# Patient Record
Sex: MALE | Race: BLACK OR AFRICAN AMERICAN | NOT HISPANIC OR LATINO | Employment: FULL TIME | URBAN - METROPOLITAN AREA
[De-identification: names, ages, dates, MRNs, and addresses within clinical notes are randomized per-mention and may not be internally consistent; named-entity substitution may affect disease eponyms.]

---

## 2017-01-17 RX ORDER — FLUTICASONE PROPIONATE 50 MCG
SPRAY, SUSPENSION (ML) NASAL
Qty: 16 G | Refills: 0 | Status: SHIPPED | OUTPATIENT
Start: 2017-01-17 | End: 2017-02-16 | Stop reason: SDUPTHER

## 2017-02-08 ENCOUNTER — TELEPHONE (OUTPATIENT)
Dept: INTERNAL MEDICINE | Facility: CLINIC | Age: 67
End: 2017-02-08

## 2017-02-08 NOTE — TELEPHONE ENCOUNTER
----- Message from Francis Dexter sent at 2/8/2017 11:20 AM CST -----  Contact: self  Pt called in about wanting to get lab work done. Please put lab in system.       Thank You

## 2017-02-09 ENCOUNTER — LAB VISIT (OUTPATIENT)
Dept: LAB | Facility: HOSPITAL | Age: 67
End: 2017-02-09
Attending: INTERNAL MEDICINE
Payer: MEDICARE

## 2017-02-09 DIAGNOSIS — I10 ESSENTIAL HYPERTENSION: ICD-10-CM

## 2017-02-09 DIAGNOSIS — E78.5 HYPERLIPIDEMIA, UNSPECIFIED HYPERLIPIDEMIA TYPE: ICD-10-CM

## 2017-02-09 LAB
ALBUMIN SERPL BCP-MCNC: 3.7 G/DL
ALP SERPL-CCNC: 92 U/L
ALT SERPL W/O P-5'-P-CCNC: 14 U/L
ANION GAP SERPL CALC-SCNC: 6 MMOL/L
AST SERPL-CCNC: 20 U/L
BASOPHILS # BLD AUTO: 0.04 K/UL
BASOPHILS NFR BLD: 0.5 %
BILIRUB SERPL-MCNC: 0.7 MG/DL
BUN SERPL-MCNC: 17 MG/DL
CALCIUM SERPL-MCNC: 9.3 MG/DL
CHLORIDE SERPL-SCNC: 106 MMOL/L
CHOLEST/HDLC SERPL: 4 {RATIO}
CO2 SERPL-SCNC: 29 MMOL/L
CREAT SERPL-MCNC: 1.4 MG/DL
DIFFERENTIAL METHOD: NORMAL
EOSINOPHIL # BLD AUTO: 0.4 K/UL
EOSINOPHIL NFR BLD: 4.9 %
ERYTHROCYTE [DISTWIDTH] IN BLOOD BY AUTOMATED COUNT: 13.3 %
EST. GFR  (AFRICAN AMERICAN): 59.7 ML/MIN/1.73 M^2
EST. GFR  (NON AFRICAN AMERICAN): 51.6 ML/MIN/1.73 M^2
GLUCOSE SERPL-MCNC: 96 MG/DL
HCT VFR BLD AUTO: 44.1 %
HDL/CHOLESTEROL RATIO: 25 %
HDLC SERPL-MCNC: 204 MG/DL
HDLC SERPL-MCNC: 51 MG/DL
HGB BLD-MCNC: 14.3 G/DL
LDLC SERPL CALC-MCNC: 131 MG/DL
LYMPHOCYTES # BLD AUTO: 2.9 K/UL
LYMPHOCYTES NFR BLD: 38.6 %
MCH RBC QN AUTO: 27.2 PG
MCHC RBC AUTO-ENTMCNC: 32.4 %
MCV RBC AUTO: 84 FL
MONOCYTES # BLD AUTO: 0.8 K/UL
MONOCYTES NFR BLD: 11.2 %
NEUTROPHILS # BLD AUTO: 3.3 K/UL
NEUTROPHILS NFR BLD: 44.3 %
NONHDLC SERPL-MCNC: 153 MG/DL
PLATELET # BLD AUTO: 232 K/UL
PMV BLD AUTO: 11.5 FL
POTASSIUM SERPL-SCNC: 4.2 MMOL/L
PROT SERPL-MCNC: 7.1 G/DL
RBC # BLD AUTO: 5.25 M/UL
SODIUM SERPL-SCNC: 141 MMOL/L
TRIGL SERPL-MCNC: 110 MG/DL
WBC # BLD AUTO: 7.4 K/UL

## 2017-02-09 PROCEDURE — 80053 COMPREHEN METABOLIC PANEL: CPT

## 2017-02-09 PROCEDURE — 80061 LIPID PANEL: CPT

## 2017-02-09 PROCEDURE — 85025 COMPLETE CBC W/AUTO DIFF WBC: CPT

## 2017-02-09 PROCEDURE — 36415 COLL VENOUS BLD VENIPUNCTURE: CPT | Mod: PO

## 2017-02-16 ENCOUNTER — TELEPHONE (OUTPATIENT)
Dept: INTERNAL MEDICINE | Facility: CLINIC | Age: 67
End: 2017-02-16

## 2017-02-16 NOTE — TELEPHONE ENCOUNTER
----- Message from Krista Zavala sent at 2/16/2017  1:48 PM CST -----  Contact: Online Dealer request  Message     Appointment Request From: Julio Bernardo        With Provider: Ryan Loera MD [-Primary Care Physician-]        Would Accept With:Only the person I've selected        Preferred Date Range: From 2/16/2017 To 2/20/2017        Preferred Times: Any        Reason for visit: Request an Appt        Comments:    Recently completed blood work and out of prescription drugs. need renewals.

## 2017-02-16 NOTE — TELEPHONE ENCOUNTER
Make him an appointment -  I usually see 10 - 11 in the morning-  Put him in on a day I don't have that many.

## 2017-02-17 ENCOUNTER — PATIENT MESSAGE (OUTPATIENT)
Dept: INTERNAL MEDICINE | Facility: CLINIC | Age: 67
End: 2017-02-17

## 2017-02-17 RX ORDER — SIMVASTATIN 20 MG/1
20 TABLET, FILM COATED ORAL NIGHTLY
Qty: 30 TABLET | Refills: 6 | Status: SHIPPED | OUTPATIENT
Start: 2017-02-17 | End: 2017-02-20 | Stop reason: SDUPTHER

## 2017-02-17 RX ORDER — LOSARTAN POTASSIUM 100 MG/1
100 TABLET ORAL DAILY
Qty: 30 TABLET | Refills: 6 | Status: SHIPPED | OUTPATIENT
Start: 2017-02-17 | End: 2017-02-17 | Stop reason: SDUPTHER

## 2017-02-17 RX ORDER — LOSARTAN POTASSIUM 100 MG/1
100 TABLET ORAL DAILY
Qty: 30 TABLET | Refills: 6 | Status: SHIPPED | OUTPATIENT
Start: 2017-02-17 | End: 2017-02-20 | Stop reason: SDUPTHER

## 2017-02-17 RX ORDER — SIMVASTATIN 20 MG/1
20 TABLET, FILM COATED ORAL NIGHTLY
Qty: 30 TABLET | Refills: 6 | Status: SHIPPED | OUTPATIENT
Start: 2017-02-17 | End: 2017-02-17 | Stop reason: SDUPTHER

## 2017-02-19 RX ORDER — FLUTICASONE PROPIONATE 50 MCG
1 SPRAY, SUSPENSION (ML) NASAL DAILY
Qty: 16 G | Refills: 4 | Status: SHIPPED | OUTPATIENT
Start: 2017-02-19 | End: 2017-02-20 | Stop reason: SDUPTHER

## 2017-02-20 DIAGNOSIS — L29.9 ITCHING OF EAR: ICD-10-CM

## 2017-02-20 RX ORDER — HYDROCORTISONE AND ACETIC ACID 20.75; 10.375 MG/ML; MG/ML
SOLUTION AURICULAR (OTIC)
Qty: 10 ML | Refills: 2 | Status: SHIPPED | OUTPATIENT
Start: 2017-02-20 | End: 2022-10-03

## 2017-02-20 RX ORDER — SILDENAFIL 100 MG/1
100 TABLET, FILM COATED ORAL DAILY PRN
Qty: 16 TABLET | Refills: 0 | Status: SHIPPED | OUTPATIENT
Start: 2017-02-20 | End: 2017-03-06 | Stop reason: SDUPTHER

## 2017-02-20 RX ORDER — SIMVASTATIN 20 MG/1
20 TABLET, FILM COATED ORAL NIGHTLY
Qty: 90 TABLET | Refills: 0 | Status: SHIPPED | OUTPATIENT
Start: 2017-02-20 | End: 2017-02-23 | Stop reason: SDUPTHER

## 2017-02-20 RX ORDER — LORATADINE 10 MG/1
10 TABLET ORAL DAILY
Qty: 90 TABLET | Refills: 1 | Status: SHIPPED | OUTPATIENT
Start: 2017-02-20 | End: 2021-10-01

## 2017-02-20 RX ORDER — INDOMETHACIN 50 MG/1
50 CAPSULE ORAL 2 TIMES DAILY WITH MEALS
Qty: 60 CAPSULE | Refills: 2 | Status: SHIPPED | OUTPATIENT
Start: 2017-02-20 | End: 2017-09-07

## 2017-02-20 RX ORDER — LOSARTAN POTASSIUM 100 MG/1
100 TABLET ORAL DAILY
Qty: 90 TABLET | Refills: 0 | Status: SHIPPED | OUTPATIENT
Start: 2017-02-20 | End: 2017-02-23 | Stop reason: SDUPTHER

## 2017-02-20 RX ORDER — FLUTICASONE PROPIONATE 50 MCG
1 SPRAY, SUSPENSION (ML) NASAL DAILY
Qty: 16 G | Refills: 4 | Status: SHIPPED | OUTPATIENT
Start: 2017-02-20 | End: 2017-02-23 | Stop reason: SDUPTHER

## 2017-02-24 RX ORDER — LOSARTAN POTASSIUM 100 MG/1
100 TABLET ORAL DAILY
Qty: 90 TABLET | Refills: 4 | Status: SHIPPED | OUTPATIENT
Start: 2017-02-24 | End: 2017-03-06 | Stop reason: SDUPTHER

## 2017-02-24 RX ORDER — FLUTICASONE PROPIONATE 50 MCG
1 SPRAY, SUSPENSION (ML) NASAL DAILY
Qty: 16 G | Refills: 4 | Status: SHIPPED | OUTPATIENT
Start: 2017-02-24 | End: 2017-09-04 | Stop reason: SDUPTHER

## 2017-02-24 RX ORDER — SIMVASTATIN 20 MG/1
20 TABLET, FILM COATED ORAL NIGHTLY
Qty: 90 TABLET | Refills: 4 | Status: SHIPPED | OUTPATIENT
Start: 2017-02-24 | End: 2017-09-07

## 2017-03-06 ENCOUNTER — HOSPITAL ENCOUNTER (OUTPATIENT)
Dept: RADIOLOGY | Facility: HOSPITAL | Age: 67
Discharge: HOME | End: 2017-03-06
Attending: INTERNAL MEDICINE
Payer: MEDICARE

## 2017-03-06 ENCOUNTER — OFFICE VISIT (OUTPATIENT)
Dept: INTERNAL MEDICINE | Facility: CLINIC | Age: 67
End: 2017-03-06
Payer: MEDICARE

## 2017-03-06 VITALS
WEIGHT: 241.63 LBS | HEART RATE: 75 BPM | DIASTOLIC BLOOD PRESSURE: 78 MMHG | BODY MASS INDEX: 32.02 KG/M2 | SYSTOLIC BLOOD PRESSURE: 135 MMHG | HEIGHT: 73 IN

## 2017-03-06 DIAGNOSIS — M17.0 OSTEOARTHRITIS OF BOTH KNEES, UNSPECIFIED OSTEOARTHRITIS TYPE: ICD-10-CM

## 2017-03-06 DIAGNOSIS — E78.5 HYPERLIPIDEMIA, UNSPECIFIED HYPERLIPIDEMIA TYPE: Primary | ICD-10-CM

## 2017-03-06 DIAGNOSIS — I10 ESSENTIAL HYPERTENSION: ICD-10-CM

## 2017-03-06 DIAGNOSIS — M79.673 PAIN OF FOOT, UNSPECIFIED LATERALITY: ICD-10-CM

## 2017-03-06 DIAGNOSIS — K63.5 POLYP OF COLON, UNSPECIFIED PART OF COLON, UNSPECIFIED TYPE: ICD-10-CM

## 2017-03-06 PROCEDURE — 3078F DIAST BP <80 MM HG: CPT | Mod: S$GLB,,, | Performed by: INTERNAL MEDICINE

## 2017-03-06 PROCEDURE — 1157F ADVNC CARE PLAN IN RCRD: CPT | Mod: S$GLB,,, | Performed by: INTERNAL MEDICINE

## 2017-03-06 PROCEDURE — 1160F RVW MEDS BY RX/DR IN RCRD: CPT | Mod: S$GLB,,, | Performed by: INTERNAL MEDICINE

## 2017-03-06 PROCEDURE — 73560 X-RAY EXAM OF KNEE 1 OR 2: CPT | Mod: 26,RT,, | Performed by: RADIOLOGY

## 2017-03-06 PROCEDURE — 99214 OFFICE O/P EST MOD 30 MIN: CPT | Mod: S$GLB,,, | Performed by: INTERNAL MEDICINE

## 2017-03-06 PROCEDURE — 99999 PR PBB SHADOW E&M-EST. PATIENT-LVL IV: CPT | Mod: PBBFAC,,, | Performed by: INTERNAL MEDICINE

## 2017-03-06 PROCEDURE — 3075F SYST BP GE 130 - 139MM HG: CPT | Mod: S$GLB,,, | Performed by: INTERNAL MEDICINE

## 2017-03-06 PROCEDURE — 99499 UNLISTED E&M SERVICE: CPT | Mod: S$PBB,,, | Performed by: INTERNAL MEDICINE

## 2017-03-06 PROCEDURE — 1159F MED LIST DOCD IN RCRD: CPT | Mod: S$GLB,,, | Performed by: INTERNAL MEDICINE

## 2017-03-06 PROCEDURE — 73560 X-RAY EXAM OF KNEE 1 OR 2: CPT | Mod: TC,50

## 2017-03-06 PROCEDURE — 73560 X-RAY EXAM OF KNEE 1 OR 2: CPT | Mod: 26,LT,, | Performed by: RADIOLOGY

## 2017-03-06 RX ORDER — SILDENAFIL 100 MG/1
100 TABLET, FILM COATED ORAL DAILY PRN
Qty: 6 TABLET | Refills: 12 | Status: SHIPPED | OUTPATIENT
Start: 2017-03-06 | End: 2021-10-01

## 2017-03-06 RX ORDER — LOSARTAN POTASSIUM 100 MG/1
100 TABLET ORAL DAILY
Qty: 90 TABLET | Refills: 4 | Status: SHIPPED | OUTPATIENT
Start: 2017-03-06 | End: 2017-10-23

## 2017-03-06 NOTE — PROGRESS NOTES
Mr. Bernardo is a 66 -year-old gentleman coming in today for followup of his   ongoing medical problems .    1. He has hypertension. He has been on losartan 100 mg a day. He has been tolerating it well. He denies any chest pain, short windedness, palpitation, nausea, or vomiting. Blood pressure today is 135/78 . At home his bp is 120-130's over 80's and he check regularly.         2. He has hyperlipidemia. He is on simvastatin. Cholesterol from this visit was reviewed---tc was 204, hdl 51 , .       3. . Colon polyps: He had 2 colon polyps in 2007, and 1 polyps in 9/2013-- Next c-scope was 9/2016. .      4. he has been having Right foot pain for 10 months. He feels a knot under the base of his second toe. He saw a podiatrist in the 9/2016   And looks like she prescribed orthotics- but he said he has not gotten those yet.  He continue to have pain./    5. Gout-- last attack in 9/2016.           ROS : Gen - no fatigue-- his weight has gone up about 4 #.  He walks or jogs daily.    Eyes - no eye pain or visual changes  ENT - no hoarseness or sore throat  CV - No chest pain or SOB. NO palpitations.  Pulm - no cough or wheezing  GI - no N/V/D   no dysuria or incontinence  MS - no joint pain or muscle pain  Skin - no rash, or c/o of skin lesions  Neuro - no HA, dizziness--- memory is doing well.   Heme - no abnormal bleeding or bruising  Endo - no polydipsia, or temperature changes  Psych - no anxiety or depression            PHYSICAL EXAM: A well-appearing gentleman. Weight is 241 pounds. bmi 31.88.   Blood   pressure as above. Pulse of 75. HT 6' 1'' blood pressure    Ear canals are open. TMs are clear. Oropharynx is clear.   NECK: Supple with no JVD. Thyroid is not enlarged.   Chest : CTA bilaterally   CARDIOVASCULAR: S1, S2, regular rate and rhythm without murmur, gallop, or  rub.   ABDOMEN: Soft, nontender.   LOWER EXTREMITIES: No edema  He has no cervical, axillary, or inguinal adenopathy.   Bicep reflexs:  nomal and equal bilaterally  Lower extremities: Normal muscle strength. No edema or cyanosis. TP/PT pulses+2 bilaterally. He does have some tenderness under his right foot.     ASSESSMENT:   1. Hyperlipidemia. His chol came back- continue the smivastatin.   2. Hypertension. conitnue the current bp meds.   3. Obesity. Spoke to him about diet and exercise. He actually seems to be exercising pretty well.     4. Colon polyp- c-scope due in another 3 years-- 9/2016.   5. Gout- - no recent episode. - We reviewed the gout diet.  6. Foot pain-- will refer back to  poditry-   7. optho- will refer back to optho.  Had been seen by optho for laser procedure after cataract surgery in 10/2016- but missed his follow up.  No eye pain-  Vision is good.    8. OA of knees- s/p chalogen injections in past- now they are starting to bother him again.  He is not taking anything for them at this time.  -- he can take some tylenol and use biofreeze- will get xrays and have him see ortho.      I will follow him up again in 6 months. He is going to let me know if   anything else come up.

## 2017-03-06 NOTE — MR AVS SNAPSHOT
[facility]  [address]  [facility] [address]  Phone: [phone]  Fax: [phone]                  Julio Bernardo   3/6/2017 9:00 AM   Office Visit    Description:  Male : 1950   Provider:  Ryan Loera Jr., MD   Department:  [facility] Medicine           Reason for Visit     Follow-up           Diagnoses this Visit        Comments    Hyperlipidemia, unspecified hyperlipidemia type    -  Primary     Essential hypertension         Polyp of colon, unspecified part of colon, unspecified type         Osteoarthritis of both knees, unspecified osteoarthritis type         Pain of foot, unspecified laterality                To Do List           Goals (5 Years of Data)     None       These Medications        Disp Refills Start End    losartan (COZAAR) 100 MG tablet 90 tablet 4 3/6/2017     Take 1 tablet (100 mg total) by mouth once daily. - Oral    Pharmacy: [facility]/pharmacy #[id number] [place], [place] - [address] Ph #: [phone]       sildenafil (VIAGRA) 100 MG tablet 6 tablet 12 3/6/2017     Take 1 tablet (100 mg total) by mouth daily as needed for Erectile Dysfunction. - Oral    Pharmacy: [facility]/pharmacy #[id number] [facility] [place] - [address] Ph #: [phone]         [facility]     [facility] On [facility] Nurse Care Line -  Assistance  Registered nurses in the [facility] Center provide clinical advisement, health education, appointment booking, and other advisory services.  Call for this free service at [phone].             Medications           Message regarding Medications     Verify the changes and/or additions to your medication regime listed below are the same as discussed with your clinician today.  If any of these changes or additions are incorrect, please notify your healthcare provider.             Verify that the below list of medications is an accurate representation of the medications you are currently taking.  If  "none reported, the list may be blank. If incorrect, please contact your healthcare provider. Carry this list with you in case of emergency.           Current Medications     ascorbic acid (VITAMIN C) 1000 MG tablet Take 1,000 mg by mouth once daily.    aspirin 81 mg Tab Take by mouth. 1 Tablet Oral Every day.  Last taken 3/25/11    fish oil-omega-3 fatty acids 300-1,000 mg capsule Take 2 g by mouth once daily.    fluticasone (FLONASE) 50 mcg/actuation nasal spray 1 spray by Each Nare route once daily.    losartan (COZAAR) 100 MG tablet Take 1 tablet (100 mg total) by mouth once daily.    multivitamin capsule Take 1 capsule by mouth once daily.    sildenafil (VIAGRA) 100 MG tablet Take 1 tablet (100 mg total) by mouth daily as needed for Erectile Dysfunction.    simvastatin (ZOCOR) 20 MG tablet Take 1 tablet (20 mg total) by mouth every evening.    acetic acid-hydrocortisone (VOSOL-HC) otic solution 2-4 drops to affected ear twice a day    indomethacin (INDOCIN) 50 MG capsule Take 1 capsule (50 mg total) by mouth 2 (two) times daily with meals.    loratadine (CLARITIN) 10 mg tablet Take 1 tablet (10 mg total) by mouth once daily.    PREVIDENT 5000 SENSITIVE 1.1-5 % Pste     sodium,potassium,mag sulfates (SUPREP BOWEL PREP KIT) 17.5-3.13-1.6 gram SolR Take 1 kit by mouth as directed.           Clinical Reference Information           Your Vitals Were     BP Pulse Height Weight BMI    135/78 (BP Location: Right arm, Patient Position: Sitting, BP Method: Automatic) 75 6' 1" (1.854 m) 109.6 kg (241 lb 10 oz) 31.88 kg/m2      Blood Pressure          Most Recent Value    BP  135/78      Allergies as of 3/6/2017     Penicillins    V-cillin K      Immunizations Administered on Date of Encounter - 3/6/2017     None      Orders Placed During Today's Visit      Normal Orders This Visit    Ambulatory consult to Orthopedics     Ambulatory consult to Podiatry     Future Labs/Procedures Expected by Expires    Comprehensive " metabolic panel  3/6/2017 5/5/2018    Lipid panel  3/6/2017 5/5/2018    X-ray AP Standing Knees with Both Lateral  3/6/2017 3/6/2018      Language Assistance Services     ATTENTION: Language assistance services are available, free of charge. Please call [phone].      ATENCIÓN: Si habla español, tiene a beard disposición servicios gratuitos de asistencia lingüística. Llame al [phone].     CHÚ Ý: N?u b?n nói Ti?ng Vi?t, có các d?ch v? h? tr? ngôn ng? mi?n phí dành cho b?n. G?i s? [phone].         [facility] complies with applicable Federal civil rights laws and does not discriminate on the basis of race, color, national origin, age, disability, or sex.

## 2017-03-09 ENCOUNTER — OFFICE VISIT (OUTPATIENT)
Dept: ORTHOPEDICS | Facility: CLINIC | Age: 67
End: 2017-03-09
Payer: MEDICARE

## 2017-03-09 VITALS
DIASTOLIC BLOOD PRESSURE: 93 MMHG | HEART RATE: 68 BPM | HEIGHT: 72 IN | SYSTOLIC BLOOD PRESSURE: 153 MMHG | WEIGHT: 235.88 LBS | RESPIRATION RATE: 16 BRPM | BODY MASS INDEX: 31.95 KG/M2

## 2017-03-09 DIAGNOSIS — M17.0 PRIMARY OSTEOARTHRITIS OF BOTH KNEES: Primary | ICD-10-CM

## 2017-03-09 PROCEDURE — 99203 OFFICE O/P NEW LOW 30 MIN: CPT | Mod: S$GLB,,, | Performed by: PHYSICIAN ASSISTANT

## 2017-03-09 PROCEDURE — 1157F ADVNC CARE PLAN IN RCRD: CPT | Mod: S$GLB,,, | Performed by: PHYSICIAN ASSISTANT

## 2017-03-09 PROCEDURE — 3077F SYST BP >= 140 MM HG: CPT | Mod: S$GLB,,, | Performed by: PHYSICIAN ASSISTANT

## 2017-03-09 PROCEDURE — 1159F MED LIST DOCD IN RCRD: CPT | Mod: S$GLB,,, | Performed by: PHYSICIAN ASSISTANT

## 2017-03-09 PROCEDURE — 1160F RVW MEDS BY RX/DR IN RCRD: CPT | Mod: S$GLB,,, | Performed by: PHYSICIAN ASSISTANT

## 2017-03-09 PROCEDURE — 99999 PR PBB SHADOW E&M-EST. PATIENT-LVL IV: CPT | Mod: PBBFAC,,, | Performed by: PHYSICIAN ASSISTANT

## 2017-03-09 PROCEDURE — 3080F DIAST BP >= 90 MM HG: CPT | Mod: S$GLB,,, | Performed by: PHYSICIAN ASSISTANT

## 2017-03-09 PROCEDURE — 1125F AMNT PAIN NOTED PAIN PRSNT: CPT | Mod: S$GLB,,, | Performed by: PHYSICIAN ASSISTANT

## 2017-03-09 RX ORDER — HYALURONATE SODIUM 20 MG/2 ML
40 SYRINGE (ML) INTRAARTICULAR WEEKLY
Status: COMPLETED | OUTPATIENT
Start: 2017-03-09 | End: 2017-03-30

## 2017-03-09 NOTE — PROGRESS NOTES
SUBJECTIVE:     Chief Complaint & History of Present Illness:  Julio Bernardo is a New patient 66 y.o. male who is seen here today with a complaint of    Chief Complaint   Patient presents with    Right Knee - Pain    Left Knee - Pain    . Patient reports she's had a slow insidious onset of pain in bilateral ears of prolonged sitting and/or negotiating stairs.  He's had no specific trauma or injury to his knees does not have significant swelling locking catching or giving way.  He has taken NSAIDs with short-term relief but has an immediate return of pain upon completion of the NSAIDs.  Reports she underwent a arthroscopy to the right knee several years ago for meniscal tear which relieved his symptoms for over 2 and half years.  He remains active and exercises daily but does try to avoid high-impact activities  On a scale of 1-10, with 10 being worst pain imaginable, he rates this pain as 4 on good days and 7 on bad days.  he describes the sore and achyAllergen Reactions    Penicillins     V-cillin k          Current Outpatient Prescriptions   Medication Sig Dispense Refill    acetic acid-hydrocortisone (VOSOL-HC) otic solution 2-4 drops to affected ear twice a day 10 mL 2    ascorbic acid (VITAMIN C) 1000 MG tablet Take 1,000 mg by mouth once daily.      aspirin 81 mg Tab Take by mouth. 1 Tablet Oral Every day.  Last taken 3/25/11      fish oil-omega-3 fatty acids 300-1,000 mg capsule Take 2 g by mouth once daily.      fluticasone (FLONASE) 50 mcg/actuation nasal spray 1 spray by Each Nare route once daily. 16 g 4    indomethacin (INDOCIN) 50 MG capsule Take 1 capsule (50 mg total) by mouth 2 (two) times daily with meals. 60 capsule 2    loratadine (CLARITIN) 10 mg tablet Take 1 tablet (10 mg total) by mouth once daily. 90 tablet 1    losartan (COZAAR) 100 MG tablet Take 1 tablet (100 mg total) by mouth once daily. 90 tablet 4    multivitamin capsule Take 1 capsule by mouth once daily.       PREVIDENT 5000 SENSITIVE 1.1-5 % Pste   2    sildenafil (VIAGRA) 100 MG tablet Take 1 tablet (100 mg total) by mouth daily as needed for Erectile Dysfunction. 6 tablet 12    simvastatin (ZOCOR) 20 MG tablet Take 1 tablet (20 mg total) by mouth every evening. 90 tablet 4    sodium,potassium,mag sulfates (SUPREP BOWEL PREP KIT) 17.5-3.13-1.6 gram SolR Take 1 kit by mouth as directed. 354 mL 0     Current Facility-Administered Medications   Medication Dose Route Frequency Provider Last Rate Last Dose    EUFLEXXA injection Syrg 40 mg  40 mg Intra-articular Weekly Damien Lee PA-C           Past Medical History:   Diagnosis Date    Allergy     Colon polyps     Hyperlipemia     Hypertension     Posterior capsular opacification        Past Surgical History:   Procedure Laterality Date    CATARACT EXTRACTION      both eyes    COLONOSCOPY N/A 9/27/2016    Procedure: COLONOSCOPY;  Surgeon: Jan Argueta MD;  Location: [address]);  Service: Endoscopy;  Laterality: N/A;    EYE SURGERY      HERNIA REPAIR      WISDOM TOOTH EXTRACTION      YAG      Left Eye       Vital Signs (Most Recent)  Vitals:    03/09/17 1114   BP: (!) 153/93   Pulse: 68   Resp: 16           Review of Systems:  ROS:  Constitutional: no fever or chills  Eyes: no visual changes, Bilateral vitreous attachments  ENT: no nasal congestion or sore throat  Respiratory: no cough or shortness of breath  Cardiovascular: no chest pain or palpitations  Gastrointestinal: no nausea or vomiting, tolerating diet  Genitourinary: no hematuria or dysuria  Integument/Breast: no rash or pruritis  Hematologic/Lymphatic: no easy bruising or lymphadenopathy  Musculoskeletal: no arthralgias or myalgias  Neurological: no seizures or tremors  Behavioral/Psych: no auditory or visual hallucinations  Endocrine: no heat or cold intolerance                OBJECTIVE:     PHYSICAL EXAM:  Height: 6' (182.9 cm) Weight: 107 kg (235 lb 14.3 oz), General Appearance:  Well nourished, well developed, in no acute distress.  Neurological: Mood & affect are normal.  bilateral Knee Exam:  Knee Range of Motion:0-115 degrees flexion   Effusion:not significant  Condition of skin:intact  Location of tenderness:Medial joint line   Strength:limited by pain and 5 of 5  Stability:  Lachman: stable, LCL: stable, MCL: stable, PCL: stable and posteromedial (dial): stable  Varus /Valgus stress:   Mild varus  Lyric:   negative/negative  Hip Examination:  full painless range of motion, without tenderness    RADIOGRAPHS:  X-rays taken today films reviewed by me demonstrate moderate arthritic changes throughout both knees with significant joint space narrowing tricompartmentally bilaterally more so in the medial compartments there is no marked osteophytic spurring and early sclerotic changes can be detected no other evidence of fracture dislocation or other bony abnormalities     ASSESSMENT/PLAN:     Plan: We discussed with the patient at length all the different treatment options available for arthrosis of the knee including anti-inflammatories, acetaminophen, rest, ice, knee strengthening exercise, occasional cortisone injections for temporary relief, Viscosupplimentation injections, arthroscopic debridement osteotomy, and finally knee arthroplasty.patient like to begin with a course of physical supplementation treatment.  We will order the medication see him back next week to begin injection therapy

## 2017-03-09 NOTE — LETTER
March 9, 2017      Ryan Loera Jr., MD  [address]  [place] [address]           [facility] Orthopedics  [address]  [facility] [address]  Phone: [phone]          Patient: Julio Bernardo   MR Number: [id number]   YOB: 1950   Date of Visit: 3/9/2017       Dear Dr. Ryan Loera Jr.:    Thank you for referring Julio Bernardo to me for evaluation. Attached you will find relevant portions of my assessment and plan of care.    If you have questions, please do not hesitate to call me. I look forward to following Julio Bernardo along with you.    Sincerely,    Damien Lee PA-C    Enclosure  CC:  No Recipients    If you would like to receive this communication electronically, please contact [email or web address] or [phone] to request more information on [employer] Link access.    For providers and/or their staff who would like to refer a patient to [facility], please contact us through our one-stop-shop provider referral line, [facility], at [phone].    If you feel you have received this communication in error or would no longer like to receive these types of communications, please e-mail [email or web address]

## 2017-03-16 ENCOUNTER — OFFICE VISIT (OUTPATIENT)
Dept: ORTHOPEDICS | Facility: CLINIC | Age: 67
End: 2017-03-16
Payer: MEDICARE

## 2017-03-16 VITALS — WEIGHT: 235.88 LBS | BODY MASS INDEX: 31.95 KG/M2 | HEIGHT: 72 IN

## 2017-03-16 DIAGNOSIS — M17.0 PRIMARY OSTEOARTHRITIS OF BOTH KNEES: Primary | ICD-10-CM

## 2017-03-16 PROCEDURE — 99999 PR PBB SHADOW E&M-EST. PATIENT-LVL III: CPT | Mod: PBBFAC,,, | Performed by: PHYSICIAN ASSISTANT

## 2017-03-16 PROCEDURE — 99499 UNLISTED E&M SERVICE: CPT | Mod: S$GLB,,, | Performed by: PHYSICIAN ASSISTANT

## 2017-03-16 PROCEDURE — 20610 DRAIN/INJ JOINT/BURSA W/O US: CPT | Mod: 50,S$GLB,, | Performed by: PHYSICIAN ASSISTANT

## 2017-03-16 RX ADMIN — Medication 40 MG: at 09:03

## 2017-03-16 NOTE — LETTER
March 16, 2017      Brenton Lay MD  [address]  [place] [address]           [facility]oped[facility]  [address]  [facility] [address]  Phone: [phone]          Patient: Julio Bernardo   MR Number: [id number]   YOB: 1950   Date of Visit: 3/16/2017       Dear Dr. Brenton Lay:    Thank you for referring Julio Bernardo to me for evaluation. Attached you will find relevant portions of my assessment and plan of care.    If you have questions, please do not hesitate to call me. I look forward to following Julio Bernardo along with you.    Sincerely,    Damien Lee PA-C    Enclosure  CC:  No Recipients    If you would like to receive this communication electronically, please contact [email or web address] or [phone] to request more information on [employer] Link access.    For providers and/or their staff who would like to refer a patient to [facility], please contact us through our one-stop-shop provider referral line, [facility], at [phone].    If you feel you have received this communication in error or would no longer like to receive these types of communications, please e-mail [email or web address]

## 2017-03-16 NOTE — PROGRESS NOTES
Julio Bernardo is a 66 y.o. year old his here today for his 1st Euflexxa injection for degenerative changes of his bilateral knee . he was last seen and treated in the clinic on 3/9/2017. There has been no significant change in his medical status since his last visit. No Fever, chills, malaise, or unexplained weight change.      Allergies, Medications, past medical and surgical history were reviewed .    Examination of the knee demonstrates  No evidence of edema, erythema , echymosis strength and range of motion are unchanged from previous visit.    The injection site was identified and the skin was prepared with a betadine solution. The joint was injected with 2 ml of Euflexxa solution under sterile technique. Julio Bernardo tolerated the procedure well, he was advised to rest the knee today, ice and elevation. I may take 3 -6 weeks following the last injection to get the full benefit of the medication.  I will see him back in 1 week. Sooner if he has any problems or concerns.           .

## 2017-03-20 ENCOUNTER — OFFICE VISIT (OUTPATIENT)
Dept: PODIATRY | Facility: CLINIC | Age: 67
End: 2017-03-20
Payer: MEDICARE

## 2017-03-20 VITALS
DIASTOLIC BLOOD PRESSURE: 79 MMHG | WEIGHT: 237 LBS | HEIGHT: 72 IN | HEART RATE: 85 BPM | BODY MASS INDEX: 32.1 KG/M2 | SYSTOLIC BLOOD PRESSURE: 136 MMHG

## 2017-03-20 DIAGNOSIS — M20.41 HAMMER TOE OF RIGHT FOOT: ICD-10-CM

## 2017-03-20 DIAGNOSIS — M79.2 NEURITIS: ICD-10-CM

## 2017-03-20 DIAGNOSIS — M79.671 RIGHT FOOT PAIN: Primary | ICD-10-CM

## 2017-03-20 DIAGNOSIS — M77.51: ICD-10-CM

## 2017-03-20 PROCEDURE — 1160F RVW MEDS BY RX/DR IN RCRD: CPT | Mod: S$GLB,,, | Performed by: PODIATRIST

## 2017-03-20 PROCEDURE — 1159F MED LIST DOCD IN RCRD: CPT | Mod: S$GLB,,, | Performed by: PODIATRIST

## 2017-03-20 PROCEDURE — 1125F AMNT PAIN NOTED PAIN PRSNT: CPT | Mod: S$GLB,,, | Performed by: PODIATRIST

## 2017-03-20 PROCEDURE — 1157F ADVNC CARE PLAN IN RCRD: CPT | Mod: S$GLB,,, | Performed by: PODIATRIST

## 2017-03-20 PROCEDURE — 3078F DIAST BP <80 MM HG: CPT | Mod: S$GLB,,, | Performed by: PODIATRIST

## 2017-03-20 PROCEDURE — 99999 PR PBB SHADOW E&M-EST. PATIENT-LVL III: CPT | Mod: PBBFAC,,, | Performed by: PODIATRIST

## 2017-03-20 PROCEDURE — 3075F SYST BP GE 130 - 139MM HG: CPT | Mod: S$GLB,,, | Performed by: PODIATRIST

## 2017-03-20 PROCEDURE — 99214 OFFICE O/P EST MOD 30 MIN: CPT | Mod: S$GLB,,, | Performed by: PODIATRIST

## 2017-03-20 NOTE — LETTER
March 22, 2017      Ryan Loera Jr., MD  [address]  [place] [address]           [facility] - Podiat[facility]  [address]  [facility] [address]  Phone: [phone]          Patient: Julio Bernardo   MR Number: [id number]   YOB: 1950   Date of Visit: 3/20/2017       Dear Dr. Ryan Loera Jr.:    Thank you for referring Julio Bernardo to me for evaluation. Attached you will find relevant portions of my assessment and plan of care.    If you have questions, please do not hesitate to call me. I look forward to following Julio Bernardo along with you.    Sincerely,    Chen Ellington DPM    Enclosure  CC:  No Recipients    If you would like to receive this communication electronically, please contact [email or web address] or [phone] to request more information on [employer] Link access.    For providers and/or their staff who would like to refer a patient to [facility], please contact us through our one-stop-shop provider referral line, [facility], at [phone].    If you feel you have received this communication in error or would no longer like to receive these types of communications, please e-mail [email or web address]

## 2017-03-20 NOTE — PROGRESS NOTES
CC:   painful right foot       HPI:   Julio Bernardo is a 66 y.o. male who presents to clinic with complaints of  Pain sub 2nd metatarsal head of the right foot.  Still present since last visit.  He has not gotten custom foot orthotics    No redness, swelling or wounds noted. He is in dress shoes, pointy toe.       Patient Active Problem List   Diagnosis    After-cataract, obscuring vision - Left Eye    Vitreous detachment - Both Eyes    Idiopathic hypertension    Astigmatism - Both Eyes    Post-operative state - Left Eye    Hyperlipemia    Hypertension    ED (erectile dysfunction)    Colon polyp    Right posterior capsular opacification    Pseudophakia       Current Outpatient Prescriptions on File Prior to Visit   Medication Sig Dispense Refill    acetic acid-hydrocortisone (VOSOL-HC) otic solution 2-4 drops to affected ear twice a day 10 mL 2    ascorbic acid (VITAMIN C) 1000 MG tablet Take 1,000 mg by mouth once daily.      aspirin 81 mg Tab Take by mouth. 1 Tablet Oral Every day.  Last taken 3/25/11      fish oil-omega-3 fatty acids 300-1,000 mg capsule Take 2 g by mouth once daily.      fluticasone (FLONASE) 50 mcg/actuation nasal spray 1 spray by Each Nare route once daily. 16 g 4    indomethacin (INDOCIN) 50 MG capsule Take 1 capsule (50 mg total) by mouth 2 (two) times daily with meals. 60 capsule 2    loratadine (CLARITIN) 10 mg tablet Take 1 tablet (10 mg total) by mouth once daily. 90 tablet 1    losartan (COZAAR) 100 MG tablet Take 1 tablet (100 mg total) by mouth once daily. 90 tablet 4    multivitamin capsule Take 1 capsule by mouth once daily.      PREVIDENT 5000 SENSITIVE 1.1-5 % Pste   2    sildenafil (VIAGRA) 100 MG tablet Take 1 tablet (100 mg total) by mouth daily as needed for Erectile Dysfunction. 6 tablet 12    simvastatin (ZOCOR) 20 MG tablet Take 1 tablet (20 mg total) by mouth every evening. 90 tablet 4    sodium,potassium,mag sulfates (SUPREP BOWEL PREP KIT)  17.5-3.13-1.6 gram SolR Take 1 kit by mouth as directed. 354 mL 0     Current Facility-Administered Medications on File Prior to Visit   Medication Dose Route Frequency Provider Last Rate Last Dose    EUFLEXXA injection Syrg 40 mg  40 mg Intra-articular Weekly Damien Lee PA-C   40 mg at 17 0937       Review of patient's allergies indicates:   Allergen Reactions    Penicillins     V-cillin k              Review of Systems -   General ROS: negative  Respiratory ROS: no cough, shortness of breath, or wheezing  Cardiovascular ROS: no chest pain or dyspnea on exertion  Musculoskeletal ROS: positive for - joint stiffness  negative for - muscle pain or muscular weakness  Neurological ROS: no TIA or stroke symptoms  Dermatological ROS: negative        EXAM:  Vitals:    17 1357   BP: 136/79   Pulse: 85   Weight: 107.5 kg (237 lb)   Height: 6' (1.829 m)         Gen: Alert and orient x 3, no apparent distress. Cooperative.       Right   Foot:      Vascular:     Dorsalis pedis pulse 2/4   posterior tibial pulse 2/4 .   3 seconds capillary refill time and toes are warm to touch.  There is  presence of digital hair.  There is no edema.  no varicosities.    Neurological: no sensory deficits noted and normal light touch sensation    Derm: normal, good color, good turgor and no lesions    MSK:  Hammer toes right 2nd, palpable pain 2nd MPJ right, reduceable 2nd MPJ right  No pain, redness or swelling on the joints  normal 5/5 strength in all tested muscle groups, no muscle wasting or atrophy and no abnormalities of position      Imagin16   The bony structures of the right foot are intact.  mild degenerative change is seen in the first MP joint but the remainder of the MP joints are normal.  The phalanges and metatarsals are normal.  Tarsal bones are unremarkable      ASSESSMENT / PLAN:    I counseled the patient on his conditions, their implications and medical management.     Right foot pain  -      MRI Lower Extremity Without Contrast Right; Future; Expected date: 3/20/17    Neuritis - Right Foot  -     MRI Lower Extremity Without Contrast Right; Future; Expected date: 3/20/17    Hammer toe of right foot    Bursitis of foot, right    -  NSAIDS prn pain.   -  Avoid narrow toe shoes, wider extra depth shoes advised.  -  Patient wants an MRI.  Will order and call with MRI result when available.

## 2017-03-23 ENCOUNTER — OFFICE VISIT (OUTPATIENT)
Dept: ORTHOPEDICS | Facility: CLINIC | Age: 67
End: 2017-03-23
Payer: MEDICARE

## 2017-03-23 VITALS
WEIGHT: 237 LBS | DIASTOLIC BLOOD PRESSURE: 86 MMHG | SYSTOLIC BLOOD PRESSURE: 145 MMHG | RESPIRATION RATE: 16 BRPM | HEIGHT: 72 IN | BODY MASS INDEX: 32.1 KG/M2 | HEART RATE: 75 BPM

## 2017-03-23 DIAGNOSIS — M17.0 PRIMARY OSTEOARTHRITIS OF BOTH KNEES: Primary | ICD-10-CM

## 2017-03-23 PROCEDURE — 20610 DRAIN/INJ JOINT/BURSA W/O US: CPT | Mod: 50,S$GLB,, | Performed by: PHYSICIAN ASSISTANT

## 2017-03-23 PROCEDURE — 99499 UNLISTED E&M SERVICE: CPT | Mod: S$GLB,,, | Performed by: PHYSICIAN ASSISTANT

## 2017-03-23 PROCEDURE — 99999 PR PBB SHADOW E&M-EST. PATIENT-LVL IV: CPT | Mod: PBBFAC,,, | Performed by: PHYSICIAN ASSISTANT

## 2017-03-23 RX ADMIN — Medication 40 MG: at 11:03

## 2017-03-23 NOTE — LETTER
March 23, 2017      Brenton Lay MD  [address]  [place] [address]           [facility] - Orthopedics  [address]  [facility] [address]  Phone: [phone]          Patient: Julio Bernardo   MR Number: [id number]   YOB: 1950   Date of Visit: 3/23/2017       Dear Dr. Brenton Lay:    Thank you for referring Julio Bernardo to me for evaluation. Attached you will find relevant portions of my assessment and plan of care.    If you have questions, please do not hesitate to call me. I look forward to following Julio Bernardo along with you.    Sincerely,    Damien Lee PA-C    Enclosure  CC:  No Recipients    If you would like to receive this communication electronically, please contact [email or web address] or [phone] to request more information on [employer] Link access.    For providers and/or their staff who would like to refer a patient to [facility], please contact us through our one-stop-shop provider referral line, [facility], at [phone].    If you feel you have received this communication in error or would no longer like to receive these types of communications, please e-mail [email or web address]

## 2017-03-23 NOTE — PROGRESS NOTES
Julio Bernardo is a 66 y.o. year old his here today for his 2nd Euflexxa injection for degenerative changes of his bilateral knee . he was last seen and treated in the clinic on 3/16/2017. There has been no significant change in his medical status since his last visit. No Fever, chills, malaise, or unexplained weight change.      Allergies, Medications, past medical and surgical history were reviewed .    Examination of the knee demonstrates  No evidence of edema, erythema , echymosis strength and range of motion are unchanged from previous visit.    The injection site was identified and the skin was prepared with a betadine solution. The joint was injected with 2 ml of Euflexxa solution under sterile technique. Julio Bernardo tolerated the procedure well, he was advised to rest the knee today, ice and elevation. I may take 3 -6 weeks following the last injection to get the full benefit of the medication.  I will see him back in 1 week. Sooner if he has any problems or concerns.           .

## 2017-03-27 ENCOUNTER — HOSPITAL ENCOUNTER (OUTPATIENT)
Dept: RADIOLOGY | Facility: HOSPITAL | Age: 67
Discharge: HOME | End: 2017-03-27
Attending: PODIATRIST
Payer: MEDICARE

## 2017-03-27 DIAGNOSIS — M79.2 NEURITIS: ICD-10-CM

## 2017-03-27 DIAGNOSIS — M79.671 RIGHT FOOT PAIN: ICD-10-CM

## 2017-03-27 PROCEDURE — 73718 MRI LOWER EXTREMITY W/O DYE: CPT | Mod: TC,RT

## 2017-03-27 PROCEDURE — 73718 MRI LOWER EXTREMITY W/O DYE: CPT | Mod: 26,RT,, | Performed by: RADIOLOGY

## 2017-03-29 ENCOUNTER — PATIENT MESSAGE (OUTPATIENT)
Dept: PODIATRY | Facility: CLINIC | Age: 67
End: 2017-03-29

## 2017-03-30 ENCOUNTER — OFFICE VISIT (OUTPATIENT)
Dept: ORTHOPEDICS | Facility: CLINIC | Age: 67
End: 2017-03-30
Payer: MEDICARE

## 2017-03-30 VITALS
BODY MASS INDEX: 32.1 KG/M2 | DIASTOLIC BLOOD PRESSURE: 95 MMHG | RESPIRATION RATE: 16 BRPM | HEART RATE: 68 BPM | WEIGHT: 237 LBS | HEIGHT: 72 IN | SYSTOLIC BLOOD PRESSURE: 141 MMHG

## 2017-03-30 DIAGNOSIS — M17.0 PRIMARY OSTEOARTHRITIS OF BOTH KNEES: Primary | ICD-10-CM

## 2017-03-30 PROCEDURE — 99999 PR PBB SHADOW E&M-EST. PATIENT-LVL IV: CPT | Mod: PBBFAC,,, | Performed by: PHYSICIAN ASSISTANT

## 2017-03-30 PROCEDURE — 20610 DRAIN/INJ JOINT/BURSA W/O US: CPT | Mod: 50,S$GLB,, | Performed by: PHYSICIAN ASSISTANT

## 2017-03-30 PROCEDURE — 99499 UNLISTED E&M SERVICE: CPT | Mod: S$GLB,,, | Performed by: PHYSICIAN ASSISTANT

## 2017-03-30 RX ADMIN — Medication 40 MG: at 10:03

## 2017-03-30 NOTE — LETTER
March 30, 2017      Brenton Lay MD  [address]  [place] [address]           [facility] - Orthopedics  [address]  [place] [address]  Phone: [phone]          Patient: Julio Bernardo   MR Number: [id number]   YOB: 1950   Date of Visit: 3/30/2017       Dear Dr. Brenton Lay:    Thank you for referring Julio Bernardo to me for evaluation. Attached you will find relevant portions of my assessment and plan of care.    If you have questions, please do not hesitate to call me. I look forward to following Julio Bernardo along with you.    Sincerely,    Damien Lee PA-C    Enclosure  CC:  No Recipients    If you would like to receive this communication electronically, please contact [email or web address] or [phone] to request more information on [employer] Link access.    For providers and/or their staff who would like to refer a patient to [facility], please contact us through our one-stop-shop provider referral line, [facility], at [phone].    If you feel you have received this communication in error or would no longer like to receive these types of communications, please e-mail [email or web address]

## 2017-04-12 ENCOUNTER — OFFICE VISIT (OUTPATIENT)
Dept: PODIATRY | Facility: CLINIC | Age: 67
End: 2017-04-12
Payer: MEDICARE

## 2017-04-12 VITALS
HEART RATE: 75 BPM | DIASTOLIC BLOOD PRESSURE: 81 MMHG | SYSTOLIC BLOOD PRESSURE: 141 MMHG | HEIGHT: 72 IN | BODY MASS INDEX: 32.51 KG/M2 | WEIGHT: 240 LBS

## 2017-04-12 DIAGNOSIS — M79.671 RIGHT FOOT PAIN: Primary | ICD-10-CM

## 2017-04-12 DIAGNOSIS — G57.61 PLANTAR NEUROMA OF RIGHT FOOT: ICD-10-CM

## 2017-04-12 PROCEDURE — 64455 NJX AA&/STRD PLTR COM DG NRV: CPT | Mod: RT,S$GLB,, | Performed by: PODIATRIST

## 2017-04-12 PROCEDURE — 99499 UNLISTED E&M SERVICE: CPT | Mod: S$GLB,,, | Performed by: PODIATRIST

## 2017-04-12 PROCEDURE — 99999 PR PBB SHADOW E&M-EST. PATIENT-LVL III: CPT | Mod: PBBFAC,,, | Performed by: PODIATRIST

## 2017-04-12 RX ORDER — DEXAMETHASONE SODIUM PHOSPHATE 4 MG/ML
4 INJECTION, SOLUTION INTRA-ARTICULAR; INTRALESIONAL; INTRAMUSCULAR; INTRAVENOUS; SOFT TISSUE ONCE
Status: COMPLETED | OUTPATIENT
Start: 2017-04-12 | End: 2017-04-12

## 2017-04-12 RX ORDER — TRIAMCINOLONE ACETONIDE 40 MG/ML
40 INJECTION, SUSPENSION INTRA-ARTICULAR; INTRAMUSCULAR ONCE
Status: COMPLETED | OUTPATIENT
Start: 2017-04-12 | End: 2017-04-12

## 2017-04-12 RX ADMIN — DEXAMETHASONE SODIUM PHOSPHATE 4 MG: 4 INJECTION, SOLUTION INTRA-ARTICULAR; INTRALESIONAL; INTRAMUSCULAR; INTRAVENOUS; SOFT TISSUE at 03:04

## 2017-04-12 RX ADMIN — TRIAMCINOLONE ACETONIDE 40 MG: 40 INJECTION, SUSPENSION INTRA-ARTICULAR; INTRAMUSCULAR at 03:04

## 2017-04-12 NOTE — PROGRESS NOTES
CC:   painful right foot       HPI:   Julio Bernardo is a 66 y.o. male who presents to clinic with complaints of  Pain sub 2nd metatarsal head of the right foot.  Still present since last visit.  He has not gotten custom foot orthotics    No redness, swelling or wounds noted. He is in dress shoes, pointy toe.       4/12/17:  MRI done.  Shows neuroma at site of pain.  Here for cortisone injection.  He is wearing narrow toe dress shoes.         Patient Active Problem List   Diagnosis    After-cataract, obscuring vision - Left Eye    Vitreous detachment - Both Eyes    Idiopathic hypertension    Astigmatism - Both Eyes    Post-operative state - Left Eye    Hyperlipemia    Hypertension    ED (erectile dysfunction)    Colon polyp    Right posterior capsular opacification    Pseudophakia       Current Outpatient Prescriptions on File Prior to Visit   Medication Sig Dispense Refill    acetic acid-hydrocortisone (VOSOL-HC) otic solution 2-4 drops to affected ear twice a day 10 mL 2    ascorbic acid (VITAMIN C) 1000 MG tablet Take 1,000 mg by mouth once daily.      aspirin 81 mg Tab Take by mouth. 1 Tablet Oral Every day.  Last taken 3/25/11      fish oil-omega-3 fatty acids 300-1,000 mg capsule Take 2 g by mouth once daily.      fluticasone (FLONASE) 50 mcg/actuation nasal spray 1 spray by Each Nare route once daily. 16 g 4    indomethacin (INDOCIN) 50 MG capsule Take 1 capsule (50 mg total) by mouth 2 (two) times daily with meals. 60 capsule 2    loratadine (CLARITIN) 10 mg tablet Take 1 tablet (10 mg total) by mouth once daily. 90 tablet 1    losartan (COZAAR) 100 MG tablet Take 1 tablet (100 mg total) by mouth once daily. 90 tablet 4    multivitamin capsule Take 1 capsule by mouth once daily.      PREVIDENT 5000 SENSITIVE 1.1-5 % Pste   2    sildenafil (VIAGRA) 100 MG tablet Take 1 tablet (100 mg total) by mouth daily as needed for Erectile Dysfunction. 6 tablet 12    simvastatin (ZOCOR) 20 MG  tablet Take 1 tablet (20 mg total) by mouth every evening. 90 tablet 4    sodium,potassium,mag sulfates (SUPREP BOWEL PREP KIT) 17.5-3.13-1.6 gram SolR Take 1 kit by mouth as directed. 354 mL 0     No current facility-administered medications on file prior to visit.        Review of patient's allergies indicates:   Allergen Reactions    Penicillins     V-cillin k              Review of Systems -   General ROS: negative  Respiratory ROS: no cough, shortness of breath, or wheezing  Cardiovascular ROS: no chest pain or dyspnea on exertion  Musculoskeletal ROS: positive for - joint stiffness  negative for - muscle pain or muscular weakness  Neurological ROS: no TIA or stroke symptoms  Dermatological ROS: negative        EXAM:  Vitals:    17 1505   BP: (!) 141/81   Pulse: 75   Weight: 108.9 kg (240 lb)   Height: 6' (1.829 m)         Gen: Alert and orient x 3, no apparent distress. Cooperative.       Right   Foot:      Vascular:     Dorsalis pedis pulse 2/4   posterior tibial pulse 2/4 .   3 seconds capillary refill time and toes are warm to touch.  There is  presence of digital hair.  There is no edema.  no varicosities.    Neurological: no sensory deficits noted and normal light touch sensation    Derm: normal, good color, good turgor and no lesions    MSK:  Hammer toes right 2nd, palpable pain 2nd MPJ right, reduceable 2nd MPJ right  No pain, redness or swelling on the joints  normal 5/5 strength in all tested muscle groups, no muscle wasting or atrophy and no abnormalities of position    Imagin16   The bony structures of the right foot are intact.  mild degenerative change is seen in the first MP joint but the remainder of the MP joints are normal.  The phalanges and metatarsals are normal.  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    Mild degenerative changes of the forefoot.          ASSESSMENT / PLAN:    I counseled the patient on  his conditions, their implications and medical management.     Right foot pain  -     dexamethasone injection 4 mg; Inject 1 mL (4 mg total) into the muscle once.  -     triamcinolone acetonide injection 40 mg; Inject 1 mL (40 mg total) into the muscle once.    Plantar neuroma of right foot  -     dexamethasone injection 4 mg; Inject 1 mL (4 mg total) into the muscle once.  -     triamcinolone acetonide injection 40 mg; Inject 1 mL (40 mg total) into the muscle once.        -  NSAIDS prn pain.   -  Avoid narrow toe shoes, wider extra depth shoes advised.  -  With patient's permission,  a cortisone injection was performed at the Right 2nd intermetatarsal space, using 3ccs of mixture of (1cc 1% plain Lidocaine : 1cc 0.5% bupivicaine : 0.5cc kenalog-40 : 0.5cc dexamethasone).  Patient tolerated well.   Call or return to clinic prn if these symptoms worsen or fail to improve as anticipated.   Patient is amenable to plan.

## 2017-06-13 ENCOUNTER — OFFICE VISIT (OUTPATIENT)
Dept: OPTOMETRY | Facility: CLINIC | Age: 67
End: 2017-06-13
Payer: MEDICARE

## 2017-06-13 DIAGNOSIS — Z96.1 PSEUDOPHAKIA OF BOTH EYES: Primary | ICD-10-CM

## 2017-06-13 DIAGNOSIS — Z13.5 SCREENING FOR GLAUCOMA: ICD-10-CM

## 2017-06-13 PROCEDURE — 92015 DETERMINE REFRACTIVE STATE: CPT | Mod: S$GLB,,, | Performed by: OPTOMETRIST

## 2017-06-13 PROCEDURE — 99999 PR PBB SHADOW E&M-EST. PATIENT-LVL II: CPT | Mod: PBBFAC,,, | Performed by: OPTOMETRIST

## 2017-06-13 PROCEDURE — 92014 COMPRE OPH EXAM EST PT 1/>: CPT | Mod: S$GLB,,, | Performed by: OPTOMETRIST

## 2017-06-13 NOTE — PROGRESS NOTES
HPI     DLS: 10/26/2016 with Dr. Blackmon  Pt states va is blurry with most recent glasses Rx. Pt states when reading   everything gets blurry when moving to the right of the page. Pt states he   sees better without glasses.  Denies f/f      Systane ou PRN     Last edited by Angie Giles on 6/13/2017  8:08 AM. (History)        ROS     Positive for: Cardiovascular, Eyes    Negative for: Constitutional, Gastrointestinal, Neurological, Skin,   Genitourinary, Musculoskeletal, HENT, Endocrine, Respiratory, Psychiatric,   Allergic/Imm, Heme/Lymph    Last edited by Manish Godoy, OD on 6/13/2017  8:12 AM. (History)        Assessment /Plan     For exam results, see Encounter Report.    Pseudophakia of both eyes    Screening for glaucoma      1. Sp pciol/YAG OU.  2 weeks ago pt got new spex Rx based on last years refraction, and cant see out of them as well as older Rx.  Re-refracted today, and found change.  Rewrote spex Rx for remake (spex made outside of [facility])    PLAN:    rtc 1 yr

## 2017-09-04 RX ORDER — FLUTICASONE PROPIONATE 50 MCG
1 SPRAY, SUSPENSION (ML) NASAL DAILY
Qty: 1 BOTTLE | Refills: 4 | Status: SHIPPED | OUTPATIENT
Start: 2017-09-04 | End: 2018-01-08 | Stop reason: SDUPTHER

## 2017-09-05 ENCOUNTER — LAB VISIT (OUTPATIENT)
Dept: LAB | Facility: HOSPITAL | Age: 67
End: 2017-09-05
Attending: INTERNAL MEDICINE
Payer: MEDICARE

## 2017-09-05 DIAGNOSIS — I10 ESSENTIAL HYPERTENSION: ICD-10-CM

## 2017-09-05 DIAGNOSIS — E78.5 HYPERLIPIDEMIA, UNSPECIFIED HYPERLIPIDEMIA TYPE: ICD-10-CM

## 2017-09-05 LAB
ALBUMIN SERPL BCP-MCNC: 3.3 G/DL
ALP SERPL-CCNC: 92 U/L
ALT SERPL W/O P-5'-P-CCNC: 17 U/L
ANION GAP SERPL CALC-SCNC: 9 MMOL/L
AST SERPL-CCNC: 21 U/L
BILIRUB SERPL-MCNC: 0.5 MG/DL
BUN SERPL-MCNC: 15 MG/DL
CALCIUM SERPL-MCNC: 9 MG/DL
CHLORIDE SERPL-SCNC: 106 MMOL/L
CHOLEST SERPL-MCNC: 185 MG/DL
CHOLEST/HDLC SERPL: 3.4 {RATIO}
CO2 SERPL-SCNC: 27 MMOL/L
CREAT SERPL-MCNC: 1.5 MG/DL
EST. GFR  (AFRICAN AMERICAN): 54.5 ML/MIN/1.73 M^2
EST. GFR  (NON AFRICAN AMERICAN): 47.2 ML/MIN/1.73 M^2
GLUCOSE SERPL-MCNC: 105 MG/DL
HDLC SERPL-MCNC: 55 MG/DL
HDLC SERPL: 29.7 %
LDLC SERPL CALC-MCNC: 110.6 MG/DL
NONHDLC SERPL-MCNC: 130 MG/DL
POTASSIUM SERPL-SCNC: 4.1 MMOL/L
PROT SERPL-MCNC: 6.8 G/DL
SODIUM SERPL-SCNC: 142 MMOL/L
TRIGL SERPL-MCNC: 97 MG/DL

## 2017-09-05 PROCEDURE — 36415 COLL VENOUS BLD VENIPUNCTURE: CPT | Mod: PO

## 2017-09-05 PROCEDURE — 80061 LIPID PANEL: CPT

## 2017-09-05 PROCEDURE — 80053 COMPREHEN METABOLIC PANEL: CPT

## 2017-09-07 ENCOUNTER — OFFICE VISIT (OUTPATIENT)
Dept: INTERNAL MEDICINE | Facility: CLINIC | Age: 67
End: 2017-09-07
Payer: MEDICARE

## 2017-09-07 VITALS
HEIGHT: 73 IN | DIASTOLIC BLOOD PRESSURE: 89 MMHG | WEIGHT: 238.31 LBS | OXYGEN SATURATION: 96 % | BODY MASS INDEX: 31.59 KG/M2 | SYSTOLIC BLOOD PRESSURE: 140 MMHG | HEART RATE: 63 BPM

## 2017-09-07 DIAGNOSIS — I10 ESSENTIAL HYPERTENSION: ICD-10-CM

## 2017-09-07 DIAGNOSIS — R60.9 EDEMA, UNSPECIFIED TYPE: ICD-10-CM

## 2017-09-07 DIAGNOSIS — E78.5 HYPERLIPIDEMIA, UNSPECIFIED HYPERLIPIDEMIA TYPE: Primary | ICD-10-CM

## 2017-09-07 DIAGNOSIS — N18.30 CKD (CHRONIC KIDNEY DISEASE) STAGE 3, GFR 30-59 ML/MIN: ICD-10-CM

## 2017-09-07 DIAGNOSIS — R06.09 DOE (DYSPNEA ON EXERTION): ICD-10-CM

## 2017-09-07 LAB
BILIRUB UR QL STRIP: NEGATIVE
CLARITY UR REFRACT.AUTO: CLEAR
COLOR UR AUTO: YELLOW
GLUCOSE UR QL STRIP: NEGATIVE
HGB UR QL STRIP: NEGATIVE
KETONES UR QL STRIP: NEGATIVE
LEUKOCYTE ESTERASE UR QL STRIP: NEGATIVE
MICROSCOPIC COMMENT: NORMAL
NITRITE UR QL STRIP: NEGATIVE
PH UR STRIP: 7 [PH] (ref 5–8)
PROT UR QL STRIP: NEGATIVE
RBC #/AREA URNS AUTO: 1 /HPF (ref 0–4)
SP GR UR STRIP: 1.01 (ref 1–1.03)
URN SPEC COLLECT METH UR: NORMAL
UROBILINOGEN UR STRIP-ACNC: NEGATIVE EU/DL
WBC #/AREA URNS AUTO: 1 /HPF (ref 0–5)

## 2017-09-07 PROCEDURE — 81001 URINALYSIS AUTO W/SCOPE: CPT

## 2017-09-07 PROCEDURE — 1159F MED LIST DOCD IN RCRD: CPT | Mod: S$GLB,,, | Performed by: INTERNAL MEDICINE

## 2017-09-07 PROCEDURE — 99999 PR PBB SHADOW E&M-EST. PATIENT-LVL IV: CPT | Mod: PBBFAC,,, | Performed by: INTERNAL MEDICINE

## 2017-09-07 PROCEDURE — 3008F BODY MASS INDEX DOCD: CPT | Mod: S$GLB,,, | Performed by: INTERNAL MEDICINE

## 2017-09-07 PROCEDURE — 1125F AMNT PAIN NOTED PAIN PRSNT: CPT | Mod: S$GLB,,, | Performed by: INTERNAL MEDICINE

## 2017-09-07 PROCEDURE — 3077F SYST BP >= 140 MM HG: CPT | Mod: S$GLB,,, | Performed by: INTERNAL MEDICINE

## 2017-09-07 PROCEDURE — 99214 OFFICE O/P EST MOD 30 MIN: CPT | Mod: S$GLB,,, | Performed by: INTERNAL MEDICINE

## 2017-09-07 PROCEDURE — 3079F DIAST BP 80-89 MM HG: CPT | Mod: S$GLB,,, | Performed by: INTERNAL MEDICINE

## 2017-09-07 RX ORDER — FUROSEMIDE 20 MG/1
20 TABLET ORAL 2 TIMES DAILY
Qty: 60 TABLET | Refills: 11 | Status: CANCELLED | OUTPATIENT
Start: 2017-09-07 | End: 2018-09-07

## 2017-09-07 RX ORDER — ATORVASTATIN CALCIUM 40 MG/1
40 TABLET, FILM COATED ORAL DAILY
Qty: 90 TABLET | Refills: 3 | Status: SHIPPED | OUTPATIENT
Start: 2017-09-07 | End: 2018-09-14 | Stop reason: SDUPTHER

## 2017-09-07 RX ORDER — HYDROCHLOROTHIAZIDE 25 MG/1
25 TABLET ORAL DAILY
Qty: 30 TABLET | Refills: 11 | Status: CANCELLED | OUTPATIENT
Start: 2017-09-07 | End: 2018-09-07

## 2017-09-07 NOTE — PROGRESS NOTES
I have personally taken the history and examined this patient and agree with the resident's note as stated above with the following thoughts:   Getting chest pain at times- but continues to walk and jog sometimes without difficulty.

## 2017-09-07 NOTE — PROGRESS NOTES
INTERNAL MEDICINE RESIDENT CLINIC  CLINIC NOTE    Patient Name: Julio eBrnardo  YOB: 1950    PRESENTING HISTORY       History of Present Illness:  Mr. Julio Bernardo is a 67 y.o. male w/ significant PMHx of:    1- HTN : On losartan 100 mg. Today's /90  2- Knee SUSANNAH: F/U with orthopedic, on local steroids injection. Pain is improving and able to do usual activity.  3- Right foot neuroma: F/U with podiatry.    Pt last seen by Dr. Whitmore 03/17, He is here to day for a f/u. He gives a 6 months history of chest heaviness that on-off comes sudden, stays for less than half an hr relived by itself, doesn't make him stop his activity, it comes both at rest and with activity. Associated with palpitation. He also noticed lower limbs edema one month ago. No PND or orthopnea. No cough. Not a smoker.    He is still jogging, no leg pain after walking. According to him he drinks a lot of water.    He doesn't relate the episodes to anxiety or stress.     Review of Systems:  Constitutional: no fever or chills  Eyes: no visual changes  ENT: no nasal congestion or sore throat  Respiratory: no cough or shortness of breath  Cardiovascular: Chest heaviness, palpitation  Gastrointestinal: no nausea or vomiting, no abdominal pain or change in bowel habits  Genitourinary: no hematuria or dysuria  Musculoskeletal: no arthralgias or myalgias neha lower limbs edema.  Neurological: no seizures or tremors    PAST HISTORY:     Past Medical History:   Diagnosis Date    Allergy     Colon polyps     Hyperlipemia     Hypertension     Posterior capsular opacification        Past Surgical History:   Procedure Laterality Date    CATARACT EXTRACTION      both eyes    COLONOSCOPY N/A 9/27/2016    Procedure: COLONOSCOPY;  Surgeon: Jan Argueta MD;  Location: [address];  Service: Endoscopy;  Laterality: N/A;    EYE SURGERY      HERNIA REPAIR      WISDOM TOOTH EXTRACTION      YAG      Left Eye       Family  History   Problem Relation Age of Onset    Hypertension Father     Cataracts Father     Prostate cancer Paternal Uncle      prostate    Amblyopia Neg Hx     Blindness Neg Hx     Glaucoma Neg Hx     Macular degeneration Neg Hx     Retinal detachment Neg Hx     Strabismus Neg Hx        Social History     Social History    Marital status:      Spouse name: Ginger    Number of children: 4    Years of education: N/A     Occupational History     [employer]     Social History Main Topics    Smoking status: Never Smoker    Smokeless tobacco: None    Alcohol use Yes      Comment: socially    Drug use: No    Sexual activity: Yes     Partners: Female     Other Topics Concern    None     Social History Narrative    None       MEDICATIONS & ALLERGIES:     Current Outpatient Prescriptions on File Prior to Visit   Medication Sig    acetic acid-hydrocortisone (VOSOL-HC) otic solution 2-4 drops to affected ear twice a day    ascorbic acid (VITAMIN C) 1000 MG tablet Take 1,000 mg by mouth once daily.    aspirin 81 mg Tab Take by mouth. 1 Tablet Oral Every day.  Last taken 3/25/11    fish oil-omega-3 fatty acids 300-1,000 mg capsule Take 2 g by mouth once daily.    fluticasone (FLONASE) 50 mcg/actuation nasal spray 1 SPRAY BY EACH NARE ROUTE ONCE DAILY.    loratadine (CLARITIN) 10 mg tablet Take 1 tablet (10 mg total) by mouth once daily.    losartan (COZAAR) 100 MG tablet Take 1 tablet (100 mg total) by mouth once daily.    multivitamin capsule Take 1 capsule by mouth once daily.    PREVIDENT 5000 SENSITIVE 1.1-5 % Pste     sildenafil (VIAGRA) 100 MG tablet Take 1 tablet (100 mg total) by mouth daily as needed for Erectile Dysfunction.    sodium,potassium,mag sulfates (SUPREP BOWEL PREP KIT) 17.5-3.13-1.6 gram SolR Take 1 kit by mouth as directed.    [DISCONTINUED] indomethacin (INDOCIN) 50 MG capsule Take 1 capsule (50 mg total) by mouth 2 (two) times daily with meals.     "[DISCONTINUED] simvastatin (ZOCOR) 20 MG tablet Take 1 tablet (20 mg total) by mouth every evening.     No current facility-administered medications on file prior to visit.        Review of patient's allergies indicates:   Allergen Reactions    Penicillins     V-cillin k        OBJECTIVE:   Vital Signs:  Vitals:    09/07/17 0832   BP: (!) 140/89   Pulse: 63   SpO2: 96%   Weight: 108.1 kg (238 lb 5.1 oz)   Height: 6' 1" (1.854 m)       No results found for this or any previous visit (from the past 24 hour(s)).      Physical Exam:   General:  Well developed, well nourished, no acute distress  HEENT:  Normocephalic, atraumatic, PERRL, EOMI, clear sclera, ears normal, throat clear without erythema or exudates  CVS:  RRR, S1 and S2 normal, no murmurs, rubs, gallops  Resp:  Lungs clear to auscultation, no wheezes, rales, rhonchi, cough  GI:  Abdomen soft, non-tender, non-distended, normoactive bowel sounds, no masses  MSK:  No muscle atrophy, cyanosis, peripheral edema, full range of motion  Skin:  No rashes, ulcers, erythema  Neuro:  CNII-XII grossly intact  Psych:  Alert and oriented to person, place, and time    ASSESSMENT & PLAN:     Julio was seen today for follow-up.    Diagnoses and all orders for this visit:    Hyperlipidemia, unspecified hyperlipidemia type  - ASCVD score 19.8%, increase his statin therapy to moderate intensity.  - atorvastatin (LIPITOR) 40 MG tablet; Take 1 tablet (40 mg total) by mouth once daily.    Essential hypertension  - Pt already on ARBs, as his history of recurrent gouts will hold prescribing HCZT at the moment also because of L.L edema CCB wont be ideal also, pulse today 63 BB would be a good option.  - Will consider adding another agent next visit.    QUINTEROS (dyspnea on exertion)  -     Exercise stress echo; Future    CKD (chronic kidney disease) stage 3, GFR 30-59 ml/min  -     Urinalysis to check for proteinuria         -     New findings, possible do to his HTN.    Edema, " unspecified type  -     Basic metabolic panel; Future  -     Brain natriuretic peptide; Future        RTC 1 month with labs    Da Ashley  PGY-1      Answers for HPI/ROS submitted by the patient on 9/5/2017   activity change: No  unexpected weight change: No  neck pain: Yes  hearing loss: No  rhinorrhea: No  trouble swallowing: No  eye discharge: No  visual disturbance: No  chest tightness: Yes  wheezing: No  chest pain: No  palpitations: No  blood in stool: No  constipation: No  vomiting: No  diarrhea: No  polydipsia: No  polyuria: No  difficulty urinating: No  urgency: No  hematuria: No  joint swelling: Yes  arthralgias: Yes  headaches: No  weakness: No  confusion: No  dysphoric mood: No

## 2017-09-11 ENCOUNTER — PATIENT MESSAGE (OUTPATIENT)
Dept: INTERNAL MEDICINE | Facility: CLINIC | Age: 67
End: 2017-09-11

## 2017-09-11 ENCOUNTER — HOSPITAL ENCOUNTER (OUTPATIENT)
Dept: CARDIOLOGY | Facility: CLINIC | Age: 67
Discharge: HOME | End: 2017-09-11
Payer: MEDICARE

## 2017-09-11 DIAGNOSIS — R06.09 DOE (DYSPNEA ON EXERTION): ICD-10-CM

## 2017-09-11 LAB — RETIRED EF AND QEF - SEE NOTES: 63 (ref 55–65)

## 2017-09-11 PROCEDURE — 93351 STRESS TTE COMPLETE: CPT | Mod: S$GLB,,, | Performed by: INTERNAL MEDICINE

## 2017-09-11 PROCEDURE — 93321 DOPPLER ECHO F-UP/LMTD STD: CPT | Mod: S$GLB,,, | Performed by: INTERNAL MEDICINE

## 2017-09-12 ENCOUNTER — TELEPHONE (OUTPATIENT)
Dept: HEPATOLOGY | Facility: HOSPITAL | Age: 67
End: 2017-09-12

## 2017-10-04 ENCOUNTER — PATIENT MESSAGE (OUTPATIENT)
Dept: INTERNAL MEDICINE | Facility: CLINIC | Age: 67
End: 2017-10-04

## 2017-10-04 DIAGNOSIS — R06.09 DOE (DYSPNEA ON EXERTION): Primary | ICD-10-CM

## 2017-10-09 ENCOUNTER — LAB VISIT (OUTPATIENT)
Dept: LAB | Facility: HOSPITAL | Age: 67
End: 2017-10-09
Attending: INTERNAL MEDICINE
Payer: MEDICARE

## 2017-10-09 DIAGNOSIS — R60.9 EDEMA, UNSPECIFIED TYPE: ICD-10-CM

## 2017-10-09 LAB
ANION GAP SERPL CALC-SCNC: 8 MMOL/L
BNP SERPL-MCNC: 24 PG/ML
BUN SERPL-MCNC: 14 MG/DL
CALCIUM SERPL-MCNC: 8.7 MG/DL
CHLORIDE SERPL-SCNC: 108 MMOL/L
CO2 SERPL-SCNC: 26 MMOL/L
CREAT SERPL-MCNC: 1.2 MG/DL
EST. GFR  (AFRICAN AMERICAN): >60 ML/MIN/1.73 M^2
EST. GFR  (NON AFRICAN AMERICAN): >60 ML/MIN/1.73 M^2
GLUCOSE SERPL-MCNC: 102 MG/DL
POTASSIUM SERPL-SCNC: 4 MMOL/L
SODIUM SERPL-SCNC: 142 MMOL/L

## 2017-10-09 PROCEDURE — 83880 ASSAY OF NATRIURETIC PEPTIDE: CPT

## 2017-10-09 PROCEDURE — 36415 COLL VENOUS BLD VENIPUNCTURE: CPT

## 2017-10-09 PROCEDURE — 80048 BASIC METABOLIC PNL TOTAL CA: CPT

## 2017-10-17 ENCOUNTER — OFFICE VISIT (OUTPATIENT)
Dept: INTERNAL MEDICINE | Facility: CLINIC | Age: 67
End: 2017-10-17
Payer: MEDICARE

## 2017-10-17 VITALS
DIASTOLIC BLOOD PRESSURE: 85 MMHG | HEIGHT: 73 IN | WEIGHT: 244.06 LBS | HEART RATE: 72 BPM | SYSTOLIC BLOOD PRESSURE: 150 MMHG | BODY MASS INDEX: 32.34 KG/M2

## 2017-10-17 DIAGNOSIS — E78.5 HYPERLIPIDEMIA, UNSPECIFIED HYPERLIPIDEMIA TYPE: ICD-10-CM

## 2017-10-17 DIAGNOSIS — N18.30 CKD (CHRONIC KIDNEY DISEASE) STAGE 3, GFR 30-59 ML/MIN: ICD-10-CM

## 2017-10-17 DIAGNOSIS — K63.5 POLYP OF COLON, UNSPECIFIED PART OF COLON, UNSPECIFIED TYPE: ICD-10-CM

## 2017-10-17 DIAGNOSIS — I10 ESSENTIAL HYPERTENSION: Primary | ICD-10-CM

## 2017-10-17 DIAGNOSIS — M79.673 PAIN OF FOOT, UNSPECIFIED LATERALITY: ICD-10-CM

## 2017-10-17 PROBLEM — R06.09 DOE (DYSPNEA ON EXERTION): Status: RESOLVED | Noted: 2017-09-07 | Resolved: 2017-10-17

## 2017-10-17 PROCEDURE — 99999 PR PBB SHADOW E&M-EST. PATIENT-LVL III: CPT | Mod: PBBFAC,,, | Performed by: INTERNAL MEDICINE

## 2017-10-17 PROCEDURE — 99214 OFFICE O/P EST MOD 30 MIN: CPT | Mod: S$GLB,,, | Performed by: INTERNAL MEDICINE

## 2017-10-17 PROCEDURE — 99499 UNLISTED E&M SERVICE: CPT | Mod: S$PBB,,, | Performed by: INTERNAL MEDICINE

## 2017-10-17 RX ORDER — AMLODIPINE BESYLATE 5 MG/1
5 TABLET ORAL DAILY
Qty: 90 TABLET | Refills: 2 | Status: SHIPPED | OUTPATIENT
Start: 2017-10-17 | End: 2018-05-31

## 2017-10-17 NOTE — PROGRESS NOTES
"Mr. Bernardo is a 67 -year-old gentleman coming in today for followup of his   ongoing medical problems .    1. He has hypertension. He has been on losartan 100 mg a day. He has been tolerating it well. He denies any chest pain, short windedness, palpitation, nausea, or vomiting. Blood pressure today is 160/90  . At home his bp is 140's over 80's and he check regularly. He feels like he gets weak and lethargic when his bp "gets to low"          2. He has hyperlipidemia. He is on atorvastatin. Cholesterol from this visit was reviewed---tc was 185, hdl 58 , .       3. . Colon polyps: He had 2 colon polyps in 2007, and 1 polyps in 9/2013-- Next c-scope was 9/2016- 1 polyp was found-- next c-scope is due 2019     4. he has been having Right foot pain for 10 months. He feels a knot under the base of his second toe. He got a steroid shot, and the pain has come back.  He saw a podiatrist in the 9/2016     He continue to have pain./     5. Gout-- last attack in 9/2016.      Answers for HPI/ROS submitted by the patient on 10/16/2017   Shortness of breath  Chronicity: new  Onset: more than 1 month ago  Frequency: intermittently  Progression since onset: gradually improving  Episode duration: 15 seconds  abdominal pain: No  chest pain: No  claudication: No  coryza: No  ear pain: No  fever: No  headaches: No  hemoptysis: No  leg pain: No  leg swelling: Yes  neck pain: No  orthopnea: No  PND: No  rash: No  rhinorrhea: No  sore throat: No  sputum production: Yes  swollen glands: No  syncope: No  vomiting: No  wheezing: No  Aggravating factors: exercise  Improvement on treatment: moderate  Risk factors for DVT/PE: no known risk factors  Treatments tried: nothing  asthma: No  allergies: Yes  COPD: No  pneumonia: Yes  aspirin allergies: No  CAD: No  DVT: No  heart failure: No  PE: No  recent surgery: Yes  bronchiolitis: No  chronic lung disease: No    BP (!) 160/90 (BP Location: Right arm, Patient Position: Sitting, BP Method: " "Medium (Manual))   Pulse 72   Ht 6' 1" (1.854 m)   Wt 110.7 kg (244 lb 0.8 oz)   BMI 32.20 kg/m²      ROS : Gen - no fatigue--   He walks or jogs daily.    Eyes - no eye pain or visual changes  ENT - no hoarseness or sore throat  CV - No chest pain or SOB. NO palpitations.  Pulm - no cough or wheezing  GI - no N/V/D   no dysuria or incontinence  MS - no joint pain or muscle pain  Skin - no rash, or c/o of skin lesions  Neuro - no HA, dizziness--- memory is doing well.   Heme - no abnormal bleeding or bruising  Endo - no polydipsia, or temperature changes  Psych - no anxiety or depression            PHYSICAL EXAM: A well-appearing gentleman. Weight is 241 pounds. bmi 31.88.   Blood   pressure as above. Pulse of 75. HT 6' 1'' blood pressure    Ear canals are open. TMs are clear. Oropharynx is clear.   NECK: Supple with no JVD. Thyroid is not enlarged.   Chest : CTA bilaterally   CARDIOVASCULAR: S1, S2, regular rate and rhythm without murmur, gallop, or  rub.   ABDOMEN: Soft, nontender.   LOWER EXTREMITIES: No edema  He has no cervical, axillary, or inguinal adenopathy.   Bicep reflexs: nomal and equal bilaterally  Lower extremities: Normal muscle strength. No edema or cyanosis. TP/PT pulses+2 bilaterally. He does have some tenderness under his right foot.     ASSESSMENT:   1. Hyperlipidemia. His chol came back- continue the atorvastatin.   2. Hypertension. conitnue the current bp meds. Will add amlodipine 5mg a day   3. Obesity. Spoke to him about diet and exercise. He actually seems to be exercising pretty well.     4. Colon polyp- c-scope due in another 3 years-- 9/2019.   5. Gout- - no recent episode. - We reviewed the gout diet.  6. Foot pain-- will refer back to  [facility]-  For garcia's neuroma --  7.OA of knees- s/p chalogen injections in past- now they are starting to bother him again.  He is not taking anything for them at this time.  -- he can take some tylenol and use biofreeze- will get xrays and have " him see ortho.       I will follow him up again in 6 months. He is going to let me know if   anything else come up.

## 2017-10-23 ENCOUNTER — OFFICE VISIT (OUTPATIENT)
Dept: PODIATRY | Facility: CLINIC | Age: 67
End: 2017-10-23
Payer: MEDICARE

## 2017-10-23 VITALS
SYSTOLIC BLOOD PRESSURE: 155 MMHG | WEIGHT: 244 LBS | HEIGHT: 73 IN | DIASTOLIC BLOOD PRESSURE: 93 MMHG | BODY MASS INDEX: 32.34 KG/M2 | HEART RATE: 66 BPM

## 2017-10-23 DIAGNOSIS — M20.41 HAMMER TOE OF RIGHT FOOT: Primary | ICD-10-CM

## 2017-10-23 DIAGNOSIS — G57.61 PLANTAR NEUROMA OF RIGHT FOOT: ICD-10-CM

## 2017-10-23 DIAGNOSIS — M79.671 RIGHT FOOT PAIN: ICD-10-CM

## 2017-10-23 PROCEDURE — 64455 NJX AA&/STRD PLTR COM DG NRV: CPT | Mod: RT,S$GLB,, | Performed by: PODIATRIST

## 2017-10-23 PROCEDURE — 99999 PR PBB SHADOW E&M-EST. PATIENT-LVL III: CPT | Mod: PBBFAC,,, | Performed by: PODIATRIST

## 2017-10-23 PROCEDURE — 99213 OFFICE O/P EST LOW 20 MIN: CPT | Mod: 25,S$GLB,, | Performed by: PODIATRIST

## 2017-10-23 RX ORDER — DEXAMETHASONE SODIUM PHOSPHATE 4 MG/ML
4 INJECTION, SOLUTION INTRA-ARTICULAR; INTRALESIONAL; INTRAMUSCULAR; INTRAVENOUS; SOFT TISSUE ONCE
Status: COMPLETED | OUTPATIENT
Start: 2017-10-23 | End: 2017-10-23

## 2017-10-23 RX ORDER — TRIAMCINOLONE ACETONIDE 40 MG/ML
40 INJECTION, SUSPENSION INTRA-ARTICULAR; INTRAMUSCULAR ONCE
Status: COMPLETED | OUTPATIENT
Start: 2017-10-23 | End: 2017-10-23

## 2017-10-23 RX ADMIN — DEXAMETHASONE SODIUM PHOSPHATE 4 MG: 4 INJECTION, SOLUTION INTRA-ARTICULAR; INTRALESIONAL; INTRAMUSCULAR; INTRAVENOUS; SOFT TISSUE at 09:10

## 2017-10-23 RX ADMIN — TRIAMCINOLONE ACETONIDE 40 MG: 40 INJECTION, SUSPENSION INTRA-ARTICULAR; INTRAMUSCULAR at 10:10

## 2017-10-23 NOTE — PROGRESS NOTES
CC:   painful right foot       HPI:   Julio Bernardo is a 67 y.o. male who presents to clinic with complaints of  Pain sub 2nd metatarsal head of the right foot.  Still present since last visit.  He has not gotten custom foot orthotics    No redness, swelling or wounds noted. He is in dress shoes, pointy toe.     4/12/17:  MRI done.  Shows neuroma at site of pain.  Here for cortisone injection.  He is wearing narrow toe dress shoes.       10/23/17:   complains of pain sub 2nd metatarsal head right foot.  Relief with injection in the past however.  Requesting injection today.   Also has right 2nd hammertoe, using crest pad occasionally.  In canvas slip on shoes today.  No history of trauma to the feet.  No custom foot orthotics.             Patient Active Problem List   Diagnosis    After-cataract, obscuring vision - Left Eye    Vitreous detachment - Both Eyes    Idiopathic hypertension    Astigmatism - Both Eyes    Post-operative state - Left Eye    Hyperlipemia    Hypertension    ED (erectile dysfunction)    Colon polyp    Right posterior capsular opacification    Pseudophakia    CKD (chronic kidney disease) stage 3, GFR 30-59 ml/min       Current Outpatient Prescriptions on File Prior to Visit   Medication Sig Dispense Refill    acetic acid-hydrocortisone (VOSOL-HC) otic solution 2-4 drops to affected ear twice a day 10 mL 2    amlodipine (NORVASC) 5 MG tablet Take 1 tablet (5 mg total) by mouth once daily. 90 tablet 2    ascorbic acid (VITAMIN C) 1000 MG tablet Take 1,000 mg by mouth once daily.      aspirin 81 mg Tab Take by mouth. 1 Tablet Oral Every day.  Last taken 3/25/11      atorvastatin (LIPITOR) 40 MG tablet Take 1 tablet (40 mg total) by mouth once daily. 90 tablet 3    fish oil-omega-3 fatty acids 300-1,000 mg capsule Take 2 g by mouth once daily.      fluticasone (FLONASE) 50 mcg/actuation nasal spray 1 SPRAY BY EACH NARE ROUTE ONCE DAILY. 1 Bottle 4    loratadine (CLARITIN)  "10 mg tablet Take 1 tablet (10 mg total) by mouth once daily. 90 tablet 1    multivitamin capsule Take 1 capsule by mouth once daily.      PREVIDENT 5000 SENSITIVE 1.1-5 % Pste   2    sildenafil (VIAGRA) 100 MG tablet Take 1 tablet (100 mg total) by mouth daily as needed for Erectile Dysfunction. 6 tablet 12    [DISCONTINUED] losartan (COZAAR) 100 MG tablet Take 1 tablet (100 mg total) by mouth once daily. 90 tablet 4     No current facility-administered medications on file prior to visit.        Review of patient's allergies indicates:   Allergen Reactions    Penicillins     V-cillin k            Social History     Social History    Marital status:      Spouse name: Ginger    Number of children: 4    Years of education: N/A     Occupational History     [employer]     Social History Main Topics    Smoking status: Never Smoker    Smokeless tobacco: Not on file    Alcohol use Yes      Comment: socially    Drug use: No    Sexual activity: Yes     Partners: Female     Other Topics Concern    Not on file     Social History Narrative    No narrative on file           Review of Systems -   General ROS: negative  Respiratory ROS: no cough, shortness of breath, or wheezing  Cardiovascular ROS: no chest pain or dyspnea on exertion  Musculoskeletal ROS: positive for - joint stiffness  negative for - muscle pain or muscular weakness  Neurological ROS: no TIA or stroke symptoms  Dermatological ROS: negative        EXAM:  Vitals:    10/23/17 0915   BP: (!) 155/93   Pulse: 66   Weight: 110.7 kg (244 lb)   Height: 6' 1" (1.854 m)         Gen: Alert and orient x 3, no apparent distress. Cooperative.       Right   Foot:      Vascular:     Dorsalis pedis pulse 2/4   posterior tibial pulse 2/4 .   3 seconds capillary refill time and toes are warm to touch.  There is  presence of digital hair.  There is no edema.  no varicosities.    Neurological: no sensory deficits noted and normal light touch " sensation    Derm: normal, good color, good turgor and no lesions    MSK:  Semi-rigid Hammer toes right 2nd, palpable pain 2nd MPJ right, reduceable 2nd MPJ right.  No pain with 2nd metatarso-phalangeal joint range of motion.  No Mulders.  No pain with lateral squeeze.   No pain, redness or swelling on the joints  normal 5/5 strength in all tested muscle groups, no muscle wasting or atrophy and no abnormalities of position.  There is hyperpigmentation at the dorsal proximal interphalangeal joint 2/2 chronic shoe irritation.      Imagin16   The bony structures of the right foot are intact.  mild degenerative change is seen in the first MP joint but the remainder of the MP joints are normal.  The phalanges and metatarsals are normal.  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  Mild degenerative changes of the forefoot.          ASSESSMENT / PLAN:    I counseled the patient on his conditions, their implications and medical management.     Hammer toe of right foot  -     dexamethasone injection 4 mg; Inject 1 mL (4 mg total) into the muscle once.  -     triamcinolone acetonide injection 40 mg; Inject 1 mL (40 mg total) into the muscle once.  - Crest pads  - Extra depth shoes    Plantar neuroma of right foot  -     dexamethasone injection 4 mg; Inject 1 mL (4 mg total) into the muscle once.  -     triamcinolone acetonide injection 40 mg; Inject 1 mL (40 mg total) into the muscle once.  - With patient's permission,  a cortisone injection was performed at the Right 2nd intermetatarsal space, using 3ccs of mixture of (1cc 1% plain Lidocaine : 1cc 0.5% bupivicaine : 0.5cc kenalog-40 : 0.5cc dexamethasone).  Patient tolerated well.       Right foot pain  · See above  · Continue comfortable shoes.  · Avoid barefoot walking      Call or return to clinic prn if these symptoms worsen or fail to improve as anticipated.

## 2017-10-23 NOTE — LETTER
October 23, 2017      Ryan Loera Jr., MD  [address]  [place] [address]           [facility] - Podiatry  [address]  [facility] [address]  Phone: [phone]          Patient: Julio Bernardo   MR Number: [id number]   YOB: 1950   Date of Visit: 10/23/2017       Dear Dr. Ryan Loera Jr.:    Thank you for referring Julio Bernardo to me for evaluation. Attached you will find relevant portions of my assessment and plan of care.    If you have questions, please do not hesitate to call me. I look forward to following Julio Bernardo along with you.    Sincerely,    Chen Ellington DPM    Enclosure  CC:  No Recipients    If you would like to receive this communication electronically, please contact [email or web address] or [phone] to request more information on [employer] Link access.    For providers and/or their staff who would like to refer a patient to [facility], please contact us through our one-stop-shop provider referral line, [facility], at [phone].    If you feel you have received this communication in error or would no longer like to receive these types of communications, please e-mail [email or web address]

## 2017-10-23 NOTE — PATIENT INSTRUCTIONS
What Are Mallet, Hammer, and Claw Toes?  Mallet, hammer, and claw toes are most often caused by wearing shoes that are too short or heels that are too high. This jams the toes against the front of the shoe and causes one or more joints to bend. Rarely, disease can cause the joints in the toes to bend. Mallet, hammer, and claw toes are among the most common toe problems. They occur most often in the longest of the four smaller toes.    Inside your toes  There are 3 bones in each of your 4 smaller toes. Where 2 bones connect is called a joint. Normally the toes lie flat. But pressure on the toes or the front of the foot can cause one or more joints to bend. This curls the toe. Toes that stay curled are called mallet toes, hammer toes, or claw toes, depending on which joints are bent.    Symptoms  You may feel pain in the toe or in the ball of your foot. A corn (a hard growth of skin on the top of the toe) may form where the toe rubs against the top of the shoe. Or a callus (a hard growth of skin on the bottom of the foot) may form under the tip of the toe or on the ball of the foot. Corns and calluses can also be painful.   Date Last Reviewed: 10/18/2015  © 2544-4577 [employer]. [address] [place] [address]. All rights reserved. This information is not intended as a substitute for professional medical care. Always follow your healthcare professional's instructions.        Treating Mallet, Hammer, and Claw Toes  Definitions  A hammer toe has an abnormal bend in the middle joint of your toe (toe is bent upward at joint).  Mallet toe affects the joint nearest your toenail (toe is bent downward at joint).  Claw toe affects the joint at the ball of your foot (toe is bent upward at joint), as well as both toe joints (toe is bent downward at both joints).  Hammer toe and mallet toe are most likely to occur in the toe next to your big toe.  Causes  The most common cause for all 3 deformities is  poorly fitting shoes and tight shoes, especially high heels for women. Trauma and nerve damage from various diseases like diabetes may also cause these deformities.  Treatment  Buying shoes with more room in the toes, filing down corns and calluses, and padding, taping, or strapping the toe most often relieve the pain. Toe stretching and exercises may also be helpful. If these steps dont work, you may need surgery to straighten your toes.  Shoes  Buy low-heeled shoes with plenty of room in the front. This keeps your toes from being jammed against the end of your shoe. It also keeps your shoe from rubbing the tops of your toes.  Corns and calluses    To file down a corn or callus, soak your foot in warm water. This softens the hard skin. Dry your foot. Then gently rub the corn or callus with a pumice stone or nail file.  Pads and splints  If you still have pain, you may need to put a pad or splint on your toe. This helps take pressure off the painful corn or callus.  · For a mallet toe, you can put a gel pad on the toe. This keeps the tip of the toe from rubbing against the bottom of the shoe.  · For a hammer or claw toe, you can put a felt or foam pad over the bent joint. This keeps the toe from rubbing on the top of the shoe.  · For a hammer or claw toe that is still flexible, you can put a splint on the toe. This keeps it straight so it doesn't rub on the top of the shoe.    Date Last Reviewed: 9/29/2015 © 2000-2017 [employer]. [address] [place] [address], [place] [address]. All rights reserved. This information is not intended as a substitute for professional medical care. Always follow your healthcare professional's instructions.

## 2018-01-08 RX ORDER — FLUTICASONE PROPIONATE 50 MCG
SPRAY, SUSPENSION (ML) NASAL
Qty: 1 BOTTLE | Refills: 1 | Status: SHIPPED | OUTPATIENT
Start: 2018-01-08 | End: 2018-06-07

## 2018-01-15 ENCOUNTER — PATIENT MESSAGE (OUTPATIENT)
Dept: INTERNAL MEDICINE | Facility: CLINIC | Age: 68
End: 2018-01-15

## 2018-02-26 RX ORDER — FLUTICASONE PROPIONATE 50 MCG
SPRAY, SUSPENSION (ML) NASAL
Qty: 16 G | Refills: 0 | Status: SHIPPED | OUTPATIENT
Start: 2018-02-26 | End: 2018-06-07 | Stop reason: SDUPTHER

## 2018-03-21 ENCOUNTER — TELEPHONE (OUTPATIENT)
Dept: INTERNAL MEDICINE | Facility: CLINIC | Age: 68
End: 2018-03-21

## 2018-03-21 DIAGNOSIS — I10 ESSENTIAL HYPERTENSION: Primary | ICD-10-CM

## 2018-03-21 DIAGNOSIS — E78.5 HYPERLIPIDEMIA, UNSPECIFIED HYPERLIPIDEMIA TYPE: ICD-10-CM

## 2018-03-22 NOTE — TELEPHONE ENCOUNTER
----- Message from Veronique Barlow sent at 3/21/2018  9:51 AM CDT -----  Contact: patient  Doctor appointment and lab have been scheduled.  Please link lab orders to the lab appointment.  Date of doctor appointment:  5-31  Physical or EP:  6 mo f/u  Date of lab appointment:  5-15  Comments: patient is having continuing swelling of right lower legs and would like all previous labs repeated.

## 2018-05-15 ENCOUNTER — LAB VISIT (OUTPATIENT)
Dept: LAB | Facility: HOSPITAL | Age: 68
End: 2018-05-15
Attending: INTERNAL MEDICINE
Payer: MEDICARE

## 2018-05-15 DIAGNOSIS — E78.5 HYPERLIPIDEMIA, UNSPECIFIED HYPERLIPIDEMIA TYPE: ICD-10-CM

## 2018-05-15 DIAGNOSIS — R06.09 DOE (DYSPNEA ON EXERTION): ICD-10-CM

## 2018-05-15 LAB
ALBUMIN SERPL BCP-MCNC: 3.6 G/DL
ALP SERPL-CCNC: 103 U/L
ALT SERPL W/O P-5'-P-CCNC: 17 U/L
ANION GAP SERPL CALC-SCNC: 9 MMOL/L
AST SERPL-CCNC: 22 U/L
BILIRUB SERPL-MCNC: 0.9 MG/DL
BUN SERPL-MCNC: 15 MG/DL
CALCIUM SERPL-MCNC: 8.8 MG/DL
CHLORIDE SERPL-SCNC: 107 MMOL/L
CHOLEST SERPL-MCNC: 153 MG/DL
CHOLEST/HDLC SERPL: 2.8 {RATIO}
CO2 SERPL-SCNC: 25 MMOL/L
CREAT SERPL-MCNC: 1.2 MG/DL
EST. GFR  (AFRICAN AMERICAN): >60 ML/MIN/1.73 M^2
EST. GFR  (NON AFRICAN AMERICAN): >60 ML/MIN/1.73 M^2
GLUCOSE SERPL-MCNC: 101 MG/DL
HDLC SERPL-MCNC: 54 MG/DL
HDLC SERPL: 35.3 %
LDLC SERPL CALC-MCNC: 77.2 MG/DL
NONHDLC SERPL-MCNC: 99 MG/DL
POTASSIUM SERPL-SCNC: 4 MMOL/L
PROT SERPL-MCNC: 6.8 G/DL
SODIUM SERPL-SCNC: 141 MMOL/L
TRIGL SERPL-MCNC: 109 MG/DL
TSH SERPL DL<=0.005 MIU/L-ACNC: 1.77 UIU/ML

## 2018-05-15 PROCEDURE — 36415 COLL VENOUS BLD VENIPUNCTURE: CPT | Mod: PO

## 2018-05-15 PROCEDURE — 80061 LIPID PANEL: CPT

## 2018-05-15 PROCEDURE — 80053 COMPREHEN METABOLIC PANEL: CPT

## 2018-05-15 PROCEDURE — 84443 ASSAY THYROID STIM HORMONE: CPT

## 2018-05-31 ENCOUNTER — OFFICE VISIT (OUTPATIENT)
Dept: INTERNAL MEDICINE | Facility: CLINIC | Age: 68
End: 2018-05-31
Payer: MEDICARE

## 2018-05-31 VITALS
DIASTOLIC BLOOD PRESSURE: 70 MMHG | HEART RATE: 70 BPM | SYSTOLIC BLOOD PRESSURE: 138 MMHG | WEIGHT: 244.69 LBS | BODY MASS INDEX: 32.43 KG/M2 | HEIGHT: 73 IN

## 2018-05-31 DIAGNOSIS — E78.5 HYPERLIPIDEMIA, UNSPECIFIED HYPERLIPIDEMIA TYPE: ICD-10-CM

## 2018-05-31 DIAGNOSIS — N18.30 CKD (CHRONIC KIDNEY DISEASE) STAGE 3, GFR 30-59 ML/MIN: ICD-10-CM

## 2018-05-31 DIAGNOSIS — K63.5 POLYP OF COLON, UNSPECIFIED PART OF COLON, UNSPECIFIED TYPE: ICD-10-CM

## 2018-05-31 DIAGNOSIS — I10 ESSENTIAL HYPERTENSION: ICD-10-CM

## 2018-05-31 DIAGNOSIS — I10 IDIOPATHIC HYPERTENSION: Primary | ICD-10-CM

## 2018-05-31 PROCEDURE — 99214 OFFICE O/P EST MOD 30 MIN: CPT | Mod: S$GLB,,, | Performed by: INTERNAL MEDICINE

## 2018-05-31 PROCEDURE — 3075F SYST BP GE 130 - 139MM HG: CPT | Mod: CPTII,S$GLB,, | Performed by: INTERNAL MEDICINE

## 2018-05-31 PROCEDURE — 99999 PR PBB SHADOW E&M-EST. PATIENT-LVL III: CPT | Mod: PBBFAC,,, | Performed by: INTERNAL MEDICINE

## 2018-05-31 PROCEDURE — 3078F DIAST BP <80 MM HG: CPT | Mod: CPTII,S$GLB,, | Performed by: INTERNAL MEDICINE

## 2018-05-31 RX ORDER — HYDROCHLOROTHIAZIDE 12.5 MG/1
12.5 CAPSULE ORAL DAILY
Qty: 90 CAPSULE | Refills: 3 | Status: SHIPPED | OUTPATIENT
Start: 2018-05-31 | End: 2018-12-05 | Stop reason: SDUPTHER

## 2018-05-31 RX ORDER — LOSARTAN POTASSIUM 100 MG/1
100 TABLET ORAL DAILY
Qty: 90 TABLET | Refills: 3 | Status: SHIPPED | OUTPATIENT
Start: 2018-05-31 | End: 2018-11-29 | Stop reason: SDUPTHER

## 2018-05-31 NOTE — PROGRESS NOTES
"Mr. Bernardo is a 67 -year-old gentleman coming in today for followup of his   ongoing medical problems .    1. He has hypertension. He has been on losartan 100 mg a day-- last visit I put him on amlodipine 5 mg a day.   He has been tolerating it well. Nut has had some ankle swelling.  He says his bp is good today but usually higher/        2. He has hyperlipidemia. He is on atorvastatin. Cholesterol from this visit was reviewed---tc was 153, hdl 54 , LDL 77.2.         3. . Colon polyps: He had 2 colon polyps in 2007, and 1 polyps in 9/2013-- Next c-scope was 9/2016- 1 polyp was found-- next c-scope is due 2019     4. . Gout-- last attack in 9/2016.  this is why we did not start hct last visit.         /70 (BP Location: Left arm, Patient Position: Sitting, BP Method: Large (Manual))   Pulse 70   Ht 6' 1" (1.854 m)   Wt 111 kg (244 lb 11.4 oz)   BMI 32.29 kg/m²        ROS : Gen - no fatigue--   He walks or jogs daily.    Eyes - no eye pain or visual changes  ENT - no hoarseness or sore throat  CV - No chest pain or SOB. NO palpitations.  Pulm - no cough or wheezing  GI - no N/V/D   no dysuria or incontinence  MS - no joint pain or muscle pain  Skin - no rash, or c/o of skin lesions  Neuro - no HA, dizziness--- memory is doing well.   Heme - no abnormal bleeding or bruising  Endo - no polydipsia, or temperature changes  Psych - no anxiety or depression            PHYSICAL EXAM: A well-appearing gentleman. Weight is 244 pounds. bmi 32.   Ear canals are open. TMs are clear. Oropharynx is clear.   NECK: Supple with no JVD. Thyroid is not enlarged.   Chest : CTA bilaterally   CARDIOVASCULAR: S1, S2, regular rate and rhythm without murmur, gallop, or  rub.   ABDOMEN: Soft, nontender.   LOWER EXTREMITIES: No edema  He has no cervical, axillary, or inguinal adenopathy.   Bicep reflexs: nomal and equal bilaterally  Lower extremities: Normal muscle strength. No edema or cyanosis. TP/PT pulses+2 bilaterally.bilateral " LE edema       ASSESSMENT:   1. Hyperlipidemia. His chol came back- continue the atorvastatin.   2. Hypertension. Stop the amlodipine  - restart losartan-- will add hctz.  -- has history of gout and very mild ckd- so will monitor - check bp at home.    3. Obesity. Spoke to him about diet and exercise. He actually seems to be exercising pretty well.     4. Colon polyp- c-scope due in another 3 years-- 9/2019.   5. Gout- - no recent episode. - We reviewed the gout diet.  6. Foot pain-- due to edema. --  7.OA of knees- s/p chalogen injections in past- now they are starting to bother him again.  He is not taking anything for them at this time.  -- he can take some tylenol and use biofreeze- will get xrays and have him see ortho.       I will follow him up again in 2 months. He is going to let me know if   anything else come up.     Answers for HPI/ROS submitted by the patient on 5/30/2018   activity change: No  unexpected weight change: No  neck pain: Yes  hearing loss: No  rhinorrhea: No  trouble swallowing: No  eye discharge: No  visual disturbance: No  chest tightness: No  wheezing: No  chest pain: No  palpitations: No  blood in stool: No  constipation: Yes  vomiting: No  diarrhea: No  polydipsia: No  polyuria: No  difficulty urinating: No  urgency: No  hematuria: No  joint swelling: Yes  arthralgias: No  headaches: No  weakness: No  confusion: No  dysphoric mood: No

## 2018-06-07 RX ORDER — FLUTICASONE PROPIONATE 50 MCG
SPRAY, SUSPENSION (ML) NASAL
Qty: 16 ML | Refills: 0 | Status: SHIPPED | OUTPATIENT
Start: 2018-06-07 | End: 2018-07-24 | Stop reason: SDUPTHER

## 2018-07-09 RX ORDER — AMLODIPINE BESYLATE 5 MG/1
5 TABLET ORAL DAILY
Qty: 90 TABLET | Refills: 2 | Status: SHIPPED | OUTPATIENT
Start: 2018-07-09 | End: 2019-09-25

## 2018-07-24 ENCOUNTER — PATIENT MESSAGE (OUTPATIENT)
Dept: INTERNAL MEDICINE | Facility: CLINIC | Age: 68
End: 2018-07-24

## 2018-07-25 RX ORDER — FLUTICASONE PROPIONATE 50 MCG
SPRAY, SUSPENSION (ML) NASAL
Qty: 16 ML | Refills: 3 | Status: SHIPPED | OUTPATIENT
Start: 2018-07-25 | End: 2018-10-10 | Stop reason: SDUPTHER

## 2018-07-26 ENCOUNTER — PATIENT MESSAGE (OUTPATIENT)
Dept: INTERNAL MEDICINE | Facility: CLINIC | Age: 68
End: 2018-07-26

## 2018-07-27 NOTE — TELEPHONE ENCOUNTER
----- Message from Mady Kingston sent at 7/27/2018 12:37 PM CDT -----  Contact: Wife, Connie call [phone]  Patient and wife sent [email or web address] message to have some labs ordered. Please check my chart and call her or enter the labs.

## 2018-07-31 ENCOUNTER — OFFICE VISIT (OUTPATIENT)
Dept: INTERNAL MEDICINE | Facility: CLINIC | Age: 68
End: 2018-07-31
Payer: MEDICARE

## 2018-07-31 VITALS
SYSTOLIC BLOOD PRESSURE: 130 MMHG | BODY MASS INDEX: 31.56 KG/M2 | HEIGHT: 73 IN | OXYGEN SATURATION: 96 % | WEIGHT: 238.13 LBS | DIASTOLIC BLOOD PRESSURE: 82 MMHG | TEMPERATURE: 98 F | HEART RATE: 52 BPM

## 2018-07-31 DIAGNOSIS — E78.5 HYPERLIPIDEMIA, UNSPECIFIED HYPERLIPIDEMIA TYPE: ICD-10-CM

## 2018-07-31 DIAGNOSIS — Z12.5 SCREENING PSA (PROSTATE SPECIFIC ANTIGEN): ICD-10-CM

## 2018-07-31 DIAGNOSIS — M1A.3710 CHRONIC GOUT OF RIGHT FOOT DUE TO RENAL IMPAIRMENT WITHOUT TOPHUS: ICD-10-CM

## 2018-07-31 DIAGNOSIS — I10 ESSENTIAL HYPERTENSION: ICD-10-CM

## 2018-07-31 DIAGNOSIS — N18.30 CKD (CHRONIC KIDNEY DISEASE) STAGE 3, GFR 30-59 ML/MIN: Primary | ICD-10-CM

## 2018-07-31 PROBLEM — M1A.9XX0 CHRONIC GOUT: Status: ACTIVE | Noted: 2018-07-31

## 2018-07-31 PROCEDURE — 3079F DIAST BP 80-89 MM HG: CPT | Mod: CPTII,S$GLB,, | Performed by: INTERNAL MEDICINE

## 2018-07-31 PROCEDURE — 99213 OFFICE O/P EST LOW 20 MIN: CPT | Mod: S$GLB,,, | Performed by: INTERNAL MEDICINE

## 2018-07-31 PROCEDURE — 99999 PR PBB SHADOW E&M-EST. PATIENT-LVL IV: CPT | Mod: PBBFAC,,, | Performed by: INTERNAL MEDICINE

## 2018-07-31 PROCEDURE — 3075F SYST BP GE 130 - 139MM HG: CPT | Mod: CPTII,S$GLB,, | Performed by: INTERNAL MEDICINE

## 2018-07-31 NOTE — PROGRESS NOTES
"Mr. Bernardo is a 68 -year-old gentleman coming in today for followup of his   ongoing medical problems .    1. He has hypertension. He has been on losartan 100 mg a day-- and hctz-- he was on amlodipine- but had some right foot swelling with this.      2. He has hyperlipidemia. He is on atorvastatin. Cholesterol from this visit was reviewed---tc was 153, hdl 54 , LDL 77.2.         3. . Colon polyps: He had 2 colon polyps in 2007, and 1 polyps in 9/2013-- Next c-scope was 9/2016- 1 polyp was found-- next c-scope is due 2019     4. . Gout-- last attack in 7/2018. This is after starting his hctz.        /82 (BP Location: Right arm, Patient Position: Sitting, BP Method: Large (Manual))   Pulse (!) 52   Temp 98.1 °F (36.7 °C)   Ht 6' 1" (1.854 m)   Wt 108 kg (238 lb 1.6 oz)   SpO2 96%   BMI 31.41 kg/m²     Answers for HPI/ROS submitted by the patient on 7/24/2018   activity change: No  unexpected weight change: No  neck pain: No  hearing loss: No  rhinorrhea: No  trouble swallowing: No  eye discharge: No  visual disturbance: No  chest tightness: No  wheezing: No  chest pain: No  palpitations: No  blood in stool: No  constipation: No  vomiting: No  diarrhea: No  polydipsia: No  polyuria: No  difficulty urinating: No  urgency: No  hematuria: No  joint swelling: Yes  arthralgias: Yes  headaches: No  weakness: No  confusion: No  dysphoric mood: No                   PHYSICAL EXAM: A well-appearing gentleman. /82 (BP Location: Right arm, Patient Position: Sitting, BP Method: Large (Manual))   Pulse (!) 52   Temp 98.1 °F (36.7 °C)   Ht 6' 1" (1.854 m)   Wt 108 kg (238 lb 1.6 oz)   SpO2 96%   BMI 31.41 kg/m²   NECK: Supple with no JVD. Thyroid is not enlarged.   Chest : CTA bilaterally   CARDIOVASCULAR: S1, S2, regular rate and rhythm without murmur, gallop, or  rub.   ABDOMEN: Soft, nontender.   LOWER EXTREMITIES: No edema  He has no cervical, axillary, or inguinal adenopathy.   Bicep reflexs: nomal and " equal bilaterally  Lower extremities: Normal muscle strength. No edema or cyanosis. TP/PT pulses+2 bilaterally.bilateral LE edema        ASSESSMENT:   1. Hyperlipidemia. continue the atorvastatin.   2. Hypertension.  losartan-- and  hctz. make sure he is drinking enough fluids--if he has more giout- may need to change to another medicine     3. Obesity. Spoke to him about diet and exercise. He actually seems to be exercising pretty well.     4. Colon polyp- c-scope due in another 3 years-- 9/2019.   5. Gout- -recent episode. - We reviewed the gout diet.  6. Foot pain-- due to edema. --resolved.     7.OA of knees- s/p chalogen injections in past- now they are starting to bother him again.  He is not taking anything for them at this time.  -- he can take some tylenol and use biofreeze- will get xrays and have him see ortho.

## 2018-08-01 ENCOUNTER — LAB VISIT (OUTPATIENT)
Dept: LAB | Facility: HOSPITAL | Age: 68
End: 2018-08-01
Attending: INTERNAL MEDICINE
Payer: MEDICARE

## 2018-08-01 DIAGNOSIS — Z12.5 SCREENING PSA (PROSTATE SPECIFIC ANTIGEN): ICD-10-CM

## 2018-08-01 DIAGNOSIS — N18.30 CKD (CHRONIC KIDNEY DISEASE) STAGE 3, GFR 30-59 ML/MIN: ICD-10-CM

## 2018-08-01 DIAGNOSIS — E78.5 HYPERLIPIDEMIA, UNSPECIFIED HYPERLIPIDEMIA TYPE: ICD-10-CM

## 2018-08-01 DIAGNOSIS — I10 ESSENTIAL HYPERTENSION: ICD-10-CM

## 2018-08-01 DIAGNOSIS — M1A.3710 CHRONIC GOUT OF RIGHT FOOT DUE TO RENAL IMPAIRMENT WITHOUT TOPHUS: ICD-10-CM

## 2018-08-01 LAB
ALBUMIN SERPL BCP-MCNC: 3.8 G/DL
ALP SERPL-CCNC: 114 U/L
ALT SERPL W/O P-5'-P-CCNC: 17 U/L
ANION GAP SERPL CALC-SCNC: 10 MMOL/L
AST SERPL-CCNC: 22 U/L
BILIRUB SERPL-MCNC: 1.1 MG/DL
BUN SERPL-MCNC: 13 MG/DL
CALCIUM SERPL-MCNC: 9.4 MG/DL
CHLORIDE SERPL-SCNC: 104 MMOL/L
CHOLEST SERPL-MCNC: 130 MG/DL
CHOLEST/HDLC SERPL: 3.3 {RATIO}
CO2 SERPL-SCNC: 25 MMOL/L
COMPLEXED PSA SERPL-MCNC: 0.63 NG/ML
CREAT SERPL-MCNC: 1.3 MG/DL
EST. GFR  (AFRICAN AMERICAN): >60 ML/MIN/1.73 M^2
EST. GFR  (NON AFRICAN AMERICAN): 55.7 ML/MIN/1.73 M^2
GLUCOSE SERPL-MCNC: 95 MG/DL
HDLC SERPL-MCNC: 40 MG/DL
HDLC SERPL: 30.8 %
LDLC SERPL CALC-MCNC: 67 MG/DL
NONHDLC SERPL-MCNC: 90 MG/DL
POTASSIUM SERPL-SCNC: 3.8 MMOL/L
PROT SERPL-MCNC: 6.9 G/DL
SODIUM SERPL-SCNC: 139 MMOL/L
TRIGL SERPL-MCNC: 115 MG/DL
URATE SERPL-MCNC: 7.1 MG/DL

## 2018-08-01 PROCEDURE — 36415 COLL VENOUS BLD VENIPUNCTURE: CPT | Mod: PO

## 2018-08-01 PROCEDURE — 84550 ASSAY OF BLOOD/URIC ACID: CPT

## 2018-08-01 PROCEDURE — 84153 ASSAY OF PSA TOTAL: CPT

## 2018-08-01 PROCEDURE — 80061 LIPID PANEL: CPT

## 2018-08-01 PROCEDURE — 80053 COMPREHEN METABOLIC PANEL: CPT

## 2018-09-14 RX ORDER — ATORVASTATIN CALCIUM 40 MG/1
40 TABLET, FILM COATED ORAL DAILY
Qty: 90 TABLET | Refills: 3 | Status: SHIPPED | OUTPATIENT
Start: 2018-09-14 | End: 2019-09-12 | Stop reason: SDUPTHER

## 2018-10-02 ENCOUNTER — TELEPHONE (OUTPATIENT)
Dept: INTERNAL MEDICINE | Facility: CLINIC | Age: 68
End: 2018-10-02

## 2018-10-02 NOTE — TELEPHONE ENCOUNTER
----- Message from Yadi Mosher sent at 10/2/2018 11:42 AM CDT -----  Contact: Ginger/Spouse/[phone]  What orders is pt asking for?: colonoscopy    Is there a future appointment with the provider?:  no    When?:    Comments?: Ginger would like for Dr Loera to sent orders for colonoscopy. thank you

## 2018-10-02 NOTE — TELEPHONE ENCOUNTER
He had on 2 years ago and they said follow up in 3 years-- Is he having any problems that would move up his colonoscopy?

## 2018-10-10 RX ORDER — FLUTICASONE PROPIONATE 50 MCG
SPRAY, SUSPENSION (ML) NASAL
Qty: 16 ML | Refills: 3 | Status: SHIPPED | OUTPATIENT
Start: 2018-10-10 | End: 2019-01-17 | Stop reason: SDUPTHER

## 2018-11-30 RX ORDER — LOSARTAN POTASSIUM 100 MG/1
100 TABLET ORAL DAILY
Qty: 90 TABLET | Refills: 3 | Status: SHIPPED | OUTPATIENT
Start: 2018-11-30 | End: 2019-12-12 | Stop reason: SDUPTHER

## 2018-12-05 RX ORDER — HYDROCHLOROTHIAZIDE 12.5 MG/1
12.5 CAPSULE ORAL DAILY
Qty: 90 CAPSULE | Refills: 3 | Status: SHIPPED | OUTPATIENT
Start: 2018-12-05 | End: 2020-01-16

## 2018-12-31 ENCOUNTER — OFFICE VISIT (OUTPATIENT)
Dept: URGENT CARE | Facility: CLINIC | Age: 68
End: 2018-12-31
Payer: MEDICARE

## 2018-12-31 VITALS
SYSTOLIC BLOOD PRESSURE: 147 MMHG | HEIGHT: 73 IN | HEART RATE: 79 BPM | DIASTOLIC BLOOD PRESSURE: 90 MMHG | BODY MASS INDEX: 31.54 KG/M2 | TEMPERATURE: 99 F | WEIGHT: 238 LBS | OXYGEN SATURATION: 99 % | RESPIRATION RATE: 18 BRPM

## 2018-12-31 DIAGNOSIS — J20.9 ACUTE BRONCHITIS DUE TO INFECTION: Primary | ICD-10-CM

## 2018-12-31 PROCEDURE — 3077F SYST BP >= 140 MM HG: CPT | Mod: CPTII,S$GLB,, | Performed by: EMERGENCY MEDICINE

## 2018-12-31 PROCEDURE — 1101F PT FALLS ASSESS-DOCD LE1/YR: CPT | Mod: CPTII,S$GLB,, | Performed by: EMERGENCY MEDICINE

## 2018-12-31 PROCEDURE — 99214 OFFICE O/P EST MOD 30 MIN: CPT | Mod: S$GLB,,, | Performed by: EMERGENCY MEDICINE

## 2018-12-31 PROCEDURE — 3080F DIAST BP >= 90 MM HG: CPT | Mod: CPTII,S$GLB,, | Performed by: EMERGENCY MEDICINE

## 2018-12-31 RX ORDER — AZITHROMYCIN 250 MG/1
TABLET, FILM COATED ORAL
Qty: 6 TABLET | Refills: 0 | Status: SHIPPED | OUTPATIENT
Start: 2018-12-31 | End: 2018-12-31 | Stop reason: SDUPTHER

## 2018-12-31 RX ORDER — AZITHROMYCIN 250 MG/1
TABLET, FILM COATED ORAL
Qty: 6 TABLET | Refills: 0 | Status: SHIPPED | OUTPATIENT
Start: 2018-12-31 | End: 2019-09-25

## 2018-12-31 RX ORDER — CODEINE PHOSPHATE AND GUAIFENESIN 10; 100 MG/5ML; MG/5ML
5 SOLUTION ORAL 3 TIMES DAILY PRN
Qty: 150 ML | Refills: 0 | Status: SHIPPED | OUTPATIENT
Start: 2018-12-31 | End: 2019-01-10

## 2018-12-31 NOTE — PROGRESS NOTES
"Subjective:       Patient ID: Julio Bernardo is a 68 y.o. male.    Vitals:    12/31/18 1118   BP: (!) 147/90   Pulse: 79   Resp: 18   Temp: 98.6 °F (37 °C)   TempSrc: Oral   SpO2: 99%   Weight: 108 kg (238 lb)   Height: 6' 1" (1.854 m)       Chief Complaint: Cough    Pt states x6 days has had a productive cough, congestion. Pt denies any fever. Pt states he had left over doxycyline and started taking the abx 2 days ago.       Cough   This is a new problem. The current episode started in the past 7 days. The problem has been gradually worsening. The problem occurs every few hours. The cough is productive of sputum. Associated symptoms include nasal congestion and postnasal drip. Pertinent negatives include no chest pain, chills, ear pain, eye redness, fever, headaches, myalgias, sore throat, shortness of breath or wheezing. Nothing aggravates the symptoms. Treatments tried: doxycyline  The treatment provided mild relief.     Review of Systems   Constitution: Negative for chills, fever and malaise/fatigue.   HENT: Positive for postnasal drip. Negative for congestion, ear pain, hoarse voice and sore throat.    Eyes: Negative for discharge and redness.   Cardiovascular: Negative for chest pain, dyspnea on exertion and leg swelling.   Respiratory: Positive for cough. Negative for shortness of breath, sputum production and wheezing.    Musculoskeletal: Negative for myalgias.   Gastrointestinal: Negative for abdominal pain and nausea.   Neurological: Negative for headaches.       Objective:      Physical Exam   Constitutional: He is oriented to person, place, and time. He appears well-developed and well-nourished. He is cooperative.  Non-toxic appearance. He does not appear ill. No distress.   HENT:   Head: Normocephalic and atraumatic.   Right Ear: Hearing, tympanic membrane, external ear and ear canal normal.   Left Ear: Hearing, tympanic membrane, external ear and ear canal normal.   Nose: Rhinorrhea present. No " mucosal edema or nasal deformity. No epistaxis. Right sinus exhibits no maxillary sinus tenderness and no frontal sinus tenderness. Left sinus exhibits no maxillary sinus tenderness and no frontal sinus tenderness.   Mouth/Throat: Uvula is midline, oropharynx is clear and moist and mucous membranes are normal. No trismus in the jaw. Normal dentition. No uvula swelling. No posterior oropharyngeal erythema.   Eyes: Conjunctivae and lids are normal. No scleral icterus.   Sclera clear bilat   Neck: Trachea normal, full passive range of motion without pain and phonation normal. Neck supple.   Cardiovascular: Normal rate, regular rhythm, normal heart sounds, intact distal pulses and normal pulses.   Pulmonary/Chest: Effort normal and breath sounds normal. No respiratory distress.   Frequent cough on exam   Abdominal: Soft. Normal appearance and bowel sounds are normal. He exhibits no distension. There is no tenderness.   Musculoskeletal: Normal range of motion. He exhibits no edema or deformity.   Neurological: He is alert and oriented to person, place, and time. He exhibits normal muscle tone. Coordination normal.   Skin: Skin is warm, dry and intact. He is not diaphoretic. No pallor.   Psychiatric: He has a normal mood and affect. His speech is normal and behavior is normal. Judgment and thought content normal. Cognition and memory are normal.   Nursing note and vitals reviewed.      Assessment:       1. Acute bronchitis due to infection        Plan:       Julio was seen today for cough.    Diagnoses and all orders for this visit:    Acute bronchitis due to infection    Other orders  -     azithromycin (ZITHROMAX Z-KOKO) 250 MG tablet; Take 2 tablets (500 mg) on  Day 1,  followed by 1 tablet (250 mg) once daily on Days 2 through 5.  -     guaifenesin-codeine 100-10 mg/5 ml (CHERATUSSIN AC)  mg/5 mL syrup; Take 5 mLs by mouth 3 (three) times daily as needed for Cough.    MDM:  Patient had started antibiotic therapy  at home prior to arrival without improvement.  This has clouded the clinical picture and given his age and comorbidities patient will be placed on azithromycin for coverage of acute bronchitis and community acquired pneumonia.        Patient Instructions   Please return here or go to the Emergency Department for any concerns or worsening of condition.      Follow up with Primary Care if not improved in 7-10 Days:  [phone]      Bronchitis, Antibiotic Treatment (Adult)    Bronchitis is an infection of the air passages (bronchial tubes) in your lungs. It often occurs when you have a cold. This illness is contagious during the first few days and is spread through the air by coughing and sneezing, or by direct contact (touching the sick person and then touching your own eyes, nose, or mouth).  Symptoms of bronchitis include cough with mucus (phlegm) and low-grade fever. Bronchitis usually lasts 7 to 14 days. Mild cases can be treated with simple home remedies. More severe infection is treated with an antibiotic.  Home care  Follow these guidelines when caring for yourself at home:  · If your symptoms are severe, rest at home for the first 2 to 3 days. When you go back to your usual activities, don't let yourself get too tired.  · Do not smoke. Also avoid being exposed to secondhand smoke.  · You may use over-the-counter medicines to control fever or pain, unless another medicine was prescribed. (Note: If you have chronic liver or kidney disease or have ever had a stomach ulcer or gastrointestinal bleeding, talk with your healthcare provider before using these medicines. Also talk to your provider if you are taking medicine to prevent blood clots.) Aspirin should never be given to anyone younger than 18 years of age who is ill with a viral infection or fever. It may cause severe liver or brain damage.  · Your appetite may be poor, so a light diet is fine. Avoid dehydration by drinking 6 to 8 glasses of fluids per day  (such as water, soft drinks, sports drinks, juices, tea, or soup). Extra fluids will help loosen secretions in the nose and lungs.  · Over-the-counter cough, cold, and sore-throat medicines will not shorten the length of the illness, but they may be helpful to reduce symptoms. (Note: Do not use decongestants if you have high blood pressure.)  · Finish all antibiotic medicine. Do this even if you are feeling better after only a few days.  Follow-up care  Follow up with your healthcare provider, or as advised. If you had an X-ray or ECG (electrocardiogram), a specialist will review it. You will be notified of any new findings that may affect your care.  Note: If you are age 65 or older, or if you have a chronic lung disease or condition that affects your immune system, or you smoke, talk to your healthcare provider about having pneumococcal vaccinations and a yearly influenza vaccination (flu shot).  When to seek medical advice  Call your healthcare provider right away if any of these occur:  · Fever of 100.4°F (38°C) or higher  · Coughing up increased amounts of colored sputum  · Weakness, drowsiness, headache, facial pain, ear pain, or a stiff neck  Call 911, or get immediate medical care  Contact emergency services right away if any of these occur.  · Coughing up blood  · Worsening weakness, drowsiness, headache, or stiff neck  · Trouble breathing, wheezing, or pain with breathing  Date Last Reviewed: 9/13/2015 © 2000-2016 [employer]. [address] [place] [address], [place] [address]. All rights reserved. This information is not intended as a substitute for professional medical care. Always follow your healthcare professional's instructions.

## 2018-12-31 NOTE — PATIENT INSTRUCTIONS
Please return here or go to the Emergency Department for any concerns or worsening of condition.      Follow up with Primary Care if not improved in 7-10 Days:  [phone]      Bronchitis, Antibiotic Treatment (Adult)    Bronchitis is an infection of the air passages (bronchial tubes) in your lungs. It often occurs when you have a cold. This illness is contagious during the first few days and is spread through the air by coughing and sneezing, or by direct contact (touching the sick person and then touching your own eyes, nose, or mouth).  Symptoms of bronchitis include cough with mucus (phlegm) and low-grade fever. Bronchitis usually lasts 7 to 14 days. Mild cases can be treated with simple home remedies. More severe infection is treated with an antibiotic.  Home care  Follow these guidelines when caring for yourself at home:  · If your symptoms are severe, rest at home for the first 2 to 3 days. When you go back to your usual activities, don't let yourself get too tired.  · Do not smoke. Also avoid being exposed to secondhand smoke.  · You may use over-the-counter medicines to control fever or pain, unless another medicine was prescribed. (Note: If you have chronic liver or kidney disease or have ever had a stomach ulcer or gastrointestinal bleeding, talk with your healthcare provider before using these medicines. Also talk to your provider if you are taking medicine to prevent blood clots.) Aspirin should never be given to anyone younger than 18 years of age who is ill with a viral infection or fever. It may cause severe liver or brain damage.  · Your appetite may be poor, so a light diet is fine. Avoid dehydration by drinking 6 to 8 glasses of fluids per day (such as water, soft drinks, sports drinks, juices, tea, or soup). Extra fluids will help loosen secretions in the nose and lungs.  · Over-the-counter cough, cold, and sore-throat medicines will not shorten the length of the illness, but they may be helpful to  reduce symptoms. (Note: Do not use decongestants if you have high blood pressure.)  · Finish all antibiotic medicine. Do this even if you are feeling better after only a few days.  Follow-up care  Follow up with your healthcare provider, or as advised. If you had an X-ray or ECG (electrocardiogram), a specialist will review it. You will be notified of any new findings that may affect your care.  Note: If you are age 65 or older, or if you have a chronic lung disease or condition that affects your immune system, or you smoke, talk to your healthcare provider about having pneumococcal vaccinations and a yearly influenza vaccination (flu shot).  When to seek medical advice  Call your healthcare provider right away if any of these occur:  · Fever of 100.4°F (38°C) or higher  · Coughing up increased amounts of colored sputum  · Weakness, drowsiness, headache, facial pain, ear pain, or a stiff neck  Call 911, or get immediate medical care  Contact emergency services right away if any of these occur.  · Coughing up blood  · Worsening weakness, drowsiness, headache, or stiff neck  · Trouble breathing, wheezing, or pain with breathing  Date Last Reviewed: 9/13/2015  © 9038-4738 [employer]. [address] [place] [address], [place] [address]. All rights reserved. This information is not intended as a substitute for professional medical care. Always follow your healthcare professional's instructions.

## 2019-01-17 RX ORDER — FLUTICASONE PROPIONATE 50 MCG
SPRAY, SUSPENSION (ML) NASAL
Qty: 48 ML | Refills: 3 | Status: SHIPPED | OUTPATIENT
Start: 2019-01-17 | End: 2020-03-17 | Stop reason: SDUPTHER

## 2019-03-21 ENCOUNTER — PES CALL (OUTPATIENT)
Dept: ADMINISTRATIVE | Facility: CLINIC | Age: 69
End: 2019-03-21

## 2019-04-09 ENCOUNTER — OFFICE VISIT (OUTPATIENT)
Dept: URGENT CARE | Facility: CLINIC | Age: 69
End: 2019-04-09
Payer: MEDICARE

## 2019-04-09 VITALS
WEIGHT: 232 LBS | DIASTOLIC BLOOD PRESSURE: 81 MMHG | BODY MASS INDEX: 30.75 KG/M2 | TEMPERATURE: 101 F | HEIGHT: 73 IN | OXYGEN SATURATION: 98 % | HEART RATE: 77 BPM | RESPIRATION RATE: 18 BRPM | SYSTOLIC BLOOD PRESSURE: 157 MMHG

## 2019-04-09 DIAGNOSIS — R50.9 FEVER, UNSPECIFIED FEVER CAUSE: ICD-10-CM

## 2019-04-09 DIAGNOSIS — J10.1 INFLUENZA A: Primary | ICD-10-CM

## 2019-04-09 DIAGNOSIS — R05.9 COUGH: ICD-10-CM

## 2019-04-09 LAB
CTP QC/QA: YES
FLUAV AG NPH QL: POSITIVE
FLUBV AG NPH QL: NEGATIVE

## 2019-04-09 PROCEDURE — 3079F DIAST BP 80-89 MM HG: CPT | Mod: CPTII,S$GLB,, | Performed by: EMERGENCY MEDICINE

## 2019-04-09 PROCEDURE — 3079F PR MOST RECENT DIASTOLIC BLOOD PRESSURE 80-89 MM HG: ICD-10-PCS | Mod: CPTII,S$GLB,, | Performed by: EMERGENCY MEDICINE

## 2019-04-09 PROCEDURE — 1101F PR PT FALLS ASSESS DOC 0-1 FALLS W/OUT INJ PAST YR: ICD-10-PCS | Mod: CPTII,S$GLB,, | Performed by: EMERGENCY MEDICINE

## 2019-04-09 PROCEDURE — 71046 X-RAY EXAM CHEST 2 VIEWS: CPT | Mod: S$GLB,,, | Performed by: RADIOLOGY

## 2019-04-09 PROCEDURE — 87804 INFLUENZA ASSAY W/OPTIC: CPT | Mod: QW,S$GLB,, | Performed by: EMERGENCY MEDICINE

## 2019-04-09 PROCEDURE — 99214 PR OFFICE/OUTPT VISIT, EST, LEVL IV, 30-39 MIN: ICD-10-PCS | Mod: S$GLB,,, | Performed by: EMERGENCY MEDICINE

## 2019-04-09 PROCEDURE — 99499 RISK ADDL DX/OHS AUDIT: ICD-10-PCS | Mod: S$GLB,,, | Performed by: EMERGENCY MEDICINE

## 2019-04-09 PROCEDURE — 3077F PR MOST RECENT SYSTOLIC BLOOD PRESSURE >= 140 MM HG: ICD-10-PCS | Mod: CPTII,S$GLB,, | Performed by: EMERGENCY MEDICINE

## 2019-04-09 PROCEDURE — 87804 POCT INFLUENZA A/B: ICD-10-PCS | Mod: QW,S$GLB,, | Performed by: EMERGENCY MEDICINE

## 2019-04-09 PROCEDURE — 99214 OFFICE O/P EST MOD 30 MIN: CPT | Mod: S$GLB,,, | Performed by: EMERGENCY MEDICINE

## 2019-04-09 PROCEDURE — 3077F SYST BP >= 140 MM HG: CPT | Mod: CPTII,S$GLB,, | Performed by: EMERGENCY MEDICINE

## 2019-04-09 PROCEDURE — 99499 UNLISTED E&M SERVICE: CPT | Mod: S$GLB,,, | Performed by: EMERGENCY MEDICINE

## 2019-04-09 PROCEDURE — 71046 XR CHEST PA AND LATERAL: ICD-10-PCS | Mod: S$GLB,,, | Performed by: RADIOLOGY

## 2019-04-09 PROCEDURE — 1101F PT FALLS ASSESS-DOCD LE1/YR: CPT | Mod: CPTII,S$GLB,, | Performed by: EMERGENCY MEDICINE

## 2019-04-09 RX ORDER — OSELTAMIVIR PHOSPHATE 75 MG/1
75 CAPSULE ORAL 2 TIMES DAILY
Qty: 10 CAPSULE | Refills: 0 | Status: SHIPPED | OUTPATIENT
Start: 2019-04-09 | End: 2019-04-14

## 2019-04-09 RX ORDER — CODEINE PHOSPHATE AND GUAIFENESIN 10; 100 MG/5ML; MG/5ML
5 SOLUTION ORAL 3 TIMES DAILY PRN
Qty: 150 ML | Refills: 0 | Status: SHIPPED | OUTPATIENT
Start: 2019-04-09 | End: 2019-04-19

## 2019-04-09 NOTE — PATIENT INSTRUCTIONS
Go to the Emergency Room if symptoms or condition worsens in any way      Tylenol 500mg 2 tabs by mouth every 8 hours  Motrin 200mg 2 tabs by mouth every 8 hours  Alternate Tylenol and Motrin every 4hours      Influenza (Adult)    Influenza is also called the flu. It is a viral illness that affects the air passages of your lungs. It is different from the common cold. The flu can easily be passed from one to person to another. It may be spread through the air by coughing and sneezing. Or it can be spread by touching the sick person and then touching your own eyes, nose, or mouth.  The flu starts 1 to 3 days after you are exposed to the flu virus. It may last for 1 to 2 weeks but many people feel tired or fatigued for many weeks afterward. You usually dont need to take antibiotics unless you have a complication. This might be an ear or sinus infection or pneumonia.  Symptoms of the flu may be mild or severe. They can include extreme tiredness (wanting to stay in bed all day), chills, fevers, muscle aches, soreness with eye movement, headache, and a dry, hacking cough.  Home care  Follow these guidelines when caring for yourself at home:  · Avoid being around cigarette smoke, whether yours or other peoples.  · Acetaminophen or ibuprofen will help ease your fever, muscle aches, and headache. Dont give aspirin to anyone younger than 18 who has the flu. Aspirin can harm the liver.  · Nausea and loss of appetite are common with the flu. Eat light meals. Drink 6 to 8 glasses of liquids every day. Good choices are water, sport drinks, soft drinks without caffeine, juices, tea, and soup. Extra fluids will also help loosen secretions in your nose and lungs.  · Over-the-counter cold medicines will not make the flu go away faster. But the medicines may help with coughing, sore throat, and congestion in your nose and sinuses. Dont use a decongestant if you have high blood pressure.  · Stay home until your fever has been  gone for at least 24 hours without using medicine to reduce fever.  Follow-up care  Follow up with your healthcare provider, or as advised, if you are not getting better over the next week.  If you are age 65 or older, talk with your provider about getting a pneumococcal vaccine every 5 years. You should also get this vaccine if you have chronic asthma or COPD. All adults should get a flu vaccine every fall. Ask your provider about this.  When to seek medical advice  Call your healthcare provider right away if any of these occur:  · Cough with lots of colored mucus (sputum) or blood in your mucus  · Chest pain, shortness of breath, wheezing, or trouble breathing  · Severe headache, or face, neck, or ear pain  · New rash with fever  · Fever of 100.4°F (38°C) or higher, or as directed by your healthcare provider  · Confusion, behavior change, or seizure  · Severe weakness or dizziness  · You get a new fever or cough after getting better for a few days  Date Last Reviewed: 1/1/2017  © 0214-9005 [employer]. [address] [place] [address], [place] [address]. All rights reserved. This information is not intended as a substitute for professional medical care. Always follow your healthcare professional's instructions.

## 2019-04-09 NOTE — PROGRESS NOTES
"Subjective:       Patient ID: Julio Bernardo is a 68 y.o. male.    Vitals:  height is 6' 1" (1.854 m) and weight is 105.2 kg (232 lb). His temperature is 100.5 °F (38.1 °C) (abnormal). His blood pressure is 157/81 (abnormal) and his pulse is 77. His respiration is 18 and oxygen saturation is 98%.     Chief Complaint: Cough    Pt came in today with cough . Pt stated he bronchitis back in December . Pt stated he he been having rattle in the back of his throat ever since . Pt stated his cough got worse over the weekend and fever    Cough   The current episode started more than 1 month ago. The problem has been unchanged. The cough is productive of sputum. Associated symptoms include a fever. Pertinent negatives include no chest pain, chills, headaches, myalgias, rash, sore throat or shortness of breath. Nothing aggravates the symptoms. He has tried prescription cough suppressant for the symptoms. The treatment provided mild relief.       Constitution: Positive for fever. Negative for chills and fatigue.   HENT: Negative for congestion and sore throat.    Neck: Negative for painful lymph nodes.   Cardiovascular: Negative for chest pain and leg swelling.   Eyes: Negative for double vision and blurred vision.   Respiratory: Positive for cough. Negative for shortness of breath.    Gastrointestinal: Negative for nausea, vomiting and diarrhea.   Genitourinary: Negative for dysuria, frequency and urgency.   Musculoskeletal: Negative for joint pain, joint swelling, muscle cramps and muscle ache.   Skin: Negative for color change, pale and rash.   Allergic/Immunologic: Negative for seasonal allergies.   Neurological: Negative for dizziness, history of vertigo, light-headedness, passing out and headaches.   Hematologic/Lymphatic: Negative for swollen lymph nodes, easy bruising/bleeding and history of blood clots. Does not bruise/bleed easily.   Psychiatric/Behavioral: Negative for nervous/anxious, sleep disturbance and " depression. The patient is not nervous/anxious.        Objective:      Physical Exam   Constitutional: He is oriented to person, place, and time. He appears well-developed and well-nourished. He is cooperative.  Non-toxic appearance. He does not appear ill. No distress.   HENT:   Head: Normocephalic and atraumatic.   Right Ear: Hearing, tympanic membrane, external ear and ear canal normal.   Left Ear: Hearing, tympanic membrane, external ear and ear canal normal.   Nose: Rhinorrhea present. No mucosal edema or nasal deformity. No epistaxis. Right sinus exhibits no maxillary sinus tenderness and no frontal sinus tenderness. Left sinus exhibits no maxillary sinus tenderness and no frontal sinus tenderness.   Mouth/Throat: Uvula is midline and mucous membranes are normal. No trismus in the jaw. Normal dentition. No uvula swelling. Posterior oropharyngeal erythema present.   Eyes: Conjunctivae and lids are normal. No scleral icterus.   Sclera clear bilat   Neck: Trachea normal, full passive range of motion without pain and phonation normal. Neck supple.   Cardiovascular: Normal rate, regular rhythm, normal heart sounds, intact distal pulses and normal pulses.   Pulmonary/Chest: Effort normal and breath sounds normal. No respiratory distress.   Frequent cough on exam   Abdominal: Soft. Normal appearance and bowel sounds are normal. He exhibits no distension. There is no tenderness.   Musculoskeletal: Normal range of motion. He exhibits no edema or deformity.   Neurological: He is alert and oriented to person, place, and time. He exhibits normal muscle tone. Coordination normal.   Skin: Skin is warm, dry and intact. He is not diaphoretic. No pallor.   Psychiatric: He has a normal mood and affect. His speech is normal and behavior is normal. Judgment and thought content normal. Cognition and memory are normal.   Nursing note and vitals reviewed.      Assessment:       1. Influenza A    2. Cough    3. Fever, unspecified  fever cause        Plan:         Influenza A    Cough  -     X-Ray Chest PA And Lateral; Future; Expected date: 04/09/2019  -     POCT Influenza A/B    Fever, unspecified fever cause    Other orders  -     oseltamivir (TAMIFLU) 75 MG capsule; Take 1 capsule (75 mg total) by mouth 2 (two) times daily. for 5 days  Dispense: 10 capsule; Refill: 0  -     guaifenesin-codeine 100-10 mg/5 ml (CHERATUSSIN AC)  mg/5 mL syrup; Take 5 mLs by mouth 3 (three) times daily as needed for Cough.  Dispense: 150 mL; Refill: 0      X-ray Chest Pa And Lateral    Result Date: 4/9/2019  EXAMINATION: XR CHEST PA AND LATERAL CLINICAL HISTORY: Cough TECHNIQUE: PA and lateral views of the chest were performed. COMPARISON: None FINDINGS: X-ray beam attenuation and scatter occur in generous overlying soft tissues. Mediastinal structures are midline. Cardiac silhouette and pulmonary vascular distribution are normal. There is mild elevation of the left hemidiaphragm suggesting eventration.  Allowing for this, lung volumes are normal. I detect no pulmonary disease, pleural fluid, lymph node enlargement, cardiac decompensation, pneumothorax, pneumomediastinum, pneumoperitoneum or significant osseous abnormality.     No convincing evidence of intrathoracic disease detected. Electronically signed by: Marilynn Powell MD Date:    04/09/2019 Time:    09:28      Medical decision making:  Patient influenza A positive with persistent cough for 2 months.  Patient with low-grade fever on examination. Clear lung fields no respiratory distress. X-ray findings show increased markings to the right perihilar region.  This may represent a early infiltrative process and could be consistent with a infiltrate from influenza.  Patient will be treated with Tamiflu at this time.  Given cough syrup as well.  Patient told to rest and drink plenty fluids.  Patient is encouraged to follow up in 1-2 weeks with primary care to ensure resolution.        Patient  Instructions     Go to the Emergency Room if symptoms or condition worsens in any way      Tylenol 500mg 2 tabs by mouth every 8 hours  Motrin 200mg 2 tabs by mouth every 8 hours  Alternate Tylenol and Motrin every 4hours      Influenza (Adult)    Influenza is also called the flu. It is a viral illness that affects the air passages of your lungs. It is different from the common cold. The flu can easily be passed from one to person to another. It may be spread through the air by coughing and sneezing. Or it can be spread by touching the sick person and then touching your own eyes, nose, or mouth.  The flu starts 1 to 3 days after you are exposed to the flu virus. It may last for 1 to 2 weeks but many people feel tired or fatigued for many weeks afterward. You usually dont need to take antibiotics unless you have a complication. This might be an ear or sinus infection or pneumonia.  Symptoms of the flu may be mild or severe. They can include extreme tiredness (wanting to stay in bed all day), chills, fevers, muscle aches, soreness with eye movement, headache, and a dry, hacking cough.  Home care  Follow these guidelines when caring for yourself at home:  · Avoid being around cigarette smoke, whether yours or other peoples.  · Acetaminophen or ibuprofen will help ease your fever, muscle aches, and headache. Dont give aspirin to anyone younger than 18 who has the flu. Aspirin can harm the liver.  · Nausea and loss of appetite are common with the flu. Eat light meals. Drink 6 to 8 glasses of liquids every day. Good choices are water, sport drinks, soft drinks without caffeine, juices, tea, and soup. Extra fluids will also help loosen secretions in your nose and lungs.  · Over-the-counter cold medicines will not make the flu go away faster. But the medicines may help with coughing, sore throat, and congestion in your nose and sinuses. Dont use a decongestant if you have high blood pressure.  · Stay home until your  fever has been gone for at least 24 hours without using medicine to reduce fever.  Follow-up care  Follow up with your healthcare provider, or as advised, if you are not getting better over the next week.  If you are age 65 or older, talk with your provider about getting a pneumococcal vaccine every 5 years. You should also get this vaccine if you have chronic asthma or COPD. All adults should get a flu vaccine every fall. Ask your provider about this.  When to seek medical advice  Call your healthcare provider right away if any of these occur:  · Cough with lots of colored mucus (sputum) or blood in your mucus  · Chest pain, shortness of breath, wheezing, or trouble breathing  · Severe headache, or face, neck, or ear pain  · New rash with fever  · Fever of 100.4°F (38°C) or higher, or as directed by your healthcare provider  · Confusion, behavior change, or seizure  · Severe weakness or dizziness  · You get a new fever or cough after getting better for a few days  Date Last Reviewed: 1/1/2017  © 5626-4919 [employer]. [address] [place] [address], [place] [address]. All rights reserved. This information is not intended as a substitute for professional medical care. Always follow your healthcare professional's instructions.

## 2019-04-29 ENCOUNTER — PATIENT MESSAGE (OUTPATIENT)
Dept: INTERNAL MEDICINE | Facility: CLINIC | Age: 69
End: 2019-04-29

## 2019-04-29 DIAGNOSIS — N18.30 CKD (CHRONIC KIDNEY DISEASE) STAGE 3, GFR 30-59 ML/MIN: Primary | ICD-10-CM

## 2019-04-29 DIAGNOSIS — E78.2 MIXED HYPERLIPIDEMIA: ICD-10-CM

## 2019-05-01 ENCOUNTER — PATIENT MESSAGE (OUTPATIENT)
Dept: INTERNAL MEDICINE | Facility: CLINIC | Age: 69
End: 2019-05-01

## 2019-05-07 ENCOUNTER — LAB VISIT (OUTPATIENT)
Dept: LAB | Facility: HOSPITAL | Age: 69
End: 2019-05-07
Attending: INTERNAL MEDICINE
Payer: MEDICARE

## 2019-05-07 DIAGNOSIS — N18.30 CKD (CHRONIC KIDNEY DISEASE) STAGE 3, GFR 30-59 ML/MIN: ICD-10-CM

## 2019-05-07 DIAGNOSIS — E78.2 MIXED HYPERLIPIDEMIA: ICD-10-CM

## 2019-05-07 LAB
ALBUMIN SERPL BCP-MCNC: 3.7 G/DL (ref 3.5–5.2)
ALP SERPL-CCNC: 97 U/L (ref 55–135)
ALT SERPL W/O P-5'-P-CCNC: 12 U/L (ref 10–44)
ANION GAP SERPL CALC-SCNC: 8 MMOL/L (ref 8–16)
AST SERPL-CCNC: 18 U/L (ref 10–40)
BILIRUB SERPL-MCNC: 0.7 MG/DL (ref 0.1–1)
BUN SERPL-MCNC: 14 MG/DL (ref 8–23)
CALCIUM SERPL-MCNC: 9.5 MG/DL (ref 8.7–10.5)
CHLORIDE SERPL-SCNC: 104 MMOL/L (ref 95–110)
CHOLEST SERPL-MCNC: 197 MG/DL (ref 120–199)
CHOLEST/HDLC SERPL: 3.5 {RATIO} (ref 2–5)
CO2 SERPL-SCNC: 28 MMOL/L (ref 23–29)
CREAT SERPL-MCNC: 1.2 MG/DL (ref 0.5–1.4)
EST. GFR  (AFRICAN AMERICAN): >60 ML/MIN/1.73 M^2
EST. GFR  (NON AFRICAN AMERICAN): >60 ML/MIN/1.73 M^2
GLUCOSE SERPL-MCNC: 97 MG/DL (ref 70–110)
HDLC SERPL-MCNC: 57 MG/DL (ref 40–75)
HDLC SERPL: 28.9 % (ref 20–50)
LDLC SERPL CALC-MCNC: 117.6 MG/DL (ref 63–159)
NONHDLC SERPL-MCNC: 140 MG/DL
POTASSIUM SERPL-SCNC: 4 MMOL/L (ref 3.5–5.1)
PROT SERPL-MCNC: 7 G/DL (ref 6–8.4)
SODIUM SERPL-SCNC: 140 MMOL/L (ref 136–145)
TRIGL SERPL-MCNC: 112 MG/DL (ref 30–150)

## 2019-05-07 PROCEDURE — 80061 LIPID PANEL: CPT

## 2019-05-07 PROCEDURE — 36415 COLL VENOUS BLD VENIPUNCTURE: CPT | Mod: PO

## 2019-05-07 PROCEDURE — 80053 COMPREHEN METABOLIC PANEL: CPT

## 2019-05-09 ENCOUNTER — HOSPITAL ENCOUNTER (OUTPATIENT)
Dept: RADIOLOGY | Facility: HOSPITAL | Age: 69
Discharge: HOME | End: 2019-05-09
Attending: INTERNAL MEDICINE
Payer: MEDICARE

## 2019-05-09 ENCOUNTER — OFFICE VISIT (OUTPATIENT)
Dept: INTERNAL MEDICINE | Facility: CLINIC | Age: 69
End: 2019-05-09
Payer: MEDICARE

## 2019-05-09 VITALS
SYSTOLIC BLOOD PRESSURE: 120 MMHG | BODY MASS INDEX: 30.88 KG/M2 | OXYGEN SATURATION: 98 % | HEIGHT: 73 IN | HEART RATE: 73 BPM | DIASTOLIC BLOOD PRESSURE: 74 MMHG | WEIGHT: 233 LBS

## 2019-05-09 DIAGNOSIS — M17.0 PRIMARY OSTEOARTHRITIS OF BOTH KNEES: ICD-10-CM

## 2019-05-09 DIAGNOSIS — E78.5 HYPERLIPIDEMIA, UNSPECIFIED HYPERLIPIDEMIA TYPE: ICD-10-CM

## 2019-05-09 DIAGNOSIS — I10 ESSENTIAL HYPERTENSION: ICD-10-CM

## 2019-05-09 DIAGNOSIS — I10 IDIOPATHIC HYPERTENSION: Primary | ICD-10-CM

## 2019-05-09 DIAGNOSIS — K63.5 POLYP OF COLON, UNSPECIFIED PART OF COLON, UNSPECIFIED TYPE: ICD-10-CM

## 2019-05-09 PROCEDURE — 1101F PT FALLS ASSESS-DOCD LE1/YR: CPT | Mod: CPTII,S$GLB,, | Performed by: INTERNAL MEDICINE

## 2019-05-09 PROCEDURE — 73565 X-RAY EXAM OF KNEES: CPT | Mod: TC,PO

## 2019-05-09 PROCEDURE — 73565 XR KNEE AP STANDING BILATERAL: ICD-10-PCS | Mod: 26,,, | Performed by: RADIOLOGY

## 2019-05-09 PROCEDURE — 99999 PR PBB SHADOW E&M-EST. PATIENT-LVL V: ICD-10-PCS | Mod: PBBFAC,,, | Performed by: INTERNAL MEDICINE

## 2019-05-09 PROCEDURE — 99214 OFFICE O/P EST MOD 30 MIN: CPT | Mod: S$GLB,,, | Performed by: INTERNAL MEDICINE

## 2019-05-09 PROCEDURE — 3074F PR MOST RECENT SYSTOLIC BLOOD PRESSURE < 130 MM HG: ICD-10-PCS | Mod: CPTII,S$GLB,, | Performed by: INTERNAL MEDICINE

## 2019-05-09 PROCEDURE — 1101F PR PT FALLS ASSESS DOC 0-1 FALLS W/OUT INJ PAST YR: ICD-10-PCS | Mod: CPTII,S$GLB,, | Performed by: INTERNAL MEDICINE

## 2019-05-09 PROCEDURE — 3074F SYST BP LT 130 MM HG: CPT | Mod: CPTII,S$GLB,, | Performed by: INTERNAL MEDICINE

## 2019-05-09 PROCEDURE — 99214 PR OFFICE/OUTPT VISIT, EST, LEVL IV, 30-39 MIN: ICD-10-PCS | Mod: S$GLB,,, | Performed by: INTERNAL MEDICINE

## 2019-05-09 PROCEDURE — 99999 PR PBB SHADOW E&M-EST. PATIENT-LVL V: CPT | Mod: PBBFAC,,, | Performed by: INTERNAL MEDICINE

## 2019-05-09 PROCEDURE — 3078F PR MOST RECENT DIASTOLIC BLOOD PRESSURE < 80 MM HG: ICD-10-PCS | Mod: CPTII,S$GLB,, | Performed by: INTERNAL MEDICINE

## 2019-05-09 PROCEDURE — 3078F DIAST BP <80 MM HG: CPT | Mod: CPTII,S$GLB,, | Performed by: INTERNAL MEDICINE

## 2019-05-09 PROCEDURE — 73565 X-RAY EXAM OF KNEES: CPT | Mod: 26,,, | Performed by: RADIOLOGY

## 2019-05-09 NOTE — PROGRESS NOTES
"Mr. Bernardo is a 68 -year-old gentleman coming in today for followup of his   ongoing medical problems .    1. He has hypertension. He has been on losartan 100 mg a day-- and  amlodipine- bp is 120/74.       2. He has hyperlipidemia. He is on atorvastatin. Cholesterol from this visit was reviewed---tc was 197, hdl 57 , .         3. . Colon polyps: He had 2 colon polyps in 2007, and 1 polyps in 9/2013-- Next c-scope was 9/2016- 1 polyp was found-- next c-scope is due late in 2019     4. . Gout-- last attack in 7/2018. This is after starting his hctz.       /74 (BP Location: Right arm, Patient Position: Sitting, BP Method: Large (Manual))   Pulse 73   Ht 6' 1" (1.854 m)   Wt 105.7 kg (233 lb 0.4 oz)   SpO2 98%   BMI 30.74 kg/m²        Answers for HPI/ROS submitted by the patient on 5/7/2019   activity change: No  unexpected weight change: No  neck pain: No  hearing loss: No  rhinorrhea: No  trouble swallowing: No  eye discharge: No  visual disturbance: No  chest tightness: No  wheezing: No  chest pain: No  palpitations: No  blood in stool: No  constipation: No  vomiting: No  diarrhea: No  polydipsia: No  polyuria: No  difficulty urinating: No  urgency: No  hematuria: No  joint swelling: Yes  arthralgias: No  headaches: No  weakness: No  confusion: No  dysphoric mood: No                  PHYSICAL EXAM: A well-appearing gentleman.   NECK: Supple with no JVD. Thyroid is not enlarged.   Chest : CTA bilaterally   CARDIOVASCULAR: S1, S2, regular rate and rhythm without murmur, gallop, or  rub.   Lungs: Clear bilaterally   ABDOMEN: Soft, nontender.   LOWER EXTREMITIES: No edema  He has no cervical, axillary, or inguinal adenopathy.   Bicep reflexs: nomal and equal bilaterally  Lower extremities: Normal muscle strength. No edema or cyanosis. TP/PT pulses+2 bilaterally.bilateral LE edema        ASSESSMENT:   1. Hyperlipidemia. continue the atorvastatin.   2. Hypertension.  losartan-- and  hctz. make sure he " is drinking enough fluids--if he has more giout- may need to change to another medicine     3. Obesity. Spoke to him about diet and exercise. He actually seems to be exercising pretty well.     4. Colon polyp- c-scope due in another 3 years-- 9/2019.   5. Gout- -recent episode. - We reviewed the gout diet.  6.KNee OA>   .OA of knees- s/p chalogen injections in past- now they are starting to bother him again - will get xrays and have him see ortho.

## 2019-05-09 NOTE — MEDICAL/APP STUDENT
Subjective:       Patient ID: Julio Bernardo is a 68 y.o. male.    Chief Complaint: Follow-up    Mr Bernardo is a 67 yo man with PMHx of HTN, HLD, CKD3 (Cr 1.2), and gout here today to discuss some of his labs as well as to talk more about his knee pain. He knee pain is the same OA pain he has had for years, worse with activity and better with rest and has been manageable. He is going to [facility] with his family in a month and wanted to know if it would be a good idea to have another round of Euflexxa of which he has had 3, his last in 2017 and had great results. In addition he wants to know more about the rise in his Creatinine to 1.2 and how to manage this issue. His blood pressure remains well controlled with the recent switch of amlodipine to losartan.    Review of Systems   Constitutional: Negative.    HENT: Negative.    Eyes: Negative.    Respiratory: Positive for cough. Negative for shortness of breath.    Cardiovascular: Positive for leg swelling.        Unilateral in R ankle   Gastrointestinal:        Indigestion on occasion    Endocrine: Negative.    Genitourinary: Negative.    Musculoskeletal: Positive for arthralgias and joint swelling.   Skin: Negative.    Neurological: Negative.        Objective:      Physical Exam   Constitutional: He is oriented to person, place, and time. He appears well-developed and well-nourished.   HENT:   Head: Normocephalic and atraumatic.   Eyes: Pupils are equal, round, and reactive to light. EOM are normal.   Cardiovascular: Normal rate, regular rhythm and normal heart sounds.   Pulmonary/Chest: Effort normal and breath sounds normal.   Abdominal: Soft. There is no tenderness.   Musculoskeletal: Normal range of motion.   Neurological: He is alert and oriented to person, place, and time.   Skin: Skin is warm and dry.   Psychiatric: He has a normal mood and affect. His behavior is normal.       Assessment:       Mr Bernardo is a 67 yo man here for evaluation of his  osteoarthritis.     Plan:       Osteoarthritis:  - known issue, managable at this time  - referall to orthopedics for euflexxa injections  - bilateral knees xray next week    HTN:  - well controlled  - continue HCTZ 12.5 and Losartan 100    HLD:  - last lipids WNL  - continue atorvastatin 40    CKD3:  - stable  - monitor CMP in 6 months  - control HTN and avoid NSAIDs    Gout:  - well controlled with diet modification    Dean Ruvalcaba  Medical Student  [facility]

## 2019-05-10 ENCOUNTER — TELEPHONE (OUTPATIENT)
Dept: ORTHOPEDICS | Facility: CLINIC | Age: 69
End: 2019-05-10

## 2019-05-10 DIAGNOSIS — M17.0 PRIMARY OSTEOARTHRITIS OF BOTH KNEES: Primary | ICD-10-CM

## 2019-05-10 NOTE — TELEPHONE ENCOUNTER
----- Message from Bonnie Valderrama sent at 5/10/2019  1:10 PM CDT -----  Contact: Wife Connie  Needs Advice    Reason for call: Wife ask if the patient's injections have been approved, ask for a call        Communication Preference: [phone]    Additional Information: n/a

## 2019-05-10 NOTE — TELEPHONE ENCOUNTER
Spoke with Mrs. Bernardo regarding Mr. Bernardo appointment will be cancel . Jo-Ann will put in neha euflexxa injection order an I will schedule his appointment once it,s approve

## 2019-05-21 ENCOUNTER — PATIENT MESSAGE (OUTPATIENT)
Dept: ORTHOPEDICS | Facility: CLINIC | Age: 69
End: 2019-05-21

## 2019-05-22 ENCOUNTER — OFFICE VISIT (OUTPATIENT)
Dept: ORTHOPEDICS | Facility: CLINIC | Age: 69
End: 2019-05-22
Payer: MEDICARE

## 2019-05-22 VITALS
WEIGHT: 233.56 LBS | HEIGHT: 73 IN | BODY MASS INDEX: 30.95 KG/M2 | SYSTOLIC BLOOD PRESSURE: 128 MMHG | HEART RATE: 88 BPM | DIASTOLIC BLOOD PRESSURE: 72 MMHG

## 2019-05-22 DIAGNOSIS — M17.0 PRIMARY OSTEOARTHRITIS OF BOTH KNEES: Primary | ICD-10-CM

## 2019-05-22 PROCEDURE — 99499 NO LOS: ICD-10-PCS | Mod: S$GLB,,, | Performed by: PHYSICIAN ASSISTANT

## 2019-05-22 PROCEDURE — 20610 DRAIN/INJ JOINT/BURSA W/O US: CPT | Mod: 50,S$GLB,, | Performed by: PHYSICIAN ASSISTANT

## 2019-05-22 PROCEDURE — 99499 UNLISTED E&M SERVICE: CPT | Mod: S$GLB,,, | Performed by: PHYSICIAN ASSISTANT

## 2019-05-22 PROCEDURE — 20610 PR DRAIN/INJECT LARGE JOINT/BURSA: ICD-10-PCS | Mod: 50,S$GLB,, | Performed by: PHYSICIAN ASSISTANT

## 2019-05-22 PROCEDURE — 99999 PR PBB SHADOW E&M-EST. PATIENT-LVL III: ICD-10-PCS | Mod: PBBFAC,,, | Performed by: PHYSICIAN ASSISTANT

## 2019-05-22 PROCEDURE — 99999 PR PBB SHADOW E&M-EST. PATIENT-LVL III: CPT | Mod: PBBFAC,,, | Performed by: PHYSICIAN ASSISTANT

## 2019-05-22 NOTE — PROGRESS NOTES
Juilo Bernardo is a 68 y.o. year old his here today for his 1st Euflexxa injection for degenerative changes of his bilateral knee . he was last seen and treated in the clinic on 3/30/2017. There has been no significant change in his medical status since his last visit. No Fever, chills, malaise, or unexplained weight change.      Allergies, Medications, past medical and surgical history were reviewed .    Examination of the knee demonstrates  No evidence of edema, erythema , echymosis strength and range of motion are unchanged from previous visit.    The injection site was identified and the skin was prepared with a betadine solution. The  bilateral knee  joint was injected with 2 ml of Euflexxa solution under sterile technique. Julio Bernardo tolerated the procedure well, he was advised to rest the knee today, ice and elevation. I may take 3 -6 weeks following the last injection to get the full benefit of the medication.  I will see him back in 1 week. Sooner if he has any problems or concerns.           .     ICD-10-CM ICD-9-CM   1. Primary osteoarthritis of both knees M17.0 715.16

## 2019-05-29 ENCOUNTER — OFFICE VISIT (OUTPATIENT)
Dept: ORTHOPEDICS | Facility: CLINIC | Age: 69
End: 2019-05-29
Payer: MEDICARE

## 2019-05-29 VITALS
HEART RATE: 81 BPM | DIASTOLIC BLOOD PRESSURE: 69 MMHG | HEIGHT: 73 IN | BODY MASS INDEX: 30.97 KG/M2 | WEIGHT: 233.69 LBS | SYSTOLIC BLOOD PRESSURE: 126 MMHG

## 2019-05-29 DIAGNOSIS — M17.0 PRIMARY OSTEOARTHRITIS OF BOTH KNEES: Primary | ICD-10-CM

## 2019-05-29 PROCEDURE — 99999 PR PBB SHADOW E&M-EST. PATIENT-LVL IV: ICD-10-PCS | Mod: PBBFAC,,, | Performed by: PHYSICIAN ASSISTANT

## 2019-05-29 PROCEDURE — 99499 NO LOS: ICD-10-PCS | Mod: S$GLB,,, | Performed by: PHYSICIAN ASSISTANT

## 2019-05-29 PROCEDURE — 99499 UNLISTED E&M SERVICE: CPT | Mod: S$GLB,,, | Performed by: PHYSICIAN ASSISTANT

## 2019-05-29 PROCEDURE — 20610 PR DRAIN/INJECT LARGE JOINT/BURSA: ICD-10-PCS | Mod: 50,S$GLB,, | Performed by: PHYSICIAN ASSISTANT

## 2019-05-29 PROCEDURE — 20610 DRAIN/INJ JOINT/BURSA W/O US: CPT | Mod: 50,S$GLB,, | Performed by: PHYSICIAN ASSISTANT

## 2019-05-29 PROCEDURE — 99999 PR PBB SHADOW E&M-EST. PATIENT-LVL IV: CPT | Mod: PBBFAC,,, | Performed by: PHYSICIAN ASSISTANT

## 2019-05-29 NOTE — PROGRESS NOTES
Julio Bernardo is a 68 y.o. year old his here today for his 2nd Euflexxa injection for degenerative changes of his bilateral knee . he was last seen and treated in the clinic on 5/22/2019. There has been no significant change in his medical status since his last visit. No Fever, chills, malaise, or unexplained weight change.      Allergies, Medications, past medical and surgical history were reviewed .    Examination of the knee demonstrates  No evidence of edema, erythema , echymosis strength and range of motion are unchanged from previous visit.    The injection site was identified and the skin was prepared with a betadine solution. The  bilateral knee  joint was injected with 2 ml of Euflexxa solution under sterile technique. Julio Bernardo tolerated the procedure well, he was advised to rest the knee today, ice and elevation. I may take 3 -6 weeks following the last injection to get the full benefit of the medication.  I will see him back in 1 week. Sooner if he has any problems or concerns.           .     ICD-10-CM ICD-9-CM   1. Primary osteoarthritis of both knees M17.0 715.16

## 2019-06-05 ENCOUNTER — OFFICE VISIT (OUTPATIENT)
Dept: ORTHOPEDICS | Facility: CLINIC | Age: 69
End: 2019-06-05
Payer: MEDICARE

## 2019-06-05 VITALS — DIASTOLIC BLOOD PRESSURE: 72 MMHG | HEART RATE: 70 BPM | SYSTOLIC BLOOD PRESSURE: 117 MMHG

## 2019-06-05 DIAGNOSIS — M17.0 PRIMARY OSTEOARTHRITIS OF BOTH KNEES: Primary | ICD-10-CM

## 2019-06-05 PROCEDURE — 99999 PR PBB SHADOW E&M-EST. PATIENT-LVL III: CPT | Mod: PBBFAC,,, | Performed by: PHYSICIAN ASSISTANT

## 2019-06-05 PROCEDURE — 20610 PR DRAIN/INJECT LARGE JOINT/BURSA: ICD-10-PCS | Mod: 50,S$GLB,, | Performed by: PHYSICIAN ASSISTANT

## 2019-06-05 PROCEDURE — 99999 PR PBB SHADOW E&M-EST. PATIENT-LVL III: ICD-10-PCS | Mod: PBBFAC,,, | Performed by: PHYSICIAN ASSISTANT

## 2019-06-05 PROCEDURE — 99499 UNLISTED E&M SERVICE: CPT | Mod: S$GLB,,, | Performed by: PHYSICIAN ASSISTANT

## 2019-06-05 PROCEDURE — 20610 DRAIN/INJ JOINT/BURSA W/O US: CPT | Mod: 50,S$GLB,, | Performed by: PHYSICIAN ASSISTANT

## 2019-06-05 PROCEDURE — 99499 NO LOS: ICD-10-PCS | Mod: S$GLB,,, | Performed by: PHYSICIAN ASSISTANT

## 2019-06-05 NOTE — PROGRESS NOTES
Julio Bernardo is a 68 y.o. 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 he was last seen and treated in the clinic on 5/29/2019. There has been no significant change in his medical status since his last visit. No Fever, chills, malaise, or unexplained weight change.      Allergies, Medications, past medical and surgical history were reviewed .    Examination of the knee demonstrates  No evidence of edema, erythema , echymosis strength and range of motion are unchanged from previous visit.    The injection site was identified and the skin was prepared with a betadine solution. The  bilateral knee  joint was injected with 2 ml of Euflexxa solution under sterile technique. Julio Bernardo tolerated the procedure well, he was advised to rest the knee today, ice and elevation. I may take 3 -6 weeks following the last injection to get the full benefit of the medication.  I will see him back in 4-6 months. Sooner if he has any problems or concerns.           .     ICD-10-CM ICD-9-CM   1. Primary osteoarthritis of both knees M17.0 715.16

## 2019-06-24 ENCOUNTER — OFFICE VISIT (OUTPATIENT)
Dept: URGENT CARE | Facility: CLINIC | Age: 69
End: 2019-06-24
Payer: MEDICARE

## 2019-06-24 VITALS
WEIGHT: 233 LBS | RESPIRATION RATE: 16 BRPM | TEMPERATURE: 98 F | HEART RATE: 63 BPM | BODY MASS INDEX: 30.88 KG/M2 | SYSTOLIC BLOOD PRESSURE: 129 MMHG | DIASTOLIC BLOOD PRESSURE: 82 MMHG | OXYGEN SATURATION: 97 % | HEIGHT: 73 IN

## 2019-06-24 DIAGNOSIS — J06.9 VIRAL URI WITH COUGH: Primary | ICD-10-CM

## 2019-06-24 PROCEDURE — 99214 OFFICE O/P EST MOD 30 MIN: CPT | Mod: S$GLB,,, | Performed by: FAMILY MEDICINE

## 2019-06-24 PROCEDURE — 71046 X-RAY EXAM CHEST 2 VIEWS: CPT | Mod: S$GLB,,, | Performed by: RADIOLOGY

## 2019-06-24 PROCEDURE — 71046 XR CHEST PA AND LATERAL: ICD-10-PCS | Mod: S$GLB,,, | Performed by: RADIOLOGY

## 2019-06-24 PROCEDURE — 3079F PR MOST RECENT DIASTOLIC BLOOD PRESSURE 80-89 MM HG: ICD-10-PCS | Mod: CPTII,S$GLB,, | Performed by: FAMILY MEDICINE

## 2019-06-24 PROCEDURE — 3079F DIAST BP 80-89 MM HG: CPT | Mod: CPTII,S$GLB,, | Performed by: FAMILY MEDICINE

## 2019-06-24 PROCEDURE — 99214 PR OFFICE/OUTPT VISIT, EST, LEVL IV, 30-39 MIN: ICD-10-PCS | Mod: S$GLB,,, | Performed by: FAMILY MEDICINE

## 2019-06-24 PROCEDURE — 3074F PR MOST RECENT SYSTOLIC BLOOD PRESSURE < 130 MM HG: ICD-10-PCS | Mod: CPTII,S$GLB,, | Performed by: FAMILY MEDICINE

## 2019-06-24 PROCEDURE — 1101F PT FALLS ASSESS-DOCD LE1/YR: CPT | Mod: CPTII,S$GLB,, | Performed by: FAMILY MEDICINE

## 2019-06-24 PROCEDURE — 1101F PR PT FALLS ASSESS DOC 0-1 FALLS W/OUT INJ PAST YR: ICD-10-PCS | Mod: CPTII,S$GLB,, | Performed by: FAMILY MEDICINE

## 2019-06-24 PROCEDURE — 3074F SYST BP LT 130 MM HG: CPT | Mod: CPTII,S$GLB,, | Performed by: FAMILY MEDICINE

## 2019-06-24 NOTE — PROGRESS NOTES
"Subjective:       Patient ID: Julio Bernardo is a 69 y.o. male.    Vitals:    06/24/19 1120   BP: 129/82   Pulse: 63   Resp: 16   Temp: 98 °F (36.7 °C)   SpO2: 97%   Weight: 105.7 kg (233 lb)   Height: 6' 1" (1.854 m)       Chief Complaint: Cough    Pt states recurring cough x 3 since 12/18 (bronchitis then flu, improved completely after the flu). Pt states current cough began 6 days ago with pnd, congestion. Pt states cough is productive of green sputum so he thought there was an infection. No fever, sob, wheezing.overall feeling better today but still wanted to be checked out. Taking flonase, allegra and cheritussin at night.    Cough   This is a new problem. The current episode started in the past 7 days. The problem has been unchanged. The cough is productive of purulent sputum. Associated symptoms include nasal congestion. Pertinent negatives include no chest pain, chills, fever, headaches, rash, sore throat or shortness of breath. The symptoms are aggravated by lying down. He has tried prescription cough suppressant (flonase) for the symptoms. The treatment provided mild relief.     Review of Systems   Constitution: Negative for chills and fever.   HENT: Positive for congestion. Negative for sore throat.    Eyes: Negative for blurred vision.   Cardiovascular: Negative for chest pain.   Respiratory: Positive for cough and sputum production. Negative for shortness of breath.    Skin: Negative for rash.   Musculoskeletal: Negative for back pain and joint pain.   Gastrointestinal: Negative for abdominal pain, diarrhea, nausea and vomiting.   Neurological: Negative for headaches.   Psychiatric/Behavioral: The patient is not nervous/anxious.        Objective:      Physical Exam   Constitutional: He is oriented to person, place, and time. He appears well-developed and well-nourished. He is cooperative.  Non-toxic appearance. He does not appear ill. No distress.   HENT:   Head: Normocephalic and atraumatic.   Right " Ear: Hearing, tympanic membrane, external ear and ear canal normal.   Left Ear: Hearing, tympanic membrane, external ear and ear canal normal.   Nose: Mucosal edema present. No rhinorrhea or nasal deformity. No epistaxis. Right sinus exhibits no maxillary sinus tenderness and no frontal sinus tenderness. Left sinus exhibits no maxillary sinus tenderness and no frontal sinus tenderness.   Mouth/Throat: Uvula is midline, oropharynx is clear and moist and mucous membranes are normal. No trismus in the jaw. Normal dentition. No uvula swelling. No oropharyngeal exudate or posterior oropharyngeal erythema.   Eyes: Conjunctivae and lids are normal. No scleral icterus.   Sclera clear bilat   Neck: Trachea normal, full passive range of motion without pain and phonation normal. Neck supple.   Cardiovascular: Normal rate, regular rhythm, normal heart sounds, intact distal pulses and normal pulses.   Pulmonary/Chest: Effort normal and breath sounds normal. No accessory muscle usage. No tachypnea. No respiratory distress.   Very Faint rhonchi left lower lobe, will get cxr   Abdominal: Soft. Normal appearance and bowel sounds are normal. He exhibits no distension. There is no tenderness.   Musculoskeletal: Normal range of motion. He exhibits no edema or deformity.   Neurological: He is alert and oriented to person, place, and time. He exhibits normal muscle tone. Coordination normal.   Skin: Skin is warm, dry and intact. He is not diaphoretic. No pallor.   Psychiatric: He has a normal mood and affect. His speech is normal and behavior is normal. Judgment and thought content normal. Cognition and memory are normal.   Nursing note and vitals reviewed.    X-ray Chest Pa And Lateral    Result Date: 6/24/2019  EXAMINATION: XR CHEST PA AND LATERAL CLINICAL HISTORY: faint rhonchi LLL; Cough TECHNIQUE: PA and lateral views of the chest were performed. COMPARISON: 4/9/19 FINDINGS: No significant change from prior.  No lung consolidation.   No pleural effusion or pneumothorax.  Continued atherosclerotic aorta.  Heart size stable within normal limits.  Visualized osseous structures grossly intact.     See above Electronically signed by: Ismael High DO Date:    06/24/2019 Time:    11:57    Assessment:       1. Viral URI with cough        Plan:       Julio was seen today for cough.    Diagnoses and all orders for this visit:    Viral URI with cough  -     X-Ray Chest PA And Lateral; Future    offered tessalon, he declined. Advised f/u for worsening sx, fever, sob    Patient Instructions   PLEASE READ YOUR DISCHARGE INSTRUCTIONS ENTIRELY AS IT CONTAINS IMPORTANT INFORMATION.      Please drink plenty of fluids.    Please get plenty of rest.    Please return here or go to the Emergency Department for any concerns or worsening of condition.    Please take an over the counter antihistamine medication (allegra/Claritin/Zyrtec) of your choice as directed.    Can continue cough syrup at night    Regular mucinex to break up congestion    If not allergic, please take over the counter Tylenol (Acetaminophen) and/or Motrin (Ibuprofen) as directed for control of pain and/or fever.  Please follow up with your primary care doctor or specialist as needed.    Sore throat recommendations: Warm fluids, warm salt water gargles, throat lozenges, tea, honey, soup, rest, hydration.    Use over the counter flonase: one spray each nostril twice daily OR two sprays each nostril once daily.     If you  smoke, please stop smoking.      Please return or see your primary care doctor if you develop new or worsening symptoms.     Please arrange follow up with your primary medical clinic as soon as possible. You must understand that you've received an Urgent Care treatment only and that you may be released before all of your medical problems are known or treated. You, the patient, will arrange for follow up as instructed. If your symptoms worsen or fail to improve you should go to the  Emergency Room.    Viral Upper Respiratory Illness (Adult)  You have a viral upper respiratory illness (URI), which is another term for the common cold. This illness is contagious during the first few days. It is spread through the air by coughing and sneezing. It may also be spread by direct contact (touching the sick person and then touching your own eyes, nose, or mouth). Frequent handwashing will decrease risk of spread. Most viral illnesses go away within 7 to 10 days with rest and simple home remedies. Sometimes the illness may last for several weeks. Antibiotics will not kill a virus, and they are generally not prescribed for this condition.    Home care  · If symptoms are severe, rest at home for the first 2 to 3 days. When you resume activity, don't let yourself get too tired.  · Avoid being exposed to cigarette smoke (yours or others).  · You may use acetaminophen or ibuprofen to control pain and fever, unless another medicine was prescribed. (Note: If you have chronic liver or kidney disease, have ever had a stomach ulcer or gastrointestinal bleeding, or are taking blood-thinning medicines, talk with your healthcare provider before using these medicines.) Aspirin should never be given to anyone under 18 years of age who is ill with a viral infection or fever. It may cause severe liver or brain damage.  · Your appetite may be poor, so a light diet is fine. Avoid dehydration by drinking 6 to 8 glasses of fluids per day (water, soft drinks, juices, tea, or soup). Extra fluids will help loosen secretions in the nose and lungs.  · Over-the-counter cold medicines will not shorten the length of time youre sick, but they may be helpful for the following symptoms: cough, sore throat, and nasal and sinus congestion. (Note: Do not use decongestants if you have high blood pressure.)  Follow-up care  Follow up with your healthcare provider, or as advised.  When to seek medical advice  Call your healthcare provider  right away if any of these occur:  · Cough with lots of colored sputum (mucus)  · Severe headache; face, neck, or ear pain  · Difficulty swallowing due to throat pain  · Fever of 100.4°F (38°C)  Call 911, or get immediate medical care  Call emergency services right away if any of these occur:  · Chest pain, shortness of breath, wheezing, or difficulty breathing  · Coughing up blood  · Inability to swallow due to throat pain  Date Last Reviewed: 9/13/2015 © 2000-2017 [employer]. [address] [place] [address], [place] [address]. All rights reserved. This information is not intended as a substitute for professional medical care. Always follow your healthcare professional's instructions.

## 2019-06-24 NOTE — PATIENT INSTRUCTIONS
PLEASE READ YOUR DISCHARGE INSTRUCTIONS ENTIRELY AS IT CONTAINS IMPORTANT INFORMATION.      Please drink plenty of fluids.    Please get plenty of rest.    Please return here or go to the Emergency Department for any concerns or worsening of condition.    Please take an over the counter antihistamine medication (allegra/Claritin/Zyrtec) of your choice as directed.    Can continue cough syrup at night    Regular mucinex to break up congestion    If not allergic, please take over the counter Tylenol (Acetaminophen) and/or Motrin (Ibuprofen) as directed for control of pain and/or fever.  Please follow up with your primary care doctor or specialist as needed.    Sore throat recommendations: Warm fluids, warm salt water gargles, throat lozenges, tea, honey, soup, rest, hydration.    Use over the counter flonase: one spray each nostril twice daily OR two sprays each nostril once daily.     If you  smoke, please stop smoking.      Please return or see your primary care doctor if you develop new or worsening symptoms.     Please arrange follow up with your primary medical clinic as soon as possible. You must understand that you've received an Urgent Care treatment only and that you may be released before all of your medical problems are known or treated. You, the patient, will arrange for follow up as instructed. If your symptoms worsen or fail to improve you should go to the Emergency Room.    Viral Upper Respiratory Illness (Adult)  You have a viral upper respiratory illness (URI), which is another term for the common cold. This illness is contagious during the first few days. It is spread through the air by coughing and sneezing. It may also be spread by direct contact (touching the sick person and then touching your own eyes, nose, or mouth). Frequent handwashing will decrease risk of spread. Most viral illnesses go away within 7 to 10 days with rest and simple home remedies. Sometimes the illness may last for several  weeks. Antibiotics will not kill a virus, and they are generally not prescribed for this condition.    Home care  · If symptoms are severe, rest at home for the first 2 to 3 days. When you resume activity, don't let yourself get too tired.  · Avoid being exposed to cigarette smoke (yours or others).  · You may use acetaminophen or ibuprofen to control pain and fever, unless another medicine was prescribed. (Note: If you have chronic liver or kidney disease, have ever had a stomach ulcer or gastrointestinal bleeding, or are taking blood-thinning medicines, talk with your healthcare provider before using these medicines.) Aspirin should never be given to anyone under 18 years of age who is ill with a viral infection or fever. It may cause severe liver or brain damage.  · Your appetite may be poor, so a light diet is fine. Avoid dehydration by drinking 6 to 8 glasses of fluids per day (water, soft drinks, juices, tea, or soup). Extra fluids will help loosen secretions in the nose and lungs.  · Over-the-counter cold medicines will not shorten the length of time youre sick, but they may be helpful for the following symptoms: cough, sore throat, and nasal and sinus congestion. (Note: Do not use decongestants if you have high blood pressure.)  Follow-up care  Follow up with your healthcare provider, or as advised.  When to seek medical advice  Call your healthcare provider right away if any of these occur:  · Cough with lots of colored sputum (mucus)  · Severe headache; face, neck, or ear pain  · Difficulty swallowing due to throat pain  · Fever of 100.4°F (38°C)  Call 911, or get immediate medical care  Call emergency services right away if any of these occur:  · Chest pain, shortness of breath, wheezing, or difficulty breathing  · Coughing up blood  · Inability to swallow due to throat pain  Date Last Reviewed: 9/13/2015  © 2566-8455 [employer]. [address] [place] [address], [place] [address]. All rights  reserved. This information is not intended as a substitute for professional medical care. Always follow your healthcare professional's instructions.

## 2019-08-15 ENCOUNTER — OFFICE VISIT (OUTPATIENT)
Dept: OPTOMETRY | Facility: CLINIC | Age: 69
End: 2019-08-15
Payer: MEDICARE

## 2019-08-15 DIAGNOSIS — Z98.42 HISTORY OF BILATERAL CATARACT EXTRACTION: Primary | ICD-10-CM

## 2019-08-15 DIAGNOSIS — Z13.5 GLAUCOMA SCREENING: ICD-10-CM

## 2019-08-15 DIAGNOSIS — Z98.41 HISTORY OF BILATERAL CATARACT EXTRACTION: Primary | ICD-10-CM

## 2019-08-15 DIAGNOSIS — H52.4 PRESBYOPIA: ICD-10-CM

## 2019-08-15 PROCEDURE — 92015 DETERMINE REFRACTIVE STATE: CPT | Mod: S$GLB,,, | Performed by: OPTOMETRIST

## 2019-08-15 PROCEDURE — 92014 PR EYE EXAM, EST PATIENT,COMPREHESV: ICD-10-PCS | Mod: S$GLB,,, | Performed by: OPTOMETRIST

## 2019-08-15 PROCEDURE — 92014 COMPRE OPH EXAM EST PT 1/>: CPT | Mod: S$GLB,,, | Performed by: OPTOMETRIST

## 2019-08-15 PROCEDURE — 99999 PR PBB SHADOW E&M-EST. PATIENT-LVL II: ICD-10-PCS | Mod: PBBFAC,,, | Performed by: OPTOMETRIST

## 2019-08-15 PROCEDURE — 92015 PR REFRACTION: ICD-10-PCS | Mod: S$GLB,,, | Performed by: OPTOMETRIST

## 2019-08-15 PROCEDURE — 99999 PR PBB SHADOW E&M-EST. PATIENT-LVL II: CPT | Mod: PBBFAC,,, | Performed by: OPTOMETRIST

## 2019-08-15 NOTE — PROGRESS NOTES
HPI     DLS; 6/13/17  Pt states reading the words on his tv is getting harder.   Floater in right eye, no flashes   systane gtts PRN   Sp pciol OU      Last edited by Manish Godoy, OD on 8/15/2019 10:16 AM. (History)        ROS     Positive for: Eyes (cat surgery)    Negative for: Constitutional, Gastrointestinal, Neurological, Skin,   Genitourinary, Musculoskeletal, HENT, Endocrine, Cardiovascular,   Respiratory, Psychiatric, Allergic/Imm, Heme/Lymph    Last edited by Manish Godoy, OD on 8/15/2019 10:19 AM. (History)        Assessment /Plan     For exam results, see Encounter Report.    History of bilateral cataract extraction    Glaucoma screening    Presbyopia      Sp pciol/YAG OU--pt wishes new Rx    PLAN:    rtc 1 yr

## 2019-09-12 RX ORDER — ATORVASTATIN CALCIUM 40 MG/1
TABLET, FILM COATED ORAL
Qty: 90 TABLET | Refills: 3 | Status: SHIPPED | OUTPATIENT
Start: 2019-09-12 | End: 2020-03-17 | Stop reason: SDUPTHER

## 2019-09-25 ENCOUNTER — OFFICE VISIT (OUTPATIENT)
Dept: INTERNAL MEDICINE | Facility: CLINIC | Age: 69
End: 2019-09-25
Payer: MEDICARE

## 2019-09-25 VITALS
DIASTOLIC BLOOD PRESSURE: 70 MMHG | HEART RATE: 67 BPM | WEIGHT: 235.25 LBS | OXYGEN SATURATION: 96 % | HEIGHT: 72 IN | SYSTOLIC BLOOD PRESSURE: 118 MMHG | BODY MASS INDEX: 31.86 KG/M2

## 2019-09-25 DIAGNOSIS — R60.9 EDEMA, UNSPECIFIED TYPE: Primary | ICD-10-CM

## 2019-09-25 DIAGNOSIS — I10 ESSENTIAL HYPERTENSION: ICD-10-CM

## 2019-09-25 DIAGNOSIS — N18.30 CKD (CHRONIC KIDNEY DISEASE) STAGE 3, GFR 30-59 ML/MIN: ICD-10-CM

## 2019-09-25 LAB
BILIRUB UR QL STRIP: NEGATIVE
CLARITY UR REFRACT.AUTO: CLEAR
COLOR UR AUTO: YELLOW
GLUCOSE UR QL STRIP: NEGATIVE
HGB UR QL STRIP: NEGATIVE
KETONES UR QL STRIP: NEGATIVE
LEUKOCYTE ESTERASE UR QL STRIP: NEGATIVE
MICROSCOPIC COMMENT: NORMAL
NITRITE UR QL STRIP: NEGATIVE
PH UR STRIP: 8 [PH] (ref 5–8)
PROT UR QL STRIP: NEGATIVE
RBC #/AREA URNS AUTO: 0 /HPF (ref 0–4)
SP GR UR STRIP: 1.01 (ref 1–1.03)
URN SPEC COLLECT METH UR: NORMAL
WBC #/AREA URNS AUTO: 0 /HPF (ref 0–5)

## 2019-09-25 PROCEDURE — 99499 UNLISTED E&M SERVICE: CPT | Mod: S$GLB,,, | Performed by: INTERNAL MEDICINE

## 2019-09-25 PROCEDURE — 99214 PR OFFICE/OUTPT VISIT, EST, LEVL IV, 30-39 MIN: ICD-10-PCS | Mod: S$GLB,,, | Performed by: INTERNAL MEDICINE

## 2019-09-25 PROCEDURE — 81001 URINALYSIS AUTO W/SCOPE: CPT

## 2019-09-25 PROCEDURE — 99999 PR PBB SHADOW E&M-EST. PATIENT-LVL III: ICD-10-PCS | Mod: PBBFAC,,, | Performed by: INTERNAL MEDICINE

## 2019-09-25 PROCEDURE — 1101F PR PT FALLS ASSESS DOC 0-1 FALLS W/OUT INJ PAST YR: ICD-10-PCS | Mod: CPTII,S$GLB,, | Performed by: INTERNAL MEDICINE

## 2019-09-25 PROCEDURE — 1101F PT FALLS ASSESS-DOCD LE1/YR: CPT | Mod: CPTII,S$GLB,, | Performed by: INTERNAL MEDICINE

## 2019-09-25 PROCEDURE — 99499 RISK ADDL DX/OHS AUDIT: ICD-10-PCS | Mod: S$GLB,,, | Performed by: INTERNAL MEDICINE

## 2019-09-25 PROCEDURE — 3078F PR MOST RECENT DIASTOLIC BLOOD PRESSURE < 80 MM HG: ICD-10-PCS | Mod: CPTII,S$GLB,, | Performed by: INTERNAL MEDICINE

## 2019-09-25 PROCEDURE — 3078F DIAST BP <80 MM HG: CPT | Mod: CPTII,S$GLB,, | Performed by: INTERNAL MEDICINE

## 2019-09-25 PROCEDURE — 3074F SYST BP LT 130 MM HG: CPT | Mod: CPTII,S$GLB,, | Performed by: INTERNAL MEDICINE

## 2019-09-25 PROCEDURE — 3074F PR MOST RECENT SYSTOLIC BLOOD PRESSURE < 130 MM HG: ICD-10-PCS | Mod: CPTII,S$GLB,, | Performed by: INTERNAL MEDICINE

## 2019-09-25 PROCEDURE — 99214 OFFICE O/P EST MOD 30 MIN: CPT | Mod: S$GLB,,, | Performed by: INTERNAL MEDICINE

## 2019-09-25 PROCEDURE — 99999 PR PBB SHADOW E&M-EST. PATIENT-LVL III: CPT | Mod: PBBFAC,,, | Performed by: INTERNAL MEDICINE

## 2019-09-25 NOTE — PROGRESS NOTES
Answers for HPI/ROS submitted by the patient on 9/24/2019   activity change: No  unexpected weight change: No  neck pain: No  hearing loss: No  rhinorrhea: No  trouble swallowing: No  eye discharge: No  visual disturbance: No  chest tightness: No  wheezing: No  chest pain: No  palpitations: No  blood in stool: No  constipation: No  vomiting: No  diarrhea: No  polydipsia: No  polyuria: No  difficulty urinating: No  urgency: No  hematuria: No  joint swelling: Yes  arthralgias: No  headaches: No  weakness: No  confusion: No  dysphoric mood: No

## 2019-09-27 NOTE — PROGRESS NOTES
HISTORY OF PRESENT ILLNESS:  Mr. Valderrama comes in today with a complaint of leg   pain and leg swelling.  The leg swelling started couple of months ago may be,   mainly after standing up all day, goes down during the night, not associated   with any PND or orthopnea.  No shortness of breath.  He has compression   stockings and those seem to help, but he usually try to put them on once the leg   starts swelling.  He was on amlodipine at one time, but he is off of that.  He   is on losartan and hydrochlorothiazide.  He takes an extra hydrochlorothiazide   occasionally when the swelling is bothersome.  He also complains that sometimes   when he sleeps on his hips or either on his right or left side, the hip start   hurting him around 4:00 in the morning.  He does not want to try any medication   for this.  He sees a chiropractor.  He is supposed to be here tomorrow.  He has   seen once for about 25 years and he says this helps.  He is not having any PND   or orthopnea.  He does not have any claudication type symptoms.  He does have   some numbness in the right side of his right foot at times.    PHYSICAL EXAMINATION:  GENERAL:  Well-appearing 69-year-old gentleman, obese with BMI of 31.9, weighing   235 pounds.  NECK:  Supple.  CHEST:  Clear.  No crackles.  No wheeze.  CARDIOVASCULAR:  S1 and S2.  No S3, no murmur.  LOWER EXTREMITIES:  He has trace edema in bilateral lower ankles, but not on the   dorsum of the foot.  He has normal dorsal pedis pulses.  He is missing the hair   on both legs, but no signs of any venous stasis disease.    ASSESSMENT:  Lower extremity edema.  We will check urinalysis to make sure he is   not spilling protein.  He does have some CKD stage III, but we will let him use   the hydrochlorothiazide as needed.  I believe his edema is probably just venous   stasis, so we discussed low-salt diets.  Continue current hypertension   medications and offered him a flu shot and a shingles vaccine.  He was  not   interested in either one of these today.  We will check urinalysis to make sure   he is not spilling protein.  Otherwise, we will follow him up again as already   scheduled.      J LUIS  dd: 09/25/2019 14:04:11 (CDT)  td: 09/26/2019 07:34:17 (CDT)  Doc ID   #[id number]  Job ID #[id number]    CC:

## 2019-11-19 ENCOUNTER — OFFICE VISIT (OUTPATIENT)
Dept: URGENT CARE | Facility: CLINIC | Age: 69
End: 2019-11-19
Payer: MEDICARE

## 2019-11-19 VITALS
HEART RATE: 90 BPM | DIASTOLIC BLOOD PRESSURE: 81 MMHG | BODY MASS INDEX: 31.83 KG/M2 | TEMPERATURE: 98 F | SYSTOLIC BLOOD PRESSURE: 140 MMHG | WEIGHT: 235 LBS | HEIGHT: 72 IN | RESPIRATION RATE: 18 BRPM | OXYGEN SATURATION: 100 %

## 2019-11-19 DIAGNOSIS — R09.82 ALLERGIC RHINITIS WITH POSTNASAL DRIP: Primary | ICD-10-CM

## 2019-11-19 DIAGNOSIS — R05.9 COUGH: ICD-10-CM

## 2019-11-19 DIAGNOSIS — M62.838 NECK MUSCLE SPASM: ICD-10-CM

## 2019-11-19 DIAGNOSIS — J30.9 ALLERGIC RHINITIS WITH POSTNASAL DRIP: Primary | ICD-10-CM

## 2019-11-19 PROCEDURE — 3079F PR MOST RECENT DIASTOLIC BLOOD PRESSURE 80-89 MM HG: ICD-10-PCS | Mod: CPTII,S$GLB,, | Performed by: NURSE PRACTITIONER

## 2019-11-19 PROCEDURE — 3077F SYST BP >= 140 MM HG: CPT | Mod: CPTII,S$GLB,, | Performed by: NURSE PRACTITIONER

## 2019-11-19 PROCEDURE — 3077F PR MOST RECENT SYSTOLIC BLOOD PRESSURE >= 140 MM HG: ICD-10-PCS | Mod: CPTII,S$GLB,, | Performed by: NURSE PRACTITIONER

## 2019-11-19 PROCEDURE — 96372 PR INJECTION,THERAP/PROPH/DIAG2ST, IM OR SUBCUT: ICD-10-PCS | Mod: S$GLB,,, | Performed by: NURSE PRACTITIONER

## 2019-11-19 PROCEDURE — 1101F PR PT FALLS ASSESS DOC 0-1 FALLS W/OUT INJ PAST YR: ICD-10-PCS | Mod: CPTII,S$GLB,, | Performed by: NURSE PRACTITIONER

## 2019-11-19 PROCEDURE — 99214 PR OFFICE/OUTPT VISIT, EST, LEVL IV, 30-39 MIN: ICD-10-PCS | Mod: 25,S$GLB,, | Performed by: NURSE PRACTITIONER

## 2019-11-19 PROCEDURE — 96372 THER/PROPH/DIAG INJ SC/IM: CPT | Mod: S$GLB,,, | Performed by: NURSE PRACTITIONER

## 2019-11-19 PROCEDURE — 99214 OFFICE O/P EST MOD 30 MIN: CPT | Mod: 25,S$GLB,, | Performed by: NURSE PRACTITIONER

## 2019-11-19 PROCEDURE — 3079F DIAST BP 80-89 MM HG: CPT | Mod: CPTII,S$GLB,, | Performed by: NURSE PRACTITIONER

## 2019-11-19 PROCEDURE — 1101F PT FALLS ASSESS-DOCD LE1/YR: CPT | Mod: CPTII,S$GLB,, | Performed by: NURSE PRACTITIONER

## 2019-11-19 RX ORDER — CODEINE PHOSPHATE AND GUAIFENESIN 10; 100 MG/5ML; MG/5ML
10 SOLUTION ORAL EVERY 6 HOURS PRN
Qty: 120 ML | Refills: 0 | Status: SHIPPED | OUTPATIENT
Start: 2019-11-19 | End: 2019-11-29

## 2019-11-19 RX ORDER — BETAMETHASONE SODIUM PHOSPHATE AND BETAMETHASONE ACETATE 3; 3 MG/ML; MG/ML
9 INJECTION, SUSPENSION INTRA-ARTICULAR; INTRALESIONAL; INTRAMUSCULAR; SOFT TISSUE
Status: COMPLETED | OUTPATIENT
Start: 2019-11-19 | End: 2019-11-19

## 2019-11-19 RX ADMIN — BETAMETHASONE SODIUM PHOSPHATE AND BETAMETHASONE ACETATE 9 MG: 3; 3 INJECTION, SUSPENSION INTRA-ARTICULAR; INTRALESIONAL; INTRAMUSCULAR; SOFT TISSUE at 10:11

## 2019-11-19 NOTE — PROGRESS NOTES
Subjective:       Patient ID: Julio Bernardo is a 69 y.o. male.    Vitals:  height is 6' (1.829 m) and weight is 106.6 kg (235 lb). His temperature is 98 °F (36.7 °C). His blood pressure is 140/81 (abnormal) and his pulse is 90. His respiration is 18 and oxygen saturation is 100%.     Chief Complaint: Cough (started on 11/16/2019) and Neck Pain (2 weeks now )    Patient presents with c/o scratchy throat x3 days with clear runny nose, and a non productive cough with a postnasal drip.  He is also has recurrent neck spasms that he sees his chiropractor for once a month, no real changes, just having flare for the last 2 weeks.  No recent steroid use.  Denies any arm pain, chest pain, shortness of breath, fever, numbness, tingling, weakness, or headaches.    Cough   This is a new problem. The current episode started in the past 7 days. The problem has been gradually worsening. The problem occurs every few minutes. The cough is non-productive. Associated symptoms include postnasal drip and rhinorrhea. Pertinent negatives include no chest pain, chills, ear congestion, ear pain, eye redness, fever, headaches, heartburn, hemoptysis, myalgias, nasal congestion, rash, sore throat, shortness of breath, sweats, weight loss or wheezing. Nothing aggravates the symptoms. He has tried prescription cough suppressant (pt was a refill on cough meds ) for the symptoms. There is no history of asthma, bronchitis or pneumonia.   Neck Pain    This is a recurrent problem. The current episode started 1 to 4 weeks ago. The problem occurs daily. The problem has been unchanged. The pain is associated with nothing. The pain is present in the left side. The pain is at a severity of 4/10. The pain is moderate. Pertinent negatives include no chest pain, fever, headaches, leg pain, numbness, pain with swallowing, paresis, photophobia, syncope, tingling, trouble swallowing, visual change, weakness or weight loss. He has tried chiropractic  manipulation and heat (massage) for the symptoms.       Constitution: Negative for chills, sweating, fatigue and fever.   HENT: Positive for postnasal drip. Negative for ear pain, congestion, sinus pain, sinus pressure, sore throat, trouble swallowing and voice change.    Neck: Pt states he did get a massage yesterday   Positive for neck pain and neck stiffness. Negative for painful lymph nodes.   Cardiovascular: Negative for chest pain and passing out.   Eyes: Negative for eye redness and photophobia.   Respiratory: Positive for cough. Negative for chest tightness, sputum production, bloody sputum, COPD, shortness of breath, stridor, wheezing and asthma.    Gastrointestinal: Negative for nausea, vomiting and heartburn.   Musculoskeletal: Negative for muscle ache.   Skin: Negative for rash.   Allergic/Immunologic: Negative for seasonal allergies and asthma.   Neurological: Negative for headaches and numbness.   Hematologic/Lymphatic: Negative for swollen lymph nodes.       Objective:      Physical Exam   Constitutional: He is oriented to person, place, and time. He appears well-developed and well-nourished. He is cooperative.  Non-toxic appearance. He does not have a sickly appearance. He does not appear ill. No distress.   HENT:   Head: Normocephalic and atraumatic.   Right Ear: Hearing, tympanic membrane, external ear and ear canal normal.   Left Ear: Hearing, tympanic membrane, external ear and ear canal normal.   Nose: Nose normal. No mucosal edema, rhinorrhea or nasal deformity. No epistaxis. Right sinus exhibits no maxillary sinus tenderness and no frontal sinus tenderness. Left sinus exhibits no maxillary sinus tenderness and no frontal sinus tenderness.   Mouth/Throat: Uvula is midline, oropharynx is clear and moist and mucous membranes are normal. No trismus in the jaw. Normal dentition. No uvula swelling. No oropharyngeal exudate, posterior oropharyngeal edema or posterior oropharyngeal erythema.   Eyes:  Pupils are equal, round, and reactive to light. Conjunctivae, EOM and lids are normal. No scleral icterus.   Neck: Trachea normal, normal range of motion, full passive range of motion without pain and phonation normal. Neck supple. Muscular tenderness present. No spinous process tenderness present. No neck rigidity. No edema, no erythema and normal range of motion present.   Full ROM but pain when moving to left side.  Deep muscular tenderness over posterior left trapezius muscle.  No bony tenderness.   Cardiovascular: Normal rate, regular rhythm, normal heart sounds, intact distal pulses and normal pulses.   Pulmonary/Chest: Effort normal and breath sounds normal. No respiratory distress. He has no decreased breath sounds. He has no rhonchi.   Abdominal: Normal appearance.   Musculoskeletal: Normal range of motion. He exhibits no edema or deformity.   Neurological: He is alert and oriented to person, place, and time. He exhibits normal muscle tone. Coordination normal.   Skin: Skin is warm, dry, intact, not diaphoretic and not pale.   Psychiatric: He has a normal mood and affect. His speech is normal and behavior is normal. Judgment and thought content normal. Cognition and memory are normal.   Nursing note and vitals reviewed.        Assessment:       1. Allergic rhinitis with postnasal drip    2. Cough    3. Neck muscle spasm        Plan:         Allergic rhinitis with postnasal drip  -     guaifenesin-codeine 100-10 mg/5 ml (TUSSI-ORGANIDIN NR)  mg/5 mL syrup; Take 10 mLs by mouth every 6 (six) hours as needed for Cough.  Dispense: 120 mL; Refill: 0  -     betamethasone acetate-betamethasone sodium phosphate injection 9 mg    Cough  -     guaifenesin-codeine 100-10 mg/5 ml (TUSSI-ORGANIDIN NR)  mg/5 mL syrup; Take 10 mLs by mouth every 6 (six) hours as needed for Cough.  Dispense: 120 mL; Refill: 0    Neck muscle spasm  -     betamethasone acetate-betamethasone sodium phosphate injection 9  mg      Patient Instructions   Restart Flonase.  Add Zyrtec.  12 hr Mucinex DM during the day as directed with water.  Rest.  Fluids.  Cheratussin at night as needed for cough.  Do not drive on this medication as it may sedate you.  Follow up with ENT or PCP as needed.  For neck add extra neck support.  Alternate ice and heat and continue to follow up with your Chiropractor as needed.  Return here or go to the ER for worsening symptoms.  Allergic Rhinitis  Allergic rhinitis is an allergic reaction that affects the nose, and often the eyes. Its often known as nasal allergies. Nasal allergies are often due to things in the environment that are breathed in. Depending what you are sensitive to, nasal allergies may occur only during certain seasons. Or they may occur year round. Common indoor allergens include house dust mites, mold, cockroaches, and pet dander. Outdoor allergens include pollen from trees, grasses, and weeds.   Symptoms include a drippy, stuffy, and itchy nose. They also include sneezing and red and itchy eyes. You may feel tired more often. Severe allergies may also affect your breathing and trigger a condition called asthma.   Tests can be done to see what allergens are affecting you. You may be referred to an allergy specialist for testing and further evaluation.  Home care  Your healthcare provider may prescribe medicines to help relieve allergy symptoms. These may include oral medicines, nasal sprays, or eye drops.  Ask your provider for advice on how to avoid substances that you are allergic to. Below are a few tips for each type of allergen.  Pet dander:  · Do not have pets with fur and feathers.  · If you can't avoid having a pet, keep it out of your bedroom and off upholstered furniture.  Pollen:  · When pollen counts are high, keep windows of your car and home closed. If possible, use an air conditioner instead.  · Wear a filter mask when mowing or doing yard work.  House dust mites:  · Wash  bedding every week in warm water and detergent and dry on a hot setting.  · Cover the mattress, box spring, and pillows with allergy covers.   · If possible, sleep in a room with no carpet, curtains, or upholstered furniture.  Cockroaches:  · Store food in sealed containers.  · Remove garbage from the home promptly.  · Fix water leaks  Mold:  · Keep humidity low by using a dehumidifier or air conditioner. Keep the dehumidifier and air conditioner clean and free of mold.  · Clean moldy areas with bleach and water.  In general:  · Vacuum once or twice a week. If possible, use a vacuum with a high-efficiency particulate air (HEPA) filter.  · Do not smoke. Avoid cigarette smoke. Cigarette smoke is an irritant that can make symptoms worse.  Follow-up care  Follow up as advised by the healthcare provider or our staff. If you were referred to an allergy specialist, make this appointment promptly.  When to seek medical advice  Call your healthcare provider right away if the following occur:  · Coughing or wheezing  · Fever greater than 100.4°F (38°C)  · Hives (raised red bumps)  · Continuing symptoms, new symptoms, or worsening symptoms  Call 911 right away if you have:  · Trouble breathing  · Severe swelling of the face or severe itching of the eyes or mouth  Date Last Reviewed: 3/1/2017  © 8746-4166 [employer]. [address] [place] [address]. All rights reserved. This information is not intended as a substitute for professional medical care. Always follow your healthcare professional's instructions.        Neck Spasm     A spasm of the neck muscles can happen after a sudden awkward neck movement. Sleeping with your neck in a crooked position can also cause spasm. Some people respond to emotional stress by tensing the muscles of their neck, shoulders, and upper back. If neck spasm lasts long enough, it can cause headache.  The treatment described below will usually help the pain to go away in 5 to  7 days. Pain that continues may need further evaluation or other types of treatment such as physical therapy.  Home care  · Rest and relax the muscles. Use a comfortable pillow that supports the head and keeps the spine in a neutral position. The position of the head should not be tilted forward or backward. A rolled up towel may help for a custom fit.  · Some people find relief with heat. Heat can be applied with either a warm shower or bath or a moist towel heated in the microwave and massage. Others prefer cold packs. You can make an ice pack by filling a plastic bag that seals at the top with ice cubes or crushed ice and then wrapping it with a thin towel. Try both and use the method that feels best for 15 to 20 minutes, several times a day.  · Whether using ice or heat, be careful that you do not injure your skin. Never put ice directly on the skin. Always wrap the ice in a towel or other type of cloth. This is very important, especially in people with poor skin sensations.  · Try to reduce your stress level. Emotional stress can lead to neck muscle tension and get in the way of or delay the healing process.  · You may use over-the-counter pain medicine to control pain, unless another medicine was prescribed.If you have chronic liver or kidney disease or ever had a stomach ulcer or GI bleeding, talk with your healthcare provider before using these medicines.  Follow-up care  Follow up with your healthcare provider if your symptoms do not show signs of improvement after one week. Physical therapy or further tests may be needed.  If X-rays, CT scans, or MRI scans were taken, you will be told of any new findings that may affect your care.  Call 911  Call 911 if you have:  · Sudden weakness or numbness in one or both arms  · Neck swelling, difficulty or painful swallowing  · Difficulty breathing  · Chest pain  When to seek medical advice  Call your healthcare provider right away if any of these occur:  · Pain  becomes worse or spreads into one or both arms  · Increasing headache with nausea or vomiting  · Fever of 100.4°F (38°C) or above lasting for 24 to 48 hours  Date Last Reviewed: 11/21/2015  © 0060-9718 [employer]. [address] [place] [address], [place] [address]. All rights reserved. This information is not intended as a substitute for professional medical care. Always follow your healthcare professional's instructions.

## 2019-11-25 ENCOUNTER — OFFICE VISIT (OUTPATIENT)
Dept: INTERNAL MEDICINE | Facility: CLINIC | Age: 69
End: 2019-11-25
Payer: MEDICARE

## 2019-11-25 ENCOUNTER — TELEPHONE (OUTPATIENT)
Dept: INTERNAL MEDICINE | Facility: CLINIC | Age: 69
End: 2019-11-25

## 2019-11-25 VITALS
HEART RATE: 64 BPM | OXYGEN SATURATION: 98 % | SYSTOLIC BLOOD PRESSURE: 132 MMHG | BODY MASS INDEX: 32.4 KG/M2 | WEIGHT: 239.19 LBS | HEIGHT: 72 IN | DIASTOLIC BLOOD PRESSURE: 78 MMHG

## 2019-11-25 DIAGNOSIS — S16.1XXA STRAIN OF NECK MUSCLE, INITIAL ENCOUNTER: Primary | ICD-10-CM

## 2019-11-25 PROCEDURE — 1159F MED LIST DOCD IN RCRD: CPT | Mod: S$GLB,,, | Performed by: INTERNAL MEDICINE

## 2019-11-25 PROCEDURE — 99999 PR PBB SHADOW E&M-EST. PATIENT-LVL III: ICD-10-PCS | Mod: PBBFAC,,, | Performed by: INTERNAL MEDICINE

## 2019-11-25 PROCEDURE — 1101F PR PT FALLS ASSESS DOC 0-1 FALLS W/OUT INJ PAST YR: ICD-10-PCS | Mod: CPTII,S$GLB,, | Performed by: INTERNAL MEDICINE

## 2019-11-25 PROCEDURE — 99213 OFFICE O/P EST LOW 20 MIN: CPT | Mod: S$GLB,,, | Performed by: INTERNAL MEDICINE

## 2019-11-25 PROCEDURE — 1125F PR PAIN SEVERITY QUANTIFIED, PAIN PRESENT: ICD-10-PCS | Mod: S$GLB,,, | Performed by: INTERNAL MEDICINE

## 2019-11-25 PROCEDURE — 99999 PR PBB SHADOW E&M-EST. PATIENT-LVL III: CPT | Mod: PBBFAC,,, | Performed by: INTERNAL MEDICINE

## 2019-11-25 PROCEDURE — 3075F PR MOST RECENT SYSTOLIC BLOOD PRESS GE 130-139MM HG: ICD-10-PCS | Mod: CPTII,S$GLB,, | Performed by: INTERNAL MEDICINE

## 2019-11-25 PROCEDURE — 1101F PT FALLS ASSESS-DOCD LE1/YR: CPT | Mod: CPTII,S$GLB,, | Performed by: INTERNAL MEDICINE

## 2019-11-25 PROCEDURE — 1159F PR MEDICATION LIST DOCUMENTED IN MEDICAL RECORD: ICD-10-PCS | Mod: S$GLB,,, | Performed by: INTERNAL MEDICINE

## 2019-11-25 PROCEDURE — 99213 PR OFFICE/OUTPT VISIT, EST, LEVL III, 20-29 MIN: ICD-10-PCS | Mod: S$GLB,,, | Performed by: INTERNAL MEDICINE

## 2019-11-25 PROCEDURE — 3078F PR MOST RECENT DIASTOLIC BLOOD PRESSURE < 80 MM HG: ICD-10-PCS | Mod: CPTII,S$GLB,, | Performed by: INTERNAL MEDICINE

## 2019-11-25 PROCEDURE — 3075F SYST BP GE 130 - 139MM HG: CPT | Mod: CPTII,S$GLB,, | Performed by: INTERNAL MEDICINE

## 2019-11-25 PROCEDURE — 1125F AMNT PAIN NOTED PAIN PRSNT: CPT | Mod: S$GLB,,, | Performed by: INTERNAL MEDICINE

## 2019-11-25 PROCEDURE — 3078F DIAST BP <80 MM HG: CPT | Mod: CPTII,S$GLB,, | Performed by: INTERNAL MEDICINE

## 2019-11-25 RX ORDER — TIZANIDINE 4 MG/1
4 TABLET ORAL EVERY 8 HOURS PRN
Qty: 30 TABLET | Refills: 0 | Status: SHIPPED | OUTPATIENT
Start: 2019-11-25 | End: 2019-12-23

## 2019-11-25 RX ORDER — DICLOFENAC SODIUM 50 MG/1
50 TABLET, DELAYED RELEASE ORAL 2 TIMES DAILY
Qty: 2020 TABLET | Refills: 0 | Status: SHIPPED | OUTPATIENT
Start: 2019-11-25 | End: 2019-12-10 | Stop reason: SDUPTHER

## 2019-11-25 NOTE — TELEPHONE ENCOUNTER
Type: Pain      Location: Neck, left side-radiating down scapular (before going to chiropractor was both side      Pain Scale (0-10): at the moment 7, but has been a 15    How long? : since 11/7    Were you seen previously? No    Any other symptoms? : No other symptoms, but unable to turn to the left    Any medications? : Has some muscle relaxants, but makes him drowsy, can't take during the day    Any drug allergies? : PCN    Additional information: Pt would like an MRI, specialist appt, or anything that will help relieve the pain or find out why he is having the pain.    Please contact patient.

## 2019-11-26 NOTE — PROGRESS NOTES
HISTORY OF PRESENT ILLNESS:  Mr. Valderrama comes in today urgent care complaint of neck pain. On November 7th he went to see his chiropractor for some neck pain he has been having for couple of days.  At that time he was having difficulty turning his neck either to the right or the left.  He had some manipulations done  to his cervical spine for couple days by the chiropractor.  The right side of his neck recovered however he still having some pain on the left side of his neck.  He has been taking Aleve and he also took some Flexeril.  He has also tried some topical medications such as Biofreeze.  The pain is better, but is very sharp sometimes.  The pain is located in the upper left neck.  It hurts when he turns his head all the way to the left.  He can move his head around otherwise without any difficulty.  The nature of the pain is sharp and feels shooting or stabbing but does not radiate.     Other medical problems:  1. He has hypertension. He has been on losartan 100 mg a day-- and  amlodipine- bp is 132/78.       2. He has hyperlipidemia. He is on atorvastatin. Cholesterol from this visit was reviewed--     3. . Colon polyps: He had 2 colon polyps in 2007, and 1 polyps in 9/2013-- Next c-scope was 9/2016- 1 polyp was found-- next c-scope is due late in 2019     4. . Gout-- last attack in 7/2018. This is after starting his hctz.         PHYSICAL EXAMINATION: /78 (BP Location: Right arm, Patient Position: Sitting, BP Method: Large (Manual))   Pulse 64   Ht 6' (1.829 m)   Wt 108.5 kg (239 lb 3.2 oz)   SpO2 98%   BMI 32.44 kg/m²     GENERAL:  Well-appearing 69-year-old gentleman,  NECK:  Supple.  He has a little bit of tenderness on the left side of cervical spine.  More in the paraspinal muscle area.  He has a normal range of motion of the neck otherwise.  CHEST:  Clear.  No crackles.  No wheeze.  CARDIOVASCULAR:  S1 and S2.  No S3, no murmur.  LOWER EXTREMITIES:  He has trace edema in bilateral lower  ankles, but not on the   dorsum of the foot.  He has normal dorsal pedis pulses.  He is missing the hair   on both legs, but no signs of any venous stasis disease.     ASSESSMENT:   cervical strain.  Will try some diclofenac and some Zanaflex.  Conservative treatment.  He does not improve in the next 2 weeks his goal let me know.  No films needed at this time.

## 2019-12-03 ENCOUNTER — PATIENT MESSAGE (OUTPATIENT)
Dept: INTERNAL MEDICINE | Facility: CLINIC | Age: 69
End: 2019-12-03

## 2019-12-03 ENCOUNTER — TELEPHONE (OUTPATIENT)
Dept: INTERNAL MEDICINE | Facility: CLINIC | Age: 69
End: 2019-12-03

## 2019-12-03 DIAGNOSIS — M54.2 NECK PAIN: Primary | ICD-10-CM

## 2019-12-03 NOTE — TELEPHONE ENCOUNTER
----- Message from Tabby Miguel Angel sent at 12/3/2019  9:13 AM CST -----  Contact: Patient [phone]  Patient is requesting a call about getting a MRI on his neck. He has been in pain since last week.    Please call and advise.    Thank You

## 2019-12-03 NOTE — TELEPHONE ENCOUNTER
No- he does not need a MRI-- let's get a cervical xray and I put in a referral for him to see PT for his neck pain.

## 2019-12-04 ENCOUNTER — TELEPHONE (OUTPATIENT)
Dept: ORTHOPEDICS | Facility: CLINIC | Age: 69
End: 2019-12-04

## 2019-12-04 ENCOUNTER — PATIENT OUTREACH (OUTPATIENT)
Dept: ADMINISTRATIVE | Facility: OTHER | Age: 69
End: 2019-12-04

## 2019-12-04 DIAGNOSIS — M50.30 DDD (DEGENERATIVE DISC DISEASE), CERVICAL: Primary | ICD-10-CM

## 2019-12-04 NOTE — TELEPHONE ENCOUNTER
Spoke to patient in regards to finding out what area of the back was hurting. Patient stated that it is the left side of his neck, and the the pain is so bad that it is causing the left shoulder to hurt also. Stated to patient that I will give him a call once Candace put the orders in. Patient stated thanks.

## 2019-12-05 ENCOUNTER — HOSPITAL ENCOUNTER (OUTPATIENT)
Dept: RADIOLOGY | Facility: HOSPITAL | Age: 69
Discharge: HOME | End: 2019-12-05
Attending: PHYSICIAN ASSISTANT
Payer: MEDICARE

## 2019-12-05 ENCOUNTER — OFFICE VISIT (OUTPATIENT)
Dept: ORTHOPEDICS | Facility: CLINIC | Age: 69
End: 2019-12-05
Payer: MEDICARE

## 2019-12-05 ENCOUNTER — TELEPHONE (OUTPATIENT)
Dept: ORTHOPEDICS | Facility: CLINIC | Age: 69
End: 2019-12-05

## 2019-12-05 ENCOUNTER — HOSPITAL ENCOUNTER (OUTPATIENT)
Dept: RADIOLOGY | Facility: HOSPITAL | Age: 69
Discharge: HOME | End: 2019-12-05
Attending: INTERNAL MEDICINE
Payer: MEDICARE

## 2019-12-05 ENCOUNTER — PATIENT MESSAGE (OUTPATIENT)
Dept: INTERNAL MEDICINE | Facility: CLINIC | Age: 69
End: 2019-12-05

## 2019-12-05 VITALS
WEIGHT: 239.88 LBS | BODY MASS INDEX: 32.49 KG/M2 | SYSTOLIC BLOOD PRESSURE: 160 MMHG | DIASTOLIC BLOOD PRESSURE: 93 MMHG | HEIGHT: 72 IN | HEART RATE: 62 BPM

## 2019-12-05 DIAGNOSIS — M50.30 DDD (DEGENERATIVE DISC DISEASE), CERVICAL: ICD-10-CM

## 2019-12-05 DIAGNOSIS — M54.12 CERVICAL RADICULOPATHY: Primary | ICD-10-CM

## 2019-12-05 DIAGNOSIS — M54.2 NECK PAIN: ICD-10-CM

## 2019-12-05 PROCEDURE — 99999 PR PBB SHADOW E&M-EST. PATIENT-LVL IV: ICD-10-PCS | Mod: PBBFAC,,, | Performed by: PHYSICIAN ASSISTANT

## 2019-12-05 PROCEDURE — 72050 X-RAY EXAM NECK SPINE 4/5VWS: CPT | Mod: 26,,, | Performed by: RADIOLOGY

## 2019-12-05 PROCEDURE — 99213 PR OFFICE/OUTPT VISIT, EST, LEVL III, 20-29 MIN: ICD-10-PCS | Mod: S$GLB,,, | Performed by: PHYSICIAN ASSISTANT

## 2019-12-05 PROCEDURE — 1125F AMNT PAIN NOTED PAIN PRSNT: CPT | Mod: S$GLB,,, | Performed by: PHYSICIAN ASSISTANT

## 2019-12-05 PROCEDURE — 99999 PR PBB SHADOW E&M-EST. PATIENT-LVL IV: CPT | Mod: PBBFAC,,, | Performed by: PHYSICIAN ASSISTANT

## 2019-12-05 PROCEDURE — 3077F SYST BP >= 140 MM HG: CPT | Mod: CPTII,S$GLB,, | Performed by: PHYSICIAN ASSISTANT

## 2019-12-05 PROCEDURE — 1159F PR MEDICATION LIST DOCUMENTED IN MEDICAL RECORD: ICD-10-PCS | Mod: S$GLB,,, | Performed by: PHYSICIAN ASSISTANT

## 2019-12-05 PROCEDURE — 1101F PR PT FALLS ASSESS DOC 0-1 FALLS W/OUT INJ PAST YR: ICD-10-PCS | Mod: CPTII,S$GLB,, | Performed by: PHYSICIAN ASSISTANT

## 2019-12-05 PROCEDURE — 99213 OFFICE O/P EST LOW 20 MIN: CPT | Mod: S$GLB,,, | Performed by: PHYSICIAN ASSISTANT

## 2019-12-05 PROCEDURE — 72050 XR CERVICAL SPINE AP LAT WITH FLEX EXTEN: ICD-10-PCS | Mod: 26,,, | Performed by: RADIOLOGY

## 2019-12-05 PROCEDURE — 1101F PT FALLS ASSESS-DOCD LE1/YR: CPT | Mod: CPTII,S$GLB,, | Performed by: PHYSICIAN ASSISTANT

## 2019-12-05 PROCEDURE — 1159F MED LIST DOCD IN RCRD: CPT | Mod: S$GLB,,, | Performed by: PHYSICIAN ASSISTANT

## 2019-12-05 PROCEDURE — 1125F PR PAIN SEVERITY QUANTIFIED, PAIN PRESENT: ICD-10-PCS | Mod: S$GLB,,, | Performed by: PHYSICIAN ASSISTANT

## 2019-12-05 PROCEDURE — 3077F PR MOST RECENT SYSTOLIC BLOOD PRESSURE >= 140 MM HG: ICD-10-PCS | Mod: CPTII,S$GLB,, | Performed by: PHYSICIAN ASSISTANT

## 2019-12-05 PROCEDURE — 72050 X-RAY EXAM NECK SPINE 4/5VWS: CPT | Mod: TC

## 2019-12-05 PROCEDURE — 3080F PR MOST RECENT DIASTOLIC BLOOD PRESSURE >= 90 MM HG: ICD-10-PCS | Mod: CPTII,S$GLB,, | Performed by: PHYSICIAN ASSISTANT

## 2019-12-05 PROCEDURE — 3080F DIAST BP >= 90 MM HG: CPT | Mod: CPTII,S$GLB,, | Performed by: PHYSICIAN ASSISTANT

## 2019-12-05 NOTE — PROGRESS NOTES
DATE: 12/5/2019  PATIENT: Julio Bernardo    Supervising Physician: Brant Matias M.D.    CHIEF COMPLAINT: neck pain    HISTORY:  Julio Bernardo is a 69 y.o. male here for initial evaluation of neck and left shoulder pain (Neck - 10, Arm - 10). The pain has been present for one month. The patient describes the pain as achy.  The pain is along the left side of his neck and radiates into the trapezius.  The pain is worse with neck extension.  There is nothing that helps his pain, it has been constant and worsening. There is associated numbness and tingling. There is no subjective weakness. Prior treatments have included chiropractic treatment (several visits), massage, nsaids, muscle relaxants, but no PT or VELMA.     The patient denies myelopathic symptoms such as handwriting changes or difficulty with buttons/coins/keys. Denies perineal paresthesias, bowel/bladder dysfunction.    PAST MEDICAL/SURGICAL HISTORY:  Past Medical History:   Diagnosis Date    Allergy     Colon polyps     Hyperlipemia     Hypertension     Posterior capsular opacification      Past Surgical History:   Procedure Laterality Date    CATARACT EXTRACTION      both eyes    COLONOSCOPY N/A 9/27/2016    Procedure: COLONOSCOPY;  Surgeon: Jan Argueta MD;  Location: [address]);  Service: Endoscopy;  Laterality: N/A;    EYE SURGERY      HERNIA REPAIR      WISDOM TOOTH EXTRACTION      YAG      Left Eye       Medications:  Current Outpatient Medications on File Prior to Visit   Medication Sig Dispense Refill    acetic acid-hydrocortisone (VOSOL-HC) otic solution 2-4 drops to affected ear twice a day 10 mL 2    ascorbic acid (VITAMIN C) 1000 MG tablet Take 1,000 mg by mouth once daily.      aspirin 81 mg Tab Take by mouth. 1 Tablet Oral Every day.  Last taken 3/25/11      atorvastatin (LIPITOR) 40 MG tablet TAKE 1 TABLET BY MOUTH EVERY DAY 90 tablet 3    diclofenac (VOLTAREN) 50 MG EC tablet Take 1 tablet (50 mg total) by mouth  2 (two) times daily. for 10 days 2020 tablet 0    fish oil-omega-3 fatty acids 300-1,000 mg capsule Take 2 g by mouth once daily.      fluticasone (FLONASE) 50 mcg/actuation nasal spray USE 1 SPRAY BY EACH NARE ROUTE ONCE DAILY. 48 mL 3    hydroCHLOROthiazide (MICROZIDE) 12.5 mg capsule Take 1 capsule (12.5 mg total) by mouth once daily. 90 capsule 3    multivitamin capsule Take 1 capsule by mouth once daily.      PREVIDENT 5000 SENSITIVE 1.1-5 % Pste   2    sildenafil (VIAGRA) 100 MG tablet Take 1 tablet (100 mg total) by mouth daily as needed for Erectile Dysfunction. 6 tablet 12    tiZANidine (ZANAFLEX) 4 MG tablet Take 1 tablet (4 mg total) by mouth every 8 (eight) hours as needed. 30 tablet 0    loratadine (CLARITIN) 10 mg tablet Take 1 tablet (10 mg total) by mouth once daily. 90 tablet 1    losartan (COZAAR) 100 MG tablet Take 1 tablet (100 mg total) by mouth once daily. 90 tablet 3     No current facility-administered medications on file prior to visit.        Social History:   Social History     Socioeconomic History    Marital status:      Spouse name: Ginger    Number of children: 4    Years of education: Not on file    Highest education level: Not on file   Occupational History     Employer: [employer]   Social Needs    Financial resource strain: Not very hard    Food insecurity:     Worry: Never true     Inability: Never true    Transportation needs:     Medical: No     Non-medical: No   Tobacco Use    Smoking status: Never Smoker    Smokeless tobacco: Never Used   Substance and Sexual Activity    Alcohol use: Yes     Frequency: 2-3 times a week     Drinks per session: 1 or 2     Binge frequency: Never     Comment: socially    Drug use: No    Sexual activity: Yes     Partners: Female   Lifestyle    Physical activity:     Days per week: 5 days     Minutes per session: 60 min    Stress: Only a little   Relationships    Social connections:     Talks on phone: Three  times a week     Gets together: Twice a week     Attends Nondenominational service: Not on file     Active member of club or organization: Yes     Attends meetings of clubs or organizations: More than 4 times per year     Relationship status:    Other Topics Concern    Not on file   Social History Narrative    Not on file       REVIEW OF SYSTEMS:  Constitution: Negative. Negative for chills, fever and night sweats.   Cardiovascular: Negative for chest pain and syncope.   Respiratory: Negative for cough and shortness of breath.   Gastrointestinal: See HPI. Negative for nausea/vomiting. Negative for abdominal pain.  Genitourinary: See HPI. Negative for discoloration or dysuria.  Skin: Negative for dry skin, itching and rash.   Hematologic/Lymphatic: Negative for bleeding problem. Does not bruise/bleed easily.   Musculoskeletal: Negative for falls and muscle weakness.   Neurological: See HPI. No seizures.   Endocrine: Negative for polydipsia, polyphagia and polyuria.   Allergic/Immunologic: Negative for hives and persistent infections.  Psychiatric/Behavioral: Negative for depression and insomnia.         EXAM:  BP (!) 160/93 (BP Location: Left arm, Patient Position: Sitting, BP Method: Medium (Automatic))   Pulse 62   Ht 6' (1.829 m)   Wt 108.8 kg (239 lb 13.8 oz)   BMI 32.53 kg/m²     General: The patient is a pleasant 69 y.o. male in no apparent distress, the patient is oriented to person, place and time.  Psych: Normal mood and affect  HEENT: Vision grossly intact, hearing intact to the spoken word.  Lungs: Respirations unlabored.  Gait: Normal station and gait, no difficulty with toe or heel walk.   Skin: Cervical skin negative for rashes, lesions, hairy patches and surgical scars.  Range of motion: Cervical range of motion is acceptable. There is mild left sided tenderness to palpation.  Pain along lateral neck and into trapezius.    Spinal Balance: Global saggital and coronal spinal balance acceptable, no  "significant for scoliosis and kyphosis.  Musculoskeletal: No pain with the range of motion of the bilateral shoulders and elbows. Normal bulk and contour of the bilateral hands.  Vascular: Bilateral hands warm and well perfused, radial pulses 2+ bilaterally.  Neurological: Normal strength and tone in all major motor groups in the bilateral upper and lower extremities. Normal sensation to light touch in the C5-T1 and L2-S1 dermatomes bilaterally.  Deep tendon reflexes symmetric 2+ in the bilateral upper and lower extremities.  Negative Inverted Radial Reflex and Loaiza's bilaterally. Negative Babinski bilaterally.     IMAGING:   Today I personally reviewed AP, Lat and Flex/Ex  upright C-spine films that demonstrate mild degenerative changes with no acute abnormalities.     Body mass index is 32.53 kg/m².    No results found for: HGBA1C        ASSESSMENT/PLAN:    Julio was seen today for pain.    Diagnoses and all orders for this visit:    Cervical radiculopathy  -     MRI Cervical Spine Without Contrast; Future    DDD (degenerative disc disease), cervical  -     Ambulatory referral/consult to Orthopedics  -     MRI Cervical Spine Without Contrast; Future        - He has symptoms of cervical radiculopathy without myelopathic symptoms.  He has worsened despite conservative treatment consisting of nsaids, massage, chiropractor, TENS, and muscle relaxants.  MRI of the cervical spine was ordered. Will follow up to discuss results and further treatment.    Addendum 12/6/2019:  Spoke with patient about MRI results  MRI shows "Cervical spondylosis, most prominent at C3-4, resulting in severe spinal canal stenosis.  There is increased cord signal at this level suggesting myelomalacia and/or edema, moderate/ severe neural foraminal narrowing from C3-4 through C5-6"    He currently not exhibiting any myelopathic signs or symptoms.  At this time I would recommend consider cervical VELMA or start PT.  He will continue nsaids and " muscle relaxant as needed.  We discussed follow up if symptoms change or he begins to develop myelopathic symptoms.

## 2019-12-05 NOTE — TELEPHONE ENCOUNTER
Spoke to patient in regard to message. Stated to patient that his insurance was approve to have his mri done in the morning. Patient stated thank you.

## 2019-12-06 ENCOUNTER — PATIENT MESSAGE (OUTPATIENT)
Dept: INTERNAL MEDICINE | Facility: CLINIC | Age: 69
End: 2019-12-06

## 2019-12-06 ENCOUNTER — HOSPITAL ENCOUNTER (OUTPATIENT)
Dept: RADIOLOGY | Facility: HOSPITAL | Age: 69
Discharge: HOME | End: 2019-12-06
Attending: PHYSICIAN ASSISTANT
Payer: MEDICARE

## 2019-12-06 DIAGNOSIS — M54.12 CERVICAL RADICULOPATHY: ICD-10-CM

## 2019-12-06 DIAGNOSIS — M50.30 DDD (DEGENERATIVE DISC DISEASE), CERVICAL: ICD-10-CM

## 2019-12-06 PROCEDURE — 72141 MRI CERVICAL SPINE WITHOUT CONTRAST: ICD-10-PCS | Mod: 26,,, | Performed by: RADIOLOGY

## 2019-12-06 PROCEDURE — 72141 MRI NECK SPINE W/O DYE: CPT | Mod: TC

## 2019-12-06 PROCEDURE — 72141 MRI NECK SPINE W/O DYE: CPT | Mod: 26,,, | Performed by: RADIOLOGY

## 2019-12-10 ENCOUNTER — TELEPHONE (OUTPATIENT)
Dept: INTERNAL MEDICINE | Facility: CLINIC | Age: 69
End: 2019-12-10

## 2019-12-10 ENCOUNTER — PATIENT MESSAGE (OUTPATIENT)
Dept: INTERNAL MEDICINE | Facility: CLINIC | Age: 69
End: 2019-12-10

## 2019-12-10 NOTE — TELEPHONE ENCOUNTER
----- Message from Katya Buenrostro sent at 12/10/2019  1:38 PM CST -----  Contact: [phone]  Patient is returning a phone call.  Who left a message for the patient:   Does patient know what this is regarding:    Comments: patient stated he received a call from the office.  please advise, thanks .

## 2019-12-11 RX ORDER — DICLOFENAC SODIUM 50 MG/1
50 TABLET, DELAYED RELEASE ORAL 2 TIMES DAILY
Qty: 20 TABLET | Refills: 0 | Status: SHIPPED | OUTPATIENT
Start: 2019-12-11 | End: 2019-12-12 | Stop reason: SDUPTHER

## 2019-12-12 ENCOUNTER — CLINICAL SUPPORT (OUTPATIENT)
Dept: REHABILITATION | Facility: HOSPITAL | Age: 69
End: 2019-12-12
Attending: INTERNAL MEDICINE
Payer: MEDICARE

## 2019-12-12 ENCOUNTER — PATIENT MESSAGE (OUTPATIENT)
Dept: INTERNAL MEDICINE | Facility: CLINIC | Age: 69
End: 2019-12-12

## 2019-12-12 DIAGNOSIS — M99.01 SEGMENTAL DYSFUNCTION OF CERVICAL REGION: ICD-10-CM

## 2019-12-12 DIAGNOSIS — R29.3 ABNORMAL POSTURE: ICD-10-CM

## 2019-12-12 DIAGNOSIS — M43.6 NECK STIFFNESS: ICD-10-CM

## 2019-12-12 DIAGNOSIS — R29.898 DECREASED RANGE OF MOTION OF NECK: ICD-10-CM

## 2019-12-12 PROCEDURE — 97161 PT EVAL LOW COMPLEX 20 MIN: CPT | Mod: PO | Performed by: PHYSICAL THERAPIST

## 2019-12-12 RX ORDER — LOSARTAN POTASSIUM 100 MG/1
TABLET ORAL
Qty: 90 TABLET | Refills: 3 | Status: SHIPPED | OUTPATIENT
Start: 2019-12-12 | End: 2020-12-19

## 2019-12-13 RX ORDER — DICLOFENAC SODIUM 50 MG/1
50 TABLET, DELAYED RELEASE ORAL 2 TIMES DAILY
Qty: 20 TABLET | Refills: 0 | Status: SHIPPED | OUTPATIENT
Start: 2019-12-13 | End: 2019-12-23 | Stop reason: SDUPTHER

## 2019-12-16 ENCOUNTER — OFFICE VISIT (OUTPATIENT)
Dept: INTERNAL MEDICINE | Facility: CLINIC | Age: 69
End: 2019-12-16
Payer: MEDICARE

## 2019-12-16 VITALS
WEIGHT: 244.5 LBS | SYSTOLIC BLOOD PRESSURE: 136 MMHG | DIASTOLIC BLOOD PRESSURE: 80 MMHG | HEIGHT: 72 IN | BODY MASS INDEX: 33.12 KG/M2 | OXYGEN SATURATION: 95 % | HEART RATE: 75 BPM

## 2019-12-16 DIAGNOSIS — N18.30 CKD (CHRONIC KIDNEY DISEASE) STAGE 3, GFR 30-59 ML/MIN: ICD-10-CM

## 2019-12-16 DIAGNOSIS — M43.6 NECK STIFFNESS: ICD-10-CM

## 2019-12-16 DIAGNOSIS — M48.02 CERVICAL STENOSIS OF SPINAL CANAL: Primary | ICD-10-CM

## 2019-12-16 DIAGNOSIS — I10 ESSENTIAL HYPERTENSION: ICD-10-CM

## 2019-12-16 DIAGNOSIS — E78.5 HYPERLIPIDEMIA, UNSPECIFIED HYPERLIPIDEMIA TYPE: ICD-10-CM

## 2019-12-16 DIAGNOSIS — M1A.3710 CHRONIC GOUT OF RIGHT FOOT DUE TO RENAL IMPAIRMENT WITHOUT TOPHUS: ICD-10-CM

## 2019-12-16 PROCEDURE — 1101F PT FALLS ASSESS-DOCD LE1/YR: CPT | Mod: CPTII,S$GLB,, | Performed by: INTERNAL MEDICINE

## 2019-12-16 PROCEDURE — 3075F SYST BP GE 130 - 139MM HG: CPT | Mod: CPTII,S$GLB,, | Performed by: INTERNAL MEDICINE

## 2019-12-16 PROCEDURE — 1101F PR PT FALLS ASSESS DOC 0-1 FALLS W/OUT INJ PAST YR: ICD-10-PCS | Mod: CPTII,S$GLB,, | Performed by: INTERNAL MEDICINE

## 2019-12-16 PROCEDURE — 1125F AMNT PAIN NOTED PAIN PRSNT: CPT | Mod: S$GLB,,, | Performed by: INTERNAL MEDICINE

## 2019-12-16 PROCEDURE — 99212 PR OFFICE/OUTPT VISIT, EST, LEVL II, 10-19 MIN: ICD-10-PCS | Mod: S$GLB,,, | Performed by: INTERNAL MEDICINE

## 2019-12-16 PROCEDURE — 1125F PR PAIN SEVERITY QUANTIFIED, PAIN PRESENT: ICD-10-PCS | Mod: S$GLB,,, | Performed by: INTERNAL MEDICINE

## 2019-12-16 PROCEDURE — 99999 PR PBB SHADOW E&M-EST. PATIENT-LVL IV: CPT | Mod: PBBFAC,,, | Performed by: INTERNAL MEDICINE

## 2019-12-16 PROCEDURE — 99999 PR PBB SHADOW E&M-EST. PATIENT-LVL IV: ICD-10-PCS | Mod: PBBFAC,,, | Performed by: INTERNAL MEDICINE

## 2019-12-16 PROCEDURE — 1159F MED LIST DOCD IN RCRD: CPT | Mod: S$GLB,,, | Performed by: INTERNAL MEDICINE

## 2019-12-16 PROCEDURE — 99212 OFFICE O/P EST SF 10 MIN: CPT | Mod: S$GLB,,, | Performed by: INTERNAL MEDICINE

## 2019-12-16 PROCEDURE — 3075F PR MOST RECENT SYSTOLIC BLOOD PRESS GE 130-139MM HG: ICD-10-PCS | Mod: CPTII,S$GLB,, | Performed by: INTERNAL MEDICINE

## 2019-12-16 PROCEDURE — 1159F PR MEDICATION LIST DOCUMENTED IN MEDICAL RECORD: ICD-10-PCS | Mod: S$GLB,,, | Performed by: INTERNAL MEDICINE

## 2019-12-16 PROCEDURE — 3079F PR MOST RECENT DIASTOLIC BLOOD PRESSURE 80-89 MM HG: ICD-10-PCS | Mod: CPTII,S$GLB,, | Performed by: INTERNAL MEDICINE

## 2019-12-16 PROCEDURE — 3079F DIAST BP 80-89 MM HG: CPT | Mod: CPTII,S$GLB,, | Performed by: INTERNAL MEDICINE

## 2019-12-17 NOTE — PROGRESS NOTES
"  Physical Therapy Daily Treatment Note     Name: Julio ROPER [facility] Number: [id number]    Therapy Diagnosis:   Encounter Diagnoses   Name Primary?    Segmental dysfunction of cervical region     Abnormal posture     Neck stiffness      Physician: Ryan Loera Jr., MD    Visit Date: 12/18/2019    Physician Orders: PT Eval and Treat neck  Medical Diagnosis from Referral:   M54.2 (ICD-10-CM) - Neck pain  Evaluation Date: 12/12/2019  Authorization Period Expiration: 12/26/2019  Plan of Care Expiration: 3/12/2020  Visit # / Visits authorized: 2/ 6     Time In: 7:00 AM  Time Out: 7:55 AM  Total Billable Time: 30 minutes     Precautions: Standard    Subjective     Pt reports: L sided neck pain. Reports posterior neck gets tired as the day goes on.   He was compliant with home exercise program.  Response to previous treatment: no adverse effects   Functional change: none     Pain: 4/10  Location: left neck      Objective     Mani received therapeutic exercises to develop strength, endurance, ROM, flexibility and posture for 30 minutes including:     Supine Cervical Rotation x 20   Supine Cervical nods x20   Scap Retraction x 20   L Levator Scap Stretch 2x30   L Upper Trap Stretch 2x30   Supine Pec Stretch x 3 minutes   Seated Chin Tucks 20x5"     Mani received the following manual therapy techniques: Soft tissue Mobilization were applied to the: neck for 10 minutes, including:    Suboccipital Release   STM to cervical paraspinals, bilat Uts and Levator Scap (focused more on the L)  Manual L UT stretch    Mani received cold pack for 10 minutes to the neck.      Home Exercises Provided and Patient Education Provided     Education provided:   -  HEP    Written Home Exercises Provided: yes.  Exercises were reviewed and Mani was able to demonstrate them prior to the end of the session.  Mani demonstrated good  understanding of the education provided.     See EMR under Patient Instructions for exercises provided " 12/18/2019.    Assessment     Patient tolerated initial treatment post eval well today with no onset of adverse effects. Patient instructed to perform all exercises within pain free range. Post treatment patient reports decreased stiffness. Will continue to progress per patient's tolerance.     Mani is progressing well towards his goals.   Pt prognosis is Good.     Pt will continue to benefit from skilled outpatient physical therapy to address the deficits listed in the problem list box on initial evaluation, provide pt/family education and to maximize pt's level of independence in the home and community environment.     Pt's spiritual, cultural and educational needs considered and pt agreeable to plan of care and goals.     Anticipated barriers to physical therapy: none     Goals:   Short Term GOALS: 5 weeks. Pt agrees with goals set.  1. Pt to report decreased intensity and frequency of pain 20 to 40% to improve quality of daily activity.   2. Pt to improve flexibility in the cervical and upper extremity musculature to restore functional mobility.   3. Pt to report decreased pain and tenderness over the low cervical segments and surrounding musculature to restore functional mobility.   4. Patient demonstrates independence with HEP for the purpose od self maintenance     Long Term GOALS: 10 weeks. Pt agrees with goals set.  1. Patient demonstrates increased right shoulder motions actively to restore functional movement patterns   2. Patient demonstrates increased scapulothoracic strength for the purpose of improving postures and reducing pain in the cervical area when sitting.   3. Patient demonstrates functional cervical flexion and rotation without pain and increase extension mobility without pain .    Plan     Continue PT LOTUS Mo, MORENA

## 2019-12-18 ENCOUNTER — CLINICAL SUPPORT (OUTPATIENT)
Dept: REHABILITATION | Facility: HOSPITAL | Age: 69
End: 2019-12-18
Payer: MEDICARE

## 2019-12-18 DIAGNOSIS — M43.6 NECK STIFFNESS: ICD-10-CM

## 2019-12-18 DIAGNOSIS — M99.01 SEGMENTAL DYSFUNCTION OF CERVICAL REGION: ICD-10-CM

## 2019-12-18 DIAGNOSIS — R29.3 ABNORMAL POSTURE: ICD-10-CM

## 2019-12-18 PROCEDURE — 97140 MANUAL THERAPY 1/> REGIONS: CPT | Mod: PO

## 2019-12-18 PROCEDURE — 97110 THERAPEUTIC EXERCISES: CPT | Mod: PO

## 2019-12-23 RX ORDER — TIZANIDINE 4 MG/1
TABLET ORAL
Qty: 30 TABLET | Refills: 0 | Status: SHIPPED | OUTPATIENT
Start: 2019-12-23 | End: 2022-10-03

## 2019-12-23 RX ORDER — TIZANIDINE 4 MG/1
4 TABLET ORAL EVERY 8 HOURS PRN
Qty: 30 TABLET | Refills: 0 | OUTPATIENT
Start: 2019-12-23

## 2019-12-23 RX ORDER — DICLOFENAC SODIUM 50 MG/1
50 TABLET, DELAYED RELEASE ORAL 2 TIMES DAILY
Qty: 20 TABLET | Refills: 0 | Status: SHIPPED | OUTPATIENT
Start: 2019-12-23 | End: 2020-01-02

## 2019-12-23 NOTE — TELEPHONE ENCOUNTER
----- Message from Mady Kingston sent at 12/23/2019  2:31 PM CST -----  Contact: Spouse:  Connie call [phone]  Patient is calling for an RX refill or new RX.  Is this a refill or new RX:  refill  RX name and strength: tiZANidine (ZANAFLEX) 4 MG tablet  Directions (copy/paste from chart): Take 1 tablet (4 mg total) by mouth every 8 (eight) hours as needed. - Oral   Is this a 30 day or 90 day RX:  30  Local pharmacy or mail order pharmacy: local    Pharmacy name and phone # (copy/paste from chart): [facility]   [phone] (Phone)  [phone] (Fax)  Comments:

## 2019-12-27 ENCOUNTER — CLINICAL SUPPORT (OUTPATIENT)
Dept: REHABILITATION | Facility: HOSPITAL | Age: 69
End: 2019-12-27
Payer: MEDICARE

## 2019-12-27 DIAGNOSIS — R29.3 ABNORMAL POSTURE: ICD-10-CM

## 2019-12-27 DIAGNOSIS — M43.6 NECK STIFFNESS: ICD-10-CM

## 2019-12-27 DIAGNOSIS — M99.01 SEGMENTAL DYSFUNCTION OF CERVICAL REGION: ICD-10-CM

## 2019-12-27 PROCEDURE — 97140 MANUAL THERAPY 1/> REGIONS: CPT | Mod: PO

## 2019-12-27 PROCEDURE — 97110 THERAPEUTIC EXERCISES: CPT | Mod: PO

## 2019-12-27 NOTE — PROGRESS NOTES
"  Physical Therapy Daily Treatment Note     Name: Julio ROPER [facility] Number: [id number]    Therapy Diagnosis:   Encounter Diagnoses   Name Primary?    Segmental dysfunction of cervical region     Abnormal posture     Neck stiffness      Physician: Ryan Loera Jr., MD    Visit Date: 12/27/2019    Physician Orders: PT Eval and Treat neck  Medical Diagnosis from Referral:   M54.2 (ICD-10-CM) - Neck pain  Evaluation Date: 12/12/2019  Authorization Period Expiration: 12/26/2019  Plan of Care Expiration: 3/12/2020  Visit # / Visits authorized: 3/ 6     Time In: 7:58 AM  Time Out: 7:55 AM  Total Billable Time: 30 minutes     Precautions: Standard    Subjective     Pt reports: the throbbing pain is gone, reports more soreness now. Reports he can turn his head better now to the L.   He was compliant with home exercise program.  Response to previous treatment: no adverse effects   Functional change: can turn head to the L better.     Pain: 2/10  Location: left neck      Objective     Mani received therapeutic exercises to develop strength, endurance, ROM, flexibility and posture for 43 minutes including:     Supine Cervical Rotation x 20   Supine Cervical nods x20   Scap Retraction x 20   L Levator Scap Stretch 2x30   L Upper Trap Stretch 2x30   Supine Pec Stretch x 3 minutes   Seated Chin Tucks 20x5" - cues for proper form.     Rows with OTB x 20   Extension with OTB x 20   Brueggers x 20 YTB   Door way pec stretch     Mani received the following manual therapy techniques: Soft tissue Mobilization were applied to the: neck for 10 minutes, including:    Suboccipital Release   STM to cervical paraspinals, bilat Uts and Levator Scap (focused more on the L)  Manual L UT stretch  Manual Pec stretch     Mani received cold pack for 10 minutes to the neck.      Home Exercises Provided and Patient Education Provided     Education provided:   -  HEP    Written Home Exercises Provided: yes.  Exercises were reviewed and Mani was " able to demonstrate them prior to the end of the session.  Mani demonstrated good  understanding of the education provided.     See EMR under Patient Instructions for exercises provided 12/18/2019.    Assessment     Patient tolerated all therex and manual therapy well today. Patient required cues for proper form with chin tucks today. Patient with improvement noted post treatment. Will continue to progress per patient's tolerance.     Mani is progressing well towards his goals.   Pt prognosis is Good.     Pt will continue to benefit from skilled outpatient physical therapy to address the deficits listed in the problem list box on initial evaluation, provide pt/family education and to maximize pt's level of independence in the home and community environment.     Pt's spiritual, cultural and educational needs considered and pt agreeable to plan of care and goals.     Anticipated barriers to physical therapy: none     Goals:   Short Term GOALS: 5 weeks. Pt agrees with goals set.  1. Pt to report decreased intensity and frequency of pain 20 to 40% to improve quality of daily activity.   2. Pt to improve flexibility in the cervical and upper extremity musculature to restore functional mobility.   3. Pt to report decreased pain and tenderness over the low cervical segments and surrounding musculature to restore functional mobility.   4. Patient demonstrates independence with HEP for the purpose od self maintenance     Long Term GOALS: 10 weeks. Pt agrees with goals set.  1. Patient demonstrates increased right shoulder motions actively to restore functional movement patterns   2. Patient demonstrates increased scapulothoracic strength for the purpose of improving postures and reducing pain in the cervical area when sitting.   3. Patient demonstrates functional cervical flexion and rotation without pain and increase extension mobility without pain .    Plan     Continue PT POC     Roxie Mo, MORENA

## 2019-12-30 ENCOUNTER — CLINICAL SUPPORT (OUTPATIENT)
Dept: REHABILITATION | Facility: HOSPITAL | Age: 69
End: 2019-12-30
Payer: MEDICARE

## 2019-12-30 DIAGNOSIS — M99.01 SEGMENTAL DYSFUNCTION OF CERVICAL REGION: ICD-10-CM

## 2019-12-30 DIAGNOSIS — M43.6 NECK STIFFNESS: ICD-10-CM

## 2019-12-30 DIAGNOSIS — R29.3 ABNORMAL POSTURE: ICD-10-CM

## 2019-12-30 PROCEDURE — 97140 MANUAL THERAPY 1/> REGIONS: CPT | Mod: PO

## 2019-12-30 PROCEDURE — 97110 THERAPEUTIC EXERCISES: CPT | Mod: PO

## 2019-12-31 NOTE — PROGRESS NOTES
He is a 69-year-old gentleman coming in today to follow-up his blood pressure and also his neck pain. I last saw for cervical strain last month.  He was seen by Orthopedics on December 5th and they get MRI of her cervical spine that showed : Cervical spondylosis, most prominent at C3-4, resulting in severe spinal canal stenosis.  There is increased cord signal at this level suggesting myelomalacia and/or edema.    Additional cervical spondylosis noted, resulting in moderate/ severe neural foraminal narrowing from C3-4 through C5-6, as above.    Orthopedics also has a set up for physical therapy for his back.  He continues to have some neck pain but overall is been doing a little bit better.  He continues on diclofenac and Zanaflex.      Other medical problems include hypertension, hyperlipidemia, colon polyps and gout.    /80 (BP Location: Right arm, Patient Position: Sitting, BP Method: Large (Manual))   Pulse 75   Ht 6' (1.829 m)   Wt 110.9 kg (244 lb 7.8 oz)   SpO2 95%   BMI 33.16 kg/m²   He is well-appearing in no acute distress.      Assessment severe spinal canal stenosis at C3-C4.  With some neural foraminal narrowing at C3-C4 and C5-C6.  Today I discussed the MRI findings with the patient and he to reassess how his response therapy is going.  Will continue diclofenac for now if it does not improve the next step will be a cervical epidural and tilt then he will continue with physical therapy.  I will follow him up again in 4 months with labs prior.  However if things worsen before then he will let me know..    Answers for HPI/ROS submitted by the patient on 12/15/2019   activity change: No  unexpected weight change: No  neck pain: Yes  hearing loss: No  rhinorrhea: No  trouble swallowing: No  eye discharge: No  visual disturbance: No  chest tightness: No  wheezing: No  chest pain: No  palpitations: No  blood in stool: No  constipation: No  vomiting: No  diarrhea: No  polydipsia: No  polyuria:  No  difficulty urinating: No  urgency: No  hematuria: No  joint swelling: No  arthralgias: No  headaches: No  weakness: No  confusion: No  dysphoric mood: No

## 2020-01-06 ENCOUNTER — CLINICAL SUPPORT (OUTPATIENT)
Dept: REHABILITATION | Facility: HOSPITAL | Age: 70
End: 2020-01-06
Payer: MEDICARE

## 2020-01-06 DIAGNOSIS — M43.6 NECK STIFFNESS: ICD-10-CM

## 2020-01-06 DIAGNOSIS — R29.3 ABNORMAL POSTURE: ICD-10-CM

## 2020-01-06 DIAGNOSIS — M99.01 SEGMENTAL DYSFUNCTION OF CERVICAL REGION: ICD-10-CM

## 2020-01-06 PROCEDURE — 97110 THERAPEUTIC EXERCISES: CPT | Mod: PO | Performed by: PHYSICAL THERAPIST

## 2020-01-06 NOTE — PROGRESS NOTES
"  Physical Therapy Daily Treatment Note     [id number]    Therapy Diagnosis:   Encounter Diagnoses   Name Primary?    Segmental dysfunction of cervical region     Abnormal posture     Neck stiffness      Physician: Ryan Loera Jr., MD    Visit Date: 1/6/2020    Physician Orders: PT Eval and Treat neck  Medical Diagnosis from Referral:   M54.2 (ICD-10-CM) - Neck pain  Evaluation Date: 12/12/2019  Authorization Period Expiration: 12/26/2019  Plan of Care Expiration: 3/12/2020  Visit # / Visits authorized:  4 / 6  NEW 2020: 1    Time In: 09:10  AM  Time Out: 1015 AM  Total Billable Time: 65  minutes     Precautions: Standard    Subjective     Pt reports the neck is feeling better. Only some soreness but no throbbing pain. Biggest problem is in the groove down in the neck and shoulder. Sleep is not interrupted. Stiffness with movement of the neck. Feels like if I have been lifting weights.   He was compliant with home exercise program.  Response to previous treatment: did fine.   Functional change: not limited .     Pain:  0 /10 pain, 4/10 soreness with movement.   Location: left neck      Objective     Mani received therapeutic exercises to develop strength, endurance, ROM, flexibility and posture for 30  minutes including:     UBE: 3 fwd / 3 back   Supine Cervical Rotation x 20   Supine Cervical nods x20   Scap Retraction x 20   L Levator Scap Stretch 2x30   L Upper Trap Stretch 2x30   Supine Pec Stretch x 3 minutes   Seated Chin Tucks 20x5" - cues for proper form.     Rows with OTB x 20   Extension with OTB x 20   Brueggers x 20 YTB   Door way pec stretch     Mani received the following manual therapy techniques: Soft tissue Mobilization were applied to the: neck for 10 minutes, including:    Suboccipital Release   Cervical distraction   Cervical mobs upper cervical rotation and lower cervical rotation grades III / IIII  JOEY of the upper cervical segments in lateral flexion "   Manual L UT stretch  Manual Pec stretch     Mani received cold pack for 10 minutes to the neck.      Home Exercises Provided and Patient Education Provided     Education provided:   -  HEP    Written Home Exercises Provided: yes.  Exercises were reviewed and Mani was able to demonstrate them prior to the end of the session.  Mani demonstrated good  understanding of the education provided.     See EMR under Patient Instructions for exercises provided 12/18/2019.    Assessment     Performed the activities noted. He responded well to manual interventions. Did not encounter pain during the activities performed. Progress with thoracic mobility and periscapular muscle strengthening.     Mani is progressing well towards his goals.   Pt prognosis is Good.     Pt will continue to benefit from skilled outpatient physical therapy to address the deficits listed in the problem list box on initial evaluation, provide pt/family education and to maximize pt's level of independence in the home and community environment.     Pt's spiritual, cultural and educational needs considered and pt agreeable to plan of care and goals.     Anticipated barriers to physical therapy: none     Goals:   Short Term GOALS: 5 weeks. Pt agrees with goals set.  1. Pt to report decreased intensity and frequency of pain 20 to 40% to improve quality of daily activity.   2. Pt to improve flexibility in the cervical and upper extremity musculature to restore functional mobility.   3. Pt to report decreased pain and tenderness over the low cervical segments and surrounding musculature to restore functional mobility.   4. Patient demonstrates independence with HEP for the purpose od self maintenance     Long Term GOALS: 10 weeks. Pt agrees with goals set.  1. Patient demonstrates increased right shoulder motions actively to restore functional movement patterns   2. Patient demonstrates increased scapulothoracic strength for the purpose of improving postures and  reducing pain in the cervical area when sitting.   3. Patient demonstrates functional cervical flexion and rotation without pain and increase extension mobility without pain .    Plan        Plan of care Certification: 12/12/2019 to 3/12/2019.     Outpatient Physical Therapy 2 times weekly for 10 weeks to include the following interventions: Manual Therapy, Patient Education and Therapeutic Exercise.     Mian France, PT

## 2020-01-10 ENCOUNTER — CLINICAL SUPPORT (OUTPATIENT)
Dept: REHABILITATION | Facility: HOSPITAL | Age: 70
End: 2020-01-10
Payer: MEDICARE

## 2020-01-10 DIAGNOSIS — M43.6 NECK STIFFNESS: ICD-10-CM

## 2020-01-10 DIAGNOSIS — R29.3 ABNORMAL POSTURE: ICD-10-CM

## 2020-01-10 DIAGNOSIS — M99.01 SEGMENTAL DYSFUNCTION OF CERVICAL REGION: ICD-10-CM

## 2020-01-10 PROCEDURE — 97140 MANUAL THERAPY 1/> REGIONS: CPT | Mod: PO,CQ

## 2020-01-10 NOTE — PROGRESS NOTES
"  Physical Therapy Daily Treatment Note     Name: Julio ROPER [facility] Number: [id number]    Therapy Diagnosis:   Encounter Diagnoses   Name Primary?    Segmental dysfunction of cervical region     Abnormal posture     Neck stiffness      Physician: Ryan Loera Jr., MD    Visit Date: 1/10/2020    Physician Orders: PT Eval and Treat neck  Medical Diagnosis from Referral:   M54.2 (ICD-10-CM) - Neck pain  Evaluation Date: 12/12/2019  Authorization Period Expiration: 12/26/2019  Plan of Care Expiration: 3/12/2020  Visit # / Visits authorized:  5/ 6  NEW 2020: 2    Time In: 10:00 AM  Time Out: 11:00 AM  Total Billable Time: 15 minutes     Precautions: Standard    Subjective     Pt reports: improvement and just soreness.    He was compliant with home exercise program.  Response to previous treatment: did fine.   Functional change: not limited .     Pain:  0 /10 pain, - soreness   Location: left neck      Objective     Mani received therapeutic exercises to develop strength, endurance, ROM, flexibility and posture for 35 minutes including:     UBE: 3 fwd / 3 back   Seated Cervical Rotation x 20   Seated Cervical nods x20   Scap Retraction x 20   L Levator Scap Stretch 2x30   L Upper Trap Stretch 2x30   Supine Pec Stretch x 3 minutes   Seated Chin Tucks 20x5" - cues for proper form.     Rows with GTB 3x10   Extension with GTB 3x10   Brueggers x 20 OTB   Corner pec stretch 3x30     Mani received the following manual therapy techniques: Soft tissue Mobilization were applied to the: neck for 12 minutes, including:    Suboccipital Release   STM to cervical paraspinal, B UT, B Levator Scap     Mani received hot pack for 10 minutes to the neck.      Home Exercises Provided and Patient Education Provided     Education provided:   -  HEP    Written Home Exercises Provided: yes.  Exercises were reviewed and Mani was able to demonstrate them prior to the end of the session.  Mani demonstrated good  understanding of the education " provided.     See EMR under Patient Instructions for exercises provided 12/18/2019.    Assessment     Patient tolerated treatment well today. Patient with reports of increased R sided tightness today in the neck and into the top of the shoulder and that the L side is sore. Patient with no onset of adverse effects throughout treatment and reports corner pec stretch is helpful.     Mani is progressing well towards his goals.   Pt prognosis is Good.     Pt will continue to benefit from skilled outpatient physical therapy to address the deficits listed in the problem list box on initial evaluation, provide pt/family education and to maximize pt's level of independence in the home and community environment.     Pt's spiritual, cultural and educational needs considered and pt agreeable to plan of care and goals.     Anticipated barriers to physical therapy: none     Goals:   Short Term GOALS: 5 weeks. Pt agrees with goals set.  1. Pt to report decreased intensity and frequency of pain 20 to 40% to improve quality of daily activity.   2. Pt to improve flexibility in the cervical and upper extremity musculature to restore functional mobility.   3. Pt to report decreased pain and tenderness over the low cervical segments and surrounding musculature to restore functional mobility.   4. Patient demonstrates independence with HEP for the purpose od self maintenance     Long Term GOALS: 10 weeks. Pt agrees with goals set.  1. Patient demonstrates increased right shoulder motions actively to restore functional movement patterns   2. Patient demonstrates increased scapulothoracic strength for the purpose of improving postures and reducing pain in the cervical area when sitting.   3. Patient demonstrates functional cervical flexion and rotation without pain and increase extension mobility without pain .    Plan        Plan of care Certification: 12/12/2019 to 3/12/2019.     Outpatient Physical Therapy 2 times weekly for 10 weeks to  include the following interventions: Manual Therapy, Patient Education and Therapeutic Exercise.     Roxie Mo, PTA

## 2020-01-13 ENCOUNTER — CLINICAL SUPPORT (OUTPATIENT)
Dept: REHABILITATION | Facility: HOSPITAL | Age: 70
End: 2020-01-13
Payer: MEDICARE

## 2020-01-13 DIAGNOSIS — R29.3 ABNORMAL POSTURE: ICD-10-CM

## 2020-01-13 DIAGNOSIS — M99.01 SEGMENTAL DYSFUNCTION OF CERVICAL REGION: ICD-10-CM

## 2020-01-13 DIAGNOSIS — M43.6 NECK STIFFNESS: ICD-10-CM

## 2020-01-13 PROCEDURE — 97140 MANUAL THERAPY 1/> REGIONS: CPT | Mod: PO | Performed by: PHYSICAL THERAPIST

## 2020-01-13 PROCEDURE — 97110 THERAPEUTIC EXERCISES: CPT | Mod: PO | Performed by: PHYSICAL THERAPIST

## 2020-01-13 NOTE — PROGRESS NOTES
"      Physical Therapy Daily Treatment Note     Name: Julio ROPER [facility] Number: [id number]    Therapy Diagnosis:   Encounter Diagnoses   Name Primary?    Segmental dysfunction of cervical region     Abnormal posture     Neck stiffness      Physician: Ryan Loera Jr., MD    Visit Date: 1/13/2020    Physician Orders: PT Eval and Treat neck  Medical Diagnosis from Referral:   M54.2 (ICD-10-CM) - Neck pain  Evaluation Date: 12/12/2019  Authorization Period Expiration: 12/26/2019  Plan of Care Expiration: 3/12/2020  Visit # / Visits authorized:  5/ 6  NEW 2020: 3/    Time In: 915 AM  Time Out: 1020 AM  Total Billable Time:   65   minutes     Precautions: Standard    Subjective     Pt reports: he only has pain when turning the neck, otherwise, there is no pain. No throbbing pain and no pain looking straight forward.   He was compliant with home exercise program.  Response to previous treatment: did fine.    Functional change: not limited .     Pain:  0 /10 pain, - soreness, 5/10 with rotation L>R on the left and 3/10 turn to the right with pain on the left   Location: left neck      Objective     Mani received therapeutic exercises to develop strength, endurance, ROM, flexibility and posture for 50 minutes including:     UBE: 3 fwd / 3 back     SUPINE  Chin Tucks 20x5" - cues for proper form.   Cervical Rotation   x 20   Cervical nods         x20   Scap Retraction      x 20   L Levator Scap Stretch 10" x 6   L Upper Trap Stretch     10" x 6   Supine Pec Stretch x 3 minutes     SIDELYING  Open book   Thoracic stretch     SEATED   Rows with GTB 3x10   Extension with GTB 3x10   Brueggers x 20 OTB     STANDING  Corner pec stretch 3x30     Mani received the following manual therapy techniques: 1-1 PT = 15 min   Suboccipital Release   Cervical distraction   Cervical mobs upper cervical rotation and lower cervical rotation grades III / IIII with MWM   JOEY of the upper cervical segments in lateral flexion - " bilaterally  Manual L - UT/ LS  stretch with JOEY   First rib JOEY - left sided     Mani received hot pack for 10 minutes to the neck.      Home Exercises Provided and Patient Education Provided     Education provided:   -  HEP    Written Home Exercises Provided: yes.  Exercises were reviewed and Mani was able to demonstrate them prior to the end of the session.  Mani demonstrated good  understanding of the education provided.     See EMR under Patient Instructions for exercises provided 12/18/2019.    Assessment     Pain elicited over the first rib. Release of the first rib appeared to be effective and created more mobility. Completed the remainder of the exercises without significant limitation or challenged from pain. Demonstrated adequate thoracic mobility. Consider DN.     Mani is progressing well towards his goals.   Pt prognosis is Good.     Pt will continue to benefit from skilled outpatient physical therapy to address the deficits listed in the problem list box on initial evaluation, provide pt/family education and to maximize pt's level of independence in the home and community environment.     Pt's spiritual, cultural and educational needs considered and pt agreeable to plan of care and goals.     Anticipated barriers to physical therapy: none     Goals:   Short Term GOALS: 5 weeks. Pt agrees with goals set.  1. Pt to report decreased intensity and frequency of pain 20 to 40% to improve quality of daily activity.   2. Pt to improve flexibility in the cervical and upper extremity musculature to restore functional mobility.   3. Pt to report decreased pain and tenderness over the low cervical segments and surrounding musculature to restore functional mobility.   4. Patient demonstrates independence with HEP for the purpose od self maintenance     Long Term GOALS: 10 weeks. Pt agrees with goals set.  1. Patient demonstrates increased right shoulder motions actively to restore functional movement patterns   2.  Patient demonstrates increased scapulothoracic strength for the purpose of improving postures and reducing pain in the cervical area when sitting.   3. Patient demonstrates functional cervical flexion and rotation without pain and increase extension mobility without pain .    Plan        Plan of care Certification: 12/12/2019 to 3/12/2019.     Outpatient Physical Therapy 2 times weekly for 10 weeks to include the following interventions: Manual Therapy, Patient Education and Therapeutic Exercise.     Mian France, PT

## 2020-01-16 RX ORDER — HYDROCHLOROTHIAZIDE 12.5 MG/1
12.5 CAPSULE ORAL DAILY
Qty: 90 CAPSULE | Refills: 3 | Status: SHIPPED | OUTPATIENT
Start: 2020-01-16 | End: 2020-06-30 | Stop reason: SDUPTHER

## 2020-01-20 ENCOUNTER — CLINICAL SUPPORT (OUTPATIENT)
Dept: REHABILITATION | Facility: HOSPITAL | Age: 70
End: 2020-01-20
Payer: MEDICARE

## 2020-01-20 DIAGNOSIS — R29.3 ABNORMAL POSTURE: ICD-10-CM

## 2020-01-20 DIAGNOSIS — M43.6 NECK STIFFNESS: ICD-10-CM

## 2020-01-20 DIAGNOSIS — M99.01 SEGMENTAL DYSFUNCTION OF CERVICAL REGION: ICD-10-CM

## 2020-01-20 PROCEDURE — 97110 THERAPEUTIC EXERCISES: CPT | Mod: PO,CQ

## 2020-01-20 NOTE — PROGRESS NOTES
"      Physical Therapy Daily Treatment Note     Name: Julio ROPER [facility] Number: [id number]    Therapy Diagnosis:   Encounter Diagnoses   Name Primary?    Segmental dysfunction of cervical region     Abnormal posture     Neck stiffness      Physician: Ryan Loera Jr., MD    Visit Date: 1/20/2020    Physician Orders: PT Eval and Treat neck  Medical Diagnosis from Referral:   M54.2 (ICD-10-CM) - Neck pain  Evaluation Date: 12/12/2019  Authorization Period Expiration: 12/26/2019  Plan of Care Expiration: 3/12/2020  Visit # / Visits authorized:  6/ 6  NEW 2020: 3/    Time In: 08:21 am (patient with late arrival)   Time Out: 09:00 am   Total Billable Time:   30minutes  TE - 2      Precautions: Standard    Subjective     Pt reports:  He had a rough morning and was running late. States he has increased neck pain with turning his head miguel angel to the left.   He was compliant with home exercise program.  Response to previous treatment: did fine.    Functional change: not limited .     Pain:  0 /10 pain, - soreness, 5/10 with rotation L>R on the left and 3/10 turn to the right with pain on the left   Location: left neck      Objective     Mani received therapeutic exercises to develop strength, endurance, ROM, flexibility and posture for 50 minutes including:     UBE: 3 fwd / 3 back     SUPINE  Chin Tucks 20x5" - cues for proper form.   Cervical Rotation   x 20   Cervical nods         x20   Scap Retraction      x 20   L Levator Scap Stretch 10" x 6   L Upper Trap Stretch     10" x 6   Supine Pec Stretch x 3 minutes     SIDELYING  Open book   Thoracic stretch     SEATED   Rows with GTB 3x10   Extension with GTB 3x10   Brueggers x 20 OTB     STANDING  Corner pec stretch 3x30     Mani received the following manual therapy techniques: 1-1 PT = 15 min _ not performed this session.   Suboccipital Release   Cervical distraction   Cervical mobs upper cervical rotation and lower cervical rotation grades III / IIII with MWM   JOEY of " the upper cervical segments in lateral flexion - bilaterally  Manual L - UT/ LS  stretch with JOEY   First rib JOEY - left sided     Mani received hot pack for 10 minutes to the neck.      Home Exercises Provided and Patient Education Provided     Education provided:   -  HEP    Written Home Exercises Provided: yes.  Exercises were reviewed and Mani was able to demonstrate them prior to the end of the session.  Mani demonstrated good  understanding of the education provided.     See EMR under Patient Instructions for exercises provided 12/18/2019.    Assessment     Patient tolerated therapy session well. Limited session due to late arrival. Required cueing for proper form / technique with therex.   Mani is progressing well towards his goals.   Pt prognosis is Good.     Pt will continue to benefit from skilled outpatient physical therapy to address the deficits listed in the problem list box on initial evaluation, provide pt/family education and to maximize pt's level of independence in the home and community environment.     Pt's spiritual, cultural and educational needs considered and pt agreeable to plan of care and goals.     Anticipated barriers to physical therapy: none     Goals:   Short Term GOALS: 5 weeks. Pt agrees with goals set.  1. Pt to report decreased intensity and frequency of pain 20 to 40% to improve quality of daily activity.   2. Pt to improve flexibility in the cervical and upper extremity musculature to restore functional mobility.   3. Pt to report decreased pain and tenderness over the low cervical segments and surrounding musculature to restore functional mobility.   4. Patient demonstrates independence with HEP for the purpose od self maintenance     Long Term GOALS: 10 weeks. Pt agrees with goals set.  1. Patient demonstrates increased right shoulder motions actively to restore functional movement patterns   2. Patient demonstrates increased scapulothoracic strength for the purpose of improving  postures and reducing pain in the cervical area when sitting.   3. Patient demonstrates functional cervical flexion and rotation without pain and increase extension mobility without pain .    Plan        Plan of care Certification: 12/12/2019 to 3/12/2019.     Outpatient Physical Therapy 2 times weekly for 10 weeks to include the following interventions: Manual Therapy, Patient Education and Therapeutic Exercise.     Liza Lopez, PTA

## 2020-01-22 ENCOUNTER — CLINICAL SUPPORT (OUTPATIENT)
Dept: REHABILITATION | Facility: HOSPITAL | Age: 70
End: 2020-01-22
Payer: MEDICARE

## 2020-01-22 DIAGNOSIS — M99.01 SEGMENTAL DYSFUNCTION OF CERVICAL REGION: ICD-10-CM

## 2020-01-22 DIAGNOSIS — M43.6 NECK STIFFNESS: ICD-10-CM

## 2020-01-22 DIAGNOSIS — R29.3 ABNORMAL POSTURE: ICD-10-CM

## 2020-01-22 PROCEDURE — 97140 MANUAL THERAPY 1/> REGIONS: CPT | Mod: PO | Performed by: PHYSICAL THERAPIST

## 2020-01-22 PROCEDURE — 97110 THERAPEUTIC EXERCISES: CPT | Mod: PO | Performed by: PHYSICAL THERAPIST

## 2020-01-22 NOTE — PROGRESS NOTES
"      Physical Therapy Daily Treatment Note     Name: Julio ROPER [facility] Number: [id number]    Therapy Diagnosis:   Encounter Diagnoses   Name Primary?    Segmental dysfunction of cervical region     Abnormal posture     Neck stiffness      Physician: Ryan Loera Jr., MD    Visit Date: 1/22/2020    Physician Orders: PT Eval and Treat neck  Medical Diagnosis from Referral:   M54.2 (ICD-10-CM) - Neck pain  Evaluation Date: 12/12/2019  Authorization Period Expiration: 12/26/2019  Plan of Care Expiration: 3/12/2020  Visit # / Visits authorized:  6/ 6  NEW 2020: 5 /    Time In: 1115 am (patient with late arrival)   Time Out:  1220  Total Billable Time:  65 minutes       Precautions: Standard    Subjective     Pt reports:  He is feeling good and the only pain is minimal when turning the head. There is no throbbing like before.   He was compliant with home exercise program.  Response to previous treatment: fine, no problems   Functional change: better ROM of the neck .     Pain:  0 /10 pain, - soreness, 1-2/10 with rotation R on the left and 2-3  /10 turn to the left with pain on the left  Location: left neck      Objective     Mani received therapeutic exercises to develop strength, endurance, ROM, flexibility and posture for 50 minutes including:     .BOLD INDICATES ACTIVITIES PERFORMED        UBE: 3 fwd / 3 back     SUPINE  Chin Tucks 20x5" - cues for proper form.   Cervical Rotation  x10  Cervical nods        x10   Scap Retraction     x20 5" hold   L Levator Scap Stretch  10"  x 6   L Upper Trap Stretch     10" x 6   Supine Pec Stretch x 3 minutes     SIDELYING  Open book 12  Thoracic stretch x12    SEATED   Rows with GTB 3x10   Extension with GTB 3x10   Brueggers x 20 OTB     STANDING  Corner pec stretch 3x30     Mani received the following manual therapy techniques: 1-1PT =15'  Suboccipital Release   Cervical distraction   Cervical mobs upper cervical rotation and lower cervical rotation grades III / IIII with " MWM   JOEY of the upper cervical segments in lateral flexion - bilaterally  Manual L - UT/ LS  stretch with JOEY   First rib JOEY - left sided     Mani received hot pack for 10 minutes to the neck.      Home Exercises Provided and Patient Education Provided     Education provided:   -  HEP    Written Home Exercises Provided: yes.  Exercises were reviewed and Mani was able to demonstrate them prior to the end of the session.  Mani demonstrated good  understanding of the education provided.     See EMR under Patient Instructions for exercises provided 12/18/2019.    Assessment     The patient completed the session with no pain reported. He remains mobility restricted in rotation and flexion with pain isolated to the deep lateral cervical region over the medial scalenes. Good response to manual interventions.     Mani is progressing well towards his goals.   Pt prognosis is Good.     Pt will continue to benefit from skilled outpatient physical therapy to address the deficits listed in the problem list box on initial evaluation, provide pt/family education and to maximize pt's level of independence in the home and community environment.     Pt's spiritual, cultural and educational needs considered and pt agreeable to plan of care and goals.     Anticipated barriers to physical therapy: none     Goals:   Short Term GOALS: 5 weeks. Pt agrees with goals set.  1. Pt to report decreased intensity and frequency of pain 20 to 40% to improve quality of daily activity.   2. Pt to improve flexibility in the cervical and upper extremity musculature to restore functional mobility.   3. Pt to report decreased pain and tenderness over the low cervical segments and surrounding musculature to restore functional mobility.   4. Patient demonstrates independence with HEP for the purpose od self maintenance     Long Term GOALS: 10 weeks. Pt agrees with goals set.  1. Patient demonstrates increased right shoulder motions actively to restore  functional movement patterns   2. Patient demonstrates increased scapulothoracic strength for the purpose of improving postures and reducing pain in the cervical area when sitting.   3. Patient demonstrates functional cervical flexion and rotation without pain and increase extension mobility without pain .    Plan        Plan of care Certification: 12/12/2019 to 3/12/2019.     Outpatient Physical Therapy 2 times weekly for 10 weeks to include the following interventions: Manual Therapy, Patient Education and Therapeutic Exercise.     Mian France, PT

## 2020-01-29 ENCOUNTER — CLINICAL SUPPORT (OUTPATIENT)
Dept: REHABILITATION | Facility: HOSPITAL | Age: 70
End: 2020-01-29
Payer: MEDICARE

## 2020-01-29 DIAGNOSIS — M43.6 NECK STIFFNESS: ICD-10-CM

## 2020-01-29 DIAGNOSIS — R29.3 ABNORMAL POSTURE: ICD-10-CM

## 2020-01-29 DIAGNOSIS — M99.01 SEGMENTAL DYSFUNCTION OF CERVICAL REGION: ICD-10-CM

## 2020-01-29 PROCEDURE — 97110 THERAPEUTIC EXERCISES: CPT | Mod: PO | Performed by: PHYSICAL THERAPIST

## 2020-01-29 NOTE — PROGRESS NOTES
"      Physical Therapy Daily Treatment Note     Name: Julio ROPER [facility] Number: [id number]    Therapy Diagnosis:   Encounter Diagnoses   Name Primary?    Segmental dysfunction of cervical region     Abnormal posture     Neck stiffness      Physician: Ryan Loera Jr., MD    Visit Date: 1/29/2020    Physician Orders: PT Eval and Treat neck  Medical Diagnosis from Referral:   M54.2 (ICD-10-CM) - Neck pain  Evaluation Date: 12/12/2019  Authorization Period Expiration: 12/26/2019  Plan of Care Expiration: 3/12/2020  Visit # / Visits authorized:  6/ 6  NEW 2020: 6 /    Time In: 910 am   Time Out: 1020   Total Billable Time: 70  minutes       Precautions: Standard    Subjective     Pt reports:  He is feeling good and the only pain is minimal when turning the head. There is no throbbing like before.   He was compliant with home exercise program.  Response to previous treatment: fine, no problems   Functional change: better ROM of the neck .     Pain:  0 /10 pain, - soreness, 1-2/10 with rotation R on the left and 2-3  /10 turn to the left with pain on the left  Location: left neck      Objective     Mani received therapeutic exercises to develop strength, endurance, ROM, flexibility and posture for 50 minutes including:     .BOLD INDICATES ACTIVITIES PERFORMED        UBE: 3 fwd / 3 back      SUPINE   Chin Tucks 20x5" - cues for proper form.   Cervical Rotation  x10  Cervical nods        x10   Scap Retraction     x20 5" hold   L Levator Scap Stretch  10"  x 6   L Upper Trap Stretch     10" x 6   Supine Pec Stretch x 3 minutes  + head slides   Reciprocal Arm raise x12 over towels (levator stretch)        SIDELYING  Open book 12  Thoracic stretch x12    SEATED   Rows with GTB 3x10   Extension with GTB 3x10   Brueggers x 20 OTB     STANDING  Corner pec stretch 3x30     Mani received the following manual therapy techniques: 1-1PT = 10' '  Suboccipital Release   Cervical distraction 6'   Cervical mobs P/A lower cervical " grades III / IIII with upper thoracic mobs seated grades III / IIII 4'   JOEY of the upper cervical segments in lateral flexion - bilaterally  Manual L - UT/ LS  stretch with JOEY   First rib JOEY - left sided     Mani received hot pack for 10 minutes to the neck.NA       Home Exercises Provided and Patient Education Provided     Education provided:   -  HEP    Written Home Exercises Provided: yes.  Exercises were reviewed and Mani was able to demonstrate them prior to the end of the session.  Mani demonstrated good  understanding of the education provided.     See EMR under Patient Instructions for exercises provided 12/18/2019.    Assessment     The cervical and thoracic segments are hypomobile. His chin tuck is mobility restricted. Performed all activities noted. He responded very well to manual therapy interventions. Did not report pain at the end of the session.     Mani is progressing well towards his goals.   Pt prognosis is Good.     Pt will continue to benefit from skilled outpatient physical therapy to address the deficits listed in the problem list box on initial evaluation, provide pt/family education and to maximize pt's level of independence in the home and community environment.     Pt's spiritual, cultural and educational needs considered and pt agreeable to plan of care and goals.     Anticipated barriers to physical therapy: none     Goals:   Short Term GOALS: 5 weeks. Pt agrees with goals set.  1. Pt to report decreased intensity and frequency of pain 20 to 40% to improve quality of daily activity.   2. Pt to improve flexibility in the cervical and upper extremity musculature to restore functional mobility.   3. Pt to report decreased pain and tenderness over the low cervical segments and surrounding musculature to restore functional mobility.   4. Patient demonstrates independence with HEP for the purpose od self maintenance     Long Term GOALS: 10 weeks. Pt agrees with goals set.  1. Patient  demonstrates increased right shoulder motions actively to restore functional movement patterns   2. Patient demonstrates increased scapulothoracic strength for the purpose of improving postures and reducing pain in the cervical area when sitting.   3. Patient demonstrates functional cervical flexion and rotation without pain and increase extension mobility without pain .    Plan        Plan of care Certification: 12/12/2019 to 3/12/2019.     Outpatient Physical Therapy 2 times weekly for 10 weeks to include the following interventions: Manual Therapy, Patient Education and Therapeutic Exercise.     Mian France, PT

## 2020-03-17 RX ORDER — ATORVASTATIN CALCIUM 40 MG/1
40 TABLET, FILM COATED ORAL DAILY
Qty: 90 TABLET | Refills: 0 | Status: SHIPPED | OUTPATIENT
Start: 2020-03-17 | End: 2020-06-10

## 2020-03-17 RX ORDER — FLUTICASONE PROPIONATE 50 MCG
SPRAY, SUSPENSION (ML) NASAL
Qty: 48 ML | Refills: 0 | Status: SHIPPED | OUTPATIENT
Start: 2020-03-17 | End: 2020-06-02

## 2020-03-31 ENCOUNTER — DOCUMENTATION ONLY (OUTPATIENT)
Dept: REHABILITATION | Facility: HOSPITAL | Age: 70
End: 2020-03-31

## 2020-03-31 NOTE — PROGRESS NOTES
Outpatient Therapy Discharge Summary     [id number]  Therapy Diagnosis:        Encounter Diagnoses   Name Primary?    Segmental dysfunction of cervical region      Abnormal posture      Neck stiffness        Physician: Ryan Loera Jr., MD     Visit Date: 1/29/2020     Physician Orders: PT Eval and Treat neck  Medical Diagnosis from Referral:   M54.2 (ICD-10-CM) - Neck pain  Evaluation Date: 12/12/2019  Date of Last visit: 1/29/2020  Total Visits Received: 10    Assessment    Goals:   Short Term GOALS: 5 weeks. Pt agrees with goals set.  1. Pt to report decreased intensity and frequency of pain 20 to 40% to improve quality of daily activity.   2. Pt to improve flexibility in the cervical and upper extremity musculature to restore functional mobility.   3. Pt to report decreased pain and tenderness over the low cervical segments and surrounding musculature to restore functional mobility.   4. Patient demonstrates independence with HEP for the purpose od self maintenance     Long Term GOALS: 10 weeks. Pt agrees with goals set.  1. Patient demonstrates increased right shoulder motions actively to restore functional movement patterns   2. Patient demonstrates increased scapulothoracic strength for the purpose of improving postures and reducing pain in the cervical area when sitting.   3. Patient demonstrates functional cervical flexion and rotation without pain and increase extension mobility without pain .    Discharge reason: Patient has not returned for continued therapy since last visit.     Plan   This patient is discharged from Physical Therapy    Mian France PT

## 2020-04-11 ENCOUNTER — PATIENT MESSAGE (OUTPATIENT)
Dept: INTERNAL MEDICINE | Facility: CLINIC | Age: 70
End: 2020-04-11

## 2020-04-11 ENCOUNTER — NURSE TRIAGE (OUTPATIENT)
Dept: ADMINISTRATIVE | Facility: CLINIC | Age: 70
End: 2020-04-11

## 2020-04-11 RX ORDER — CODEINE PHOSPHATE AND GUAIFENESIN 10; 100 MG/5ML; MG/5ML
5 SOLUTION ORAL 3 TIMES DAILY PRN
Qty: 236 ML | Refills: 0 | Status: SHIPPED | OUTPATIENT
Start: 2020-04-11 | End: 2020-04-21

## 2020-04-11 NOTE — TELEPHONE ENCOUNTER
Reason for Disposition   Caller has already spoken with the PCP and has no further questions.    Additional Information   Negative: Caller is angry or rude (e.g., hangs up, verbally abusive, yelling)   Negative: Caller hangs up    Protocols used: NO CONTACT OR DUPLICATE CONTACT CALL-A-  pt states he has a Cough today. no SOB, afeb. message sent to PCP. Rx already sent in . Denies any further questions call back with questions.

## 2020-04-13 RX ORDER — CODEINE PHOSPHATE AND GUAIFENESIN 10; 100 MG/5ML; MG/5ML
SOLUTION ORAL
Qty: 120 ML | Refills: 0 | OUTPATIENT
Start: 2020-04-13

## 2020-06-04 ENCOUNTER — TELEPHONE (OUTPATIENT)
Dept: INTERNAL MEDICINE | Facility: CLINIC | Age: 70
End: 2020-06-04

## 2020-06-04 NOTE — TELEPHONE ENCOUNTER
----- Message from Shelly Pavon sent at 6/4/2020 11:20 AM CDT -----  Contact: [phone]  Would like to get medical advice.  Symptoms (please be specific):  Chest congestion off and on   How long has patient had these symptoms:  Over two months, gets worse at night  Pharmacy name and phone #:    Any drug allergies (copy from chart):      Would the patient rather a call back or a response via [facility]?:  Call back  Comments:  Patient would like to request orders for a chest X-ray.

## 2020-06-05 ENCOUNTER — OFFICE VISIT (OUTPATIENT)
Dept: INTERNAL MEDICINE | Facility: CLINIC | Age: 70
End: 2020-06-05
Payer: MEDICARE

## 2020-06-05 ENCOUNTER — HOSPITAL ENCOUNTER (OUTPATIENT)
Dept: RADIOLOGY | Facility: HOSPITAL | Age: 70
Discharge: HOME | End: 2020-06-05
Attending: STUDENT IN AN ORGANIZED HEALTH CARE EDUCATION/TRAINING PROGRAM
Payer: MEDICARE

## 2020-06-05 ENCOUNTER — HOSPITAL ENCOUNTER (OUTPATIENT)
Dept: CARDIOLOGY | Facility: CLINIC | Age: 70
Discharge: HOME | End: 2020-06-05
Attending: STUDENT IN AN ORGANIZED HEALTH CARE EDUCATION/TRAINING PROGRAM
Payer: MEDICARE

## 2020-06-05 VITALS
BODY MASS INDEX: 33.77 KG/M2 | SYSTOLIC BLOOD PRESSURE: 132 MMHG | OXYGEN SATURATION: 96 % | DIASTOLIC BLOOD PRESSURE: 70 MMHG | HEART RATE: 57 BPM | WEIGHT: 249.31 LBS | HEIGHT: 72 IN

## 2020-06-05 VITALS
DIASTOLIC BLOOD PRESSURE: 70 MMHG | HEART RATE: 60 BPM | HEIGHT: 72 IN | BODY MASS INDEX: 33.72 KG/M2 | SYSTOLIC BLOOD PRESSURE: 132 MMHG | WEIGHT: 249 LBS

## 2020-06-05 DIAGNOSIS — R05.9 COUGH: ICD-10-CM

## 2020-06-05 DIAGNOSIS — I50.32 CHRONIC DIASTOLIC CONGESTIVE HEART FAILURE: ICD-10-CM

## 2020-06-05 DIAGNOSIS — N18.30 CKD (CHRONIC KIDNEY DISEASE) STAGE 3, GFR 30-59 ML/MIN: ICD-10-CM

## 2020-06-05 DIAGNOSIS — Z12.11 SCREENING FOR COLON CANCER: ICD-10-CM

## 2020-06-05 DIAGNOSIS — R60.0 BILATERAL LOWER EXTREMITY EDEMA: ICD-10-CM

## 2020-06-05 DIAGNOSIS — R05.9 COUGH: Primary | ICD-10-CM

## 2020-06-05 DIAGNOSIS — E78.2 MIXED HYPERLIPIDEMIA: ICD-10-CM

## 2020-06-05 DIAGNOSIS — I10 IDIOPATHIC HYPERTENSION: ICD-10-CM

## 2020-06-05 LAB
ASCENDING AORTA: 3.23 CM
AV INDEX (PROSTH): 1.02
AV MEAN GRADIENT: 3 MMHG
AV PEAK GRADIENT: 7 MMHG
AV VALVE AREA: 3.57 CM2
AV VELOCITY RATIO: 1.02
BSA FOR ECHO PROCEDURE: 2.4 M2
CV ECHO LV RWT: 0.39 CM
DOP CALC AO PEAK VEL: 1.29 M/S
DOP CALC AO VTI: 24.1 CM
DOP CALC LVOT AREA: 3.5 CM2
DOP CALC LVOT DIAMETER: 2.11 CM
DOP CALC LVOT PEAK VEL: 1.31 M/S
DOP CALC LVOT STROKE VOLUME: 86.15 CM3
DOP CALCLVOT PEAK VEL VTI: 24.65 CM
E WAVE DECELERATION TIME: 183.22 MSEC
E/A RATIO: 1.01
E/E' RATIO: 8.89 M/S
ECHO LV POSTERIOR WALL: 0.95 CM (ref 0.6–1.1)
FRACTIONAL SHORTENING: 38 % (ref 28–44)
INTERVENTRICULAR SEPTUM: 0.8 CM (ref 0.6–1.1)
IVRT: 65.65 MSEC
LA MAJOR: 6.38 CM
LA MINOR: 5.99 CM
LA WIDTH: 5.14 CM
LEFT ATRIUM SIZE: 4.2 CM
LEFT ATRIUM VOLUME INDEX: 48.5 ML/M2
LEFT ATRIUM VOLUME: 113.38 CM3
LEFT INTERNAL DIMENSION IN SYSTOLE: 2.98 CM (ref 2.1–4)
LEFT VENTRICLE DIASTOLIC VOLUME INDEX: 46.75 ML/M2
LEFT VENTRICLE DIASTOLIC VOLUME: 109.32 ML
LEFT VENTRICLE MASS INDEX: 62 G/M2
LEFT VENTRICLE SYSTOLIC VOLUME INDEX: 14.7 ML/M2
LEFT VENTRICLE SYSTOLIC VOLUME: 34.35 ML
LEFT VENTRICULAR INTERNAL DIMENSION IN DIASTOLE: 4.83 CM (ref 3.5–6)
LEFT VENTRICULAR MASS: 143.88 G
LV LATERAL E/E' RATIO: 8 M/S
LV SEPTAL E/E' RATIO: 10 M/S
MV PEAK A VEL: 0.79 M/S
MV PEAK E VEL: 0.8 M/S
PISA TR MAX VEL: 2.63 M/S
PULM VEIN S/D RATIO: 0.94
PV PEAK D VEL: 0.66 M/S
PV PEAK S VEL: 0.62 M/S
RA MAJOR: 5.45 CM
RA PRESSURE: 3 MMHG
RA WIDTH: 5.22 CM
RIGHT VENTRICULAR END-DIASTOLIC DIMENSION: 4.62 CM
RV TISSUE DOPPLER FREE WALL SYSTOLIC VELOCITY 1 (APICAL 4 CHAMBER VIEW): 16.12 CM/S
SINUS: 3.66 CM
STJ: 3.25 CM
TDI LATERAL: 0.1 M/S
TDI SEPTAL: 0.08 M/S
TDI: 0.09 M/S
TR MAX PG: 28 MMHG
TRICUSPID ANNULAR PLANE SYSTOLIC EXCURSION: 2.38 CM
TV REST PULMONARY ARTERY PRESSURE: 31 MMHG

## 2020-06-05 PROCEDURE — 99999 PR PBB SHADOW E&M-EST. PATIENT-LVL IV: CPT | Mod: PBBFAC,,, | Performed by: INTERNAL MEDICINE

## 2020-06-05 PROCEDURE — 3008F BODY MASS INDEX DOCD: CPT | Mod: CPTII,S$GLB,, | Performed by: INTERNAL MEDICINE

## 2020-06-05 PROCEDURE — 1101F PR PT FALLS ASSESS DOC 0-1 FALLS W/OUT INJ PAST YR: ICD-10-PCS | Mod: CPTII,S$GLB,, | Performed by: INTERNAL MEDICINE

## 2020-06-05 PROCEDURE — 71046 XR CHEST PA AND LATERAL: ICD-10-PCS | Mod: 26,,, | Performed by: RADIOLOGY

## 2020-06-05 PROCEDURE — 3078F DIAST BP <80 MM HG: CPT | Mod: CPTII,S$GLB,, | Performed by: INTERNAL MEDICINE

## 2020-06-05 PROCEDURE — 1159F PR MEDICATION LIST DOCUMENTED IN MEDICAL RECORD: ICD-10-PCS | Mod: S$GLB,,, | Performed by: INTERNAL MEDICINE

## 2020-06-05 PROCEDURE — 93306 ECHO (CUPID ONLY): ICD-10-PCS | Mod: S$GLB,,, | Performed by: INTERNAL MEDICINE

## 2020-06-05 PROCEDURE — 93306 TTE W/DOPPLER COMPLETE: CPT | Mod: S$GLB,,, | Performed by: INTERNAL MEDICINE

## 2020-06-05 PROCEDURE — 1126F AMNT PAIN NOTED NONE PRSNT: CPT | Mod: S$GLB,,, | Performed by: INTERNAL MEDICINE

## 2020-06-05 PROCEDURE — 1159F MED LIST DOCD IN RCRD: CPT | Mod: S$GLB,,, | Performed by: INTERNAL MEDICINE

## 2020-06-05 PROCEDURE — 99214 PR OFFICE/OUTPT VISIT, EST, LEVL IV, 30-39 MIN: ICD-10-PCS | Mod: S$GLB,,, | Performed by: INTERNAL MEDICINE

## 2020-06-05 PROCEDURE — 1126F PR PAIN SEVERITY QUANTIFIED, NO PAIN PRESENT: ICD-10-PCS | Mod: S$GLB,,, | Performed by: INTERNAL MEDICINE

## 2020-06-05 PROCEDURE — 1101F PT FALLS ASSESS-DOCD LE1/YR: CPT | Mod: CPTII,S$GLB,, | Performed by: INTERNAL MEDICINE

## 2020-06-05 PROCEDURE — 3075F PR MOST RECENT SYSTOLIC BLOOD PRESS GE 130-139MM HG: ICD-10-PCS | Mod: CPTII,S$GLB,, | Performed by: INTERNAL MEDICINE

## 2020-06-05 PROCEDURE — 71046 X-RAY EXAM CHEST 2 VIEWS: CPT | Mod: 26,,, | Performed by: RADIOLOGY

## 2020-06-05 PROCEDURE — 71046 X-RAY EXAM CHEST 2 VIEWS: CPT | Mod: TC

## 2020-06-05 PROCEDURE — 3078F PR MOST RECENT DIASTOLIC BLOOD PRESSURE < 80 MM HG: ICD-10-PCS | Mod: CPTII,S$GLB,, | Performed by: INTERNAL MEDICINE

## 2020-06-05 PROCEDURE — 99999 PR PBB SHADOW E&M-EST. PATIENT-LVL IV: ICD-10-PCS | Mod: PBBFAC,,, | Performed by: INTERNAL MEDICINE

## 2020-06-05 PROCEDURE — 99214 OFFICE O/P EST MOD 30 MIN: CPT | Mod: S$GLB,,, | Performed by: INTERNAL MEDICINE

## 2020-06-05 PROCEDURE — 3008F PR BODY MASS INDEX (BMI) DOCUMENTED: ICD-10-PCS | Mod: CPTII,S$GLB,, | Performed by: INTERNAL MEDICINE

## 2020-06-05 PROCEDURE — 3075F SYST BP GE 130 - 139MM HG: CPT | Mod: CPTII,S$GLB,, | Performed by: INTERNAL MEDICINE

## 2020-06-05 NOTE — PROGRESS NOTES
Subjective     Chief Complaint: 6 month follow up    History of Present Illness:  Mr. Julio Bernardo is a 69 y.o. male with HTN, cervical spondylosis with chronic neck and left shoulder pain, CKD3, HLD, and hx gout here for a 6 month follow up. At the time of his last appointment in December 2019, his neck pain had worsened despite conservative treatment consisting of nsaids, massage, chiropractor, TENS, and muscle relaxants. Patient saw orthopedic surgery for this and MRI was ordered, which showed severe spinal canal stenosis at C3-4. Because patient did not have any myelopathic symptoms, they recommended PT and if this did not help then would need cervical epidural steroid injection. Now neck/left shoulder pain is more of a pulling shoulder pain but overall improved. Did go to PT. Also started lifting weights which he feels helps his pain. Has not been back with his chiropractor since the COVID pandemic started (previously going for 25 years).    In November 2019, started to experience left-sided sore throat, productive cough (yellow-green sputum) and neck pain. At that time was given guaifenesin-codeine syrup, a betamethasone SC injection, and restarted his Zyrtec and flonase and his symptoms slowly improved. In March 2020 began left sided scratchy throat sensation has recurred and a cough started at that time (nonproductive). Feels that he has mucous to cough up but can't come out, and this sensation is worsened at night. He has needed to sleep upright in a chair the past few nights. He takes his flonase at night and wonders if this may be contributing. When he does cough up sputum it is clear white. Went through multiple bottles of robitussin and mucinex without assistance. Denies any fevers, chills, night sweats, lymphadenopathy, pain, issues swallowing, shortness of breath, chest pain. No sick contacts. Does note an increase in leg swelling and gained 17 lbs in the past few months. Has not noticed any  change in urine output. Has been compliant with his losartan, HCTZ, ASA and atorvastatin, and other medications as prescribed. Also takes supplements such as ascorbic acid, fish oil and magnesium (which he takes for leg cramps). Last chest X-ray in June 2019 was unremarkable. Patient had stress echo in 2017 which showed normal EF, indeterminate LV diastolic function, and bi-atrial enlargement.    In regards to screening, patient did have a colonoscopy last in September 2017, a 10 mm polyp was resected and was recommended follow up colonoscopy in 3 years which the patient has not had. Patient is up to date with all other vaccines.    Review of Systems   Constitutional: Negative for chills, fever and weight loss.   HENT: Positive for sore throat. Negative for congestion.    Eyes: Negative for blurred vision and double vision.   Respiratory: Positive for cough. Negative for shortness of breath.    Cardiovascular: Positive for leg swelling. Negative for chest pain and palpitations.   Gastrointestinal: Negative for abdominal pain, constipation, diarrhea, nausea and vomiting.   Genitourinary: Negative for dysuria.   Musculoskeletal: Negative for back pain, joint pain and neck pain.   Skin: Negative for rash.   Neurological: Negative for dizziness, loss of consciousness, weakness and headaches.   Psychiatric/Behavioral: Negative for depression. The patient is not nervous/anxious.        PAST HISTORY:     Past Medical History:   Diagnosis Date    Allergy     Colon polyps     Hyperlipemia     Hypertension     Posterior capsular opacification        Past Surgical History:   Procedure Laterality Date    CATARACT EXTRACTION      both eyes    COLONOSCOPY N/A 9/27/2016    Procedure: COLONOSCOPY;  Surgeon: Jan Argueta MD;  Location: [address]);  Service: Endoscopy;  Laterality: N/A;    EYE SURGERY      HERNIA REPAIR      WISDOM TOOTH EXTRACTION      YAG      Left Eye       Family History   Problem Relation Age  of Onset    Hypertension Father     Cataracts Father     Prostate cancer Paternal Uncle         prostate    Amblyopia Neg Hx     Blindness Neg Hx     Glaucoma Neg Hx     Macular degeneration Neg Hx     Retinal detachment Neg Hx     Strabismus Neg Hx        Social History     Socioeconomic History    Marital status:      Spouse name: Ginger    Number of children: 4    Years of education: Not on file    Highest education level: Not on file   Occupational History     Employer: [employer]   Social Needs    Financial resource strain: Not very hard    Food insecurity:     Worry: Never true     Inability: Never true    Transportation needs:     Medical: No     Non-medical: No   Tobacco Use    Smoking status: Never Smoker    Smokeless tobacco: Never Used   Substance and Sexual Activity    Alcohol use: Yes     Frequency: 2-3 times a week     Drinks per session: 1 or 2     Binge frequency: Never     Comment: socially    Drug use: No    Sexual activity: Yes     Partners: Female   Lifestyle    Physical activity:     Days per week: 5 days     Minutes per session: 60 min    Stress: Only a little   Relationships    Social connections:     Talks on phone: Three times a week     Gets together: Twice a week     Attends Taoist service: Not on file     Active member of club or organization: Yes     Attends meetings of clubs or organizations: More than 4 times per year     Relationship status:    Other Topics Concern    Not on file   Social History Narrative    Not on file       MEDICATIONS & ALLERGIES:     Current Outpatient Medications on File Prior to Visit   Medication Sig    acetic acid-hydrocortisone (VOSOL-HC) otic solution 2-4 drops to affected ear twice a day    ascorbic acid (VITAMIN C) 1000 MG tablet Take 1,000 mg by mouth once daily.    aspirin 81 mg Tab Take by mouth. 1 Tablet Oral Every day.  Last taken 3/25/11    atorvastatin (LIPITOR) 40 MG tablet Take 1 tablet (40  mg total) by mouth once daily.    fish oil-omega-3 fatty acids 300-1,000 mg capsule Take 2 g by mouth once daily.    fluticasone propionate (FLONASE) 50 mcg/actuation nasal spray USE 1 SPRAY BY EACH NARE ROUTE ONCE DAILY.    hydroCHLOROthiazide (MICROZIDE) 12.5 mg capsule TAKE 1 CAPSULE (12.5 MG TOTAL) BY MOUTH ONCE DAILY.    loratadine (CLARITIN) 10 mg tablet Take 1 tablet (10 mg total) by mouth once daily.    losartan (COZAAR) 100 MG tablet TAKE 1 TABLET BY MOUTH EVERY DAY    multivitamin capsule Take 1 capsule by mouth once daily.    PREVIDENT 5000 SENSITIVE 1.1-5 % Pste     sildenafil (VIAGRA) 100 MG tablet Take 1 tablet (100 mg total) by mouth daily as needed for Erectile Dysfunction.    tiZANidine (ZANAFLEX) 4 MG tablet TAKE 1 TABLET BY MOUTH EVERY 8 HOURS AS NEEDED     No current facility-administered medications on file prior to visit.        Review of patient's allergies indicates:   Allergen Reactions    Penicillins     V-cillin k        OBJECTIVE:     Vital Signs:  Vitals:    06/05/20 1038   BP: 132/70   BP Location: Left arm   Patient Position: Sitting   BP Method: Large (Manual)   Pulse: (!) 57   SpO2: 96%   Weight: 113.1 kg (249 lb 5.4 oz)   Height: 6' (1.829 m)       Body mass index is 33.82 kg/m².     Physical Exam   Constitutional: He is oriented to person, place, and time and well-developed, well-nourished, and in no distress. No distress.   HENT:   Head: Normocephalic and atraumatic.   Right Ear: External ear normal.   Left Ear: External ear normal.   Nose: Nose normal.   Mouth/Throat: Oropharynx is clear and moist. No oropharyngeal exudate.   Eyes: EOM are normal. Right eye exhibits no discharge. Left eye exhibits no discharge. No scleral icterus.   Neck: Normal range of motion. Neck supple. No tracheal deviation present.   Cardiovascular: Normal rate, regular rhythm, normal heart sounds and intact distal pulses.   No murmur heard.  Pulmonary/Chest: Effort normal. No respiratory  distress. He has no wheezes. He has no rales.   Decreased breath sounds at bases bilaterally   Abdominal: Soft. Bowel sounds are normal. He exhibits no distension. There is no tenderness.   Musculoskeletal: Normal range of motion. He exhibits edema (2+ pitting pretibial edema up to knees bilaterally). He exhibits no deformity.   Lymphadenopathy:     He has no cervical adenopathy.   Neurological: He is alert and oriented to person, place, and time. No cranial nerve deficit. He exhibits normal muscle tone. Gait normal. Coordination normal.   Skin: Skin is warm and dry. No rash noted. He is not diaphoretic. No erythema.   Psychiatric: Mood and affect normal.   Vitals reviewed.      Laboratory  Lab Results   Component Value Date    WBC 7.40 02/09/2017    HGB 14.3 02/09/2017    HCT 44.1 02/09/2017    MCV 84 02/09/2017     02/09/2017     BMP  Lab Results   Component Value Date     12/16/2019    K 4.0 12/16/2019     12/16/2019    CO2 29 12/16/2019    BUN 19 12/16/2019    CREATININE 1.3 12/16/2019    CALCIUM 9.3 12/16/2019    ANIONGAP 8 12/16/2019    ESTGFRAFRICA >60 12/16/2019    EGFRNONAA 55 (A) 12/16/2019       Diagnostic Results:  Labs: Reviewed    Health Maintenance Due   Topic Date Due    Colonoscopy  09/27/2019         ASSESSMENT & PLAN:   Mr. Julio Bernardo is a 69 y.o. male here for a 6 month follow up, with complaints of persistent cough, sore throat and leg swelling.    Chronic diastolic congestive heart failure  Bilateral lower extremity edema  Cough        -     Patient has not been febrile or had sick contacts. The prolonged course of his symptoms points against an infectious etiology. The worsening of his cough, sore throat, and sensation of chest congestion at night and when laying down is concerning for pulmonary edema, especially given his worsening leg edema and weight gain. Will get CXR to r/o pulmonary edema. Will get BNP to assess for HF and plan for repeat TTE to assess for  worsening HF considering his co-morbidities (HTN, HLD, obesity).  -     X-Ray Chest PA And Lateral; Future; Expected date: 06/05/2020  -     Echo Color Flow Doppler? Yes; Future  -     Brain Natriuretic Peptide; Future; Expected date: 06/05/2020    CKD (chronic kidney disease) stage 3, GFR 30-59 ml/min        -     Will repeat renal function labs to assess for worsening CKD as a factor in his hypervolemia, and assess electrolytes to help decide on HCTZ vs furosemide. Concern for worsening CKD considering his use of NSAIDs for his chronic neck/shoulder pain. Also intermittently taking magnesium for leg cramps so will measure this as well.  -     Renal function panel; Future; Expected date: 06/05/2020  -     Magnesium; Future; Expected date: 06/05/2020    Screening for colon cancer  -     Case request GI: COLONOSCOPY    Idiopathic hypertension        -     Continue losartan 100 mg qd and HCTZ 12.5 mg for now, may change HCTZ to higher dose or furosemide pending CXR and BNP results    Mixed hyperlipidemia        -     Continue atorvastatin 40 mg qd        RTC in 3 months with PCP.    Discussed with Dr. Loera  - staff attestation to follow      Harshil Manrique DO  Internal Medicine PGY1  [facility]  [address] [place], [place] [id number] [phone]

## 2020-06-08 ENCOUNTER — PATIENT MESSAGE (OUTPATIENT)
Dept: INTERNAL MEDICINE | Facility: CLINIC | Age: 70
End: 2020-06-08

## 2020-06-09 ENCOUNTER — TELEPHONE (OUTPATIENT)
Dept: INTERNAL MEDICINE | Facility: CLINIC | Age: 70
End: 2020-06-09

## 2020-06-09 RX ORDER — ALBUTEROL SULFATE 90 UG/1
2 AEROSOL, METERED RESPIRATORY (INHALATION) EVERY 6 HOURS PRN
Qty: 18 G | Refills: 2 | Status: SHIPPED | OUTPATIENT
Start: 2020-06-09 | End: 2020-11-23

## 2020-06-15 ENCOUNTER — PATIENT MESSAGE (OUTPATIENT)
Dept: INTERNAL MEDICINE | Facility: CLINIC | Age: 70
End: 2020-06-15

## 2020-06-15 NOTE — TELEPHONE ENCOUNTER
Can you see if you can release the results I reviewing them in epic and the leading them in your results review.

## 2020-06-29 ENCOUNTER — PATIENT MESSAGE (OUTPATIENT)
Dept: INTERNAL MEDICINE | Facility: CLINIC | Age: 70
End: 2020-06-29

## 2020-06-30 ENCOUNTER — PATIENT MESSAGE (OUTPATIENT)
Dept: INTERNAL MEDICINE | Facility: CLINIC | Age: 70
End: 2020-06-30

## 2020-06-30 RX ORDER — HYDROCHLOROTHIAZIDE 12.5 MG/1
12.5 CAPSULE ORAL DAILY
Qty: 90 CAPSULE | Refills: 3 | Status: SHIPPED | OUTPATIENT
Start: 2020-06-30 | End: 2021-08-02

## 2020-07-01 ENCOUNTER — PATIENT MESSAGE (OUTPATIENT)
Dept: INTERNAL MEDICINE | Facility: CLINIC | Age: 70
End: 2020-07-01

## 2020-07-05 NOTE — PROGRESS NOTES
I have personally taken the history and examined this patient and agree with the resident's note as stated above with the following thoughts:  /70 (BP Location: Left arm, Patient Position: Sitting, BP Method: Large (Manual))   Pulse (!) 57   Ht 6' (1.829 m)   Wt 113.1 kg (249 lb 5.4 oz)   SpO2 96%   BMI 33.82 kg/m²     Discussed getting vaccines up to date.  He he get back to physical therapy and exercises after the pandemic.  We discussed the hand hygiene and risk of getting COVID.  Mental health seems to be doing well today.

## 2020-07-22 ENCOUNTER — PATIENT OUTREACH (OUTPATIENT)
Dept: ADMINISTRATIVE | Facility: OTHER | Age: 70
End: 2020-07-22

## 2020-07-22 NOTE — PROGRESS NOTES
Care Everywhere:   Immunization:   Health Maintenance:   Media Review:   Legacy Review:   Order placed:   Upcoming appts:  Colonoscopy case request 6.5.2020

## 2020-07-24 ENCOUNTER — OFFICE VISIT (OUTPATIENT)
Dept: OPTOMETRY | Facility: CLINIC | Age: 70
End: 2020-07-24
Payer: MEDICARE

## 2020-07-24 DIAGNOSIS — H52.4 PRESBYOPIA: ICD-10-CM

## 2020-07-24 DIAGNOSIS — Z13.5 GLAUCOMA SCREENING: ICD-10-CM

## 2020-07-24 DIAGNOSIS — H53.15 VISUAL DISTORTIONS OF SHAPE AND SIZE: ICD-10-CM

## 2020-07-24 DIAGNOSIS — H43.392 VITREOUS FLOATER, LEFT: Primary | ICD-10-CM

## 2020-07-24 PROCEDURE — 92134 CPTRZ OPH DX IMG PST SGM RTA: CPT | Mod: S$GLB,,, | Performed by: OPTOMETRIST

## 2020-07-24 PROCEDURE — 92134 OCT, RETINA - OU - BOTH EYES: ICD-10-PCS | Mod: S$GLB,,, | Performed by: OPTOMETRIST

## 2020-07-24 PROCEDURE — 92014 PR EYE EXAM, EST PATIENT,COMPREHESV: ICD-10-PCS | Mod: S$GLB,,, | Performed by: OPTOMETRIST

## 2020-07-24 PROCEDURE — 92015 PR REFRACTION: ICD-10-PCS | Mod: S$GLB,,, | Performed by: OPTOMETRIST

## 2020-07-24 PROCEDURE — 92014 COMPRE OPH EXAM EST PT 1/>: CPT | Mod: S$GLB,,, | Performed by: OPTOMETRIST

## 2020-07-24 PROCEDURE — 99999 PR PBB SHADOW E&M-EST. PATIENT-LVL III: CPT | Mod: PBBFAC,,, | Performed by: OPTOMETRIST

## 2020-07-24 PROCEDURE — 99999 PR PBB SHADOW E&M-EST. PATIENT-LVL III: ICD-10-PCS | Mod: PBBFAC,,, | Performed by: OPTOMETRIST

## 2020-07-24 PROCEDURE — 92015 DETERMINE REFRACTIVE STATE: CPT | Mod: S$GLB,,, | Performed by: OPTOMETRIST

## 2020-07-24 NOTE — PROGRESS NOTES
HPI     DLS: 8/15/19  Pt states his dist [facility] is not as good anymore with his glasses. States with   his  reading [facility] is good somedays.  Also pt states last week he put on drop   of ear drops in his left eye, and flushed it with water and states no   problem.   Occ. Floaters, no flashes   systane gtts     Last edited by Manish Godoy, OD on 7/24/2020 11:04 AM. (History)        ROS     Positive for: Eyes (cat surgery)    Negative for: Constitutional, Gastrointestinal, Neurological, Skin,   Genitourinary, Musculoskeletal, HENT, Endocrine, Cardiovascular,   Respiratory, Psychiatric, Allergic/Imm, Heme/Lymph    Last edited by Manish Godoy, OD on 7/24/2020 11:04 AM. (History)        Assessment /Plan     For exam results, see Encounter Report.    Vitreous floater, left    Glaucoma screening    Presbyopia    Visual distortions of shape and size  -     OCT, Retina - OU - Both Eyes      1. Sp pciol/YAG OU--pt happy w spex  2. Pt notes transient blur/distortion in his vision, but he can shake his head or move his eyes to look around it.  Mac OCT wnl OU.  Appears to have a large vit floater OS which is causing his problems.  Discussed S+S of RD    PLAN:    rtc 1 yr, or immediately if inc F/F

## 2020-08-02 ENCOUNTER — OFFICE VISIT (OUTPATIENT)
Dept: URGENT CARE | Facility: CLINIC | Age: 70
End: 2020-08-02
Payer: MEDICARE

## 2020-08-02 VITALS
SYSTOLIC BLOOD PRESSURE: 141 MMHG | RESPIRATION RATE: 16 BRPM | WEIGHT: 250.44 LBS | DIASTOLIC BLOOD PRESSURE: 84 MMHG | OXYGEN SATURATION: 99 % | HEIGHT: 72 IN | HEART RATE: 79 BPM | BODY MASS INDEX: 33.92 KG/M2 | TEMPERATURE: 97 F

## 2020-08-02 DIAGNOSIS — J30.89 NON-SEASONAL ALLERGIC RHINITIS, UNSPECIFIED TRIGGER: ICD-10-CM

## 2020-08-02 DIAGNOSIS — H61.23 EXCESSIVE EAR WAX, BILATERAL: ICD-10-CM

## 2020-08-02 DIAGNOSIS — R05.9 COUGH: Primary | ICD-10-CM

## 2020-08-02 DIAGNOSIS — J40 BRONCHITIS: ICD-10-CM

## 2020-08-02 PROCEDURE — 99214 OFFICE O/P EST MOD 30 MIN: CPT | Mod: 25,S$GLB,, | Performed by: FAMILY MEDICINE

## 2020-08-02 PROCEDURE — 96372 THER/PROPH/DIAG INJ SC/IM: CPT | Mod: S$GLB,,, | Performed by: FAMILY MEDICINE

## 2020-08-02 PROCEDURE — 96372 PR INJECTION,THERAP/PROPH/DIAG2ST, IM OR SUBCUT: ICD-10-PCS | Mod: S$GLB,,, | Performed by: FAMILY MEDICINE

## 2020-08-02 PROCEDURE — U0003 INFECTIOUS AGENT DETECTION BY NUCLEIC ACID (DNA OR RNA); SEVERE ACUTE RESPIRATORY SYNDROME CORONAVIRUS 2 (SARS-COV-2) (CORONAVIRUS DISEASE [COVID-19]), AMPLIFIED PROBE TECHNIQUE, MAKING USE OF HIGH THROUGHPUT TECHNOLOGIES AS DESCRIBED BY CMS-2020-01-R: HCPCS

## 2020-08-02 PROCEDURE — 99214 PR OFFICE/OUTPT VISIT, EST, LEVL IV, 30-39 MIN: ICD-10-PCS | Mod: 25,S$GLB,, | Performed by: FAMILY MEDICINE

## 2020-08-02 RX ORDER — AZITHROMYCIN 250 MG/1
TABLET, FILM COATED ORAL
Qty: 6 TABLET | Refills: 0 | Status: SHIPPED | OUTPATIENT
Start: 2020-08-02 | End: 2021-10-01

## 2020-08-02 RX ORDER — BETAMETHASONE SODIUM PHOSPHATE AND BETAMETHASONE ACETATE 3; 3 MG/ML; MG/ML
6 INJECTION, SUSPENSION INTRA-ARTICULAR; INTRALESIONAL; INTRAMUSCULAR; SOFT TISSUE
Status: COMPLETED | OUTPATIENT
Start: 2020-08-02 | End: 2020-08-02

## 2020-08-02 RX ADMIN — BETAMETHASONE SODIUM PHOSPHATE AND BETAMETHASONE ACETATE 6 MG: 3; 3 INJECTION, SUSPENSION INTRA-ARTICULAR; INTRALESIONAL; INTRAMUSCULAR; SOFT TISSUE at 12:08

## 2020-08-02 NOTE — PATIENT INSTRUCTIONS
PLEASE READ YOUR DISCHARGE INSTRUCTIONS ENTIRELY AS IT CONTAINS IMPORTANT INFORMATION.    Please return here or go to the Emergency Department for any concerns or worsening of condition.    If you were prescribed antibiotics, please take them to completion.      If not allergic, please take over the counter Tylenol (Acetaminophen) and/or Motrin (Ibuprofen) as directed for control of pain and/or fever.  Please follow up with your primary care doctor or specialist as needed.  S    If you smoke, please stop smoking.    Please return or see your primary care doctor if you develop new or worsening symptoms.     Please arrange follow up with your primary medical clinic as soon as possible. You must understand that you've received an Urgent Care treatment only and that you may be released before all of your medical problems are known or treated. You, the patient, will arrange for follow up as instructed. If your symptoms worsen or fail to improve you should go to the Emergency Room.  Earwax (Treated)    Everyone produces earwax from the lining of the ear canal. It lubricates and protects the ear. The wax that forms in the canal slowly moves toward the outside of the ear and falls out. Sometimes wax can build up in the ear canal. This can cause a blockage and loss of hearing. A buildup of earwax was removed from your ear today.  Home care  If you have a tendency to build up wax in the ear canal, you should clear the wax at home regularly, before it causes discomfort. This should be about once every six months.  · Unless a medicine was prescribed, you may use an over-the-counter product made for clearing earwax. These contain carbamide peroxide and are available over-the-counter in a kit with a small bulb syringe.  · Lie down with the blocked ear facing upward. Apply one dropper full of medicine and wait a few minutes. Grasp the outer ear and wiggle it to help the solution enter the canal.  · Lean over a sink or basin with  the blocked ear turned downward. Use a rubber bulb syringe filled with warm (not hot or cold) water to rinse the ear several times. Use gentle pressure only. You may need to repeat the irrigation several times before the wax flows out.  · If you are having trouble draining all the water out of your ear canal, put a few drops of rubbing alcohol into the ear canal. This will help remove the remaining water.  Don'ts  · Dont use cold water to rinse the ear. This will make you dizzy.  · Dont do this procedure if you have an ear infection. Symptoms include ear pain, fever, or fluid draining from the ear.  · Dont do this procedure if you have a punctured eardrum.  · Dont use cotton swabs, matches, hairpins, keys, or other objects to clean the ear canal. This can cause infection of the ear canal or rupture of the eardrum. Because of their size and shape, cotton swabs can push the earwax deeper into the ear canal instead of removing it.  Follow-up care  Follow up with your healthcare provider, or as advised.  When to seek medical advice  Call your healthcare provider right away if any of these occur:  · Worsening ear pain  · Fever of 100.4°F (38°C) or higher, or as directed by your healthcare provider  · Hearing does not return to normal after three days of treatment  · Fluid drainage or bleeding from the ear canal  · Swelling, redness, or tenderness of the outer ear  · Headache, neck pain, or stiff neck  Date Last Reviewed: 3/22/2015  © 4658-3646 [employer]. [address] [place] [address], [place] [address]. All rights reserved. This information is not intended as a substitute for professional medical care. Always follow your healthcare professional's instructions.

## 2020-08-02 NOTE — PROGRESS NOTES
Subjective:       Patient ID: Julio Bernardo is a 70 y.o. male.    Vitals:  height is 6' (1.829 m) and weight is 113.6 kg (250 lb 7.1 oz). His tympanic temperature is 97.2 °F (36.2 °C). His blood pressure is 141/84 (abnormal) and his pulse is 79. His respiration is 16 and oxygen saturation is 99%.     Chief Complaint: Chest Pain and Edema (ankles)    Patient came with complaint of cough and chest congestion off and on for last 3 months, symptoms got worse so were last 2-3 days.  Denies chest pain, shortness of breath, fever, chills, weakness, dizziness, palpitation, nausea/vomiting, headache, body aches, loss of smell or taste, abdominal pain, diarrhea.  Also complained of bilateral ear pressure.  Reports history of sinus allergies and excessive earwax.    Chest Pain   This is a recurrent problem. Episode onset: since March/April. The onset quality is undetermined. The problem occurs intermittently. The problem has been gradually worsening. Pain location: near esophagus. The quality of the pain is described as tightness. The pain does not radiate. Associated symptoms include a cough (at night Dry). Pertinent negatives include no dizziness, fever, headaches, nausea, shortness of breath or vomiting. The cough is non-productive. Treatments tried: Guaifenesin. The treatment provided no relief.   Edema  This is a recurrent problem. The problem occurs constantly. Associated symptoms include coughing (at night Dry). Pertinent negatives include no arthralgias, chest pain, chills, congestion, fatigue, fever, headaches, joint swelling, myalgias, nausea, rash, sore throat, vertigo or vomiting. Nothing aggravates the symptoms. Treatments tried: fluid pills. The treatment provided no relief.       Constitution: Negative for chills, fatigue and fever.   HENT: Negative for ear pain (left ear), congestion and sore throat.         Left ear fullness   Neck: Negative for painful lymph nodes.   Cardiovascular: Negative for chest pain  and leg swelling.   Eyes: Negative for double vision and blurred vision.   Respiratory: Positive for chest tightness and cough (at night Dry). Negative for shortness of breath.    Gastrointestinal: Negative for nausea, vomiting and diarrhea.   Genitourinary: Negative for dysuria, frequency and urgency.   Musculoskeletal: Negative for joint pain, joint swelling, muscle cramps and muscle ache.        Edema in ankles   Skin: Negative for color change, pale and rash.   Allergic/Immunologic: Negative for seasonal allergies.   Neurological: Negative for dizziness, history of vertigo, light-headedness, passing out and headaches.   Hematologic/Lymphatic: Negative for swollen lymph nodes, easy bruising/bleeding and history of blood clots. Does not bruise/bleed easily.   Psychiatric/Behavioral: Negative for nervous/anxious, sleep disturbance and depression. The patient is not nervous/anxious.        Objective:      Physical Exam   Constitutional: He is oriented to person, place, and time. He appears well-developed. He is cooperative.  Non-toxic appearance. He does not appear ill. No distress.   HENT:   Head: Normocephalic and atraumatic.   Ears:   Right Ear: Hearing, tympanic membrane, external ear and ear canal normal.   Left Ear: Hearing, tympanic membrane, external ear and ear canal normal.   Nose: Congestion present. No mucosal edema, rhinorrhea or nasal deformity. No epistaxis. Right sinus exhibits no maxillary sinus tenderness and no frontal sinus tenderness. Left sinus exhibits no maxillary sinus tenderness and no frontal sinus tenderness.   Mouth/Throat: Uvula is midline, oropharynx is clear and moist and mucous membranes are normal. Mucous membranes are moist. No trismus in the jaw. Normal dentition. No uvula swelling. No oropharyngeal exudate or posterior oropharyngeal erythema.      Comments: Positive mild posterior pharyngeal erythema.  No tonsillar swelling or exudates.  Eyes: Conjunctivae and lids are normal.  Right eye exhibits no discharge. Left eye exhibits no discharge. No scleral icterus.   Neck: Trachea normal, normal range of motion, full passive range of motion without pain and phonation normal. Neck supple.   Cardiovascular: Normal rate, regular rhythm, normal heart sounds and normal pulses.   No murmur heard.  Pulmonary/Chest: Effort normal and breath sounds normal. No stridor. No respiratory distress. He has no rhonchi. He has no rales. He exhibits no tenderness.   Abdominal: Soft. Normal appearance and bowel sounds are normal. He exhibits no distension, no pulsatile midline mass and no mass. There is no abdominal tenderness.   Musculoskeletal: Normal range of motion.         General: No deformity.   Neurological: He is alert and oriented to person, place, and time. He exhibits normal muscle tone. Coordination normal.   Skin: Skin is warm, dry, intact, not diaphoretic and not pale. Psychiatric: His speech is normal and behavior is normal. Judgment and thought content normal.   Nursing note and vitals reviewed.        Assessment:       1. Cough    2. Bronchitis    3. Excessive ear wax, bilateral    4. Non-seasonal allergic rhinitis, unspecified trigger        Plan:         Cough  -     COVID-19 Routine Screening    Bronchitis    Excessive ear wax, bilateral  -     Ear wax removal    Non-seasonal allergic rhinitis, unspecified trigger    Other orders  -     betamethasone acetate-betamethasone sodium phosphate injection 6 mg  -     azithromycin (ZITHROMAX Z-KOKO) 250 MG tablet; Take 2 tablets (500 mg) on  Day 1,  followed by 1 tablet (250 mg) once daily on Days 2 through 5.  Dispense: 6 tablet; Refill: 0        PLEASE READ YOUR DISCHARGE INSTRUCTIONS ENTIRELY AS IT CONTAINS IMPORTANT INFORMATION.    Please return here or go to the Emergency Department for any concerns or worsening of condition.    If you were prescribed antibiotics, please take them to completion.      If not allergic, please take over the counter  Tylenol (Acetaminophen) and/or Motrin (Ibuprofen) as directed for control of pain and/or fever.  Please follow up with your primary care doctor or specialist as needed.  S    If you smoke, please stop smoking.    Please return or see your primary care doctor if you develop new or worsening symptoms.     Please arrange follow up with your primary medical clinic as soon as possible. You must understand that you've received an Urgent Care treatment only and that you may be released before all of your medical problems are known or treated. You, the patient, will arrange for follow up as instructed. If your symptoms worsen or fail to improve you should go to the Emergency Room.  Earwax (Treated)    Everyone produces earwax from the lining of the ear canal. It lubricates and protects the ear. The wax that forms in the canal slowly moves toward the outside of the ear and falls out. Sometimes wax can build up in the ear canal. This can cause a blockage and loss of hearing. A buildup of earwax was removed from your ear today.  Home care  If you have a tendency to build up wax in the ear canal, you should clear the wax at home regularly, before it causes discomfort. This should be about once every six months.  · Unless a medicine was prescribed, you may use an over-the-counter product made for clearing earwax. These contain carbamide peroxide and are available over-the-counter in a kit with a small bulb syringe.  · Lie down with the blocked ear facing upward. Apply one dropper full of medicine and wait a few minutes. Grasp the outer ear and wiggle it to help the solution enter the canal.  · Lean over a sink or basin with the blocked ear turned downward. Use a rubber bulb syringe filled with warm (not hot or cold) water to rinse the ear several times. Use gentle pressure only. You may need to repeat the irrigation several times before the wax flows out.  · If you are having trouble draining all the water out of your ear canal,  put a few drops of rubbing alcohol into the ear canal. This will help remove the remaining water.  Don'ts  · Dont use cold water to rinse the ear. This will make you dizzy.  · Dont do this procedure if you have an ear infection. Symptoms include ear pain, fever, or fluid draining from the ear.  · Dont do this procedure if you have a punctured eardrum.  · Dont use cotton swabs, matches, hairpins, keys, or other objects to clean the ear canal. This can cause infection of the ear canal or rupture of the eardrum. Because of their size and shape, cotton swabs can push the earwax deeper into the ear canal instead of removing it.  Follow-up care  Follow up with your healthcare provider, or as advised.  When to seek medical advice  Call your healthcare provider right away if any of these occur:  · Worsening ear pain  · Fever of 100.4°F (38°C) or higher, or as directed by your healthcare provider  · Hearing does not return to normal after three days of treatment  · Fluid drainage or bleeding from the ear canal  · Swelling, redness, or tenderness of the outer ear  · Headache, neck pain, or stiff neck  Date Last Reviewed: 3/22/2015  © 5638-3071 [employer]. [address] [place] [address], [place] [address]. All rights reserved. This information is not intended as a substitute for professional medical care. Always follow your healthcare professional's instructions.

## 2020-08-03 LAB — SARS-COV-2 RNA RESP QL NAA+PROBE: NOT DETECTED

## 2020-08-13 ENCOUNTER — OFFICE VISIT (OUTPATIENT)
Dept: INTERNAL MEDICINE | Facility: CLINIC | Age: 70
End: 2020-08-13
Payer: MEDICARE

## 2020-08-13 ENCOUNTER — LAB VISIT (OUTPATIENT)
Dept: LAB | Facility: HOSPITAL | Age: 70
End: 2020-08-13
Attending: INTERNAL MEDICINE
Payer: MEDICARE

## 2020-08-13 VITALS
BODY MASS INDEX: 32.91 KG/M2 | HEART RATE: 63 BPM | SYSTOLIC BLOOD PRESSURE: 118 MMHG | HEIGHT: 72 IN | DIASTOLIC BLOOD PRESSURE: 70 MMHG | OXYGEN SATURATION: 98 % | WEIGHT: 242.94 LBS

## 2020-08-13 DIAGNOSIS — E78.5 HYPERLIPIDEMIA, UNSPECIFIED HYPERLIPIDEMIA TYPE: ICD-10-CM

## 2020-08-13 DIAGNOSIS — I10 ESSENTIAL HYPERTENSION: Primary | ICD-10-CM

## 2020-08-13 DIAGNOSIS — N18.30 CKD (CHRONIC KIDNEY DISEASE) STAGE 3, GFR 30-59 ML/MIN: ICD-10-CM

## 2020-08-13 LAB
ALBUMIN SERPL BCP-MCNC: 4 G/DL (ref 3.5–5.2)
ALP SERPL-CCNC: 111 U/L (ref 55–135)
ALT SERPL W/O P-5'-P-CCNC: 18 U/L (ref 10–44)
ANION GAP SERPL CALC-SCNC: 5 MMOL/L (ref 8–16)
AST SERPL-CCNC: 21 U/L (ref 10–40)
BILIRUB SERPL-MCNC: 0.9 MG/DL (ref 0.1–1)
BUN SERPL-MCNC: 15 MG/DL (ref 8–23)
CALCIUM SERPL-MCNC: 9.4 MG/DL (ref 8.7–10.5)
CHLORIDE SERPL-SCNC: 103 MMOL/L (ref 95–110)
CHOLEST SERPL-MCNC: 156 MG/DL (ref 120–199)
CHOLEST/HDLC SERPL: 3 {RATIO} (ref 2–5)
CO2 SERPL-SCNC: 29 MMOL/L (ref 23–29)
CREAT SERPL-MCNC: 1.4 MG/DL (ref 0.5–1.4)
EST. GFR  (AFRICAN AMERICAN): 58 ML/MIN/1.73 M^2
EST. GFR  (NON AFRICAN AMERICAN): 50.2 ML/MIN/1.73 M^2
GLUCOSE SERPL-MCNC: 105 MG/DL (ref 70–110)
HDLC SERPL-MCNC: 52 MG/DL (ref 40–75)
HDLC SERPL: 33.3 % (ref 20–50)
LDLC SERPL CALC-MCNC: 86.4 MG/DL (ref 63–159)
NONHDLC SERPL-MCNC: 104 MG/DL
POTASSIUM SERPL-SCNC: 3.9 MMOL/L (ref 3.5–5.1)
PROT SERPL-MCNC: 7.4 G/DL (ref 6–8.4)
SODIUM SERPL-SCNC: 137 MMOL/L (ref 136–145)
TRIGL SERPL-MCNC: 88 MG/DL (ref 30–150)

## 2020-08-13 PROCEDURE — 99213 PR OFFICE/OUTPT VISIT, EST, LEVL III, 20-29 MIN: ICD-10-PCS | Mod: S$GLB,,, | Performed by: INTERNAL MEDICINE

## 2020-08-13 PROCEDURE — 1126F AMNT PAIN NOTED NONE PRSNT: CPT | Mod: S$GLB,,, | Performed by: INTERNAL MEDICINE

## 2020-08-13 PROCEDURE — 99499 RISK ADDL DX/OHS AUDIT: ICD-10-PCS | Mod: S$GLB,,, | Performed by: INTERNAL MEDICINE

## 2020-08-13 PROCEDURE — 3008F PR BODY MASS INDEX (BMI) DOCUMENTED: ICD-10-PCS | Mod: CPTII,S$GLB,, | Performed by: INTERNAL MEDICINE

## 2020-08-13 PROCEDURE — 99499 UNLISTED E&M SERVICE: CPT | Mod: S$GLB,,, | Performed by: INTERNAL MEDICINE

## 2020-08-13 PROCEDURE — 3074F SYST BP LT 130 MM HG: CPT | Mod: CPTII,S$GLB,, | Performed by: INTERNAL MEDICINE

## 2020-08-13 PROCEDURE — 3078F PR MOST RECENT DIASTOLIC BLOOD PRESSURE < 80 MM HG: ICD-10-PCS | Mod: CPTII,S$GLB,, | Performed by: INTERNAL MEDICINE

## 2020-08-13 PROCEDURE — 80053 COMPREHEN METABOLIC PANEL: CPT

## 2020-08-13 PROCEDURE — 1101F PT FALLS ASSESS-DOCD LE1/YR: CPT | Mod: CPTII,S$GLB,, | Performed by: INTERNAL MEDICINE

## 2020-08-13 PROCEDURE — 3074F PR MOST RECENT SYSTOLIC BLOOD PRESSURE < 130 MM HG: ICD-10-PCS | Mod: CPTII,S$GLB,, | Performed by: INTERNAL MEDICINE

## 2020-08-13 PROCEDURE — 1126F PR PAIN SEVERITY QUANTIFIED, NO PAIN PRESENT: ICD-10-PCS | Mod: S$GLB,,, | Performed by: INTERNAL MEDICINE

## 2020-08-13 PROCEDURE — 99999 PR PBB SHADOW E&M-EST. PATIENT-LVL IV: ICD-10-PCS | Mod: PBBFAC,,, | Performed by: INTERNAL MEDICINE

## 2020-08-13 PROCEDURE — 36415 COLL VENOUS BLD VENIPUNCTURE: CPT

## 2020-08-13 PROCEDURE — 3078F DIAST BP <80 MM HG: CPT | Mod: CPTII,S$GLB,, | Performed by: INTERNAL MEDICINE

## 2020-08-13 PROCEDURE — 3008F BODY MASS INDEX DOCD: CPT | Mod: CPTII,S$GLB,, | Performed by: INTERNAL MEDICINE

## 2020-08-13 PROCEDURE — 80061 LIPID PANEL: CPT

## 2020-08-13 PROCEDURE — 1159F PR MEDICATION LIST DOCUMENTED IN MEDICAL RECORD: ICD-10-PCS | Mod: S$GLB,,, | Performed by: INTERNAL MEDICINE

## 2020-08-13 PROCEDURE — 1101F PR PT FALLS ASSESS DOC 0-1 FALLS W/OUT INJ PAST YR: ICD-10-PCS | Mod: CPTII,S$GLB,, | Performed by: INTERNAL MEDICINE

## 2020-08-13 PROCEDURE — 1159F MED LIST DOCD IN RCRD: CPT | Mod: S$GLB,,, | Performed by: INTERNAL MEDICINE

## 2020-08-13 PROCEDURE — 99999 PR PBB SHADOW E&M-EST. PATIENT-LVL IV: CPT | Mod: PBBFAC,,, | Performed by: INTERNAL MEDICINE

## 2020-08-13 PROCEDURE — 99213 OFFICE O/P EST LOW 20 MIN: CPT | Mod: S$GLB,,, | Performed by: INTERNAL MEDICINE

## 2020-08-13 NOTE — MEDICAL/APP STUDENT
"Julio Bernardo is a 70 year old man with pmhx hypertension, hyperlipidemia, and gout who presents with chronic cough and bilateral lower leg edema.    Cough- Patient has had the chronic cough since December, which he describes as a "tickling" sensation in the center of his chest more prominent at night time and in the morning. During those times he produces clear white mucous with no blood. He denies fevers, or chest pain, weight loss, loss of appetite. He will occasionally have mild shortness of breath when going out for a jog. He was recently prescribed Albuterol, which he uses at bedtime and in the morning, which he states has improved his symptoms. He also takes guaifenesin.  He states overall his cough has gotten better however he is mildly concerned why it has lingered for so long. He has had unremarkable chest xrays and negative covid-19 tests.    Edema- Patient states he has had 2 years of bilateral lower leg edema, however he has recently noticed some "tingling" in his right toes. No history of heart failure.    No sick contacts. No other recent illness.    Vitals:    08/13/20 0911   BP: 118/70   Pulse: 63       Physical Exam   Constitutional: He is oriented to person, place, and time and well-developed, well-nourished, and in no distress. No distress.   HENT:   Head: Normocephalic and atraumatic.   Cardiovascular: Normal rate, regular rhythm and normal heart sounds.   No murmur heard.  Pulmonary/Chest: Effort normal and breath sounds normal. No respiratory distress. He has no wheezes. He has no rales. He exhibits no tenderness.   Abdominal: Soft. There is no abdominal tenderness.   Musculoskeletal:         General: Edema present.      Comments: Low grade bilateral pitting edema extending from ankles to mid-tibia. More prominent on the right side.    Neurological: He is alert and oriented to person, place, and time.   Skin: He is not diaphoretic.       MDM  Number of Diagnoses or Management Options  CKD " (chronic kidney disease) stage 3, GFR 30-59 ml/min: established, improving  Essential hypertension: established, improving  Hyperlipidemia, unspecified hyperlipidemia type:   Diagnosis management comments: Patient advised to wear compression stockings for his edema caused by venous insuffiency. Clinically advisable to avoid diuretics given his history of gout.     Patient Progress  Patient progress: stable      Jose A Nugent MS IV  [facility]

## 2020-08-13 NOTE — PROGRESS NOTES
"    Answers for HPI/ROS submitted by the patient on 8/13/2020   activity change: No  unexpected weight change: Yes  neck pain: Yes  hearing loss: No  rhinorrhea: No  trouble swallowing: No  eye discharge: No  visual disturbance: Yes  chest tightness: Yes  wheezing: No  chest pain: No  palpitations: No  blood in stool: No  constipation: No  vomiting: No  diarrhea: No  polydipsia: No  polyuria: No  difficulty urinating: No  urgency: No  hematuria: No  joint swelling: Yes  arthralgias: No  headaches: No  weakness: No  confusion: No  dysphoric mood: No    I have personally taken the history and examined this patient and agree with the Medical student's  s note as stated above with the following thoughts:  /70 (BP Location: Right arm, Patient Position: Sitting, BP Method: Large (Manual))   Pulse 63   Ht 6' (1.829 m)   Wt 110.2 kg (242 lb 15.2 oz)   SpO2 98%   BMI 32.95 kg/m²       Discussed getting vaccines up to date.  Discussed importance of low fat diet and exercise.  He continues to have some edema.  His controlled with compression stockings.  His from venous insufficiency.  We discussed possibility of starting a diuretic, however he has some mild CKD and also he has a history of gout.  Continue where consent compression stockings and keep his feet elevated when not in use.  I will see him back in 6 months or sooner.  For that postnasal drip he has he can try some Mucinex DM or Flonase with actually probably be better for him.  We discussed.  If it worsens he will let me know. ::::::              Julio Bernardo is a 70 year old man with pmhx hypertension, hyperlipidemia, and gout who presents with chronic cough and bilateral lower leg edema.     Cough- Patient has had the chronic cough since December, which he describes as a "tickling" sensation in the center of his chest more prominent at night time and in the morning. During those times he produces clear white mucous with no blood. He denies fevers, or " "chest pain, weight loss, loss of appetite. He will occasionally have mild shortness of breath when going out for a jog. He was recently prescribed Albuterol, which he uses at bedtime and in the morning, which he states has improved his symptoms. He also takes guaifenesin.  He states overall his cough has gotten better however he is mildly concerned why it has lingered for so long. He has had unremarkable chest xrays and negative covid-19 tests.     Edema- Patient states he has had 2 years of bilateral lower leg edema, however he has recently noticed some "tingling" in his right toes. No history of heart failure.     No sick contacts. No other recent illness.         Vitals:     08/13/20 0911   BP: 118/70   Pulse: 63        Physical Exam   Constitutional: He is oriented to person, place, and time and well-developed, well-nourished, and in no distress. No distress.   HENT:   Head: Normocephalic and atraumatic.   Cardiovascular: Normal rate, regular rhythm and normal heart sounds.   No murmur heard.  Pulmonary/Chest: Effort normal and breath sounds normal. No respiratory distress. He has no wheezes. He has no rales. He exhibits no tenderness.   Abdominal: Soft. There is no abdominal tenderness.   Musculoskeletal:         General: Edema present.      Comments: Low grade bilateral pitting edema extending from ankles to mid-tibia. More prominent on the right side.    Neurological: He is alert and oriented to person, place, and time.   Skin: He is not diaphoretic.         MDM  Number of Diagnoses or Management Options  CKD (chronic kidney disease) stage 3, GFR 30-59 ml/min: established, improving  Essential hypertension: established, improving  Hyperlipidemia, unspecified hyperlipidemia type:   Diagnosis management comments: Patient advised to wear compression stockings for his edema caused by venous insuffiency. Clinically advisable to avoid diuretics given his history of gout.      Patient Progress  Patient progress: " manju Nugent MS IV  [facility]  \

## 2020-11-23 RX ORDER — ALBUTEROL SULFATE 90 UG/1
2 AEROSOL, METERED RESPIRATORY (INHALATION) EVERY 6 HOURS PRN
Qty: 18 G | Refills: 2 | Status: SHIPPED | OUTPATIENT
Start: 2020-11-23 | End: 2021-10-01

## 2020-11-23 RX ORDER — FLUTICASONE PROPIONATE 50 MCG
SPRAY, SUSPENSION (ML) NASAL
Qty: 48 ML | Refills: 5 | Status: SHIPPED | OUTPATIENT
Start: 2020-11-23 | End: 2022-03-22

## 2021-05-05 ENCOUNTER — CLINICAL SUPPORT (OUTPATIENT)
Dept: URGENT CARE | Facility: CLINIC | Age: 71
End: 2021-05-05
Payer: MEDICARE

## 2021-05-05 DIAGNOSIS — Z11.59 ENCOUNTER FOR SCREENING FOR OTHER VIRAL DISEASES: Primary | ICD-10-CM

## 2021-05-05 DIAGNOSIS — Z13.9 ENCOUNTER FOR SCREENING: Primary | ICD-10-CM

## 2021-05-05 LAB
CTP QC/QA: YES
SARS-COV-2 RDRP RESP QL NAA+PROBE: NEGATIVE

## 2021-05-05 PROCEDURE — 99211 PR OFFICE/OUTPT VISIT, EST, LEVL I: ICD-10-PCS | Mod: S$GLB,CS,, | Performed by: FAMILY MEDICINE

## 2021-05-05 PROCEDURE — 99211 OFF/OP EST MAY X REQ PHY/QHP: CPT | Mod: S$GLB,CS,, | Performed by: FAMILY MEDICINE

## 2021-05-05 PROCEDURE — U0002: ICD-10-PCS | Mod: QW,S$GLB,, | Performed by: FAMILY MEDICINE

## 2021-05-05 PROCEDURE — U0002 COVID-19 LAB TEST NON-CDC: HCPCS | Mod: QW,S$GLB,, | Performed by: FAMILY MEDICINE

## 2021-09-27 ENCOUNTER — IMMUNIZATION (OUTPATIENT)
Dept: INTERNAL MEDICINE | Facility: CLINIC | Age: 71
End: 2021-09-27
Payer: MEDICARE

## 2021-09-27 ENCOUNTER — PATIENT MESSAGE (OUTPATIENT)
Dept: INTERNAL MEDICINE | Facility: CLINIC | Age: 71
End: 2021-09-27

## 2021-09-27 DIAGNOSIS — N18.31 STAGE 3A CHRONIC KIDNEY DISEASE: ICD-10-CM

## 2021-09-27 DIAGNOSIS — Z23 NEED FOR VACCINATION: Primary | ICD-10-CM

## 2021-09-27 DIAGNOSIS — K63.5 POLYP OF COLON, UNSPECIFIED PART OF COLON, UNSPECIFIED TYPE: Primary | ICD-10-CM

## 2021-09-27 DIAGNOSIS — E78.5 HYPERLIPIDEMIA, UNSPECIFIED HYPERLIPIDEMIA TYPE: ICD-10-CM

## 2021-09-27 PROCEDURE — 0003A COVID-19, MRNA, LNP-S, PF, 30 MCG/0.3 ML DOSE VACCINE: CPT | Mod: PBBFAC | Performed by: INTERNAL MEDICINE

## 2021-09-27 PROCEDURE — 91300 COVID-19, MRNA, LNP-S, PF, 30 MCG/0.3 ML DOSE VACCINE: CPT | Mod: PBBFAC | Performed by: INTERNAL MEDICINE

## 2021-09-29 ENCOUNTER — TELEPHONE (OUTPATIENT)
Dept: INTERNAL MEDICINE | Facility: CLINIC | Age: 71
End: 2021-09-29

## 2021-09-30 ENCOUNTER — LAB VISIT (OUTPATIENT)
Dept: LAB | Facility: HOSPITAL | Age: 71
End: 2021-09-30
Attending: INTERNAL MEDICINE
Payer: MEDICARE

## 2021-09-30 DIAGNOSIS — E78.5 HYPERLIPIDEMIA, UNSPECIFIED HYPERLIPIDEMIA TYPE: ICD-10-CM

## 2021-09-30 LAB
ALBUMIN SERPL BCP-MCNC: 3.6 G/DL (ref 3.5–5.2)
ALP SERPL-CCNC: 105 U/L (ref 55–135)
ALT SERPL W/O P-5'-P-CCNC: 15 U/L (ref 10–44)
ANION GAP SERPL CALC-SCNC: 13 MMOL/L (ref 8–16)
AST SERPL-CCNC: 20 U/L (ref 10–40)
BILIRUB SERPL-MCNC: 0.5 MG/DL (ref 0.1–1)
BUN SERPL-MCNC: 16 MG/DL (ref 8–23)
CALCIUM SERPL-MCNC: 9.5 MG/DL (ref 8.7–10.5)
CHLORIDE SERPL-SCNC: 103 MMOL/L (ref 95–110)
CHOLEST SERPL-MCNC: 145 MG/DL (ref 120–199)
CHOLEST/HDLC SERPL: 2.9 {RATIO} (ref 2–5)
CO2 SERPL-SCNC: 26 MMOL/L (ref 23–29)
CREAT SERPL-MCNC: 1.4 MG/DL (ref 0.5–1.4)
EST. GFR  (AFRICAN AMERICAN): 57.6 ML/MIN/1.73 M^2
EST. GFR  (NON AFRICAN AMERICAN): 49.8 ML/MIN/1.73 M^2
GLUCOSE SERPL-MCNC: 102 MG/DL (ref 70–110)
HDLC SERPL-MCNC: 50 MG/DL (ref 40–75)
HDLC SERPL: 34.5 % (ref 20–50)
LDLC SERPL CALC-MCNC: 74.6 MG/DL (ref 63–159)
NONHDLC SERPL-MCNC: 95 MG/DL
POTASSIUM SERPL-SCNC: 4.3 MMOL/L (ref 3.5–5.1)
PROT SERPL-MCNC: 7 G/DL (ref 6–8.4)
SODIUM SERPL-SCNC: 142 MMOL/L (ref 136–145)
TRIGL SERPL-MCNC: 102 MG/DL (ref 30–150)

## 2021-09-30 PROCEDURE — 80053 COMPREHEN METABOLIC PANEL: CPT | Performed by: INTERNAL MEDICINE

## 2021-09-30 PROCEDURE — 36415 COLL VENOUS BLD VENIPUNCTURE: CPT | Mod: PO | Performed by: INTERNAL MEDICINE

## 2021-09-30 PROCEDURE — 80061 LIPID PANEL: CPT | Performed by: INTERNAL MEDICINE

## 2021-10-01 ENCOUNTER — HOSPITAL ENCOUNTER (OUTPATIENT)
Dept: RADIOLOGY | Facility: HOSPITAL | Age: 71
Discharge: HOME | End: 2021-10-01
Attending: INTERNAL MEDICINE
Payer: MEDICARE

## 2021-10-01 ENCOUNTER — OFFICE VISIT (OUTPATIENT)
Dept: INTERNAL MEDICINE | Facility: CLINIC | Age: 71
End: 2021-10-01
Payer: MEDICARE

## 2021-10-01 VITALS
WEIGHT: 249.81 LBS | DIASTOLIC BLOOD PRESSURE: 83 MMHG | SYSTOLIC BLOOD PRESSURE: 138 MMHG | HEIGHT: 73 IN | OXYGEN SATURATION: 97 % | HEART RATE: 73 BPM | BODY MASS INDEX: 33.11 KG/M2

## 2021-10-01 DIAGNOSIS — N18.31 STAGE 3A CHRONIC KIDNEY DISEASE: ICD-10-CM

## 2021-10-01 DIAGNOSIS — R05.3 CHRONIC COUGH: ICD-10-CM

## 2021-10-01 DIAGNOSIS — M43.6 NECK STIFFNESS: ICD-10-CM

## 2021-10-01 DIAGNOSIS — M1A.3710 CHRONIC GOUT OF RIGHT FOOT DUE TO RENAL IMPAIRMENT WITHOUT TOPHUS: ICD-10-CM

## 2021-10-01 DIAGNOSIS — R29.898 DECREASED RANGE OF MOTION OF NECK: ICD-10-CM

## 2021-10-01 DIAGNOSIS — E78.5 HYPERLIPIDEMIA, UNSPECIFIED HYPERLIPIDEMIA TYPE: ICD-10-CM

## 2021-10-01 DIAGNOSIS — I10 PRIMARY HYPERTENSION: Primary | ICD-10-CM

## 2021-10-01 DIAGNOSIS — Z12.5 SCREENING PSA (PROSTATE SPECIFIC ANTIGEN): ICD-10-CM

## 2021-10-01 DIAGNOSIS — K63.5 POLYP OF COLON, UNSPECIFIED PART OF COLON, UNSPECIFIED TYPE: ICD-10-CM

## 2021-10-01 DIAGNOSIS — L82.1 SK (SEBORRHEIC KERATOSIS): ICD-10-CM

## 2021-10-01 PROCEDURE — 99999 PR PBB SHADOW E&M-EST. PATIENT-LVL III: CPT | Mod: PBBFAC,,, | Performed by: INTERNAL MEDICINE

## 2021-10-01 PROCEDURE — 1101F PR PT FALLS ASSESS DOC 0-1 FALLS W/OUT INJ PAST YR: ICD-10-PCS | Mod: CPTII,S$GLB,, | Performed by: INTERNAL MEDICINE

## 2021-10-01 PROCEDURE — 99214 OFFICE O/P EST MOD 30 MIN: CPT | Mod: S$GLB,,, | Performed by: INTERNAL MEDICINE

## 2021-10-01 PROCEDURE — 3288F PR FALLS RISK ASSESSMENT DOCUMENTED: ICD-10-PCS | Mod: CPTII,S$GLB,, | Performed by: INTERNAL MEDICINE

## 2021-10-01 PROCEDURE — 3075F PR MOST RECENT SYSTOLIC BLOOD PRESS GE 130-139MM HG: ICD-10-PCS | Mod: CPTII,S$GLB,, | Performed by: INTERNAL MEDICINE

## 2021-10-01 PROCEDURE — 71046 X-RAY EXAM CHEST 2 VIEWS: CPT | Mod: TC

## 2021-10-01 PROCEDURE — 99499 UNLISTED E&M SERVICE: CPT | Mod: S$GLB,,, | Performed by: INTERNAL MEDICINE

## 2021-10-01 PROCEDURE — 99499 RISK ADDL DX/OHS AUDIT: ICD-10-PCS | Mod: S$GLB,,, | Performed by: INTERNAL MEDICINE

## 2021-10-01 PROCEDURE — 3008F PR BODY MASS INDEX (BMI) DOCUMENTED: ICD-10-PCS | Mod: CPTII,S$GLB,, | Performed by: INTERNAL MEDICINE

## 2021-10-01 PROCEDURE — 99214 PR OFFICE/OUTPT VISIT, EST, LEVL IV, 30-39 MIN: ICD-10-PCS | Mod: S$GLB,,, | Performed by: INTERNAL MEDICINE

## 2021-10-01 PROCEDURE — 1101F PT FALLS ASSESS-DOCD LE1/YR: CPT | Mod: CPTII,S$GLB,, | Performed by: INTERNAL MEDICINE

## 2021-10-01 PROCEDURE — 71046 XR CHEST PA AND LATERAL: ICD-10-PCS | Mod: 26,,, | Performed by: RADIOLOGY

## 2021-10-01 PROCEDURE — 4010F ACE/ARB THERAPY RXD/TAKEN: CPT | Mod: CPTII,S$GLB,, | Performed by: INTERNAL MEDICINE

## 2021-10-01 PROCEDURE — 3079F DIAST BP 80-89 MM HG: CPT | Mod: CPTII,S$GLB,, | Performed by: INTERNAL MEDICINE

## 2021-10-01 PROCEDURE — 1126F PR PAIN SEVERITY QUANTIFIED, NO PAIN PRESENT: ICD-10-PCS | Mod: CPTII,S$GLB,, | Performed by: INTERNAL MEDICINE

## 2021-10-01 PROCEDURE — 71046 X-RAY EXAM CHEST 2 VIEWS: CPT | Mod: 26,,, | Performed by: RADIOLOGY

## 2021-10-01 PROCEDURE — 3008F BODY MASS INDEX DOCD: CPT | Mod: CPTII,S$GLB,, | Performed by: INTERNAL MEDICINE

## 2021-10-01 PROCEDURE — 4010F PR ACE/ARB THEARPY RXD/TAKEN: ICD-10-PCS | Mod: CPTII,S$GLB,, | Performed by: INTERNAL MEDICINE

## 2021-10-01 PROCEDURE — 1126F AMNT PAIN NOTED NONE PRSNT: CPT | Mod: CPTII,S$GLB,, | Performed by: INTERNAL MEDICINE

## 2021-10-01 PROCEDURE — 3288F FALL RISK ASSESSMENT DOCD: CPT | Mod: CPTII,S$GLB,, | Performed by: INTERNAL MEDICINE

## 2021-10-01 PROCEDURE — 3075F SYST BP GE 130 - 139MM HG: CPT | Mod: CPTII,S$GLB,, | Performed by: INTERNAL MEDICINE

## 2021-10-01 PROCEDURE — 1159F PR MEDICATION LIST DOCUMENTED IN MEDICAL RECORD: ICD-10-PCS | Mod: CPTII,S$GLB,, | Performed by: INTERNAL MEDICINE

## 2021-10-01 PROCEDURE — 99999 PR PBB SHADOW E&M-EST. PATIENT-LVL III: ICD-10-PCS | Mod: PBBFAC,,, | Performed by: INTERNAL MEDICINE

## 2021-10-01 PROCEDURE — 3079F PR MOST RECENT DIASTOLIC BLOOD PRESSURE 80-89 MM HG: ICD-10-PCS | Mod: CPTII,S$GLB,, | Performed by: INTERNAL MEDICINE

## 2021-10-01 PROCEDURE — 1159F MED LIST DOCD IN RCRD: CPT | Mod: CPTII,S$GLB,, | Performed by: INTERNAL MEDICINE

## 2021-10-01 RX ORDER — ATORVASTATIN CALCIUM 40 MG/1
40 TABLET, FILM COATED ORAL DAILY
Qty: 90 TABLET | Refills: 2 | Status: SHIPPED | OUTPATIENT
Start: 2021-10-01 | End: 2022-09-16

## 2021-10-01 RX ORDER — HYDROCHLOROTHIAZIDE 12.5 MG/1
12.5 CAPSULE ORAL DAILY
Qty: 90 CAPSULE | Refills: 3 | Status: SHIPPED | OUTPATIENT
Start: 2021-10-01 | End: 2022-10-03 | Stop reason: SDUPTHER

## 2021-10-01 RX ORDER — LOSARTAN POTASSIUM 100 MG/1
100 TABLET ORAL DAILY
Qty: 90 TABLET | Refills: 3 | Status: SHIPPED | OUTPATIENT
Start: 2021-10-01 | End: 2022-10-03 | Stop reason: SDUPTHER

## 2021-10-01 RX ORDER — ALBUTEROL SULFATE 90 UG/1
2 AEROSOL, METERED RESPIRATORY (INHALATION) EVERY 6 HOURS PRN
Qty: 8.5 G | Refills: 2 | Status: SHIPPED | OUTPATIENT
Start: 2021-10-01 | End: 2023-03-04 | Stop reason: SDUPTHER

## 2021-10-08 ENCOUNTER — OFFICE VISIT (OUTPATIENT)
Dept: DERMATOLOGY | Facility: CLINIC | Age: 71
End: 2021-10-08
Payer: MEDICARE

## 2021-10-08 DIAGNOSIS — D22.9 BENIGN NEVUS: ICD-10-CM

## 2021-10-08 DIAGNOSIS — D23.9 DERMATOFIBROMA: ICD-10-CM

## 2021-10-08 DIAGNOSIS — L91.8 SKIN TAG: ICD-10-CM

## 2021-10-08 DIAGNOSIS — L82.1 SK (SEBORRHEIC KERATOSIS): Primary | ICD-10-CM

## 2021-10-08 PROCEDURE — 3288F FALL RISK ASSESSMENT DOCD: CPT | Mod: CPTII,S$GLB,, | Performed by: DERMATOLOGY

## 2021-10-08 PROCEDURE — 99203 PR OFFICE/OUTPT VISIT, NEW, LEVL III, 30-44 MIN: ICD-10-PCS | Mod: S$GLB,,, | Performed by: DERMATOLOGY

## 2021-10-08 PROCEDURE — 99999 PR PBB SHADOW E&M-EST. PATIENT-LVL III: CPT | Mod: PBBFAC,,, | Performed by: DERMATOLOGY

## 2021-10-08 PROCEDURE — 1159F PR MEDICATION LIST DOCUMENTED IN MEDICAL RECORD: ICD-10-PCS | Mod: CPTII,S$GLB,, | Performed by: DERMATOLOGY

## 2021-10-08 PROCEDURE — 99203 OFFICE O/P NEW LOW 30 MIN: CPT | Mod: S$GLB,,, | Performed by: DERMATOLOGY

## 2021-10-08 PROCEDURE — 1126F AMNT PAIN NOTED NONE PRSNT: CPT | Mod: CPTII,S$GLB,, | Performed by: DERMATOLOGY

## 2021-10-08 PROCEDURE — 1101F PT FALLS ASSESS-DOCD LE1/YR: CPT | Mod: CPTII,S$GLB,, | Performed by: DERMATOLOGY

## 2021-10-08 PROCEDURE — 4010F PR ACE/ARB THEARPY RXD/TAKEN: ICD-10-PCS | Mod: CPTII,S$GLB,, | Performed by: DERMATOLOGY

## 2021-10-08 PROCEDURE — 1126F PR PAIN SEVERITY QUANTIFIED, NO PAIN PRESENT: ICD-10-PCS | Mod: CPTII,S$GLB,, | Performed by: DERMATOLOGY

## 2021-10-08 PROCEDURE — 1159F MED LIST DOCD IN RCRD: CPT | Mod: CPTII,S$GLB,, | Performed by: DERMATOLOGY

## 2021-10-08 PROCEDURE — 1101F PR PT FALLS ASSESS DOC 0-1 FALLS W/OUT INJ PAST YR: ICD-10-PCS | Mod: CPTII,S$GLB,, | Performed by: DERMATOLOGY

## 2021-10-08 PROCEDURE — 99999 PR PBB SHADOW E&M-EST. PATIENT-LVL III: ICD-10-PCS | Mod: PBBFAC,,, | Performed by: DERMATOLOGY

## 2021-10-08 PROCEDURE — 4010F ACE/ARB THERAPY RXD/TAKEN: CPT | Mod: CPTII,S$GLB,, | Performed by: DERMATOLOGY

## 2021-10-08 PROCEDURE — 1160F PR REVIEW ALL MEDS BY PRESCRIBER/CLIN PHARMACIST DOCUMENTED: ICD-10-PCS | Mod: CPTII,S$GLB,, | Performed by: DERMATOLOGY

## 2021-10-08 PROCEDURE — 3288F PR FALLS RISK ASSESSMENT DOCUMENTED: ICD-10-PCS | Mod: CPTII,S$GLB,, | Performed by: DERMATOLOGY

## 2021-10-08 PROCEDURE — 1160F RVW MEDS BY RX/DR IN RCRD: CPT | Mod: CPTII,S$GLB,, | Performed by: DERMATOLOGY

## 2021-12-03 ENCOUNTER — OFFICE VISIT (OUTPATIENT)
Dept: PULMONOLOGY | Facility: CLINIC | Age: 71
End: 2021-12-03
Payer: MEDICARE

## 2021-12-03 VITALS
DIASTOLIC BLOOD PRESSURE: 93 MMHG | SYSTOLIC BLOOD PRESSURE: 166 MMHG | WEIGHT: 251.88 LBS | OXYGEN SATURATION: 98 % | HEIGHT: 73 IN | BODY MASS INDEX: 33.38 KG/M2 | HEART RATE: 71 BPM | TEMPERATURE: 97 F

## 2021-12-03 DIAGNOSIS — R05.3 CHRONIC COUGH: ICD-10-CM

## 2021-12-03 PROCEDURE — 1101F PR PT FALLS ASSESS DOC 0-1 FALLS W/OUT INJ PAST YR: ICD-10-PCS | Mod: CPTII,S$GLB,, | Performed by: NURSE PRACTITIONER

## 2021-12-03 PROCEDURE — 1101F PT FALLS ASSESS-DOCD LE1/YR: CPT | Mod: CPTII,S$GLB,, | Performed by: NURSE PRACTITIONER

## 2021-12-03 PROCEDURE — 4010F PR ACE/ARB THEARPY RXD/TAKEN: ICD-10-PCS | Mod: CPTII,S$GLB,, | Performed by: NURSE PRACTITIONER

## 2021-12-03 PROCEDURE — 99203 PR OFFICE/OUTPT VISIT, NEW, LEVL III, 30-44 MIN: ICD-10-PCS | Mod: S$GLB,,, | Performed by: NURSE PRACTITIONER

## 2021-12-03 PROCEDURE — 3288F PR FALLS RISK ASSESSMENT DOCUMENTED: ICD-10-PCS | Mod: CPTII,S$GLB,, | Performed by: NURSE PRACTITIONER

## 2021-12-03 PROCEDURE — 3288F FALL RISK ASSESSMENT DOCD: CPT | Mod: CPTII,S$GLB,, | Performed by: NURSE PRACTITIONER

## 2021-12-03 PROCEDURE — 4010F ACE/ARB THERAPY RXD/TAKEN: CPT | Mod: CPTII,S$GLB,, | Performed by: NURSE PRACTITIONER

## 2021-12-03 PROCEDURE — 3077F PR MOST RECENT SYSTOLIC BLOOD PRESSURE >= 140 MM HG: ICD-10-PCS | Mod: CPTII,S$GLB,, | Performed by: NURSE PRACTITIONER

## 2021-12-03 PROCEDURE — 3080F DIAST BP >= 90 MM HG: CPT | Mod: CPTII,S$GLB,, | Performed by: NURSE PRACTITIONER

## 2021-12-03 PROCEDURE — 99203 OFFICE O/P NEW LOW 30 MIN: CPT | Mod: S$GLB,,, | Performed by: NURSE PRACTITIONER

## 2021-12-03 PROCEDURE — 3077F SYST BP >= 140 MM HG: CPT | Mod: CPTII,S$GLB,, | Performed by: NURSE PRACTITIONER

## 2021-12-03 PROCEDURE — 3080F PR MOST RECENT DIASTOLIC BLOOD PRESSURE >= 90 MM HG: ICD-10-PCS | Mod: CPTII,S$GLB,, | Performed by: NURSE PRACTITIONER

## 2021-12-03 PROCEDURE — 3008F PR BODY MASS INDEX (BMI) DOCUMENTED: ICD-10-PCS | Mod: CPTII,S$GLB,, | Performed by: NURSE PRACTITIONER

## 2021-12-03 PROCEDURE — 1126F PR PAIN SEVERITY QUANTIFIED, NO PAIN PRESENT: ICD-10-PCS | Mod: CPTII,S$GLB,, | Performed by: NURSE PRACTITIONER

## 2021-12-03 PROCEDURE — 99999 PR PBB SHADOW E&M-EST. PATIENT-LVL IV: ICD-10-PCS | Mod: PBBFAC,,, | Performed by: NURSE PRACTITIONER

## 2021-12-03 PROCEDURE — 99999 PR PBB SHADOW E&M-EST. PATIENT-LVL IV: CPT | Mod: PBBFAC,,, | Performed by: NURSE PRACTITIONER

## 2021-12-03 PROCEDURE — 1126F AMNT PAIN NOTED NONE PRSNT: CPT | Mod: CPTII,S$GLB,, | Performed by: NURSE PRACTITIONER

## 2021-12-03 PROCEDURE — 3008F BODY MASS INDEX DOCD: CPT | Mod: CPTII,S$GLB,, | Performed by: NURSE PRACTITIONER

## 2021-12-15 ENCOUNTER — HOSPITAL ENCOUNTER (OUTPATIENT)
Dept: PULMONOLOGY | Facility: CLINIC | Age: 71
Discharge: HOME | End: 2021-12-15
Payer: MEDICARE

## 2021-12-15 ENCOUNTER — HOSPITAL ENCOUNTER (OUTPATIENT)
Dept: RADIOLOGY | Facility: HOSPITAL | Age: 71
Discharge: HOME | End: 2021-12-15
Attending: NURSE PRACTITIONER
Payer: MEDICARE

## 2021-12-15 VITALS — HEIGHT: 72 IN | WEIGHT: 249.13 LBS | BODY MASS INDEX: 33.74 KG/M2

## 2021-12-15 DIAGNOSIS — R05.3 CHRONIC COUGH: ICD-10-CM

## 2021-12-15 PROCEDURE — 94060 PR EVAL OF BRONCHOSPASM: ICD-10-PCS | Mod: S$GLB,,, | Performed by: INTERNAL MEDICINE

## 2021-12-15 PROCEDURE — 94618 PULMONARY STRESS TESTING: ICD-10-PCS | Mod: S$GLB,,, | Performed by: INTERNAL MEDICINE

## 2021-12-15 PROCEDURE — 94618 PULMONARY STRESS TESTING: CPT | Mod: S$GLB,,, | Performed by: INTERNAL MEDICINE

## 2021-12-15 PROCEDURE — 94727 GAS DIL/WSHOT DETER LNG VOL: CPT | Mod: S$GLB,,, | Performed by: INTERNAL MEDICINE

## 2021-12-15 PROCEDURE — 94729 DIFFUSING CAPACITY: CPT | Mod: S$GLB,,, | Performed by: INTERNAL MEDICINE

## 2021-12-15 PROCEDURE — 71250 CT THORAX DX C-: CPT | Mod: 26,,, | Performed by: RADIOLOGY

## 2021-12-15 PROCEDURE — 94727 PR PULM FUNCTION TEST BY GAS: ICD-10-PCS | Mod: S$GLB,,, | Performed by: INTERNAL MEDICINE

## 2021-12-15 PROCEDURE — 71250 CT THORAX DX C-: CPT | Mod: TC

## 2021-12-15 PROCEDURE — 94060 EVALUATION OF WHEEZING: CPT | Mod: S$GLB,,, | Performed by: INTERNAL MEDICINE

## 2021-12-15 PROCEDURE — 94729 PR C02/MEMBANE DIFFUSE CAPACITY: ICD-10-PCS | Mod: S$GLB,,, | Performed by: INTERNAL MEDICINE

## 2021-12-15 PROCEDURE — 71250 CT CHEST WITHOUT CONTRAST: ICD-10-PCS | Mod: 26,,, | Performed by: RADIOLOGY

## 2021-12-20 ENCOUNTER — OFFICE VISIT (OUTPATIENT)
Dept: PULMONOLOGY | Facility: CLINIC | Age: 71
End: 2021-12-20
Payer: MEDICARE

## 2021-12-20 VITALS
WEIGHT: 254.88 LBS | OXYGEN SATURATION: 98 % | SYSTOLIC BLOOD PRESSURE: 147 MMHG | HEART RATE: 66 BPM | DIASTOLIC BLOOD PRESSURE: 88 MMHG | HEIGHT: 72 IN | BODY MASS INDEX: 34.52 KG/M2 | TEMPERATURE: 97 F

## 2021-12-20 DIAGNOSIS — J18.1 CONSOLIDATION OF LEFT UPPER LOBE OF LUNG: Primary | ICD-10-CM

## 2021-12-20 DIAGNOSIS — J98.6 ELEVATED HEMIDIAPHRAGM: ICD-10-CM

## 2021-12-20 DIAGNOSIS — G47.30 SLEEP-RELATED BREATHING DISORDER: ICD-10-CM

## 2021-12-20 PROCEDURE — 1101F PT FALLS ASSESS-DOCD LE1/YR: CPT | Mod: CPTII,S$GLB,, | Performed by: NURSE PRACTITIONER

## 2021-12-20 PROCEDURE — 3079F PR MOST RECENT DIASTOLIC BLOOD PRESSURE 80-89 MM HG: ICD-10-PCS | Mod: CPTII,S$GLB,, | Performed by: NURSE PRACTITIONER

## 2021-12-20 PROCEDURE — 1159F PR MEDICATION LIST DOCUMENTED IN MEDICAL RECORD: ICD-10-PCS | Mod: CPTII,S$GLB,, | Performed by: NURSE PRACTITIONER

## 2021-12-20 PROCEDURE — 1126F PR PAIN SEVERITY QUANTIFIED, NO PAIN PRESENT: ICD-10-PCS | Mod: CPTII,S$GLB,, | Performed by: NURSE PRACTITIONER

## 2021-12-20 PROCEDURE — 3288F PR FALLS RISK ASSESSMENT DOCUMENTED: ICD-10-PCS | Mod: CPTII,S$GLB,, | Performed by: NURSE PRACTITIONER

## 2021-12-20 PROCEDURE — 1101F PR PT FALLS ASSESS DOC 0-1 FALLS W/OUT INJ PAST YR: ICD-10-PCS | Mod: CPTII,S$GLB,, | Performed by: NURSE PRACTITIONER

## 2021-12-20 PROCEDURE — 3008F BODY MASS INDEX DOCD: CPT | Mod: CPTII,S$GLB,, | Performed by: NURSE PRACTITIONER

## 2021-12-20 PROCEDURE — 99499 UNLISTED E&M SERVICE: CPT | Mod: S$GLB,,, | Performed by: NURSE PRACTITIONER

## 2021-12-20 PROCEDURE — 3079F DIAST BP 80-89 MM HG: CPT | Mod: CPTII,S$GLB,, | Performed by: NURSE PRACTITIONER

## 2021-12-20 PROCEDURE — 99999 PR PBB SHADOW E&M-EST. PATIENT-LVL IV: CPT | Mod: PBBFAC,,, | Performed by: NURSE PRACTITIONER

## 2021-12-20 PROCEDURE — 3008F PR BODY MASS INDEX (BMI) DOCUMENTED: ICD-10-PCS | Mod: CPTII,S$GLB,, | Performed by: NURSE PRACTITIONER

## 2021-12-20 PROCEDURE — 99214 PR OFFICE/OUTPT VISIT, EST, LEVL IV, 30-39 MIN: ICD-10-PCS | Mod: S$GLB,,, | Performed by: NURSE PRACTITIONER

## 2021-12-20 PROCEDURE — 99214 OFFICE O/P EST MOD 30 MIN: CPT | Mod: S$GLB,,, | Performed by: NURSE PRACTITIONER

## 2021-12-20 PROCEDURE — 99999 PR PBB SHADOW E&M-EST. PATIENT-LVL IV: ICD-10-PCS | Mod: PBBFAC,,, | Performed by: NURSE PRACTITIONER

## 2021-12-20 PROCEDURE — 1159F MED LIST DOCD IN RCRD: CPT | Mod: CPTII,S$GLB,, | Performed by: NURSE PRACTITIONER

## 2021-12-20 PROCEDURE — 3077F PR MOST RECENT SYSTOLIC BLOOD PRESSURE >= 140 MM HG: ICD-10-PCS | Mod: CPTII,S$GLB,, | Performed by: NURSE PRACTITIONER

## 2021-12-20 PROCEDURE — 1126F AMNT PAIN NOTED NONE PRSNT: CPT | Mod: CPTII,S$GLB,, | Performed by: NURSE PRACTITIONER

## 2021-12-20 PROCEDURE — 99499 RISK ADDL DX/OHS AUDIT: ICD-10-PCS | Mod: S$GLB,,, | Performed by: NURSE PRACTITIONER

## 2021-12-20 PROCEDURE — 3077F SYST BP >= 140 MM HG: CPT | Mod: CPTII,S$GLB,, | Performed by: NURSE PRACTITIONER

## 2021-12-20 PROCEDURE — 3288F FALL RISK ASSESSMENT DOCD: CPT | Mod: CPTII,S$GLB,, | Performed by: NURSE PRACTITIONER

## 2021-12-20 RX ORDER — DOXYCYCLINE 100 MG/1
100 CAPSULE ORAL EVERY 12 HOURS
Qty: 20 CAPSULE | Refills: 0 | Status: SHIPPED | OUTPATIENT
Start: 2021-12-20 | End: 2021-12-30

## 2021-12-20 RX ORDER — CODEINE PHOSPHATE AND GUAIFENESIN 10; 100 MG/5ML; MG/5ML
5 SOLUTION ORAL NIGHTLY PRN
Qty: 120 ML | Refills: 0 | Status: SHIPPED | OUTPATIENT
Start: 2021-12-20 | End: 2021-12-30

## 2021-12-20 NOTE — PROGRESS NOTES
CHIEF COMPLAINT:    Chief Complaint   Patient presents with    Results       HISTORY OF PRESENT ILLNESS: Julio Bernardo is a 71 y.o. male with  has a past medical history of Allergy, Arthritis (2019), Colon polyps, Disorder of kidney and ureter (2019), Hyperlipemia, Hypertension, and Posterior capsular opacification. is here for pulmonary follow up. Patient with  lingering cough that started April 2020. Seen at  April 2020 and treated with  zpack, steroid shot. Cough improved but never resolved. Taking guaifenesin which helps. Able to walk 3 miles, bend over get sob.    CT chest 12/15/2021: 1. Bronchial wall thickening and multiple foci of mucoid impaction in the left lower lobe, as well as bronchogenic consolidation in the left upper lobe.  Suspect medium and small airway disease, likely infectious bronchitis.  Follow-up low dose chest CT recommended to ensure stability vs. resolution.  2. Left hemidiaphragm elevation and left lung base volume loss, increased compared to 10/01/2021 CXR.  No CT findings of mass involving the phrenic nerve.  3. Scattered bilateral pulmonary micronodules.  These could also be re-evaluated at follow-up chest CT in 12 months.    pft 12/15/2021: Spirometry is normal. Spirometry is improved following bronchodilator administration. Lung volumes show mild restriction is  present. DLCO is normal.  Walk 98/95/97      PAST MEDICAL HISTORY:    Active Ambulatory Problems     Diagnosis Date Noted    After-cataract, obscuring vision - Left Eye 05/15/2013    Vitreous detachment - Both Eyes 05/15/2013    Idiopathic hypertension 05/15/2013    Astigmatism - Both Eyes 05/15/2013    Post-operative state - Left Eye 06/03/2013    Hyperlipemia     Hypertension     ED (erectile dysfunction) 08/28/2013    Colon polyp 08/28/2013    Right posterior capsular opacification 10/26/2016    Pseudophakia 10/26/2016    CKD (chronic kidney disease) stage 3, GFR 30-59 ml/min 09/07/2017    Chronic  gout 07/31/2018    Primary osteoarthritis of both knees 05/09/2019    Decreased range of motion of neck 12/12/2019    Segmental dysfunction of cervical region 12/12/2019    Abnormal posture 12/12/2019    Neck stiffness 12/12/2019    Chronic cough 12/03/2021    Consolidation of left upper lobe of lung 12/20/2021    Elevated hemidiaphragm 12/20/2021    Sleep-related breathing disorder      Resolved Ambulatory Problems     Diagnosis Date Noted    Aftercataract - Both Eyes 05/09/2013    After-cataract, unspecified - Right Eye 05/15/2013    Smell disorder 01/26/2016    QUINTEROS (dyspnea on exertion) 09/07/2017     Past Medical History:   Diagnosis Date    Allergy     Arthritis 2019    Colon polyps     Disorder of kidney and ureter 2019    Posterior capsular opacification                 PAST SURGICAL HISTORY:    Past Surgical History:   Procedure Laterality Date    CATARACT EXTRACTION      both eyes    COLONOSCOPY N/A 9/27/2016    Procedure: COLONOSCOPY;  Surgeon: Jan Argueta MD;  Location: [address];  Service: Endoscopy;  Laterality: N/A;    EYE SURGERY      HERNIA REPAIR      WISDOM TOOTH EXTRACTION      YAG      Left Eye         FAMILY HISTORY:                Family History   Problem Relation Age of Onset    Hypertension Father     Cataracts Father     Prostate cancer Paternal Uncle         prostate    Amblyopia Neg Hx     Blindness Neg Hx     Glaucoma Neg Hx     Macular degeneration Neg Hx     Retinal detachment Neg Hx     Strabismus Neg Hx        SOCIAL HISTORY:          Tobacco:   Social History     Tobacco Use   Smoking Status Never Smoker   Smokeless Tobacco Never Used       alcohol use:    Social History     Substance and Sexual Activity   Alcohol Use Yes    Alcohol/week: 6.0 standard drinks    Types: 4 Glasses of wine, 1 Shots of liquor, 1 Standard drinks or equivalent per week    Comment: socially                   ALLERGIES:    Review of patient's allergies indicates:    Allergen Reactions    V-cillin k        CURRENT MEDICATIONS:    Current Outpatient Medications   Medication Sig Dispense Refill    acetic acid-hydrocortisone (VOSOL-HC) otic solution 2-4 drops to affected ear twice a day 10 mL 2    albuterol (PROVENTIL/VENTOLIN HFA) 90 mcg/actuation inhaler Inhale 2 puffs into the lungs every 6 (six) hours as needed (cough). 8.5 g 2    ascorbic acid (VITAMIN C) 1000 MG tablet Take 1,000 mg by mouth once daily.      aspirin 81 mg Tab Take by mouth. 1 Tablet Oral Every day.  Last taken 3/25/11      atorvastatin (LIPITOR) 40 MG tablet Take 1 tablet (40 mg total) by mouth once daily. 90 tablet 2    fish oil-omega-3 fatty acids 300-1,000 mg capsule Take 2 g by mouth once daily.      fluticasone propionate (FLONASE) 50 mcg/actuation nasal spray USE 1 SPRAY BY EACH NARE ROUTE ONCE DAILY. 48 mL 5    hydroCHLOROthiazide (MICROZIDE) 12.5 mg capsule Take 1 capsule (12.5 mg total) by mouth once daily. 90 capsule 3    losartan (COZAAR) 100 MG tablet Take 1 tablet (100 mg total) by mouth once daily. 90 tablet 3    multivitamin capsule Take 1 capsule by mouth once daily.      PREVIDENT 5000 SENSITIVE 1.1-5 % Pste   2    tiZANidine (ZANAFLEX) 4 MG tablet TAKE 1 TABLET BY MOUTH EVERY 8 HOURS AS NEEDED 30 tablet 0     No current facility-administered medications for this visit.                  REVIEW OF SYSTEMS:   Review of Systems   Constitutional: Negative for fever, chills, weight loss, weight gain, activity change, appetite change, fatigue and night sweats.   HENT: Positive for congestion (flonase daily).    Eyes: Negative.    Respiratory: Positive for snoring, cough (minimal , once yesterrday greenish yellow), dyspnea on extertion ([place], 1st lap breathing hard, wife reports when move in house) and use of rescue inhaler (proair in am and nighhtime, no obvious improvement ). Negative for sputum production (loud per wife), chest tightness, wheezing, orthopnea  and previous hospitialization due to pulmonary problems.         Symptoms mostly night and early in am  Water loosen clear minimal  Daily-   Trial prilosec    After 1st lap 3 miles breathing improves   Cardiovascular: Positive for leg swelling. Negative for chest pain and palpitations.        Chest discomfort, feelimng something upper chest, tender and sore from cough   Genitourinary: Negative.    Endocrine: endocrine negative   Musculoskeletal: Negative for gait problem.   Skin: Negative.    Gastrointestinal: Negative for acid reflux.   Neurological: Negative.    Psychiatric/Behavioral: Negative for sleep disturbance.        PHYSICAL EXAM:  Vitals:    12/20/21 1145   BP: (!) 147/88   Pulse: 66   Temp: 97.3 °F (36.3 °C)   SpO2: 98%   Weight: 115.6 kg (254 lb 13.6 oz)   Height: 6' (1.829 m)   PainSc: 0-No pain     Body mass index is 34.56 kg/m².   Physical Exam   Constitutional: He is oriented to person, place, and time. He appears well-developed. No distress. He is obese.   HENT:   Head: Normocephalic.   Mouth/Throat: Mallampati Score: I.   Cardiovascular: Normal rate, regular rhythm and normal heart sounds.   Pulmonary/Chest: Normal expansion, effort normal and breath sounds normal.   Musculoskeletal:         General: Edema (hair loss) present.      Cervical back: Neck supple.   Neurological: He is alert and oriented to person, place, and time. Gait normal.   Skin: Skin is warm. He is not diaphoretic.   Psychiatric: He has a normal mood and affect. His behavior is normal. Judgment and thought content normal.         Lab Results   Component Value Date    TSH 1.770 05/15/2018      Lab Results   Component Value Date    WBC 7.40 02/09/2017    HGB 14.3 02/09/2017    HCT 44.1 02/09/2017    MCV 84 02/09/2017     02/09/2017     BMP  Lab Results   Component Value Date     09/30/2021    K 4.3 09/30/2021     09/30/2021    CO2 26 09/30/2021    BUN 16 09/30/2021    CREATININE 1.4 09/30/2021    CALCIUM 9.5  09/30/2021    ANIONGAP 13 09/30/2021    ESTGFRAFRICA 57.6 (A) 09/30/2021    EGFRNONAA 49.8 (A) 09/30/2021     No results found for: LABA1C, HGBA1C     ASSESSMENT    ICD-10-CM ICD-9-CM    1. Consolidation of left upper lobe of lung  J18.1 481 doxycycline (VIBRAMYCIN) 100 MG Cap      CT Chest Without Contrast   2. Elevated hemidiaphragm  J98.6 519.4 FL Fluoro of Diaphragm   3. Sleep-related breathing disorder  G47.30 780.59 Home Sleep Studies       PLAN:    Problem List Items Addressed This Visit        Unprioritized    Consolidation of left upper lobe of lung - Primary    Overview     tx empirically for infectious pna hx walking pna per pt , repeat ct in 2 months         Relevant Orders    CT Chest Without Contrast    Elevated hemidiaphragm    Overview     Sniff to r/o paralysis         Relevant Orders    FL Fluoro of Diaphragm (Completed)    Sleep-related breathing disorder    Overview     Pt with symptoms snoring, breathing problems mostly at night and in am. Recommend sleep eval         Relevant Orders    Home Sleep Studies (Completed)            Patient will Follow up in about 2 months (around 2/20/2022) for contact for return if test abnormal sniff otherwise 2 months after CT .

## 2021-12-27 ENCOUNTER — HOSPITAL ENCOUNTER (OUTPATIENT)
Dept: RADIOLOGY | Facility: HOSPITAL | Age: 71
Discharge: HOME | End: 2021-12-27
Attending: NURSE PRACTITIONER
Payer: MEDICARE

## 2021-12-27 DIAGNOSIS — J98.6 ELEVATED HEMIDIAPHRAGM: ICD-10-CM

## 2021-12-27 PROCEDURE — 76000 FLUOROSCOPY <1 HR PHYS/QHP: CPT | Mod: 26,,, | Performed by: RADIOLOGY

## 2021-12-27 PROCEDURE — 76000 FLUOROSCOPY <1 HR PHYS/QHP: CPT | Mod: TC

## 2021-12-27 PROCEDURE — 76000 FL FLUORO OF DIAPHRAGM: ICD-10-PCS | Mod: 26,,, | Performed by: RADIOLOGY

## 2021-12-28 ENCOUNTER — TELEPHONE (OUTPATIENT)
Dept: SLEEP MEDICINE | Facility: OTHER | Age: 71
End: 2021-12-28
Payer: MEDICARE

## 2021-12-29 ENCOUNTER — HOSPITAL ENCOUNTER (OUTPATIENT)
Dept: SLEEP MEDICINE | Facility: OTHER | Age: 71
Discharge: HOME | End: 2021-12-29
Attending: NURSE PRACTITIONER
Payer: MEDICARE

## 2021-12-29 DIAGNOSIS — G47.30 SLEEP-RELATED BREATHING DISORDER: ICD-10-CM

## 2021-12-29 PROCEDURE — 95800 SLP STDY UNATTENDED: CPT

## 2021-12-29 PROCEDURE — 95806 PR SLEEP STUDY, UNATTENDED, SIMUL RECORD HR/O2 SAT/RESP FLOW/RESP EFFT: ICD-10-PCS | Mod: 26,52,, | Performed by: INTERNAL MEDICINE

## 2021-12-29 PROCEDURE — 95806 SLEEP STUDY UNATT&RESP EFFT: CPT | Mod: 26,52,, | Performed by: INTERNAL MEDICINE

## 2022-01-03 NOTE — PROCEDURES
"Dear Provider,     You have ordered sleep LAB services to perform the sleep study for Julio Bernardo.  The sleep study that you ordered is complete.      Please find Sleep Study result in "Chart Review" under the "Media tab."      As the ordering provider, you are responsible for reviewing the results and implementing a treatment plan with your patient.    If you need a Sleep Medicine provider to explain the sleep study findings and arrange treatment for the patient, please refer patient for consultation to our Sleep Clinic via [facility] with Ambulatory Consult Sleep.    To do that please place an order for an  "Ambulatory Consult Sleep" - it will go to our clinic work queue for our Medical Assistant to contact the patient for an appointment.     For any questions, please contact our clinic staff at [phone] to talk to clinical staff.   "

## 2022-01-11 NOTE — PROGRESS NOTES
Please call the patient  and let them know their test results are back. Please call the patient to schedule a follow up appointment to discuss results.

## 2022-01-12 ENCOUNTER — TELEPHONE (OUTPATIENT)
Dept: PULMONOLOGY | Facility: CLINIC | Age: 72
End: 2022-01-12
Payer: MEDICARE

## 2022-01-12 NOTE — TELEPHONE ENCOUNTER
Spoke with patient scheduled an appointment to see provider.    LEE Nair                    ----- Message from Dilcia Torres NP sent at 1/11/2022 11:16 AM CST -----  Please call the patient  and let them know their test results are back. Please call the patient to schedule a follow up appointment to discuss results.

## 2022-01-14 ENCOUNTER — HOSPITAL ENCOUNTER (OUTPATIENT)
Dept: RADIOLOGY | Facility: HOSPITAL | Age: 72
Discharge: HOME | End: 2022-01-14
Attending: NURSE PRACTITIONER
Payer: MEDICARE

## 2022-01-14 ENCOUNTER — TELEPHONE (OUTPATIENT)
Dept: PULMONOLOGY | Facility: CLINIC | Age: 72
End: 2022-01-14
Payer: MEDICARE

## 2022-01-14 DIAGNOSIS — J18.1 CONSOLIDATION OF LEFT UPPER LOBE OF LUNG: ICD-10-CM

## 2022-01-14 DIAGNOSIS — J98.4 CAVITARY LESION OF LUNG: Primary | ICD-10-CM

## 2022-01-14 PROCEDURE — 71250 CT THORAX DX C-: CPT | Mod: TC

## 2022-01-14 PROCEDURE — 71250 CT THORAX DX C-: CPT | Mod: 26,,, | Performed by: RADIOLOGY

## 2022-01-14 PROCEDURE — 71250 CT CHEST WITHOUT CONTRAST: ICD-10-PCS | Mod: 26,,, | Performed by: RADIOLOGY

## 2022-01-14 NOTE — TELEPHONE ENCOUNTER
Spoke with patient he will have blood work done.                ----- Message from Korey Haq MA sent at 1/14/2022  1:24 PM CST -----  Please arrange for pt to obtain gold test. Thank you-Dilcia

## 2022-01-18 ENCOUNTER — LAB VISIT (OUTPATIENT)
Dept: LAB | Facility: HOSPITAL | Age: 72
End: 2022-01-18
Attending: INTERNAL MEDICINE
Payer: MEDICARE

## 2022-01-18 DIAGNOSIS — J98.4 CAVITARY LESION OF LUNG: ICD-10-CM

## 2022-01-18 PROCEDURE — 36415 COLL VENOUS BLD VENIPUNCTURE: CPT | Mod: PN | Performed by: NURSE PRACTITIONER

## 2022-01-18 PROCEDURE — 86480 TB TEST CELL IMMUN MEASURE: CPT | Performed by: NURSE PRACTITIONER

## 2022-01-20 LAB
GAMMA INTERFERON BACKGROUND BLD IA-ACNC: 0.03 IU/ML
M TB IFN-G CD4+ BCKGRND COR BLD-ACNC: 0.02 IU/ML
MITOGEN IGNF BCKGRD COR BLD-ACNC: >10 IU/ML
TB GOLD PLUS: NEGATIVE
TB2 - NIL: 0.03 IU/ML

## 2022-02-14 ENCOUNTER — OFFICE VISIT (OUTPATIENT)
Dept: PULMONOLOGY | Facility: CLINIC | Age: 72
End: 2022-02-14
Payer: MEDICARE

## 2022-02-14 DIAGNOSIS — G47.33 OSA (OBSTRUCTIVE SLEEP APNEA): ICD-10-CM

## 2022-02-14 DIAGNOSIS — J98.4 CAVITARY LESION OF LUNG: Primary | ICD-10-CM

## 2022-02-14 DIAGNOSIS — R05.3 CHRONIC COUGH: ICD-10-CM

## 2022-02-14 DIAGNOSIS — R91.8 OTHER NONSPECIFIC ABNORMAL FINDING OF LUNG FIELD: ICD-10-CM

## 2022-02-14 PROCEDURE — 1160F RVW MEDS BY RX/DR IN RCRD: CPT | Mod: CPTII,95,, | Performed by: NURSE PRACTITIONER

## 2022-02-14 PROCEDURE — 1159F MED LIST DOCD IN RCRD: CPT | Mod: CPTII,95,, | Performed by: NURSE PRACTITIONER

## 2022-02-14 PROCEDURE — 99214 PR OFFICE/OUTPT VISIT, EST, LEVL IV, 30-39 MIN: ICD-10-PCS | Mod: 95,,, | Performed by: NURSE PRACTITIONER

## 2022-02-14 PROCEDURE — 1160F PR REVIEW ALL MEDS BY PRESCRIBER/CLIN PHARMACIST DOCUMENTED: ICD-10-PCS | Mod: CPTII,95,, | Performed by: NURSE PRACTITIONER

## 2022-02-14 PROCEDURE — 1159F PR MEDICATION LIST DOCUMENTED IN MEDICAL RECORD: ICD-10-PCS | Mod: CPTII,95,, | Performed by: NURSE PRACTITIONER

## 2022-02-14 PROCEDURE — 99214 OFFICE O/P EST MOD 30 MIN: CPT | Mod: 95,,, | Performed by: NURSE PRACTITIONER

## 2022-02-14 NOTE — PROGRESS NOTES
The patient location is: home office  The chief complaint leading to consultation is: left cavitary lesion    Visit type: audiovisual    Face to Face time with patient:20 min  30 minutes of total time spent on the encounter, which includes face to face time and non-face to face time preparing to see the patient (eg, review of tests), Obtaining and/or reviewing separately obtained history, Documenting clinical information in the electronic or other health record, Independently interpreting results (not separately reported) and communicating results to the patient/family/caregiver, or Care coordination (not separately reported).         Each patient to whom he or she provides medical services by telemedicine is:  (1) informed of the relationship between the physician and patient and the respective role of any other health care provider with respect to management of the patient; and (2) notified that he or she may decline to receive medical services by telemedicine and may withdraw from such care at any time.    Notes:     CHIEF COMPLAINT:    No chief complaint on file.      HISTORY OF PRESENT ILLNESS: Julio Bernardo is a 71 y.o. male with  has a past medical history of Allergy, Arthritis (2019), Colon polyps, Disorder of kidney and ureter (2019), Hyperlipemia, Hypertension, and Posterior capsular opacification. is here for follow up cavitary lesion on CT chest. Cough x 2 years and persistent. No improvement following doxycycline tx  Symptoms mostly when he lays down, allegra helps  Walks 2.5 miles without sob [place] park, symptoms improve as he moves more.Chest discomfort, describe as  feeling something upper chest. + tender and sore from cough    CT chest 12/15/2021: 1. Bronchial wall thickening and multiple foci of mucoid impaction in the left lower lobe, as well as bronchogenic consolidation in the left upper lobe.  Suspect medium and small airway disease, likely infectious bronchitis.  Follow-up low dose chest CT  recommended to ensure stability vs. resolution.  2. Left hemidiaphragm elevation and left lung base volume loss, increased compared to 10/01/2021 CXR.  No CT findings of mass involving the phrenic nerve.  3. Scattered bilateral pulmonary micronodules.  These could also be re-evaluated at follow-up chest CT in 12 months.    CT chest 1/14/2022:Cavitary left upper lobe lesion, unchanged in size and appearance when compared to 12/15/2021.  The appearance is nonspecific and could be secondary to infection or malignancy.  Continued close imaging follow-up, PET-CT, and/or tissue sampling could be considered for further evaluation.    Sniff test: normal  pft 12/15/2021: Spirometry is normal. Spirometry is improved following bronchodilator administration. Lung volumes show mild restriction is  present. DLCO is normal.  Walk 98/95/97  HST 12/29/2021: AHI 34  AFB cultures pending results  Gold test negative        PAST MEDICAL HISTORY:    Active Ambulatory Problems     Diagnosis Date Noted    After-cataract, obscuring vision - Left Eye 05/15/2013    Vitreous detachment - Both Eyes 05/15/2013    Idiopathic hypertension 05/15/2013    Astigmatism - Both Eyes 05/15/2013    Post-operative state - Left Eye 06/03/2013    Hyperlipemia     Hypertension     ED (erectile dysfunction) 08/28/2013    Colon polyp 08/28/2013    Right posterior capsular opacification 10/26/2016    Pseudophakia 10/26/2016    CKD (chronic kidney disease) stage 3, GFR 30-59 ml/min 09/07/2017    Chronic gout 07/31/2018    Primary osteoarthritis of both knees 05/09/2019    Decreased range of motion of neck 12/12/2019    Segmental dysfunction of cervical region 12/12/2019    Abnormal posture 12/12/2019    Neck stiffness 12/12/2019    Chronic cough 12/03/2021    Cavitary lesion of lung 12/20/2021    Elevated hemidiaphragm 12/20/2021    SUSANNAH (obstructive sleep apnea)      Resolved Ambulatory Problems     Diagnosis Date Noted    Aftercataract -  Both Eyes 05/09/2013    After-cataract, unspecified - Right Eye 05/15/2013    Smell disorder 01/26/2016    QUINTEROS (dyspnea on exertion) 09/07/2017     Past Medical History:   Diagnosis Date    Allergy     Arthritis 2019    Colon polyps     Disorder of kidney and ureter 2019    Posterior capsular opacification                 PAST SURGICAL HISTORY:    Past Surgical History:   Procedure Laterality Date    CATARACT EXTRACTION      both eyes    COLONOSCOPY N/A 9/27/2016    Procedure: COLONOSCOPY;  Surgeon: Jan Argueta MD;  Location: [address];  Service: Endoscopy;  Laterality: N/A;    EYE SURGERY      HERNIA REPAIR      WISDOM TOOTH EXTRACTION      YAG      Left Eye         FAMILY HISTORY:                Family History   Problem Relation Age of Onset    Hypertension Father     Cataracts Father     Prostate cancer Paternal Uncle         prostate    Amblyopia Neg Hx     Blindness Neg Hx     Glaucoma Neg Hx     Macular degeneration Neg Hx     Retinal detachment Neg Hx     Strabismus Neg Hx        SOCIAL HISTORY:          Tobacco:   Social History     Tobacco Use   Smoking Status Never Smoker   Smokeless Tobacco Never Used       alcohol use:    Social History     Substance and Sexual Activity   Alcohol Use Yes    Alcohol/week: 6.0 standard drinks    Types: 4 Glasses of wine, 1 Shots of liquor, 1 Standard drinks or equivalent per week    Comment: socially                   ALLERGIES:    Review of patient's allergies indicates:   Allergen Reactions    V-cillin k        CURRENT MEDICATIONS:    Current Outpatient Medications   Medication Sig Dispense Refill    fexofenadine HCl (ALLEGRA ORAL) Take by mouth.      acetic acid-hydrocortisone (VOSOL-HC) otic solution 2-4 drops to affected ear twice a day 10 mL 2    albuterol (PROVENTIL/VENTOLIN HFA) 90 mcg/actuation inhaler Inhale 2 puffs into the lungs every 6 (six) hours as needed (cough). 8.5 g 2    ascorbic acid (VITAMIN C) 1000 MG tablet Take  1,000 mg by mouth once daily.      aspirin 81 mg Tab Take by mouth. 1 Tablet Oral Every day.  Last taken 3/25/11      atorvastatin (LIPITOR) 40 MG tablet Take 1 tablet (40 mg total) by mouth once daily. 90 tablet 2    fish oil-omega-3 fatty acids 300-1,000 mg capsule Take 2 g by mouth once daily.      fluticasone propionate (FLONASE) 50 mcg/actuation nasal spray USE 1 SPRAY BY EACH NARE ROUTE ONCE DAILY. 48 mL 5    hydroCHLOROthiazide (MICROZIDE) 12.5 mg capsule Take 1 capsule (12.5 mg total) by mouth once daily. 90 capsule 3    losartan (COZAAR) 100 MG tablet Take 1 tablet (100 mg total) by mouth once daily. 90 tablet 3    multivitamin capsule Take 1 capsule by mouth once daily.      PREVIDENT 5000 SENSITIVE 1.1-5 % Pste   2    tiZANidine (ZANAFLEX) 4 MG tablet TAKE 1 TABLET BY MOUTH EVERY 8 HOURS AS NEEDED 30 tablet 0     No current facility-administered medications for this visit.                  REVIEW OF SYSTEMS:   Review of Systems   Constitutional: Negative for fever, chills, weight loss, weight gain, activity change, appetite change, fatigue and night sweats.   HENT: Positive for congestion (flonase daily).    Eyes: Negative.    Respiratory: Positive for snoring (loud per wife), cough (minimal , once yesterrday greenish yellow), dyspnea on extertion ([place], 1st lap breathing hard, wife reports when move in house), use of rescue inhaler (proair in am and nighhtime, no obvious improvement ) and somnolence (230 afternoon computer sleepy). Negative for apnea, sputum production, chest tightness, wheezing, orthopnea and previous hospitialization due to pulmonary problems.         Symptoms mostly night and early in am  [place] loosen clear minimal  Daily-   Trial prilosec took for 1 week, didn't help with cough    After 1st lap 3 miles breathing improves    Irritation chest area in chest   Cardiovascular: Positive for leg swelling. Negative for chest pain and palpitations.    Genitourinary: Negative.    Endocrine: endocrine negative   Musculoskeletal: Negative for gait problem.   Skin: Negative.    Gastrointestinal: Negative for acid reflux.   Neurological: Negative.    Psychiatric/Behavioral: Negative for sleep disturbance (bed 1230 am - am).        Plan trip [place]  Aor[place] 3-10     PHYSICAL EXAM:  There were no vitals filed for this visit.  There is no height or weight on file to calculate BMI.   Physical Exam   Constitutional: He is oriented to person, place, and time. He appears well-developed. No distress. He is obese.   HENT:   Head: Normocephalic.   Pulmonary/Chest: No respiratory distress.   Neurological: He is alert and oriented to person, place, and time.   Skin: He is not diaphoretic.   Psychiatric: He has a normal mood and affect. His behavior is normal. Judgment and thought content normal.         Lab Results   Component Value Date    TSH 1.770 05/15/2018      Lab Results   Component Value Date    WBC 7.40 02/09/2017    HGB 14.3 02/09/2017    HCT 44.1 02/09/2017    MCV 84 02/09/2017     02/09/2017     BMP  Lab Results   Component Value Date     09/30/2021    K 4.3 09/30/2021     09/30/2021    CO2 26 09/30/2021    BUN 16 09/30/2021    CREATININE 1.4 09/30/2021    CALCIUM 9.5 09/30/2021    ANIONGAP 13 09/30/2021    ESTGFRAFRICA 57.6 (A) 09/30/2021    EGFRNONAA 49.8 (A) 09/30/2021     No results found for: LABA1C, HGBA1C     ASSESSMENT    ICD-10-CM ICD-9-CM    1. Cavitary lesion of lung  J98.4 518.89 NM PET CT Routine Skull to Mid Thigh   2. SUSANNAH (obstructive sleep apnea)  G47.33 327.23 CPAP FOR HOME USE   3. Chronic cough  R05.3 786.2    4. Other nonspecific abnormal finding of lung field   R91.8 793.19 NM PET CT Routine Skull to Mid Thigh       PLAN:    Problem List Items Addressed This Visit        Unprioritized    Cavitary lesion of lung - Primary    Overview     tx empirically for infectious pna hx walking pna per pt , repeat PET in 2 months            Relevant Orders    NM PET CT Routine Skull to Mid Thigh    Chronic cough    Overview     resume prilosec x 2 months  Imaging in 2 months  Wait culture           SUSANNAH (obstructive sleep apnea)    Overview     HST 12/29/2021: AHI 34    Pt with symptoms snoring, breathing problems mostly at night and in am.            Relevant Orders    CPAP FOR HOME USE      Other Visit Diagnoses     Other nonspecific abnormal finding of lung field         Relevant Orders    NM PET CT Routine Skull to Mid Thigh            Patient will Follow up in about 2 months (around 4/14/2022) for after PET.

## 2022-03-10 ENCOUNTER — PES CALL (OUTPATIENT)
Dept: ADMINISTRATIVE | Facility: CLINIC | Age: 72
End: 2022-03-10
Payer: MEDICARE

## 2022-03-31 ENCOUNTER — LAB VISIT (OUTPATIENT)
Dept: LAB | Facility: HOSPITAL | Age: 72
End: 2022-03-31
Attending: INTERNAL MEDICINE
Payer: MEDICARE

## 2022-03-31 DIAGNOSIS — E78.5 HYPERLIPIDEMIA, UNSPECIFIED HYPERLIPIDEMIA TYPE: ICD-10-CM

## 2022-03-31 DIAGNOSIS — Z12.5 SCREENING PSA (PROSTATE SPECIFIC ANTIGEN): ICD-10-CM

## 2022-03-31 LAB
ALBUMIN SERPL BCP-MCNC: 3.7 G/DL (ref 3.5–5.2)
ALP SERPL-CCNC: 98 U/L (ref 55–135)
ALT SERPL W/O P-5'-P-CCNC: 14 U/L (ref 10–44)
ANION GAP SERPL CALC-SCNC: 8 MMOL/L (ref 8–16)
AST SERPL-CCNC: 22 U/L (ref 10–40)
BILIRUB SERPL-MCNC: 0.8 MG/DL (ref 0.1–1)
BUN SERPL-MCNC: 16 MG/DL (ref 8–23)
CALCIUM SERPL-MCNC: 10 MG/DL (ref 8.7–10.5)
CHLORIDE SERPL-SCNC: 102 MMOL/L (ref 95–110)
CHOLEST SERPL-MCNC: 148 MG/DL (ref 120–199)
CHOLEST/HDLC SERPL: 2.8 {RATIO} (ref 2–5)
CO2 SERPL-SCNC: 31 MMOL/L (ref 23–29)
COMPLEXED PSA SERPL-MCNC: 1.2 NG/ML (ref 0–4)
CREAT SERPL-MCNC: 1.4 MG/DL (ref 0.5–1.4)
EST. GFR  (AFRICAN AMERICAN): 57.6 ML/MIN/1.73 M^2
EST. GFR  (NON AFRICAN AMERICAN): 49.8 ML/MIN/1.73 M^2
GLUCOSE SERPL-MCNC: 102 MG/DL (ref 70–110)
HDLC SERPL-MCNC: 52 MG/DL (ref 40–75)
HDLC SERPL: 35.1 % (ref 20–50)
LDLC SERPL CALC-MCNC: 78.2 MG/DL (ref 63–159)
NONHDLC SERPL-MCNC: 96 MG/DL
POTASSIUM SERPL-SCNC: 4.2 MMOL/L (ref 3.5–5.1)
PROT SERPL-MCNC: 6.9 G/DL (ref 6–8.4)
SODIUM SERPL-SCNC: 141 MMOL/L (ref 136–145)
TRIGL SERPL-MCNC: 89 MG/DL (ref 30–150)

## 2022-03-31 PROCEDURE — 80061 LIPID PANEL: CPT | Performed by: INTERNAL MEDICINE

## 2022-03-31 PROCEDURE — 80053 COMPREHEN METABOLIC PANEL: CPT | Performed by: INTERNAL MEDICINE

## 2022-03-31 PROCEDURE — 36415 COLL VENOUS BLD VENIPUNCTURE: CPT | Mod: PO | Performed by: INTERNAL MEDICINE

## 2022-03-31 PROCEDURE — 84153 ASSAY OF PSA TOTAL: CPT | Performed by: INTERNAL MEDICINE

## 2022-04-01 ENCOUNTER — OFFICE VISIT (OUTPATIENT)
Dept: INTERNAL MEDICINE | Facility: CLINIC | Age: 72
End: 2022-04-01
Payer: MEDICARE

## 2022-04-01 VITALS
WEIGHT: 254.88 LBS | DIASTOLIC BLOOD PRESSURE: 74 MMHG | SYSTOLIC BLOOD PRESSURE: 134 MMHG | BODY MASS INDEX: 33.78 KG/M2 | OXYGEN SATURATION: 97 % | HEART RATE: 65 BPM | HEIGHT: 73 IN

## 2022-04-01 DIAGNOSIS — R05.3 CHRONIC COUGH: ICD-10-CM

## 2022-04-01 DIAGNOSIS — K63.5 POLYP OF COLON, UNSPECIFIED PART OF COLON, UNSPECIFIED TYPE: ICD-10-CM

## 2022-04-01 DIAGNOSIS — I10 PRIMARY HYPERTENSION: ICD-10-CM

## 2022-04-01 DIAGNOSIS — J98.4 CAVITARY LESION OF LUNG: Primary | ICD-10-CM

## 2022-04-01 DIAGNOSIS — E78.5 HYPERLIPIDEMIA, UNSPECIFIED HYPERLIPIDEMIA TYPE: ICD-10-CM

## 2022-04-01 DIAGNOSIS — M1A.3710 CHRONIC GOUT OF RIGHT FOOT DUE TO RENAL IMPAIRMENT WITHOUT TOPHUS: ICD-10-CM

## 2022-04-01 DIAGNOSIS — N18.31 STAGE 3A CHRONIC KIDNEY DISEASE: ICD-10-CM

## 2022-04-01 PROCEDURE — 3075F PR MOST RECENT SYSTOLIC BLOOD PRESS GE 130-139MM HG: ICD-10-PCS | Mod: CPTII,S$GLB,, | Performed by: INTERNAL MEDICINE

## 2022-04-01 PROCEDURE — 3008F BODY MASS INDEX DOCD: CPT | Mod: CPTII,S$GLB,, | Performed by: INTERNAL MEDICINE

## 2022-04-01 PROCEDURE — 1159F MED LIST DOCD IN RCRD: CPT | Mod: CPTII,S$GLB,, | Performed by: INTERNAL MEDICINE

## 2022-04-01 PROCEDURE — 3288F FALL RISK ASSESSMENT DOCD: CPT | Mod: CPTII,S$GLB,, | Performed by: INTERNAL MEDICINE

## 2022-04-01 PROCEDURE — 3078F DIAST BP <80 MM HG: CPT | Mod: CPTII,S$GLB,, | Performed by: INTERNAL MEDICINE

## 2022-04-01 PROCEDURE — 99499 RISK ADDL DX/OHS AUDIT: ICD-10-PCS | Mod: S$GLB,,, | Performed by: INTERNAL MEDICINE

## 2022-04-01 PROCEDURE — 3078F PR MOST RECENT DIASTOLIC BLOOD PRESSURE < 80 MM HG: ICD-10-PCS | Mod: CPTII,S$GLB,, | Performed by: INTERNAL MEDICINE

## 2022-04-01 PROCEDURE — 3288F PR FALLS RISK ASSESSMENT DOCUMENTED: ICD-10-PCS | Mod: CPTII,S$GLB,, | Performed by: INTERNAL MEDICINE

## 2022-04-01 PROCEDURE — 99499 UNLISTED E&M SERVICE: CPT | Mod: S$GLB,,, | Performed by: INTERNAL MEDICINE

## 2022-04-01 PROCEDURE — 1101F PT FALLS ASSESS-DOCD LE1/YR: CPT | Mod: CPTII,S$GLB,, | Performed by: INTERNAL MEDICINE

## 2022-04-01 PROCEDURE — 3075F SYST BP GE 130 - 139MM HG: CPT | Mod: CPTII,S$GLB,, | Performed by: INTERNAL MEDICINE

## 2022-04-01 PROCEDURE — 1101F PR PT FALLS ASSESS DOC 0-1 FALLS W/OUT INJ PAST YR: ICD-10-PCS | Mod: CPTII,S$GLB,, | Performed by: INTERNAL MEDICINE

## 2022-04-01 PROCEDURE — 99999 PR PBB SHADOW E&M-EST. PATIENT-LVL III: ICD-10-PCS | Mod: PBBFAC,,, | Performed by: INTERNAL MEDICINE

## 2022-04-01 PROCEDURE — 1126F PR PAIN SEVERITY QUANTIFIED, NO PAIN PRESENT: ICD-10-PCS | Mod: CPTII,S$GLB,, | Performed by: INTERNAL MEDICINE

## 2022-04-01 PROCEDURE — 1159F PR MEDICATION LIST DOCUMENTED IN MEDICAL RECORD: ICD-10-PCS | Mod: CPTII,S$GLB,, | Performed by: INTERNAL MEDICINE

## 2022-04-01 PROCEDURE — 3008F PR BODY MASS INDEX (BMI) DOCUMENTED: ICD-10-PCS | Mod: CPTII,S$GLB,, | Performed by: INTERNAL MEDICINE

## 2022-04-01 PROCEDURE — 1126F AMNT PAIN NOTED NONE PRSNT: CPT | Mod: CPTII,S$GLB,, | Performed by: INTERNAL MEDICINE

## 2022-04-01 PROCEDURE — 99999 PR PBB SHADOW E&M-EST. PATIENT-LVL III: CPT | Mod: PBBFAC,,, | Performed by: INTERNAL MEDICINE

## 2022-04-01 PROCEDURE — 99214 PR OFFICE/OUTPT VISIT, EST, LEVL IV, 30-39 MIN: ICD-10-PCS | Mod: S$GLB,,, | Performed by: INTERNAL MEDICINE

## 2022-04-01 PROCEDURE — 99214 OFFICE O/P EST MOD 30 MIN: CPT | Mod: S$GLB,,, | Performed by: INTERNAL MEDICINE

## 2022-04-01 NOTE — PROGRESS NOTES
"  Mr. Bernardo is a 71  -year-old gentleman coming in today for followup of his   ongoing medical problems . I last saw him in 10/01/2021.      1. He has hypertension. He has been on losartan 100 mg a day-- and  amlodipine- bp is 134/74       2. He has hyperlipidemia. He is on atorvastatin. Cholesterol from this visit was reviewed---tc was 148, hdl 52  , LDL 78.2.          3. . Colon polyps: He had 2 colon polyps in 2007, and 1 polyps in 9/2013-- Next c-scope was 9/2016- 1 polyp was found-- next c-scope is due late in 2019.  He has not had one-- look like he had an order for one recently but it was completed and he has not scheduled it yet.  Bowels are normal if he eats a lot of fiber.          4. . Gout-- last attack in 3 moths ago after eating lamb for 2 straight nights/         5. Crhronic neck pain-- pretty stable he plans to get massage soon-- worse on the right vs the left.          ROS : Gen - no fatigue --   Eyes - no eye pain or visual changes  ENT - no hoarseness or sore throat  CV - No chest pain or SOB.  NO palpitations.  Pulm - no cough or wheezing  GI - no N/V/D   no dysuria or incontinence  MS - no joint pain or muscle pain  Skin - no rash, or c/o of skin lesions  Neuro - no HA, dizziness--- memory is doing well.   Heme - no abnormal bleeding or bruising  Endo - no polydipsia, or temperature changes  Psych - no anxiety or depression            /74 (BP Location: Right arm, Patient Position: Sitting, BP Method: Large (Manual))   Pulse 65   Ht 6' 0.5" (1.842 m)   Wt 115.6 kg (254 lb 13.6 oz)   SpO2 97%   BMI 34.09 kg/m²      PHYSICAL EXAM: A well-appearing gentleman.   NECK: Supple with no JVD. Thyroid is not enlarged.   Chest : CTA bilaterally   CARDIOVASCULAR: S1, S2, regular rate and rhythm without murmur, gallop, or  rub.   Lungs: Clear bilaterally   ABDOMEN: Soft, nontender.   LOWER EXTREMITIES: No edema  He has no cervical, axillary, or inguinal adenopathy.   Bicep reflexs: nomal and " equal bilaterally  Lower extremities: Normal muscle strength. No edema or cyanosis. TP/PT pulses+2 bilaterally.bilateral LE edema        ASSESSMENT:   1. Hyperlipidemia. continue the atorvastatin.   2. Hypertension.  losartan-- and  hctz. make sure he is drinking enough fluids--if he has more giout- may need to change to another medicine     3. Obesity. Spoke to him about diet and exercise. He actually seems to be exercising pretty well.     4. Colon polyp- c-scope due -- last one was in 2016 --Due since  9/2019.   5. Gout-  No  recent episode. - We reviewed the gout diet.  6  ckd stage 3 A-- stable-- will monitor-- avoid nsaids    7. Has a cavitary lung lesion following with pulmonary for this.  PET coing up.  He has PET and follow up with pulmonary later this month.      Set up for c-scope and follow up in 6 months

## 2022-04-04 ENCOUNTER — PATIENT MESSAGE (OUTPATIENT)
Dept: ENDOSCOPY | Facility: HOSPITAL | Age: 72
End: 2022-04-04
Payer: MEDICARE

## 2022-04-04 DIAGNOSIS — Z12.11 SPECIAL SCREENING FOR MALIGNANT NEOPLASMS, COLON: Primary | ICD-10-CM

## 2022-04-04 RX ORDER — POLYETHYLENE GLYCOL 3350, SODIUM SULFATE ANHYDROUS, SODIUM BICARBONATE, SODIUM CHLORIDE, POTASSIUM CHLORIDE 236; 22.74; 6.74; 5.86; 2.97 G/4L; G/4L; G/4L; G/4L; G/4L
4 POWDER, FOR SOLUTION ORAL ONCE
Qty: 4000 ML | Refills: 0 | Status: SHIPPED | OUTPATIENT
Start: 2022-04-04 | End: 2022-04-04

## 2022-04-13 ENCOUNTER — PATIENT MESSAGE (OUTPATIENT)
Dept: PULMONOLOGY | Facility: CLINIC | Age: 72
End: 2022-04-13
Payer: MEDICARE

## 2022-04-18 ENCOUNTER — HOSPITAL ENCOUNTER (OUTPATIENT)
Dept: RADIOLOGY | Facility: HOSPITAL | Age: 72
Discharge: HOME | End: 2022-04-18
Attending: NURSE PRACTITIONER
Payer: MEDICARE

## 2022-04-18 DIAGNOSIS — R91.8 OTHER NONSPECIFIC ABNORMAL FINDING OF LUNG FIELD: ICD-10-CM

## 2022-04-18 DIAGNOSIS — J98.4 CAVITARY LESION OF LUNG: ICD-10-CM

## 2022-04-18 LAB — POCT GLUCOSE: 105 MG/DL (ref 70–110)

## 2022-04-18 PROCEDURE — 78815 PET IMAGE W/CT SKULL-THIGH: CPT | Mod: 26,PS,, | Performed by: RADIOLOGY

## 2022-04-18 PROCEDURE — 25500020 PHARM REV CODE 255: Performed by: NURSE PRACTITIONER

## 2022-04-18 PROCEDURE — 78815 PET IMAGE W/CT SKULL-THIGH: CPT | Mod: PI,TC

## 2022-04-18 PROCEDURE — 78815 NM PET CT ROUTINE: ICD-10-PCS | Mod: 26,PS,, | Performed by: RADIOLOGY

## 2022-04-18 PROCEDURE — A9698 NON-RAD CONTRAST MATERIALNOC: HCPCS | Performed by: NURSE PRACTITIONER

## 2022-04-18 RX ADMIN — IOHEXOL 1000 ML: 9 SOLUTION ORAL at 10:04

## 2022-04-25 ENCOUNTER — OFFICE VISIT (OUTPATIENT)
Dept: PULMONOLOGY | Facility: CLINIC | Age: 72
End: 2022-04-25
Payer: MEDICARE

## 2022-04-25 VITALS
HEART RATE: 73 BPM | OXYGEN SATURATION: 97 % | BODY MASS INDEX: 33.84 KG/M2 | DIASTOLIC BLOOD PRESSURE: 75 MMHG | HEIGHT: 73 IN | SYSTOLIC BLOOD PRESSURE: 136 MMHG | WEIGHT: 255.31 LBS

## 2022-04-25 DIAGNOSIS — G47.33 OSA (OBSTRUCTIVE SLEEP APNEA): ICD-10-CM

## 2022-04-25 DIAGNOSIS — J98.4 CAVITARY LESION OF LUNG: Primary | ICD-10-CM

## 2022-04-25 DIAGNOSIS — J34.1 MUCOUS RETENTION CYST OF MAXILLARY SINUS: ICD-10-CM

## 2022-04-25 DIAGNOSIS — R43.9 DECREASED TASTE AND SMELL: ICD-10-CM

## 2022-04-25 DIAGNOSIS — R09.81 CHRONIC NASAL CONGESTION: ICD-10-CM

## 2022-04-25 DIAGNOSIS — R05.3 CHRONIC COUGH: ICD-10-CM

## 2022-04-25 DIAGNOSIS — J98.6 ELEVATED HEMIDIAPHRAGM: ICD-10-CM

## 2022-04-25 PROCEDURE — 99214 OFFICE O/P EST MOD 30 MIN: CPT | Mod: S$GLB,,, | Performed by: NURSE PRACTITIONER

## 2022-04-25 PROCEDURE — 3075F PR MOST RECENT SYSTOLIC BLOOD PRESS GE 130-139MM HG: ICD-10-PCS | Mod: CPTII,S$GLB,, | Performed by: NURSE PRACTITIONER

## 2022-04-25 PROCEDURE — 1159F PR MEDICATION LIST DOCUMENTED IN MEDICAL RECORD: ICD-10-PCS | Mod: CPTII,S$GLB,, | Performed by: NURSE PRACTITIONER

## 2022-04-25 PROCEDURE — 1125F PR PAIN SEVERITY QUANTIFIED, PAIN PRESENT: ICD-10-PCS | Mod: CPTII,S$GLB,, | Performed by: NURSE PRACTITIONER

## 2022-04-25 PROCEDURE — 1159F MED LIST DOCD IN RCRD: CPT | Mod: CPTII,S$GLB,, | Performed by: NURSE PRACTITIONER

## 2022-04-25 PROCEDURE — 4010F PR ACE/ARB THEARPY RXD/TAKEN: ICD-10-PCS | Mod: CPTII,S$GLB,, | Performed by: NURSE PRACTITIONER

## 2022-04-25 PROCEDURE — 3288F PR FALLS RISK ASSESSMENT DOCUMENTED: ICD-10-PCS | Mod: CPTII,S$GLB,, | Performed by: NURSE PRACTITIONER

## 2022-04-25 PROCEDURE — 99214 PR OFFICE/OUTPT VISIT, EST, LEVL IV, 30-39 MIN: ICD-10-PCS | Mod: S$GLB,,, | Performed by: NURSE PRACTITIONER

## 2022-04-25 PROCEDURE — 1101F PR PT FALLS ASSESS DOC 0-1 FALLS W/OUT INJ PAST YR: ICD-10-PCS | Mod: CPTII,S$GLB,, | Performed by: NURSE PRACTITIONER

## 2022-04-25 PROCEDURE — 3008F BODY MASS INDEX DOCD: CPT | Mod: CPTII,S$GLB,, | Performed by: NURSE PRACTITIONER

## 2022-04-25 PROCEDURE — 1101F PT FALLS ASSESS-DOCD LE1/YR: CPT | Mod: CPTII,S$GLB,, | Performed by: NURSE PRACTITIONER

## 2022-04-25 PROCEDURE — 3075F SYST BP GE 130 - 139MM HG: CPT | Mod: CPTII,S$GLB,, | Performed by: NURSE PRACTITIONER

## 2022-04-25 PROCEDURE — 99999 PR PBB SHADOW E&M-EST. PATIENT-LVL V: ICD-10-PCS | Mod: PBBFAC,,, | Performed by: NURSE PRACTITIONER

## 2022-04-25 PROCEDURE — 3078F DIAST BP <80 MM HG: CPT | Mod: CPTII,S$GLB,, | Performed by: NURSE PRACTITIONER

## 2022-04-25 PROCEDURE — 3288F FALL RISK ASSESSMENT DOCD: CPT | Mod: CPTII,S$GLB,, | Performed by: NURSE PRACTITIONER

## 2022-04-25 PROCEDURE — 3008F PR BODY MASS INDEX (BMI) DOCUMENTED: ICD-10-PCS | Mod: CPTII,S$GLB,, | Performed by: NURSE PRACTITIONER

## 2022-04-25 PROCEDURE — 1125F AMNT PAIN NOTED PAIN PRSNT: CPT | Mod: CPTII,S$GLB,, | Performed by: NURSE PRACTITIONER

## 2022-04-25 PROCEDURE — 4010F ACE/ARB THERAPY RXD/TAKEN: CPT | Mod: CPTII,S$GLB,, | Performed by: NURSE PRACTITIONER

## 2022-04-25 PROCEDURE — 3078F PR MOST RECENT DIASTOLIC BLOOD PRESSURE < 80 MM HG: ICD-10-PCS | Mod: CPTII,S$GLB,, | Performed by: NURSE PRACTITIONER

## 2022-04-25 PROCEDURE — 99999 PR PBB SHADOW E&M-EST. PATIENT-LVL V: CPT | Mod: PBBFAC,,, | Performed by: NURSE PRACTITIONER

## 2022-04-25 RX ORDER — OMEPRAZOLE 20 MG/1
20 CAPSULE, DELAYED RELEASE ORAL 2 TIMES DAILY
Qty: 180 CAPSULE | Refills: 1 | Status: SHIPPED | OUTPATIENT
Start: 2022-04-25 | End: 2023-06-21 | Stop reason: SDUPTHER

## 2022-04-25 NOTE — PROGRESS NOTES
Notes:     CHIEF COMPLAINT:    Chief Complaint   Patient presents with    Results       HISTORY OF PRESENT ILLNESS: Julio Bernardo is a 71 y.o. male nonsmoker with  has a past medical history of Allergy, Arthritis (2019), Colon polyps, Disorder of kidney and ureter (2019), Hyperlipemia, Hypertension, and Posterior capsular opacification. is here for follow up cavitary lesion on CT chest. Cough x 2 years and persistent. No improvement following doxycycline tx, +gain weight , sob improve with activity, initially sob burning sensation  Symptoms mostly when he lays down, Sleep sitting in chair  Sleep left side, allegra helps, improving  Walks 2.5 miles without sob [place] park, symptoms improve as he moves more.Chest discomfort, describe as  feeling something upper chest. + tender and sore from cough  Inhaler not helpful, tried premedicating QUINTEROS    CT chest 12/15/2021: 1. Bronchial wall thickening and multiple foci of mucoid impaction in the left lower lobe, as well as bronchogenic consolidation in the left upper lobe.  Suspect medium and small airway disease, likely infectious bronchitis.  Follow-up low dose chest CT recommended to ensure stability vs. resolution.  2. Left hemidiaphragm elevation and left lung base volume loss, increased compared to 10/01/2021 CXR.  No CT findings of mass involving the phrenic nerve.  3. Scattered bilateral pulmonary micronodules.  These could also be re-evaluated at follow-up chest CT in 12 months.    CT chest 1/14/2022:Cavitary left upper lobe lesion, unchanged in size and appearance when compared to 12/15/2021.  The appearance is nonspecific and could be secondary to infection or malignancy.  Continued close imaging follow-up, PET-CT, and/or tissue sampling could be considered for further evaluation.  PET 4/18/2022: Impression:     No FDG avid lesions concerning for malignancy.     Anatomically stable non-hypermetabolic centrally cavitary left upper lobe lesion, favoring  benignity.  If suspicion for malignancy remains, then recommend continued surveillance by dedicated chest CT alone i.e., without FDG PET.       Sniff test: normal  pft 12/15/2021: Spirometry is normal. Spirometry is improved following bronchodilator administration. Lung volumes show mild restriction is  present. DLCO is normal.  Walk 98/95/97  HST 12/29/2021: AHI 34  AFB cultures negative  Gold test negative        PAST MEDICAL HISTORY:    Active Ambulatory Problems     Diagnosis Date Noted    After-cataract, obscuring vision - Left Eye 05/15/2013    Vitreous detachment - Both Eyes 05/15/2013    Idiopathic hypertension 05/15/2013    Astigmatism - Both Eyes 05/15/2013    Post-operative state - Left Eye 06/03/2013    Hyperlipemia     Hypertension     ED (erectile dysfunction) 08/28/2013    Colon polyp 08/28/2013    Decreased taste and smell 01/26/2016    Right posterior capsular opacification 10/26/2016    Pseudophakia 10/26/2016    CKD (chronic kidney disease) stage 3, GFR 30-59 ml/min 09/07/2017    Chronic gout 07/31/2018    Primary osteoarthritis of both knees 05/09/2019    Decreased range of motion of neck 12/12/2019    Segmental dysfunction of cervical region 12/12/2019    Abnormal posture 12/12/2019    Neck stiffness 12/12/2019    Chronic cough 12/03/2021    Cavitary lesion of lung 12/20/2021    Elevated hemidiaphragm 12/20/2021    SUSANNAH (obstructive sleep apnea)     Mucous retention cyst of maxillary sinus 04/25/2022    Chronic nasal congestion 04/25/2022     Resolved Ambulatory Problems     Diagnosis Date Noted    Aftercataract - Both Eyes 05/09/2013    After-cataract, unspecified - Right Eye 05/15/2013    QUINTEROS (dyspnea on exertion) 09/07/2017     Past Medical History:   Diagnosis Date    Allergy     Arthritis 2019    Colon polyps     Disorder of kidney and ureter 2019    Posterior capsular opacification                 PAST SURGICAL HISTORY:    Past Surgical History:   Procedure  Laterality Date    CATARACT EXTRACTION      both eyes    COLONOSCOPY N/A 9/27/2016    Procedure: COLONOSCOPY;  Surgeon: Jan Argueta MD;  Location: [facility] ([address] [place] [address]);  Service: Endoscopy;  Laterality: N/A;    EYE SURGERY      HERNIA REPAIR      WISDOM TOOTH EXTRACTION      YAG      Left Eye         FAMILY HISTORY:                Family History   Problem Relation Age of Onset    Hypertension Father     Cataracts Father     Prostate cancer Paternal Uncle         prostate    Amblyopia Neg Hx     Blindness Neg Hx     Glaucoma Neg Hx     Macular degeneration Neg Hx     Retinal detachment Neg Hx     Strabismus Neg Hx        SOCIAL HISTORY:          Tobacco:   Social History     Tobacco Use   Smoking Status Never Smoker   Smokeless Tobacco Never Used       alcohol use:    Social History     Substance and Sexual Activity   Alcohol Use Yes    Alcohol/week: 6.0 standard drinks    Types: 4 Glasses of wine, 1 Shots of liquor, 1 Standard drinks or equivalent per week    Comment: socially                   ALLERGIES:    Review of patient's allergies indicates:   Allergen Reactions    V-cillin k        CURRENT MEDICATIONS:    Current Outpatient Medications   Medication Sig Dispense Refill    acetic acid-hydrocortisone (VOSOL-HC) otic solution 2-4 drops to affected ear twice a day 10 mL 2    albuterol (PROVENTIL/VENTOLIN HFA) 90 mcg/actuation inhaler Inhale 2 puffs into the lungs every 6 (six) hours as needed (cough). 8.5 g 2    ascorbic acid (VITAMIN C) 1000 MG tablet Take 1,000 mg by mouth once daily.      aspirin 81 mg Tab Take by mouth. 1 Tablet Oral Every day.  Last taken 3/25/11      atorvastatin (LIPITOR) 40 MG tablet Take 1 tablet (40 mg total) by mouth once daily. 90 tablet 2    fexofenadine HCl (ALLEGRA ORAL) Take by mouth.      fish oil-omega-3 fatty acids 300-1,000 mg capsule Take 2 g by mouth once daily.      fluticasone propionate (FLONASE) 50 mcg/actuation nasal spray USE 1 SPRAY BY EACH  NARE ROUTE ONCE DAILY. 48 mL 5    hydroCHLOROthiazide (MICROZIDE) 12.5 mg capsule Take 1 capsule (12.5 mg total) by mouth once daily. 90 capsule 3    losartan (COZAAR) 100 MG tablet Take 1 tablet (100 mg total) by mouth once daily. 90 tablet 3    multivitamin capsule Take 1 capsule by mouth once daily.      PREVIDENT 5000 SENSITIVE 1.1-5 % Pste   2    tiZANidine (ZANAFLEX) 4 MG tablet TAKE 1 TABLET BY MOUTH EVERY 8 HOURS AS NEEDED 30 tablet 0    omeprazole (PRILOSEC) 20 MG capsule Take 1 capsule (20 mg total) by mouth 2 (two) times daily. 180 capsule 1     No current facility-administered medications for this visit.                  REVIEW OF SYSTEMS:   Review of Systems   Constitutional: Negative for fever, chills, weight loss, weight gain, activity change, appetite change, fatigue and night sweats.   HENT: Positive for congestion (flonase daily).    Eyes: Negative.    Respiratory: Positive for snoring (loud per wife), cough (minimal , once yesterrday greenish yellow), dyspnea on extertion ([place], 1st lap breathing hard, wife reports when move in house), use of rescue inhaler (proair in am and nighhtime, no obvious improvement ) and somnolence (230 afternoon computer sleepy). Negative for apnea, sputum production, chest tightness, wheezing, orthopnea and previous hospitialization due to pulmonary problems.         Symptoms mostly night and early in am  [place] loosen clear minimal  Daily-   Trial prilosec took for 1 week, didn't help with cough    After 1st lap 3 miles breathing improves    Irritation chest area in chest   Cardiovascular: Positive for leg swelling. Negative for chest pain and palpitations.   Genitourinary: Negative.    Endocrine: endocrine negative   Musculoskeletal: Negative for gait problem.   Skin: Negative.    Gastrointestinal: Negative for acid reflux.   Neurological: Negative.    Psychiatric/Behavioral: Negative for sleep disturbance (bed 1230 am - am).  "    PHYSICAL EXAM:  Vitals:    04/25/22 1143   BP: 136/75   Pulse: 73   SpO2: 97%   Weight: 115.8 kg (255 lb 4.7 oz)   Height: 6' 0.5" (1.842 m)   PainSc:   7   PainLoc: Neck     Body mass index is 34.15 kg/m².   Physical Exam   Constitutional: He is oriented to person, place, and time. He appears well-developed. No distress. He is obese.   HENT:   Head: Normocephalic.   Pulmonary/Chest: No respiratory distress.   Neurological: He is alert and oriented to person, place, and time.   Skin: He is not diaphoretic.   Psychiatric: He has a normal mood and affect. His behavior is normal. Judgment and thought content normal.         Lab Results   Component Value Date    TSH 1.770 05/15/2018      Lab Results   Component Value Date    WBC 7.40 02/09/2017    HGB 14.3 02/09/2017    HCT 44.1 02/09/2017    MCV 84 02/09/2017     02/09/2017     BMP  Lab Results   Component Value Date     03/31/2022    K 4.2 03/31/2022     03/31/2022    CO2 31 (H) 03/31/2022    BUN 16 03/31/2022    CREATININE 1.4 03/31/2022    CALCIUM 10.0 03/31/2022    ANIONGAP 8 03/31/2022    ESTGFRAFRICA 57.6 (A) 03/31/2022    EGFRNONAA 49.8 (A) 03/31/2022     No results found for: LABA1C, HGBA1C     ASSESSMENT    ICD-10-CM ICD-9-CM    1. Cavitary lesion of lung  J98.4 518.89 Culture, Respiratory with Gram Stain   2. Chronic cough  R05.3 786.2 omeprazole (PRILOSEC) 20 MG capsule   3. SUSANNAH (obstructive sleep apnea)  G47.33 327.23    4. Mucous retention cyst of maxillary sinus  J34.1 478.19 Ambulatory referral/consult to ENT   5. Chronic nasal congestion  R09.81 478.19 Ambulatory referral/consult to ENT   6. Decreased taste and smell  R43.9 781.1 Ambulatory referral/consult to ENT   7. Elevated hemidiaphragm  J98.6 519.4        PLAN:    Problem List Items Addressed This Visit        Unprioritized    Cavitary lesion of lung - Primary    Overview     tx empirically for infectious pna hx walking pna, cultures negative  Stable non-hypermetabolic " lesion since 12/2021, minimal symptoms overall improving, normal pulmonary function  Will continue to monitor, next CT 12/2022           Relevant Orders    Culture, Respiratory with Gram Stain (Completed)    Chronic cough    Overview     Continue tx AR/GERD             Relevant Medications    omeprazole (PRILOSEC) 20 MG capsule    Chronic nasal congestion    Relevant Orders    Ambulatory referral/consult to ENT    Decreased taste and smell    Relevant Orders    Ambulatory referral/consult to ENT    Elevated hemidiaphragm    Overview     Sniff negative           Mucous retention cyst of maxillary sinus    Relevant Orders    Ambulatory referral/consult to ENT    SUSANNAH (obstructive sleep apnea)    Overview     HST 12/29/2021: AHI 34    Pt with symptoms snoring, breathing problems mostly at night and in am.   After discussing all options, patient agree to cpap.                   Patient will Follow up for 5-6 weeks following cpap usage, please bring cpap.

## 2022-04-25 NOTE — PATIENT INSTRUCTIONS
Neri Bernardo     Your home sleep study confirmed sleep apnea  AHI 34 meaning you are having 34 apneas per hour. I recommend continuous positive air pressure to treat this. I will place the order. Please schedule a follow up appointment with myself  5-6 week after using the cpap to assess treatment effectiveness and make adjustments if needed. Bring your machine.     Someone from the medical equipment department will be contacting you to set you up with the CPAP.  If not please,  contact them to check on the status of your order at [phone] option 2 then option 2    Plan:   1. Start auto PAP therapy(the order will go to medical equipment and they will contact you after it gets processed through insurance which takes approximately 8-12 weeks)   2. There is a compliance requirement on machine. The requirement  is minimum 4 hours or more 70% nights (22/30 days) x 1 year or until machine paid off otherwise you risk not having the machine cover by your insurance company.   3. Pick a comfortable mask; but should you have problems with the mask, [facility] (medical equipment) has a 30 day mask exchangepolicy so exchange any mask within 30 days if the mask is uncomfortable. You will need to contact medical equipment to schedule an appointment with them to get another mask fitting. DME [id number] option 2 then option 2  4. We do not actively monitor you.  Please schedule a follow up appointment after using your cpap x 5-6 weeks to determine treatment effectiveness and address problems. This is also a requirement by some insurance in order to meet compliance.     The potential ramifications of untreated sleep apnea  could include hypoxia, daytime sleepiness, dementia, cognitive impairment, hypertension, heart disease and/or stroke. Potential treatment options, which could include weight loss, body positioning, oral appliances (OA), continuous positive airway pressure (CPAP), or referral for surgical consideration.  CPAP is the most effective and least invasive treatment and I would recommend this as a first line treatment.    Behavior modification which includes losing weight, exercising, changing the sleep position, abstaining from alcohol, and avoiding certain medications    Please  abstain from driving should you feel sleepy or drowsy      Please contact us if you have questions, persistent problems or concerns.    Thank you,    Dilcia EM, [facility]  [phone]

## 2022-05-03 ENCOUNTER — PATIENT MESSAGE (OUTPATIENT)
Dept: ENDOSCOPY | Facility: HOSPITAL | Age: 72
End: 2022-05-03
Payer: MEDICARE

## 2022-05-03 ENCOUNTER — LAB VISIT (OUTPATIENT)
Dept: LAB | Facility: HOSPITAL | Age: 72
End: 2022-05-03
Attending: NURSE PRACTITIONER
Payer: MEDICARE

## 2022-05-03 DIAGNOSIS — J98.4 CAVITARY LESION OF LUNG: ICD-10-CM

## 2022-05-03 PROCEDURE — 87205 SMEAR GRAM STAIN: CPT | Performed by: NURSE PRACTITIONER

## 2022-05-03 PROCEDURE — 87070 CULTURE OTHR SPECIMN AEROBIC: CPT | Performed by: NURSE PRACTITIONER

## 2022-05-05 LAB
BACTERIA SPEC AEROBE CULT: NORMAL
BACTERIA SPEC AEROBE CULT: NORMAL
GRAM STN SPEC: NORMAL

## 2022-05-10 ENCOUNTER — PATIENT MESSAGE (OUTPATIENT)
Dept: PULMONOLOGY | Facility: CLINIC | Age: 72
End: 2022-05-10
Payer: MEDICARE

## 2022-05-19 ENCOUNTER — ANESTHESIA (OUTPATIENT)
Dept: ENDOSCOPY | Facility: HOSPITAL | Age: 72
End: 2022-05-19
Payer: MEDICARE

## 2022-05-19 ENCOUNTER — HOSPITAL ENCOUNTER (OUTPATIENT)
Facility: HOSPITAL | Age: 72
Discharge: HOME | End: 2022-05-19
Attending: INTERNAL MEDICINE | Admitting: INTERNAL MEDICINE
Payer: MEDICARE

## 2022-05-19 ENCOUNTER — ANESTHESIA EVENT (OUTPATIENT)
Dept: ENDOSCOPY | Facility: HOSPITAL | Age: 72
End: 2022-05-19
Payer: MEDICARE

## 2022-05-19 VITALS
RESPIRATION RATE: 18 BRPM | BODY MASS INDEX: 32.47 KG/M2 | TEMPERATURE: 98 F | OXYGEN SATURATION: 97 % | WEIGHT: 245 LBS | HEART RATE: 81 BPM | DIASTOLIC BLOOD PRESSURE: 86 MMHG | SYSTOLIC BLOOD PRESSURE: 162 MMHG | HEIGHT: 73 IN

## 2022-05-19 DIAGNOSIS — Z86.010 HISTORY OF COLON POLYPS: ICD-10-CM

## 2022-05-19 PROCEDURE — 37000008 HC ANESTHESIA 1ST 15 MINUTES: Performed by: INTERNAL MEDICINE

## 2022-05-19 PROCEDURE — 45385 COLONOSCOPY W/LESION REMOVAL: CPT | Mod: PT,,, | Performed by: INTERNAL MEDICINE

## 2022-05-19 PROCEDURE — E9220 PRA ENDO ANESTHESIA: ICD-10-PCS | Mod: PT,,, | Performed by: NURSE ANESTHETIST, CERTIFIED REGISTERED

## 2022-05-19 PROCEDURE — 88305 TISSUE EXAM BY PATHOLOGIST: CPT | Performed by: PATHOLOGY

## 2022-05-19 PROCEDURE — 37000009 HC ANESTHESIA EA ADD 15 MINS: Performed by: INTERNAL MEDICINE

## 2022-05-19 PROCEDURE — 88305 TISSUE EXAM BY PATHOLOGIST: ICD-10-PCS | Mod: 26,,, | Performed by: PATHOLOGY

## 2022-05-19 PROCEDURE — 25000003 PHARM REV CODE 250: Performed by: INTERNAL MEDICINE

## 2022-05-19 PROCEDURE — 63600175 PHARM REV CODE 636 W HCPCS: Performed by: NURSE ANESTHETIST, CERTIFIED REGISTERED

## 2022-05-19 PROCEDURE — 25000003 PHARM REV CODE 250: Performed by: NURSE ANESTHETIST, CERTIFIED REGISTERED

## 2022-05-19 PROCEDURE — 88305 TISSUE EXAM BY PATHOLOGIST: CPT | Mod: 26,,, | Performed by: PATHOLOGY

## 2022-05-19 PROCEDURE — 27201089 HC SNARE, DISP (ANY): Performed by: INTERNAL MEDICINE

## 2022-05-19 PROCEDURE — E9220 PRA ENDO ANESTHESIA: HCPCS | Mod: PT,,, | Performed by: NURSE ANESTHETIST, CERTIFIED REGISTERED

## 2022-05-19 PROCEDURE — 45385 COLONOSCOPY W/LESION REMOVAL: CPT | Performed by: INTERNAL MEDICINE

## 2022-05-19 PROCEDURE — 45385 PR COLONOSCOPY,REMV LESN,SNARE: ICD-10-PCS | Mod: PT,,, | Performed by: INTERNAL MEDICINE

## 2022-05-19 RX ORDER — SODIUM CHLORIDE 9 MG/ML
INJECTION, SOLUTION INTRAVENOUS CONTINUOUS
Status: DISCONTINUED | OUTPATIENT
Start: 2022-05-19 | End: 2022-05-19 | Stop reason: HOSPADM

## 2022-05-19 RX ORDER — PROPOFOL 10 MG/ML
VIAL (ML) INTRAVENOUS
Status: DISCONTINUED | OUTPATIENT
Start: 2022-05-19 | End: 2022-05-19

## 2022-05-19 RX ORDER — LIDOCAINE HYDROCHLORIDE 20 MG/ML
INJECTION INTRAVENOUS
Status: DISCONTINUED | OUTPATIENT
Start: 2022-05-19 | End: 2022-05-19

## 2022-05-19 RX ORDER — PROPOFOL 10 MG/ML
VIAL (ML) INTRAVENOUS CONTINUOUS PRN
Status: DISCONTINUED | OUTPATIENT
Start: 2022-05-19 | End: 2022-05-19

## 2022-05-19 RX ADMIN — SODIUM CHLORIDE: 0.9 INJECTION, SOLUTION INTRAVENOUS at 08:05

## 2022-05-19 RX ADMIN — Medication 150 MCG/KG/MIN: at 10:05

## 2022-05-19 RX ADMIN — LIDOCAINE HYDROCHLORIDE 50 MG: 20 INJECTION, SOLUTION INTRAVENOUS at 10:05

## 2022-05-19 RX ADMIN — PROPOFOL 100 MG: 10 INJECTION, EMULSION INTRAVENOUS at 10:05

## 2022-05-19 NOTE — H&P
Short Stay Endoscopy History and Physical    PCP - Ryan Loera MD    Procedure - Colonoscopy  ASA - per anesthesia  Mallampati - per anesthesia  History of Anesthesia problems - no  Family history Anesthesia problems - no   Plan of anesthesia - General    HPI:  This is a 71 y.o. male here for evaluation of : personal history of colon polyps      ROS:  Constitutional: No fevers, chills, No weight loss  CV: No chest pain  Pulm: No cough, No shortness of breath  GI: see HPI  Derm: No rash    Medical History:  has a past medical history of Allergy, Arthritis (2019), Colon polyps, Disorder of kidney and ureter (2019), Hyperlipemia, Hypertension, and Posterior capsular opacification.    Surgical History:  has a past surgical history that includes Hernia repair; [place] tooth extraction; Cataract extraction; YAG; Eye surgery; and Colonoscopy (N/A, 9/27/2016).    Family History: family history includes Cataracts in his father; Hypertension in his father; Prostate cancer in his paternal uncle.. Otherwise no colon cancer, inflammatory bowel disease, or GI malignancies.    Social History:  reports that he has never smoked. He has never used smokeless tobacco. He reports current alcohol use of about 6.0 standard drinks of alcohol per week. He reports that he does not use drugs.    Review of patient's allergies indicates:   Allergen Reactions    V-cillin k        Medications:   Medications Prior to Admission   Medication Sig Dispense Refill Last Dose    acetic acid-hydrocortisone (VOSOL-HC) otic solution 2-4 drops to affected ear twice a day 10 mL 2     albuterol (PROVENTIL/VENTOLIN HFA) 90 mcg/actuation inhaler Inhale 2 puffs into the lungs every 6 (six) hours as needed (cough). 8.5 g 2     ascorbic acid (VITAMIN C) 1000 MG tablet Take 1,000 mg by mouth once daily.       aspirin 81 mg Tab Take by mouth. 1 Tablet Oral Every day.  Last taken 3/25/11       atorvastatin (LIPITOR) 40 MG tablet Take 1 tablet (40 mg total)  by mouth once daily. 90 tablet 2     fexofenadine HCl (ALLEGRA ORAL) Take by mouth.       fish oil-omega-3 fatty acids 300-1,000 mg capsule Take 2 g by mouth once daily.       fluticasone propionate (FLONASE) 50 mcg/actuation nasal spray USE 1 SPRAY BY EACH NARE ROUTE ONCE DAILY. 48 mL 5     hydroCHLOROthiazide (MICROZIDE) 12.5 mg capsule Take 1 capsule (12.5 mg total) by mouth once daily. 90 capsule 3     losartan (COZAAR) 100 MG tablet Take 1 tablet (100 mg total) by mouth once daily. 90 tablet 3     multivitamin capsule Take 1 capsule by mouth once daily.       omeprazole (PRILOSEC) 20 MG capsule Take 1 capsule (20 mg total) by mouth 2 (two) times daily. 180 capsule 1     PREVIDENT 5000 SENSITIVE 1.1-5 % Pste   2     tiZANidine (ZANAFLEX) 4 MG tablet TAKE 1 TABLET BY MOUTH EVERY 8 HOURS AS NEEDED 30 tablet 0          Physical Exam:    Vital Signs: There were no vitals filed for this visit.    Gen: NAD, lying comfortably  HENT: NCAT, oropharynx clear  Eyes: anicteric sclerae, EOMI grossly  Neck: supple, no visible masses/goiter  Cardiac: RRR  Lungs: non-labored breathing  Abd: soft, NT/ND, normoactive BS  Ext: no LE edema, warm, well perfused  Skin: skin intact on exposed body parts, no visible rashes, lesions  Neuro: A&Ox4, neuro exam grossly intact, moves all extremities  Psych: appropriate mood, affect        Labs:  Lab Results   Component Value Date    WBC 7.40 02/09/2017    HGB 14.3 02/09/2017    HCT 44.1 02/09/2017     02/09/2017    CHOL 148 03/31/2022    TRIG 89 03/31/2022    HDL 52 03/31/2022    ALT 14 03/31/2022    AST 22 03/31/2022     03/31/2022    K 4.2 03/31/2022     03/31/2022    CREATININE 1.4 03/31/2022    BUN 16 03/31/2022    CO2 31 (H) 03/31/2022    TSH 1.770 05/15/2018    PSA 1.2 03/31/2022       Plan:  Colonoscopy for CRC surveillance    I have explained the risks and benefits of endoscopy procedures to the patient including but not limited to bleeding, perforation,  infection, and death.  The patient was asked if they understand and allowed to ask any further questions to their satisfaction.    Tejinder Castillo MD

## 2022-05-19 NOTE — TRANSFER OF CARE
"Anesthesia Transfer of Care Note    Patient: Julio Bernardo    Procedure(s) Performed: Procedure(s) (LRB):  COLONOSCOPY (N/A)    Patient location: PACU    Anesthesia Type: general    Transport from OR: Transported from OR on room air with adequate spontaneous ventilation    Post pain: adequate analgesia    Post assessment: no apparent anesthetic complications and tolerated procedure well    Post vital signs: stable    Level of consciousness: awake    Nausea/Vomiting: no nausea/vomiting    Complications: none    Transfer of care protocol was followed      Last vitals:   Visit Vitals  BP (!) 162/91   Pulse (!) 55   Temp 36.5 °C (97.7 °F)   Resp 15   Ht 6' 0.5" (1.842 m)   Wt 111.1 kg (245 lb)   SpO2 97%   BMI 32.77 kg/m²     "

## 2022-05-19 NOTE — ANESTHESIA POSTPROCEDURE EVALUATION
Anesthesia Post Evaluation    Patient: Julio Bernardo    Procedure(s) Performed: Procedure(s) (LRB):  COLONOSCOPY (N/A)    Final Anesthesia Type: general      Patient location during evaluation: GI PACU  Patient participation: Yes- Able to Participate  Level of consciousness: awake and alert and oriented  Post-procedure vital signs: reviewed and stable  Pain management: adequate  Airway patency: patent    PONV status at discharge: No PONV  Anesthetic complications: no      Cardiovascular status: hemodynamically stable  Respiratory status: unassisted, spontaneous ventilation and room air  Hydration status: euvolemic  Follow-up not needed.          Vitals Value Taken Time   /86 05/19/22 1111   Temp 36.4 °C (97.5 °F) 05/19/22 1041   Pulse 81 05/19/22 1111   Resp 18 05/19/22 1111   SpO2 97 % 05/19/22 1111         Event Time   Out of Recovery 11:15:05         Pain/Blanco Score: Blanco Score: 5 (5/19/2022 10:56 AM)

## 2022-05-19 NOTE — ANESTHESIA PREPROCEDURE EVALUATION
05/19/2022  Julio Bernardo is a 71 y.o., male.  Past Medical History:   Diagnosis Date    Allergy     Arthritis 2019    Colon polyps     Disorder of kidney and ureter 2019    Hyperlipemia     Hypertension     Posterior capsular opacification      Past Surgical History:   Procedure Laterality Date    CATARACT EXTRACTION      both eyes    COLONOSCOPY N/A 9/27/2016    Procedure: COLONOSCOPY;  Surgeon: Jan Argueta MD;  Location: [address]);  Service: Endoscopy;  Laterality: N/A;    EYE SURGERY      HERNIA REPAIR      WISDOM TOOTH EXTRACTION      YAG      Left Eye           Pre-op Assessment    I have reviewed the Patient Summary Reports.     I have reviewed the Nursing Notes. I have reviewed the NPO Status.   I have reviewed the Medications.     Review of Systems  Anesthesia Hx:  No problems with previous Anesthesia  Neg history of prior surgery. Denies Family Hx of Anesthesia complications.   Denies Personal Hx of Anesthesia complications.   Cardiovascular:   Exercise tolerance: good Hypertension    Pulmonary:   Sleep Apnea    Renal/:   Chronic Renal Disease    Musculoskeletal:   Arthritis     Neurological:   Neuromuscular Disease,        Physical Exam  General: Well nourished, Alert and Oriented    Airway:  Mallampati: II / II  Mouth Opening: Normal  TM Distance: Normal  Tongue: Normal  Neck ROM: Normal ROM    Dental:  Intact    Chest/Lungs:  Clear to auscultation, Normal Respiratory Rate    Heart:  Rate: Normal  Rhythm: Regular Rhythm  Sounds: Normal    Abdomen:  Normal, Soft, Nontender        Anesthesia Plan  Type of Anesthesia, risks & benefits discussed:    Anesthesia Type: MAC  Intra-op Monitoring Plan: Standard ASA Monitors  Post Op Pain Control Plan:   (medical reason for not using multimodal pain management)  Induction:  IV  Informed Consent: Informed consent signed with the  Patient and all parties understand the risks and agree with anesthesia plan.  All questions answered.   ASA Score: 3  Day of Surgery Review of History & Physical: H&P Update referred to the surgeon/provider.    Ready For Surgery From Anesthesia Perspective.     .

## 2022-05-26 LAB
FINAL PATHOLOGIC DIAGNOSIS: NORMAL
Lab: NORMAL

## 2022-06-13 ENCOUNTER — TELEPHONE (OUTPATIENT)
Dept: OTOLARYNGOLOGY | Facility: CLINIC | Age: 72
End: 2022-06-13

## 2022-06-13 ENCOUNTER — PES CALL (OUTPATIENT)
Dept: ADMINISTRATIVE | Facility: CLINIC | Age: 72
End: 2022-06-13
Payer: MEDICARE

## 2022-06-13 NOTE — TELEPHONE ENCOUNTER
----- Message from Mary Cheng sent at 6/13/2022 11:17 AM CDT -----  Who called?:PT      What is the request in detail:PT would like to reschedule his appointment. Please advise         Can the clinic reply by [facility]?:NO        Best Call Back Number:[phone]

## 2022-06-21 ENCOUNTER — LAB VISIT (OUTPATIENT)
Dept: LAB | Facility: HOSPITAL | Age: 72
End: 2022-06-21
Attending: PODIATRIST
Payer: MEDICARE

## 2022-06-21 ENCOUNTER — OFFICE VISIT (OUTPATIENT)
Dept: PODIATRY | Facility: CLINIC | Age: 72
End: 2022-06-21
Payer: MEDICARE

## 2022-06-21 VITALS
HEIGHT: 73 IN | HEART RATE: 74 BPM | BODY MASS INDEX: 32.47 KG/M2 | WEIGHT: 245 LBS | SYSTOLIC BLOOD PRESSURE: 163 MMHG | DIASTOLIC BLOOD PRESSURE: 85 MMHG

## 2022-06-21 DIAGNOSIS — M10.00 ACUTE IDIOPATHIC GOUT, UNSPECIFIED SITE: ICD-10-CM

## 2022-06-21 DIAGNOSIS — M79.671 FOOT PAIN, RIGHT: ICD-10-CM

## 2022-06-21 DIAGNOSIS — M1A.00X0 IDIOPATHIC CHRONIC GOUT WITHOUT TOPHUS, UNSPECIFIED SITE: Primary | ICD-10-CM

## 2022-06-21 DIAGNOSIS — M1A.00X0 IDIOPATHIC CHRONIC GOUT WITHOUT TOPHUS, UNSPECIFIED SITE: ICD-10-CM

## 2022-06-21 LAB
ANION GAP SERPL CALC-SCNC: 8 MMOL/L (ref 8–16)
BASOPHILS # BLD AUTO: 0.05 K/UL (ref 0–0.2)
BASOPHILS NFR BLD: 0.5 % (ref 0–1.9)
BUN SERPL-MCNC: 13 MG/DL (ref 8–23)
CALCIUM SERPL-MCNC: 9 MG/DL (ref 8.7–10.5)
CHLORIDE SERPL-SCNC: 103 MMOL/L (ref 95–110)
CO2 SERPL-SCNC: 27 MMOL/L (ref 23–29)
CREAT SERPL-MCNC: 1.3 MG/DL (ref 0.5–1.4)
CRP SERPL-MCNC: 57 MG/L (ref 0–8.2)
DIFFERENTIAL METHOD: ABNORMAL
EOSINOPHIL # BLD AUTO: 0.5 K/UL (ref 0–0.5)
EOSINOPHIL NFR BLD: 4.9 % (ref 0–8)
ERYTHROCYTE [DISTWIDTH] IN BLOOD BY AUTOMATED COUNT: 13 % (ref 11.5–14.5)
ERYTHROCYTE [SEDIMENTATION RATE] IN BLOOD BY WESTERGREN METHOD: 45 MM/HR (ref 0–23)
EST. GFR  (AFRICAN AMERICAN): >60 ML/MIN/1.73 M^2
EST. GFR  (NON AFRICAN AMERICAN): 54.1 ML/MIN/1.73 M^2
GLUCOSE SERPL-MCNC: 107 MG/DL (ref 70–110)
HCT VFR BLD AUTO: 44.5 % (ref 40–54)
HGB BLD-MCNC: 14 G/DL (ref 14–18)
IMM GRANULOCYTES # BLD AUTO: 0.04 K/UL (ref 0–0.04)
IMM GRANULOCYTES NFR BLD AUTO: 0.4 % (ref 0–0.5)
LYMPHOCYTES # BLD AUTO: 2.1 K/UL (ref 1–4.8)
LYMPHOCYTES NFR BLD: 22.2 % (ref 18–48)
MCH RBC QN AUTO: 26.7 PG (ref 27–31)
MCHC RBC AUTO-ENTMCNC: 31.5 G/DL (ref 32–36)
MCV RBC AUTO: 85 FL (ref 82–98)
MONOCYTES # BLD AUTO: 1.2 K/UL (ref 0.3–1)
MONOCYTES NFR BLD: 12.5 % (ref 4–15)
NEUTROPHILS # BLD AUTO: 5.6 K/UL (ref 1.8–7.7)
NEUTROPHILS NFR BLD: 59.5 % (ref 38–73)
NRBC BLD-RTO: 0 /100 WBC
PLATELET # BLD AUTO: 199 K/UL (ref 150–450)
PMV BLD AUTO: 11.7 FL (ref 9.2–12.9)
POTASSIUM SERPL-SCNC: 4 MMOL/L (ref 3.5–5.1)
RBC # BLD AUTO: 5.25 M/UL (ref 4.6–6.2)
SODIUM SERPL-SCNC: 138 MMOL/L (ref 136–145)
URATE SERPL-MCNC: 5.1 MG/DL (ref 3.4–7)
WBC # BLD AUTO: 9.46 K/UL (ref 3.9–12.7)

## 2022-06-21 PROCEDURE — 99204 PR OFFICE/OUTPT VISIT, NEW, LEVL IV, 45-59 MIN: ICD-10-PCS | Mod: S$GLB,,, | Performed by: PODIATRIST

## 2022-06-21 PROCEDURE — 36415 COLL VENOUS BLD VENIPUNCTURE: CPT | Mod: PN | Performed by: PODIATRIST

## 2022-06-21 PROCEDURE — 4010F PR ACE/ARB THEARPY RXD/TAKEN: ICD-10-PCS | Mod: CPTII,S$GLB,, | Performed by: PODIATRIST

## 2022-06-21 PROCEDURE — 1160F PR REVIEW ALL MEDS BY PRESCRIBER/CLIN PHARMACIST DOCUMENTED: ICD-10-PCS | Mod: CPTII,S$GLB,, | Performed by: PODIATRIST

## 2022-06-21 PROCEDURE — 4010F ACE/ARB THERAPY RXD/TAKEN: CPT | Mod: CPTII,S$GLB,, | Performed by: PODIATRIST

## 2022-06-21 PROCEDURE — 3079F DIAST BP 80-89 MM HG: CPT | Mod: CPTII,S$GLB,, | Performed by: PODIATRIST

## 2022-06-21 PROCEDURE — 3079F PR MOST RECENT DIASTOLIC BLOOD PRESSURE 80-89 MM HG: ICD-10-PCS | Mod: CPTII,S$GLB,, | Performed by: PODIATRIST

## 2022-06-21 PROCEDURE — 3008F PR BODY MASS INDEX (BMI) DOCUMENTED: ICD-10-PCS | Mod: CPTII,S$GLB,, | Performed by: PODIATRIST

## 2022-06-21 PROCEDURE — 1125F PR PAIN SEVERITY QUANTIFIED, PAIN PRESENT: ICD-10-PCS | Mod: CPTII,S$GLB,, | Performed by: PODIATRIST

## 2022-06-21 PROCEDURE — 3008F BODY MASS INDEX DOCD: CPT | Mod: CPTII,S$GLB,, | Performed by: PODIATRIST

## 2022-06-21 PROCEDURE — 84550 ASSAY OF BLOOD/URIC ACID: CPT | Performed by: PODIATRIST

## 2022-06-21 PROCEDURE — 99999 PR PBB SHADOW E&M-EST. PATIENT-LVL IV: ICD-10-PCS | Mod: PBBFAC,,, | Performed by: PODIATRIST

## 2022-06-21 PROCEDURE — 99204 OFFICE O/P NEW MOD 45 MIN: CPT | Mod: S$GLB,,, | Performed by: PODIATRIST

## 2022-06-21 PROCEDURE — 86140 C-REACTIVE PROTEIN: CPT | Performed by: PODIATRIST

## 2022-06-21 PROCEDURE — 1101F PT FALLS ASSESS-DOCD LE1/YR: CPT | Mod: CPTII,S$GLB,, | Performed by: PODIATRIST

## 2022-06-21 PROCEDURE — 1160F RVW MEDS BY RX/DR IN RCRD: CPT | Mod: CPTII,S$GLB,, | Performed by: PODIATRIST

## 2022-06-21 PROCEDURE — 3077F SYST BP >= 140 MM HG: CPT | Mod: CPTII,S$GLB,, | Performed by: PODIATRIST

## 2022-06-21 PROCEDURE — 3288F PR FALLS RISK ASSESSMENT DOCUMENTED: ICD-10-PCS | Mod: CPTII,S$GLB,, | Performed by: PODIATRIST

## 2022-06-21 PROCEDURE — 1101F PR PT FALLS ASSESS DOC 0-1 FALLS W/OUT INJ PAST YR: ICD-10-PCS | Mod: CPTII,S$GLB,, | Performed by: PODIATRIST

## 2022-06-21 PROCEDURE — 3077F PR MOST RECENT SYSTOLIC BLOOD PRESSURE >= 140 MM HG: ICD-10-PCS | Mod: CPTII,S$GLB,, | Performed by: PODIATRIST

## 2022-06-21 PROCEDURE — 80048 BASIC METABOLIC PNL TOTAL CA: CPT | Performed by: PODIATRIST

## 2022-06-21 PROCEDURE — 85652 RBC SED RATE AUTOMATED: CPT | Performed by: PODIATRIST

## 2022-06-21 PROCEDURE — 85025 COMPLETE CBC W/AUTO DIFF WBC: CPT | Performed by: PODIATRIST

## 2022-06-21 PROCEDURE — 1159F PR MEDICATION LIST DOCUMENTED IN MEDICAL RECORD: ICD-10-PCS | Mod: CPTII,S$GLB,, | Performed by: PODIATRIST

## 2022-06-21 PROCEDURE — 3288F FALL RISK ASSESSMENT DOCD: CPT | Mod: CPTII,S$GLB,, | Performed by: PODIATRIST

## 2022-06-21 PROCEDURE — 99999 PR PBB SHADOW E&M-EST. PATIENT-LVL IV: CPT | Mod: PBBFAC,,, | Performed by: PODIATRIST

## 2022-06-21 PROCEDURE — 1159F MED LIST DOCD IN RCRD: CPT | Mod: CPTII,S$GLB,, | Performed by: PODIATRIST

## 2022-06-21 PROCEDURE — 1125F AMNT PAIN NOTED PAIN PRSNT: CPT | Mod: CPTII,S$GLB,, | Performed by: PODIATRIST

## 2022-06-21 RX ORDER — COLCHICINE 0.6 MG/1
0.6 TABLET ORAL DAILY
Qty: 15 TABLET | Refills: 0 | Status: SHIPPED | OUTPATIENT
Start: 2022-06-21 | End: 2023-05-18 | Stop reason: ALTCHOICE

## 2022-06-21 RX ORDER — POLYETHYLENE GLYCOL-3350 AND ELECTROLYTES 236; 6.74; 5.86; 2.97; 22.74 G/274.31G; G/274.31G; G/274.31G; G/274.31G; G/274.31G
4000 POWDER, FOR SOLUTION ORAL ONCE
COMMUNITY
Start: 2022-05-18 | End: 2024-03-11

## 2022-06-21 RX ORDER — CICLOPIROX 80 MG/ML
SOLUTION TOPICAL NIGHTLY
Qty: 6.6 ML | Refills: 11 | Status: SHIPPED | OUTPATIENT
Start: 2022-06-21

## 2022-06-21 RX ORDER — METHYLPREDNISOLONE 4 MG/1
TABLET ORAL
Qty: 1 EACH | Refills: 0 | Status: SHIPPED | OUTPATIENT
Start: 2022-06-21 | End: 2022-10-03

## 2022-06-21 NOTE — PROGRESS NOTES
Subjective:      Patient ID: Julio Bernardo is a 72 y.o. male.    Chief Complaint: Foot Pain (R foot)    Intense pain, swelling, heat right forefoot.  Rapid onset 4 days ago no improvement.   aggravated by increased weight bearing, shoe gear, pressure.  No previous medical treatment.  OTC pain med not helping. Denies trauma, surgery right foot.    cc2 discolored toenails.  Gradual onset, worsening over past several weeks, aggravated by increased weight bearing, shoe gear, pressure.  No previous medical treatment.  OTC med not helping.     Review of Systems   Constitutional: Negative for chills, diaphoresis, fever, malaise/fatigue and night sweats.   Cardiovascular: Negative for claudication, cyanosis, leg swelling and syncope.   Skin: Negative for color change, dry skin, nail changes, rash, suspicious lesions and unusual hair distribution.   Musculoskeletal: Positive for gout, joint pain and joint swelling. Negative for falls, muscle cramps, muscle weakness and stiffness.   Gastrointestinal: Negative for constipation, diarrhea, nausea and vomiting.   Neurological: Negative for brief paralysis, disturbances in coordination, focal weakness, numbness, paresthesias, sensory change and tremors.           Objective:      Physical Exam  Constitutional:       General: He is not in acute distress.     Appearance: He is well-developed. He is not diaphoretic.   Cardiovascular:      Pulses:           Popliteal pulses are 2+ on the right side and 2+ on the left side.        Dorsalis pedis pulses are 2+ on the right side and 2+ on the left side.        Posterior tibial pulses are 2+ on the right side and 2+ on the left side.      Comments: Capillary refill 3 seconds all toes/distal feet, all toes/both feet warm to touch.      Negative lymphadenopathy bilateral popliteal fossa and tarsal tunnel.      Negavie lower extremity edema bilateral.    Musculoskeletal:      Right ankle: No swelling, deformity, ecchymosis or lacerations.  Normal range of motion. Normal pulse.      Right Achilles Tendon: Normal. No defects. Menard's test negative.      Comments: Intense pain to palpation motion, wb right forefoot at 2nd mtpj without deformity, loss of function, signs acute trauma.    Otherwise, Normal angle, base, station of gait. All ten toes without clubbing, cyanosis, or signs of ischemia.  No pain to palpation bilateral lower extremities.  Range of motion, stability, muscle strength, and muscle tone normal bilateral feet and legs.    Lymphadenopathy:      Lower Body: No right inguinal adenopathy. No left inguinal adenopathy.      Comments: Negative lymphadenopathy bilateral popliteal fossa and tarsal tunnel.    Negative lymphangitic streaking bilateral feet/ankles/legs.   Skin:     General: Skin is warm and dry.      Capillary Refill: Capillary refill takes 2 to 3 seconds.      Coloration: Skin is not pale.      Findings: No abrasion, bruising, burn, ecchymosis, erythema, laceration, lesion or rash.      Nails: There is no clubbing.      Comments:   Skin is normal age and health appropriate color, turgor, texture, and temperature bilateral lower extremities without ulceration, hyperpigmentation, discoloration, masses nodules or cords palpated.  No ecchymosis, erythema, edema, or cardinal signs of infection bilateral lower extremities.     Neurological:      Mental Status: He is alert and oriented to person, place, and time.      Sensory: No sensory deficit.      Motor: No tremor, atrophy or abnormal muscle tone.      Gait: Gait normal.      Deep Tendon Reflexes:      Reflex Scores:       Patellar reflexes are 2+ on the right side and 2+ on the left side.       Achilles reflexes are 2+ on the right side and 2+ on the left side.     Comments: Negative tinel sign to percussion sural, superficial peroneal, deep peroneal, saphenous, and posterior tibial nerves right and left ankles and feet.     Psychiatric:         Behavior: Behavior is cooperative.                Assessment:       Encounter Diagnoses   Name Primary?    Idiopathic chronic gout without tophus, unspecified site Yes    Acute idiopathic gout, unspecified site     Foot pain, right          Plan:       Julio was seen today for foot pain.    Diagnoses and all orders for this visit:    Idiopathic chronic gout without tophus, unspecified site  -     Basic Metabolic Panel; Future  -     CBC Auto Differential; Future  -     C-Reactive Protein; Future  -     Sedimentation rate; Future  -     Uric Acid; Future  -     X-Ray Foot Complete Right; Future    Acute idiopathic gout, unspecified site  -     Basic Metabolic Panel; Future  -     CBC Auto Differential; Future  -     C-Reactive Protein; Future  -     Sedimentation rate; Future  -     Uric Acid; Future  -     X-Ray Foot Complete Right; Future    Foot pain, right  -     Basic Metabolic Panel; Future  -     CBC Auto Differential; Future  -     C-Reactive Protein; Future  -     Sedimentation rate; Future  -     Uric Acid; Future  -     X-Ray Foot Complete Right; Future    Other orders  -     colchicine (COLCRYS) 0.6 mg tablet; Take 1 tablet (0.6 mg total) by mouth once daily.  -     methylPREDNISolone (MEDROL DOSEPACK) 4 mg tablet; use as directed      I counseled the patient on his conditions, their implications and medical management.    [facility]    Discussed conservative treatment with shoes of adequate dimensions, material, and style to alleviate symptoms and delay or prevent surgical intervention.     Labs, xrays    Colchicine, medrol dose pack.    Surgical shoe          No follow-ups on file.

## 2022-06-28 ENCOUNTER — OFFICE VISIT (OUTPATIENT)
Dept: INTERNAL MEDICINE | Facility: CLINIC | Age: 72
End: 2022-06-28
Payer: MEDICARE

## 2022-06-28 ENCOUNTER — OFFICE VISIT (OUTPATIENT)
Dept: OTOLARYNGOLOGY | Facility: CLINIC | Age: 72
End: 2022-06-28
Payer: MEDICARE

## 2022-06-28 VITALS
BODY MASS INDEX: 34.49 KG/M2 | HEART RATE: 78 BPM | HEIGHT: 72 IN | SYSTOLIC BLOOD PRESSURE: 127 MMHG | WEIGHT: 254.63 LBS | OXYGEN SATURATION: 99 % | DIASTOLIC BLOOD PRESSURE: 70 MMHG

## 2022-06-28 DIAGNOSIS — H43.819 VITREOUS DETACHMENT, UNSPECIFIED LATERALITY: ICD-10-CM

## 2022-06-28 DIAGNOSIS — N18.31 STAGE 3A CHRONIC KIDNEY DISEASE: ICD-10-CM

## 2022-06-28 DIAGNOSIS — M43.6 NECK STIFFNESS: ICD-10-CM

## 2022-06-28 DIAGNOSIS — H52.209 ASTIGMATISM, UNSPECIFIED LATERALITY, UNSPECIFIED TYPE: ICD-10-CM

## 2022-06-28 DIAGNOSIS — J34.1 MUCOUS RETENTION CYST OF MAXILLARY SINUS: ICD-10-CM

## 2022-06-28 DIAGNOSIS — R29.898 DECREASED RANGE OF MOTION OF NECK: ICD-10-CM

## 2022-06-28 DIAGNOSIS — M99.01 SEGMENTAL DYSFUNCTION OF CERVICAL REGION: ICD-10-CM

## 2022-06-28 DIAGNOSIS — J30.9 ALLERGIC RHINITIS, UNSPECIFIED SEASONALITY, UNSPECIFIED TRIGGER: ICD-10-CM

## 2022-06-28 DIAGNOSIS — H26.491 RIGHT POSTERIOR CAPSULAR OPACIFICATION: ICD-10-CM

## 2022-06-28 DIAGNOSIS — I10 IDIOPATHIC HYPERTENSION: ICD-10-CM

## 2022-06-28 DIAGNOSIS — R09.81 CHRONIC NASAL CONGESTION: ICD-10-CM

## 2022-06-28 DIAGNOSIS — G47.33 OSA (OBSTRUCTIVE SLEEP APNEA): ICD-10-CM

## 2022-06-28 DIAGNOSIS — K63.5 POLYP OF COLON, UNSPECIFIED PART OF COLON, UNSPECIFIED TYPE: ICD-10-CM

## 2022-06-28 DIAGNOSIS — R43.9 DECREASED TASTE AND SMELL: ICD-10-CM

## 2022-06-28 DIAGNOSIS — I70.0 ATHEROSCLEROSIS OF AORTA: ICD-10-CM

## 2022-06-28 DIAGNOSIS — Z00.00 ENCOUNTER FOR PREVENTIVE HEALTH EXAMINATION: Primary | ICD-10-CM

## 2022-06-28 DIAGNOSIS — R05.3 CHRONIC COUGH: ICD-10-CM

## 2022-06-28 DIAGNOSIS — E78.5 HYPERLIPIDEMIA, UNSPECIFIED HYPERLIPIDEMIA TYPE: ICD-10-CM

## 2022-06-28 DIAGNOSIS — Z96.1 PSEUDOPHAKIA: ICD-10-CM

## 2022-06-28 DIAGNOSIS — R29.3 ABNORMAL POSTURE: ICD-10-CM

## 2022-06-28 DIAGNOSIS — M17.0 PRIMARY OSTEOARTHRITIS OF BOTH KNEES: ICD-10-CM

## 2022-06-28 DIAGNOSIS — Z98.890 POST-OPERATIVE STATE: ICD-10-CM

## 2022-06-28 DIAGNOSIS — J98.6 ELEVATED HEMIDIAPHRAGM: ICD-10-CM

## 2022-06-28 DIAGNOSIS — N52.9 ERECTILE DYSFUNCTION, UNSPECIFIED ERECTILE DYSFUNCTION TYPE: ICD-10-CM

## 2022-06-28 DIAGNOSIS — H26.499 AFTER-CATARACT WITH VISION OBSCURED, UNSPECIFIED LATERALITY: ICD-10-CM

## 2022-06-28 DIAGNOSIS — I10 PRIMARY HYPERTENSION: ICD-10-CM

## 2022-06-28 DIAGNOSIS — M1A.3710 CHRONIC GOUT OF RIGHT FOOT DUE TO RENAL IMPAIRMENT WITHOUT TOPHUS: ICD-10-CM

## 2022-06-28 DIAGNOSIS — J98.4 CAVITARY LESION OF LUNG: ICD-10-CM

## 2022-06-28 PROCEDURE — 3288F PR FALLS RISK ASSESSMENT DOCUMENTED: ICD-10-PCS | Mod: CPTII,S$GLB,, | Performed by: NURSE PRACTITIONER

## 2022-06-28 PROCEDURE — G0439 PPPS, SUBSEQ VISIT: HCPCS | Mod: S$GLB,,, | Performed by: NURSE PRACTITIONER

## 2022-06-28 PROCEDURE — 1101F PT FALLS ASSESS-DOCD LE1/YR: CPT | Mod: CPTII,S$GLB,, | Performed by: NURSE PRACTITIONER

## 2022-06-28 PROCEDURE — 1159F MED LIST DOCD IN RCRD: CPT | Mod: CPTII,S$GLB,, | Performed by: OTOLARYNGOLOGY

## 2022-06-28 PROCEDURE — 3008F PR BODY MASS INDEX (BMI) DOCUMENTED: ICD-10-PCS | Mod: CPTII,S$GLB,, | Performed by: NURSE PRACTITIONER

## 2022-06-28 PROCEDURE — 1170F PR FUNCTIONAL STATUS ASSESSED: ICD-10-PCS | Mod: CPTII,S$GLB,, | Performed by: NURSE PRACTITIONER

## 2022-06-28 PROCEDURE — 1101F PR PT FALLS ASSESS DOC 0-1 FALLS W/OUT INJ PAST YR: ICD-10-PCS | Mod: CPTII,S$GLB,, | Performed by: OTOLARYNGOLOGY

## 2022-06-28 PROCEDURE — 99204 PR OFFICE/OUTPT VISIT, NEW, LEVL IV, 45-59 MIN: ICD-10-PCS | Mod: S$GLB,,, | Performed by: OTOLARYNGOLOGY

## 2022-06-28 PROCEDURE — 3078F DIAST BP <80 MM HG: CPT | Mod: CPTII,S$GLB,, | Performed by: NURSE PRACTITIONER

## 2022-06-28 PROCEDURE — 4010F PR ACE/ARB THEARPY RXD/TAKEN: ICD-10-PCS | Mod: CPTII,S$GLB,, | Performed by: NURSE PRACTITIONER

## 2022-06-28 PROCEDURE — 1160F RVW MEDS BY RX/DR IN RCRD: CPT | Mod: CPTII,S$GLB,, | Performed by: OTOLARYNGOLOGY

## 2022-06-28 PROCEDURE — 4010F ACE/ARB THERAPY RXD/TAKEN: CPT | Mod: CPTII,S$GLB,, | Performed by: OTOLARYNGOLOGY

## 2022-06-28 PROCEDURE — 99999 PR PBB SHADOW E&M-EST. PATIENT-LVL V: CPT | Mod: PBBFAC,,, | Performed by: NURSE PRACTITIONER

## 2022-06-28 PROCEDURE — 3288F PR FALLS RISK ASSESSMENT DOCUMENTED: ICD-10-PCS | Mod: CPTII,S$GLB,, | Performed by: OTOLARYNGOLOGY

## 2022-06-28 PROCEDURE — 4010F PR ACE/ARB THEARPY RXD/TAKEN: ICD-10-PCS | Mod: CPTII,S$GLB,, | Performed by: OTOLARYNGOLOGY

## 2022-06-28 PROCEDURE — 99999 PR PBB SHADOW E&M-EST. PATIENT-LVL V: ICD-10-PCS | Mod: PBBFAC,,, | Performed by: NURSE PRACTITIONER

## 2022-06-28 PROCEDURE — 4010F ACE/ARB THERAPY RXD/TAKEN: CPT | Mod: CPTII,S$GLB,, | Performed by: NURSE PRACTITIONER

## 2022-06-28 PROCEDURE — 1160F PR REVIEW ALL MEDS BY PRESCRIBER/CLIN PHARMACIST DOCUMENTED: ICD-10-PCS | Mod: CPTII,S$GLB,, | Performed by: OTOLARYNGOLOGY

## 2022-06-28 PROCEDURE — 99204 OFFICE O/P NEW MOD 45 MIN: CPT | Mod: S$GLB,,, | Performed by: OTOLARYNGOLOGY

## 2022-06-28 PROCEDURE — 3288F FALL RISK ASSESSMENT DOCD: CPT | Mod: CPTII,S$GLB,, | Performed by: OTOLARYNGOLOGY

## 2022-06-28 PROCEDURE — 3074F PR MOST RECENT SYSTOLIC BLOOD PRESSURE < 130 MM HG: ICD-10-PCS | Mod: CPTII,S$GLB,, | Performed by: NURSE PRACTITIONER

## 2022-06-28 PROCEDURE — 1159F PR MEDICATION LIST DOCUMENTED IN MEDICAL RECORD: ICD-10-PCS | Mod: CPTII,S$GLB,, | Performed by: OTOLARYNGOLOGY

## 2022-06-28 PROCEDURE — 3078F PR MOST RECENT DIASTOLIC BLOOD PRESSURE < 80 MM HG: ICD-10-PCS | Mod: CPTII,S$GLB,, | Performed by: NURSE PRACTITIONER

## 2022-06-28 PROCEDURE — 99499 UNLISTED E&M SERVICE: CPT | Mod: S$GLB,,, | Performed by: NURSE PRACTITIONER

## 2022-06-28 PROCEDURE — 1101F PT FALLS ASSESS-DOCD LE1/YR: CPT | Mod: CPTII,S$GLB,, | Performed by: OTOLARYNGOLOGY

## 2022-06-28 PROCEDURE — 99499 RISK ADDL DX/OHS AUDIT: ICD-10-PCS | Mod: S$GLB,,, | Performed by: NURSE PRACTITIONER

## 2022-06-28 PROCEDURE — 3008F BODY MASS INDEX DOCD: CPT | Mod: CPTII,S$GLB,, | Performed by: NURSE PRACTITIONER

## 2022-06-28 PROCEDURE — 3074F SYST BP LT 130 MM HG: CPT | Mod: CPTII,S$GLB,, | Performed by: NURSE PRACTITIONER

## 2022-06-28 PROCEDURE — 1101F PR PT FALLS ASSESS DOC 0-1 FALLS W/OUT INJ PAST YR: ICD-10-PCS | Mod: CPTII,S$GLB,, | Performed by: NURSE PRACTITIONER

## 2022-06-28 PROCEDURE — 1170F FXNL STATUS ASSESSED: CPT | Mod: CPTII,S$GLB,, | Performed by: NURSE PRACTITIONER

## 2022-06-28 PROCEDURE — 3288F FALL RISK ASSESSMENT DOCD: CPT | Mod: CPTII,S$GLB,, | Performed by: NURSE PRACTITIONER

## 2022-06-28 PROCEDURE — G0439 PR MEDICARE ANNUAL WELLNESS SUBSEQUENT VISIT: ICD-10-PCS | Mod: S$GLB,,, | Performed by: NURSE PRACTITIONER

## 2022-06-28 RX ORDER — COLCHICINE 0.6 MG/1
TABLET ORAL
Qty: 15 TABLET | Refills: 0 | OUTPATIENT
Start: 2022-06-28

## 2022-06-28 NOTE — PROGRESS NOTES
Mr. Bernardo     There were no vitals filed for this visit.    Chief Complaint:  Allergies     HPI:   is a 72-year-old black male who presents with complaints of sinus issues, itching ears, scratchy throat, ringing in his ears.  He complains of nasal congestion on his left side mainly when he wakes up in the morning.  He states occasionally it is his right side that is blocked.  He does have a history of allergies and is on Flonase nasal sprays but no rinses.  He does have a history of snoring.    SNOT22- 27 NOSE- 50    Review of Systems:  Constitutional:   weight loss or weight gain: Negative  Allergy/Immunologic:   Positive  Nasal Congestion/Obstruction:   Positive for occasional congestion as described.  Nosebleeds:   Negative  Sinus infections:   Negative  Headache/Facial Pain:   Negative  Snoring/SUSANNAH:   Positive for SUSANNAH  Throat: Infections/Pain:   Negative  Hoarseness/Speech Disturbance:   Negative  Trauma Hx:  Negative    Cardiovascular:  M/I Angina: Negative  Hypertension:  Positive  Endocrine:    DM/Steroids: Negative  GI:   Dysphagia/Reflux: Negative  :   GYN Pregnancy: Negative  Renal:   Dialysis: Negative  Lymphatic:   Neck Mass/Lymphadenopathy: Negative  Muscoloskeletal:   Negative  Hematologic:   Bleeding Disorders/Anemia: Negative  Neurologic:    Cranial/Neuralgia: Negative  Pulmonary:   Asthma/SOB/Cough: Negative  Skin Disorders: Negative    Past Medical/Surgical/Family/Social History:    ENT Surgery: Negative  Occupational Exposure: Negative   Problems: Negative  Cancer: Negative    Past Family History:   Family history of Cancer: Negative    Past Social History:   Tobacco: Nonsmoker   Alcohol:  Once weekly Social Drinker      Allergies and medications: Reviewed per med card.    Physical Examination:  Ears:   External auditory canals:  Bilateral partial cerumen impactions   Hearing: Grossly intact   Tympanic Membranes:  Unable to visualize TMs clearly.  Nose:   External:  Normal   Intranasal:  His septum appears straight, turbinates 1 to 2+, nasal airway clear at this time.  Mouth:   Intraorally: Lips, teeth, and gums: Normal   Oropharynx: Normal   Mucosa: Normal   Tongue: Normal  Throat:      Palate: Normal palate with elevation   Tonsils:  Minimal   Posterior Pharynx: Normal  Fiberoptic exam: Not performed  Head/Face:     Inspection: Normal and atraumatic   Palpation/Percussion: Non tender   Facial strength: Normal and symmetric   Salivary glands: Normal  Neck: Supple  Thyroid: No masses  Lymphatics: No nodes  Respiratory:   Effort: Normal  Eyes:   Ocular Mobility: Normal   Vision: Grossly intact  Neuro/Psych:   Cranial Nerves: Grossly Intact   Orientation: Normal   Mood/Affect: Normal      Assessment/Plan:  I have discussed my findings with him in detail as well as my recommendations for treatment.  I have described normal nasal cycle to him which he is describe to me as alternating near ease becoming congested.  I have given him literature on saline sinus rinses including issues with distilled water only and described how this to be used in detail with him.  I have encouraged him to continue with his daily Flonase nasal spray and I have described how this is to be administered as well.  I will refer him to 1 of our ear specialist for his ear symptoms including cerumen impaction and itching ringing ears.  He will return to clinic to see me p.r.n.

## 2022-06-28 NOTE — PATIENT INSTRUCTIONS
Counseling and Referral of Other Preventative  (Italic type indicates deductible and co-insurance are waived)    Patient Name: Julio Bernardo  Today's Date: 6/28/2022    Health Maintenance       Date Due Completion Date    Shingles Vaccine (2 of 3) 12/12/2017 10/17/2017    COVID-19 Vaccine (4 - Booster for Pfizer series) 01/27/2022 9/27/2021    Influenza Vaccine (Season Ended) 09/01/2022 ---    Lipid Panel 03/31/2023 3/31/2022    Colorectal Cancer Screening 05/19/2027 5/19/2022    TETANUS VACCINE 07/31/2028 7/31/2018        No orders of the defined types were placed in this encounter.    The following information is provided to all patients.  This information is to help you find resources for any of the problems found today that may be affecting your health:                Living healthy guide: www.[facility].[place].gov      Understanding Diabetes: www.diabetes.org      Eating healthy: www.cdc.gov/healthyweight      [facility] home safety checklist: www.cdc.gov/steadi/patient.html      Agency on Aging: www.goea.[place].[facility]      Alcoholics anonymous (AA): www.aa.org      Physical Activity: www.alina.nih.gov/jb4zmpp      Tobacco use: www.quitwithusla.org

## 2022-06-28 NOTE — PROGRESS NOTES
Julio Bernardo presented for a  Medicare AWV and comprehensive Health Risk Assessment today. The following components were reviewed and updated:    · Medical history  · Family History  · Social history  · Allergies and Current Medications  · Health Risk Assessment  · Health Maintenance  · Care Team         ** See Completed Assessments for Annual Wellness Visit within the encounter summary.**         The following assessments were completed:  · Living Situation  · CAGE  · Depression Screening  · Timed Get Up and Go  · Whisper Test  · Cognitive Function Screening  · Nutrition Screening  · ADL Screening  · PAQ Screening             Vitals:    06/28/22 1202   BP: 127/70   BP Location: Left arm   Patient Position: Sitting   BP Method: Medium (Manual)   Pulse: 78   SpO2: 99%   Weight: 115.5 kg (254 lb 10.1 oz)   Height: 6' (1.829 m)     Body mass index is 34.53 kg/m².     Physical Exam  Constitutional:       Appearance: He is well-developed. He is obese.   HENT:      Head: Normocephalic and atraumatic. Not macrocephalic and not microcephalic. No raccoon eyes, Schwartz's sign, abrasion, contusion, right periorbital erythema, left periorbital erythema or laceration. Hair is normal.      Right Ear: No decreased hearing noted. No laceration, drainage, swelling or tenderness. No middle ear effusion. No foreign body. No mastoid tenderness. No hemotympanum. Tympanic membrane is not injected, scarred, perforated, erythematous, retracted or bulging. Tympanic membrane has normal mobility.      Left Ear: No decreased hearing noted. No laceration, drainage, swelling or tenderness.  No middle ear effusion. No foreign body. No mastoid tenderness. No hemotympanum. Tympanic membrane is not injected, scarred, perforated, erythematous, retracted or bulging. Tympanic membrane has normal mobility.      Nose: Nose normal. No nasal deformity, laceration or mucosal edema.      Mouth/Throat:      Pharynx: Uvula midline.   Eyes:      General:  Lids are normal. No scleral icterus.     Conjunctiva/sclera: Conjunctivae normal.   Neck:      Thyroid: No thyroid mass or thyromegaly.      Trachea: Trachea normal.   Cardiovascular:      Rate and Rhythm: Normal rate and regular rhythm.   Pulmonary:      Effort: Pulmonary effort is normal. No respiratory distress.      Breath sounds: Normal breath sounds.   Abdominal:      Palpations: Abdomen is soft.   Musculoskeletal:         General: Normal range of motion.      Cervical back: Neck supple. No edema or erythema. No spinous process tenderness or muscular tenderness. Normal range of motion.   Lymphadenopathy:      Head:      Right side of head: No submental, submandibular, tonsillar, preauricular or posterior auricular adenopathy.      Left side of head: No submental, submandibular, tonsillar, preauricular, posterior auricular or occipital adenopathy.   Skin:     General: Skin is warm and dry.   Neurological:      Mental Status: He is alert and oriented to person, place, and time.   Psychiatric:         Behavior: Behavior normal.         Thought Content: Thought content normal.         Judgment: Judgment normal.             Diagnoses and health risks identified today and associated recommendations/orders:    1. Encounter for preventive health examination  Annual Health Risk Assessment (HRA) visit today.  Counseling and referral of health maintenance and preventative health measures performed.  Patient given annual wellness paperwork to take home.  Encouraged to return in 1 year for subsequent HRA visit.     2. Stage 3a chronic kidney disease  Chronic. Stable. Continue current treatment plan as previously prescribed by PCP.    3. Atherosclerosis of aorta  Chronic. Stable. Noted on NM PET from 4/18/22. Continue current treatment plan as previously prescribed by PCP.    4. Hyperlipidemia, unspecified hyperlipidemia type  Chronic. Stable. Continue current treatment plan as previously prescribed by PCP.    5. Primary  hypertension  Chronic. Stable. Controlled. Encouraged to increase exercise as tolerated (moderate-intensity aerobic activity and muscle-strengthening activities) improve diet to heart healthy, low sodium diet. Continue current treatment plan as previously prescribed by PCP.    6. Erectile dysfunction, unspecified erectile dysfunction type  Chronic. Stable. Continue current treatment plan as previously prescribed by PCP.    7. Idiopathic hypertension  Chronic. Stable. Continue current treatment plan as previously prescribed by PCP.    8. Polyp of colon, unspecified part of colon, unspecified type  Chronic. Stable. Continue current treatment plan as previously prescribed by PCP.    9. Chronic gout of right foot due to renal impairment without tophus  Chronic. Stable. Continue current treatment plan as previously prescribed by PCP.    10. Decreased range of motion of neck  Chronic. Stable. Continue current treatment plan as previously prescribed by PCP.    11. Neck stiffness  Chronic. Stable. Continue current treatment plan as previously prescribed by PCP.    12. Primary osteoarthritis of both knees  Chronic. Stable. Continue current treatment plan as previously prescribed by PCP.    13. Segmental dysfunction of cervical region  Chronic. Stable. Continue current treatment plan as previously prescribed by PCP.    14. Post-operative state - Left Eye  Chronic. Stable. Continue current treatment plan as previously prescribed by PCP.    15. Abnormal posture  Chronic. Stable. Continue current treatment plan as previously prescribed by PCP.    16. SUSANNAH (obstructive sleep apnea)  Chronic. Stable. Continue current treatment plan as previously prescribed by PCP.    17. Elevated hemidiaphragm  Chronic. Stable. Continue current treatment plan as previously prescribed by PCP.    18. Chronic cough  Chronic. Stable. Continue current treatment plan as previously prescribed by PCP.    19. Cavitary lesion of lung  Chronic. Stable.  Continue current treatment plan as previously prescribed by PCP.    20. Mucous retention cyst of maxillary sinus  Chronic. Stable. Continue current treatment plan as previously prescribed by PCP.    21. Chronic nasal congestion  Chronic. Stable. Continue current treatment plan as previously prescribed by PCP.    22. Vitreous detachment, unspecified laterality  Chronic. Stable. Continue current treatment plan as previously prescribed by PCP.    23. Right posterior capsular opacification  Chronic. Stable. Continue current treatment plan as previously prescribed by PCP.    24. Pseudophakia  Chronic. Stable. Continue current treatment plan as previously prescribed by PCP.    25. Astigmatism, unspecified laterality, unspecified type  Chronic. Stable. Continue current treatment plan as previously prescribed by PCP.    26. After-cataract with vision obscured, unspecified laterality  Chronic. Stable. Continue current treatment plan as previously prescribed by PCP.    27. Decreased taste and smell  Chronic. Stable. Continue current treatment plan as previously prescribed by PCP.        Provided Julio with a 5-10 year written screening schedule and personal prevention plan. Recommendations were developed using the USPSTF age appropriate recommendations. Education, counseling, and referrals were provided as needed. After Visit Summary printed and given to patient which includes a list of additional screenings\tests needed.      I offered to discuss end of life issues, including information on how to make advance directives that the patient could use to name someone who would make medical decisions on their behalf if they became too ill to make themselves.    ___Patient declined  _X_Patient is interested, I provided paper work and offered to discuss.    Follow up in about 1 year (around 6/28/2023).    Hunter Fajardo NP  I offered to discuss advanced care planning, including how to pick a person who would make decisions for  you if you were unable to make them for yourself, called a health care power of , and what kind of decisions you might make such as use of life sustaining treatments such as ventilators and tube feeding when faced with a life limiting illness recorded on a living will that they will need to know. (How you want to be cared for as you near the end of your natural life)     X Patient is interested in learning more about how to make advanced directives.  I provided them paperwork and offered to discuss this with them.

## 2022-06-29 NOTE — TELEPHONE ENCOUNTER
Left message for patient to give a call back to reschedule 7/5 appointment. Dr. White will be unavailable.

## 2022-07-15 ENCOUNTER — IMMUNIZATION (OUTPATIENT)
Dept: PRIMARY CARE CLINIC | Facility: CLINIC | Age: 72
End: 2022-07-15
Payer: MEDICARE

## 2022-07-15 DIAGNOSIS — Z23 NEED FOR VACCINATION: Primary | ICD-10-CM

## 2022-07-15 PROCEDURE — 91305 COVID-19, MRNA, LNP-S, PF, 30 MCG/0.3 ML DOSE VACCINE (PFIZER): CPT | Mod: PBBFAC | Performed by: INTERNAL MEDICINE

## 2022-07-15 PROCEDURE — 0051A COVID-19, MRNA, LNP-S, PF, 30 MCG/0.3 ML DOSE VACCINE (PFIZER): CPT | Mod: CV19,PBBFAC | Performed by: INTERNAL MEDICINE

## 2022-08-01 ENCOUNTER — PATIENT MESSAGE (OUTPATIENT)
Dept: INTERNAL MEDICINE | Facility: CLINIC | Age: 72
End: 2022-08-01
Payer: MEDICARE

## 2022-08-03 ENCOUNTER — TELEPHONE (OUTPATIENT)
Dept: PODIATRY | Facility: CLINIC | Age: 72
End: 2022-08-03
Payer: MEDICARE

## 2022-08-04 ENCOUNTER — OFFICE VISIT (OUTPATIENT)
Dept: PODIATRY | Facility: CLINIC | Age: 72
End: 2022-08-04
Payer: MEDICARE

## 2022-08-04 VITALS
BODY MASS INDEX: 33.6 KG/M2 | WEIGHT: 253.5 LBS | HEIGHT: 73 IN | RESPIRATION RATE: 18 BRPM | SYSTOLIC BLOOD PRESSURE: 140 MMHG | DIASTOLIC BLOOD PRESSURE: 86 MMHG | HEART RATE: 64 BPM

## 2022-08-04 DIAGNOSIS — N18.31 STAGE 3A CHRONIC KIDNEY DISEASE: Primary | ICD-10-CM

## 2022-08-04 DIAGNOSIS — B35.1 ONYCHOMYCOSIS DUE TO DERMATOPHYTE: ICD-10-CM

## 2022-08-04 DIAGNOSIS — R60.0 BILATERAL LOWER EXTREMITY EDEMA: ICD-10-CM

## 2022-08-04 PROCEDURE — 11721 PR DEBRIDEMENT OF NAILS, 6 OR MORE: ICD-10-PCS | Mod: Q9,S$GLB,, | Performed by: PODIATRIST

## 2022-08-04 PROCEDURE — 4010F PR ACE/ARB THEARPY RXD/TAKEN: ICD-10-PCS | Mod: CPTII,S$GLB,, | Performed by: PODIATRIST

## 2022-08-04 PROCEDURE — 99499 UNLISTED E&M SERVICE: CPT | Mod: S$GLB,,, | Performed by: PODIATRIST

## 2022-08-04 PROCEDURE — 1125F AMNT PAIN NOTED PAIN PRSNT: CPT | Mod: CPTII,S$GLB,, | Performed by: PODIATRIST

## 2022-08-04 PROCEDURE — 3079F PR MOST RECENT DIASTOLIC BLOOD PRESSURE 80-89 MM HG: ICD-10-PCS | Mod: CPTII,S$GLB,, | Performed by: PODIATRIST

## 2022-08-04 PROCEDURE — 1160F RVW MEDS BY RX/DR IN RCRD: CPT | Mod: CPTII,S$GLB,, | Performed by: PODIATRIST

## 2022-08-04 PROCEDURE — 3077F PR MOST RECENT SYSTOLIC BLOOD PRESSURE >= 140 MM HG: ICD-10-PCS | Mod: CPTII,S$GLB,, | Performed by: PODIATRIST

## 2022-08-04 PROCEDURE — 3008F BODY MASS INDEX DOCD: CPT | Mod: CPTII,S$GLB,, | Performed by: PODIATRIST

## 2022-08-04 PROCEDURE — 3079F DIAST BP 80-89 MM HG: CPT | Mod: CPTII,S$GLB,, | Performed by: PODIATRIST

## 2022-08-04 PROCEDURE — 1125F PR PAIN SEVERITY QUANTIFIED, PAIN PRESENT: ICD-10-PCS | Mod: CPTII,S$GLB,, | Performed by: PODIATRIST

## 2022-08-04 PROCEDURE — 1160F PR REVIEW ALL MEDS BY PRESCRIBER/CLIN PHARMACIST DOCUMENTED: ICD-10-PCS | Mod: CPTII,S$GLB,, | Performed by: PODIATRIST

## 2022-08-04 PROCEDURE — 99499 NO LOS: ICD-10-PCS | Mod: S$GLB,,, | Performed by: PODIATRIST

## 2022-08-04 PROCEDURE — 1159F MED LIST DOCD IN RCRD: CPT | Mod: CPTII,S$GLB,, | Performed by: PODIATRIST

## 2022-08-04 PROCEDURE — 3008F PR BODY MASS INDEX (BMI) DOCUMENTED: ICD-10-PCS | Mod: CPTII,S$GLB,, | Performed by: PODIATRIST

## 2022-08-04 PROCEDURE — 4010F ACE/ARB THERAPY RXD/TAKEN: CPT | Mod: CPTII,S$GLB,, | Performed by: PODIATRIST

## 2022-08-04 PROCEDURE — 1159F PR MEDICATION LIST DOCUMENTED IN MEDICAL RECORD: ICD-10-PCS | Mod: CPTII,S$GLB,, | Performed by: PODIATRIST

## 2022-08-04 PROCEDURE — 11721 DEBRIDE NAIL 6 OR MORE: CPT | Mod: Q9,S$GLB,, | Performed by: PODIATRIST

## 2022-08-04 PROCEDURE — 99999 PR PBB SHADOW E&M-EST. PATIENT-LVL IV: ICD-10-PCS | Mod: PBBFAC,,, | Performed by: PODIATRIST

## 2022-08-04 PROCEDURE — 99999 PR PBB SHADOW E&M-EST. PATIENT-LVL IV: CPT | Mod: PBBFAC,,, | Performed by: PODIATRIST

## 2022-08-04 PROCEDURE — 3077F SYST BP >= 140 MM HG: CPT | Mod: CPTII,S$GLB,, | Performed by: PODIATRIST

## 2022-08-04 NOTE — PROGRESS NOTES
"Subjective:      Patient ID: Julio Bernardo is a 72 y.o. male.    Chief Complaint: Nail Problem (Double nail RT great toe ), Ingrown Toenail (Left great toenail ), and Toe Pain    Julio Bernardo, 72 y.o., male returns to clinic with CC of thick, discolored toenails on both feet that are difficult to trim. Has been using topical Ciclopirox on affected toenails but not as regularly as prescribed. Would like to establish care for long term nail trimming as this is becoming more and more difficult for him. No other complaints. Has hx of gout with flare up 2 months ago resolved with 1 week course of Prednisone. Not bothering him currently.     Vitals:    22 1339   BP: (!) 140/86   Pulse: 64   Resp: 18   Weight: 115 kg (253 lb 8.5 oz)   Height: 6' 0.5" (1.842 m)   PainSc:   2   PainLoc: Toe         Review of Systems   Constitutional: Negative for chills and fever.   Cardiovascular: Negative for chest pain, claudication and leg swelling.   Respiratory: Negative for cough and shortness of breath.    Skin: Positive for dry skin and nail changes.   Musculoskeletal: Positive for gout (stable).   Gastrointestinal: Negative for nausea and vomiting.   Neurological: Negative for numbness and paresthesias.   Psychiatric/Behavioral: Negative for altered mental status.           Objective:      Physical Exam  Vitals reviewed.   Constitutional:       Appearance: He is well-developed.   HENT:      Head: Normocephalic.   Cardiovascular:      Pulses:           Dorsalis pedis pulses are 1+ on the right side and 1+ on the left side.        Posterior tibial pulses are 1+ on the right side and 1+ on the left side.      Comments: CRT < 3 sec to tips of toes.    Pulmonary:      Effort: No respiratory distress.   Musculoskeletal:      Right lower le+ Edema present.      Left lower le+ Edema present.      Comments: Semi-reducible hammertoe contractures noted to toes 2-4 b/l-asymptomatic. HAV, mild, non trackbound noted b/l with " mild medial bony prominence at 1st met head--asymptomatic.     Hypermobility noted to 1st ray b/l with near complete collapse of medial longitudinal arch b/l with loading.     Otherwise rectus foot and toe position bilateral with no major deformities noted. Mild equinus noted b/l ankles with < 10 deg DF noted. MMT 5/5 in DF/PF/Inv/Ev resistance with no reproduction of pain in any direction. Passive range of motion of ankle and pedal joints is painless b/l.     Skin:     General: Skin is warm and dry.      Findings: No erythema.      Comments: No open lesions, lacerations or wounds noted. Nails are thickened, elongated, discolored yellow/brown with subungual debris and brittleness to R 1-5 and L 1-5. Interdigital spaces clean, dry and intact b/l. No erythema noted to b/l foot. Skin texture normal. Pedal hair normal. Mild hyperpigmentation to skin of both ankles and/or feet, consistent with hemosiderin deposits.  Pedal hair diminished b/l. Toes cool to touch b/l.      Neurological:      Mental Status: He is alert and oriented to person, place, and time.      Sensory: No sensory deficit.      Comments: Light touch, proprioception, and sharp/dull sensation are all intact bilaterally.     Psychiatric:         Behavior: Behavior normal.         Thought Content: Thought content normal.         Judgment: Judgment normal.               Assessment:       Encounter Diagnoses   Name Primary?    Stage 3a chronic kidney disease Yes    Onychomycosis due to dermatophyte     Bilateral lower extremity edema          Plan:       Julio was seen today for nail problem, ingrown toenail and toe pain.    Diagnoses and all orders for this visit:    Stage 3a chronic kidney disease    Onychomycosis due to dermatophyte    Bilateral lower extremity edema      I counseled the patient on his conditions, their implications and medical management.     - Shoe inspection. General Foot Education. Patient reminded of the importance of good  nutrition. Patient instructed on proper foot hygeine. We discussed wearing proper shoe gear, daily foot inspections, never walking without protective shoe gear, caution putting sharp instruments to feet     - Discussed general foot care:  Wear comfortable, proper fitting shoes. Wash feet daily. Dry well. After drying, apply moisturizer to feet (no lotion to webspaces). Inspect feet daily for skin breaks, blisters, swelling, or redness. Wear cotton socks (preferably white)  Change socks every day. Do NOT walk barefoot. Do NOT use heating pads or warm/hot water soaks     With patient's permission, nails were aggressively reduced and debrided 1,2,3,4, 5 R and 1,2, 3,4,5 L and filed to their soft tissue attachment mechanically and with electric , removing all offending nail and debris. Patient tolerated this well and no blood was drawn. Patient reports relief following the procedure.     Discussed proper and consistent elevation of lower extremities, above the level of the heart, while at rest, to help control/improve edema.     Monitor gout and report flare ups if they occur.     Continue Penlac on affected toenails as directed for 6-12 months.  Remove weekly with nail file.     RTC 9 weeks, sooner PRN

## 2022-08-11 ENCOUNTER — OFFICE VISIT (OUTPATIENT)
Dept: OTOLARYNGOLOGY | Facility: CLINIC | Age: 72
End: 2022-08-11
Payer: MEDICARE

## 2022-08-11 VITALS
HEIGHT: 72 IN | DIASTOLIC BLOOD PRESSURE: 86 MMHG | BODY MASS INDEX: 33.46 KG/M2 | WEIGHT: 247 LBS | SYSTOLIC BLOOD PRESSURE: 138 MMHG | HEART RATE: 65 BPM | TEMPERATURE: 97 F

## 2022-08-11 DIAGNOSIS — H61.23 EXCESSIVE CERUMEN IN EAR CANAL, BILATERAL: ICD-10-CM

## 2022-08-11 DIAGNOSIS — H93.13 TINNITUS, BILATERAL: ICD-10-CM

## 2022-08-11 DIAGNOSIS — L29.9 EAR ITCH: ICD-10-CM

## 2022-08-11 PROCEDURE — 3008F PR BODY MASS INDEX (BMI) DOCUMENTED: ICD-10-PCS | Mod: CPTII,S$GLB,, | Performed by: OTOLARYNGOLOGY

## 2022-08-11 PROCEDURE — 4010F ACE/ARB THERAPY RXD/TAKEN: CPT | Mod: CPTII,S$GLB,, | Performed by: OTOLARYNGOLOGY

## 2022-08-11 PROCEDURE — 4010F PR ACE/ARB THEARPY RXD/TAKEN: ICD-10-PCS | Mod: CPTII,S$GLB,, | Performed by: OTOLARYNGOLOGY

## 2022-08-11 PROCEDURE — 1159F MED LIST DOCD IN RCRD: CPT | Mod: CPTII,S$GLB,, | Performed by: OTOLARYNGOLOGY

## 2022-08-11 PROCEDURE — 1159F PR MEDICATION LIST DOCUMENTED IN MEDICAL RECORD: ICD-10-PCS | Mod: CPTII,S$GLB,, | Performed by: OTOLARYNGOLOGY

## 2022-08-11 PROCEDURE — 99213 OFFICE O/P EST LOW 20 MIN: CPT | Mod: S$GLB,,, | Performed by: OTOLARYNGOLOGY

## 2022-08-11 PROCEDURE — 1160F PR REVIEW ALL MEDS BY PRESCRIBER/CLIN PHARMACIST DOCUMENTED: ICD-10-PCS | Mod: CPTII,S$GLB,, | Performed by: OTOLARYNGOLOGY

## 2022-08-11 PROCEDURE — 3008F BODY MASS INDEX DOCD: CPT | Mod: CPTII,S$GLB,, | Performed by: OTOLARYNGOLOGY

## 2022-08-11 PROCEDURE — 99213 PR OFFICE/OUTPT VISIT, EST, LEVL III, 20-29 MIN: ICD-10-PCS | Mod: S$GLB,,, | Performed by: OTOLARYNGOLOGY

## 2022-08-11 PROCEDURE — 1160F RVW MEDS BY RX/DR IN RCRD: CPT | Mod: CPTII,S$GLB,, | Performed by: OTOLARYNGOLOGY

## 2022-08-11 PROCEDURE — 3079F PR MOST RECENT DIASTOLIC BLOOD PRESSURE 80-89 MM HG: ICD-10-PCS | Mod: CPTII,S$GLB,, | Performed by: OTOLARYNGOLOGY

## 2022-08-11 PROCEDURE — 3079F DIAST BP 80-89 MM HG: CPT | Mod: CPTII,S$GLB,, | Performed by: OTOLARYNGOLOGY

## 2022-08-11 PROCEDURE — 3075F SYST BP GE 130 - 139MM HG: CPT | Mod: CPTII,S$GLB,, | Performed by: OTOLARYNGOLOGY

## 2022-08-11 PROCEDURE — 3075F PR MOST RECENT SYSTOLIC BLOOD PRESS GE 130-139MM HG: ICD-10-PCS | Mod: CPTII,S$GLB,, | Performed by: OTOLARYNGOLOGY

## 2022-08-11 NOTE — PATIENT INSTRUCTIONS
Proper ear care and hearing protection.    Okay to use OTC Debrox ear wax drops starting in a week and use every few days.    Follow up with audiogram next few weeks.

## 2022-08-11 NOTE — PROGRESS NOTES
Subjective:       Patient ID: Julio Bernardo is a 72 y.o. male.    Chief Complaint: Ear Fullness (Ears cleaned only today / but does say he has ringing / Dr. García has no opening this am for audiogram.) and Waxy, itchy ears and ringing    He is a new patient for me referred due to ear wax buildup and complaints of ringing and itching in the ears.  States ears have been waxy for years, previously cleaned out once or twice in the past, last time was about 2 years ago.  Occasional Q-tips and uses ear plugs for zoom meetings.  As of January has noted tinnitus, perhaps longer than this, described as the sound of [email or web address].  No history of acoustical trauma.  No otalgia, otorrhea, fluctuations, spinning.  No other prior otologic history including in childhood.  Ear itch is mainly left side, often in association with itchy, scratchy throat which clears with gargles.  Takes Flonase daily.  Dr. Fulton's recent note reviewed.        Review of Systems     Constitutional: Negative for appetite change, chills, fatigue, fever and unexpected weight loss.      HENT: Positive for postnasal drip.      Eyes:  Positive for eye itching.     Respiratory:  Positive for snoring.      Cardiovascular:  Positive for foot swelling.     Gastrointestinal:  Negative for abdominal pain, acid reflux, constipation, diarrhea, heartburn and vomiting.     Genitourinary: Negative for difficulty urinating, sexual problems and frequent urination.     Musc: Positive for neck pain.     Skin: Negative for rash.     Allergy: Positive for seasonal allergies.     Endocrine: Negative for cold intolerance and heat intolerance.      Neurological: Negative for dizziness, headaches, light-headedness, seizures and tremors.      Hematologic: Negative for bruises/bleeds easily and swollen glands.      Psychiatric: Negative for decreased concentration, depression, nervous/anxious and sleep disturbance.      Patient's self populated ROS above noted.        Objective:         Vitals:    08/11/22 1019   BP: 138/86   Pulse: 65   Temp: 97 °F (36.1 °C)     Body mass index is 33.45 kg/m².  Physical Exam  Constitutional:       General: He is not in acute distress.     Appearance: He is well-developed.   HENT:      Head: Normocephalic and atraumatic.      Right Ear: Tympanic membrane and external ear normal.      Left Ear: Tympanic membrane and external ear normal.      Ears:      Comments:   Excessive cerumen noted bilaterally, cleaned with a combination of micro instrumentation and tolerated well and otherwise within normal limits AU.     Nose: No nasal deformity, mucosal edema or rhinorrhea.      Mouth/Throat:      Mouth: Mucous membranes are moist.      Pharynx: No pharyngeal swelling, oropharyngeal exudate or posterior oropharyngeal erythema.   Neck:      Trachea: Phonation normal.   Pulmonary:      Effort: Pulmonary effort is normal. No respiratory distress.   Musculoskeletal:      Cervical back: Neck supple.   Lymphadenopathy:      Cervical: No cervical adenopathy.   Skin:     General: Skin is warm and dry.   Neurological:      Mental Status: He is alert and oriented to person, place, and time.   Psychiatric:         Speech: Speech normal.         Behavior: Behavior normal.       Tests / Results:    Audiogram not available today.        Assessment:       1. Excessive cerumen in ear canal, bilateral    2. Ear itch    3. Tinnitus, bilateral        Plan:       Ears cleaned as above.    Ear care reviewed and hearing protection.    Okay to use over-the-counter Debrox earwax drops every few days starting in a week.    Return for audiogram when available next few weeks.

## 2022-08-17 ENCOUNTER — PATIENT MESSAGE (OUTPATIENT)
Dept: INTERNAL MEDICINE | Facility: CLINIC | Age: 72
End: 2022-08-17
Payer: MEDICARE

## 2022-09-07 ENCOUNTER — OFFICE VISIT (OUTPATIENT)
Dept: PODIATRY | Facility: CLINIC | Age: 72
End: 2022-09-07
Payer: MEDICARE

## 2022-09-07 VITALS
WEIGHT: 256.81 LBS | HEART RATE: 67 BPM | BODY MASS INDEX: 34.78 KG/M2 | DIASTOLIC BLOOD PRESSURE: 65 MMHG | HEIGHT: 72 IN | SYSTOLIC BLOOD PRESSURE: 149 MMHG

## 2022-09-07 DIAGNOSIS — L60.0 INGROWN TOENAIL OF LEFT FOOT: ICD-10-CM

## 2022-09-07 DIAGNOSIS — N18.31 STAGE 3A CHRONIC KIDNEY DISEASE: Primary | ICD-10-CM

## 2022-09-07 DIAGNOSIS — L84 CORN OR CALLUS: ICD-10-CM

## 2022-09-07 PROCEDURE — 1160F RVW MEDS BY RX/DR IN RCRD: CPT | Mod: CPTII,S$GLB,, | Performed by: PODIATRIST

## 2022-09-07 PROCEDURE — 3078F PR MOST RECENT DIASTOLIC BLOOD PRESSURE < 80 MM HG: ICD-10-PCS | Mod: CPTII,S$GLB,, | Performed by: PODIATRIST

## 2022-09-07 PROCEDURE — 3077F SYST BP >= 140 MM HG: CPT | Mod: CPTII,S$GLB,, | Performed by: PODIATRIST

## 2022-09-07 PROCEDURE — 1159F PR MEDICATION LIST DOCUMENTED IN MEDICAL RECORD: ICD-10-PCS | Mod: CPTII,S$GLB,, | Performed by: PODIATRIST

## 2022-09-07 PROCEDURE — 4010F ACE/ARB THERAPY RXD/TAKEN: CPT | Mod: CPTII,S$GLB,, | Performed by: PODIATRIST

## 2022-09-07 PROCEDURE — 99999 PR PBB SHADOW E&M-EST. PATIENT-LVL III: CPT | Mod: PBBFAC,,, | Performed by: PODIATRIST

## 2022-09-07 PROCEDURE — 1159F MED LIST DOCD IN RCRD: CPT | Mod: CPTII,S$GLB,, | Performed by: PODIATRIST

## 2022-09-07 PROCEDURE — 3008F PR BODY MASS INDEX (BMI) DOCUMENTED: ICD-10-PCS | Mod: CPTII,S$GLB,, | Performed by: PODIATRIST

## 2022-09-07 PROCEDURE — 3077F PR MOST RECENT SYSTOLIC BLOOD PRESSURE >= 140 MM HG: ICD-10-PCS | Mod: CPTII,S$GLB,, | Performed by: PODIATRIST

## 2022-09-07 PROCEDURE — 3078F DIAST BP <80 MM HG: CPT | Mod: CPTII,S$GLB,, | Performed by: PODIATRIST

## 2022-09-07 PROCEDURE — 1125F AMNT PAIN NOTED PAIN PRSNT: CPT | Mod: CPTII,S$GLB,, | Performed by: PODIATRIST

## 2022-09-07 PROCEDURE — 1125F PR PAIN SEVERITY QUANTIFIED, PAIN PRESENT: ICD-10-PCS | Mod: CPTII,S$GLB,, | Performed by: PODIATRIST

## 2022-09-07 PROCEDURE — 99213 PR OFFICE/OUTPT VISIT, EST, LEVL III, 20-29 MIN: ICD-10-PCS | Mod: S$GLB,,, | Performed by: PODIATRIST

## 2022-09-07 PROCEDURE — 99213 OFFICE O/P EST LOW 20 MIN: CPT | Mod: S$GLB,,, | Performed by: PODIATRIST

## 2022-09-07 PROCEDURE — 3008F BODY MASS INDEX DOCD: CPT | Mod: CPTII,S$GLB,, | Performed by: PODIATRIST

## 2022-09-07 PROCEDURE — 1160F PR REVIEW ALL MEDS BY PRESCRIBER/CLIN PHARMACIST DOCUMENTED: ICD-10-PCS | Mod: CPTII,S$GLB,, | Performed by: PODIATRIST

## 2022-09-07 PROCEDURE — 4010F PR ACE/ARB THEARPY RXD/TAKEN: ICD-10-PCS | Mod: CPTII,S$GLB,, | Performed by: PODIATRIST

## 2022-09-07 PROCEDURE — 99999 PR PBB SHADOW E&M-EST. PATIENT-LVL III: ICD-10-PCS | Mod: PBBFAC,,, | Performed by: PODIATRIST

## 2022-09-07 NOTE — PROGRESS NOTES
"Subjective:      Patient ID: Julio Bernardo is a 72 y.o. male.    Chief Complaint: Ingrown Toenail (Left big toe ingrown)    Julio Bernardo, 72 y.o., male returns to clinic with CC of thick, discolored toenails on both feet that are difficult to trim. Has been using topical Ciclopirox on affected toenails but not as regularly as prescribed. Would like to establish care for long term nail trimming as this is becoming more and more difficult for him. No other complaints. Has hx of gout with flare up 2 months ago resolved with 1 week course of Prednisone. Not bothering him currently.     09/07/2022: returns to clinic sooner than his normal 2 month routine high risk (CKD) foot care visit for L great toe ingrown nail. Has been bothering him on and off for months and was feeling better previously but now starting to bother him with pain. Wanted to have it assessed. Has not tried to self treat other than wider shoes.     Vitals:    09/07/22 1429   BP: (!) 149/65   Pulse: 67   Weight: 116.5 kg (256 lb 13.4 oz)   Height: 6' 0.05" (1.83 m)   PainSc:   2   PainLoc: Foot         Review of Systems   Constitutional: Negative for chills and fever.   Cardiovascular:  Negative for chest pain, claudication and leg swelling.   Respiratory:  Negative for cough and shortness of breath.    Skin:  Positive for dry skin and nail changes.   Musculoskeletal:  Positive for gout (stable).        L great toenail pain   Gastrointestinal:  Negative for nausea and vomiting.   Neurological:  Negative for numbness and paresthesias.   Psychiatric/Behavioral:  Negative for altered mental status.          Objective:      Physical Exam  Vitals reviewed.   Constitutional:       Appearance: He is well-developed.   HENT:      Head: Normocephalic.   Cardiovascular:      Pulses:           Dorsalis pedis pulses are 1+ on the right side and 1+ on the left side.        Posterior tibial pulses are 1+ on the right side and 1+ on the left side.      Comments: " CRT < 3 sec to tips of toes.    Pulmonary:      Effort: No respiratory distress.   Musculoskeletal:      Right lower le+ Edema present.      Left lower le+ Edema present.      Comments: Pain with palpation of L hallux distal medial nail border. See skin section.     Semi-reducible hammertoe contractures noted to toes 2-4 b/l-asymptomatic. HAV, mild, non trackbound noted b/l with mild medial bony prominence at 1st met head--asymptomatic.     Hypermobility noted to 1st ray b/l with near complete collapse of medial longitudinal arch b/l with loading.     Otherwise rectus foot and toe position bilateral with no major deformities noted. Mild equinus noted b/l ankles with < 10 deg DF noted. MMT 5/5 in DF/PF/Inv/Ev resistance with no reproduction of pain in any direction. Passive range of motion of ankle and pedal joints is painless b/l.     Skin:     General: Skin is warm and dry.      Findings: No erythema.      Comments: No open lesions, lacerations or wounds noted. L hallux distal medial nail border moderately incurvated with hyperkeratotic tissue along distal medial border. No open wound, drainage or SOI. Small spicule of nail noted embedded within skin. Remaining toenails are dystrophic but well trimmed to R 1-5 and L 1-5. Interdigital spaces clean, dry and intact b/l. No erythema noted to b/l foot. Skin texture normal. Pedal hair normal. Mild hyperpigmentation to skin of both ankles and/or feet, consistent with hemosiderin deposits.  Pedal hair diminished b/l. Toes cool to touch b/l.      Neurological:      Mental Status: He is alert and oriented to person, place, and time.      Sensory: No sensory deficit.      Comments: Light touch, proprioception, and sharp/dull sensation are all intact bilaterally.     Psychiatric:         Behavior: Behavior normal.         Thought Content: Thought content normal.         Judgment: Judgment normal.             Assessment:       Encounter Diagnoses   Name Primary?     Stage 3a chronic kidney disease Yes    Ingrown toenail of left foot     Corn or callus            Plan:       Julio was seen today for ingrown toenail.    Diagnoses and all orders for this visit:    Stage 3a chronic kidney disease    Ingrown toenail of left foot    Corn or callus      I counseled the patient on his conditions, their implications and medical management.     - Shoe inspection. General Foot Education. Patient reminded of the importance of good nutrition. Patient instructed on proper foot hygeine. We discussed wearing proper shoe gear, daily foot inspections, never walking without protective shoe gear, caution putting sharp instruments to feet     - Discussed general foot care:  Wear comfortable, proper fitting shoes. Wash feet daily. Dry well. After drying, apply moisturizer to feet (no lotion to webspaces). Inspect feet daily for skin breaks, blisters, swelling, or redness. Wear cotton socks (preferably white)  Change socks every day. Do NOT walk barefoot. Do NOT use heating pads or warm/hot water soaks      Utilizing sterile toenail clippers I aggressively trimmed the offending nail border/s approximately 3 mm from its/their edge and carried the nail plate down at an angle in order to wedge out the offending cryptotic portion of the nail plate. The offending border was then removed in toto. The area was cleansed with alcohol. Patient tolerated the procedure well and related significant relief. No wound or signs of infection noted to the area at this time.     Briefly discussed phenol matrixectomy of affected borders of affected toenail if routine trimming does not help improve the pain. Discussed alternatives, risks and complications of the procedure along with post procedure protocol and expectations. Patient will consider this if symptoms fail to improve with today's trimming.     The affected area was cleansed with an alcohol prep pad. Next, utilizing a 5mm curette, the hyperkeratotic tissues  were trimmed from distal medial L hallux nail border, down to appropriate level of skin. Care was taken to remove any nucleated core from the center of the lesion. No pinpoint bleeding was encountered. The patient tolerated relief following this procedure.      RTC 1 month for routine high risk foot care, sooner PRN

## 2022-09-16 ENCOUNTER — OFFICE VISIT (OUTPATIENT)
Dept: OPTOMETRY | Facility: CLINIC | Age: 72
End: 2022-09-16
Payer: MEDICARE

## 2022-09-16 DIAGNOSIS — Z13.5 GLAUCOMA SCREENING: ICD-10-CM

## 2022-09-16 DIAGNOSIS — Z96.1 PSEUDOPHAKIA OF BOTH EYES: Primary | ICD-10-CM

## 2022-09-16 DIAGNOSIS — H52.4 ASTIGMATISM OF BOTH EYES WITH PRESBYOPIA: ICD-10-CM

## 2022-09-16 DIAGNOSIS — H52.203 ASTIGMATISM OF BOTH EYES WITH PRESBYOPIA: ICD-10-CM

## 2022-09-16 PROCEDURE — 3288F FALL RISK ASSESSMENT DOCD: CPT | Mod: CPTII,S$GLB,, | Performed by: OPTOMETRIST

## 2022-09-16 PROCEDURE — 1126F PR PAIN SEVERITY QUANTIFIED, NO PAIN PRESENT: ICD-10-PCS | Mod: CPTII,S$GLB,, | Performed by: OPTOMETRIST

## 2022-09-16 PROCEDURE — 1101F PR PT FALLS ASSESS DOC 0-1 FALLS W/OUT INJ PAST YR: ICD-10-PCS | Mod: CPTII,S$GLB,, | Performed by: OPTOMETRIST

## 2022-09-16 PROCEDURE — 92014 COMPRE OPH EXAM EST PT 1/>: CPT | Mod: S$GLB,,, | Performed by: OPTOMETRIST

## 2022-09-16 PROCEDURE — 1126F AMNT PAIN NOTED NONE PRSNT: CPT | Mod: CPTII,S$GLB,, | Performed by: OPTOMETRIST

## 2022-09-16 PROCEDURE — 3288F PR FALLS RISK ASSESSMENT DOCUMENTED: ICD-10-PCS | Mod: CPTII,S$GLB,, | Performed by: OPTOMETRIST

## 2022-09-16 PROCEDURE — 4010F PR ACE/ARB THEARPY RXD/TAKEN: ICD-10-PCS | Mod: CPTII,S$GLB,, | Performed by: OPTOMETRIST

## 2022-09-16 PROCEDURE — 1160F RVW MEDS BY RX/DR IN RCRD: CPT | Mod: CPTII,S$GLB,, | Performed by: OPTOMETRIST

## 2022-09-16 PROCEDURE — 99999 PR PBB SHADOW E&M-EST. PATIENT-LVL III: ICD-10-PCS | Mod: PBBFAC,,, | Performed by: OPTOMETRIST

## 2022-09-16 PROCEDURE — 1160F PR REVIEW ALL MEDS BY PRESCRIBER/CLIN PHARMACIST DOCUMENTED: ICD-10-PCS | Mod: CPTII,S$GLB,, | Performed by: OPTOMETRIST

## 2022-09-16 PROCEDURE — 4010F ACE/ARB THERAPY RXD/TAKEN: CPT | Mod: CPTII,S$GLB,, | Performed by: OPTOMETRIST

## 2022-09-16 PROCEDURE — 1101F PT FALLS ASSESS-DOCD LE1/YR: CPT | Mod: CPTII,S$GLB,, | Performed by: OPTOMETRIST

## 2022-09-16 PROCEDURE — 92015 DETERMINE REFRACTIVE STATE: CPT | Mod: S$GLB,,, | Performed by: OPTOMETRIST

## 2022-09-16 PROCEDURE — 99999 PR PBB SHADOW E&M-EST. PATIENT-LVL III: CPT | Mod: PBBFAC,,, | Performed by: OPTOMETRIST

## 2022-09-16 PROCEDURE — 92014 PR EYE EXAM, EST PATIENT,COMPREHESV: ICD-10-PCS | Mod: S$GLB,,, | Performed by: OPTOMETRIST

## 2022-09-16 PROCEDURE — 1159F MED LIST DOCD IN RCRD: CPT | Mod: CPTII,S$GLB,, | Performed by: OPTOMETRIST

## 2022-09-16 PROCEDURE — 1159F PR MEDICATION LIST DOCUMENTED IN MEDICAL RECORD: ICD-10-PCS | Mod: CPTII,S$GLB,, | Performed by: OPTOMETRIST

## 2022-09-16 PROCEDURE — 92015 PR REFRACTION: ICD-10-PCS | Mod: S$GLB,,, | Performed by: OPTOMETRIST

## 2022-09-16 NOTE — PROGRESS NOTES
HPI    71 Y/o male is here for routine eye exam with C/o pt states while having   Rx on vision is blurry pt state he takes his Rx off he can see at distance   better than wit them on   Pt denies pain and discomfort   No f/f    Eye med : [facility] OU BID   Last edited by Librado Lazcano MA on 9/16/2022  8:01 AM.        ROS    Positive for: Eyes (cat surgery)  Negative for: Constitutional, Gastrointestinal, Neurological, Skin,   Genitourinary, Musculoskeletal, HENT, Endocrine, Cardiovascular,   Respiratory, Psychiatric, Allergic/Imm, Heme/Lymph  Last edited by Manish Godoy, OD on 9/16/2022  8:07 AM.        Assessment /Plan     For exam results, see Encounter Report.    Pseudophakia of both eyes    Glaucoma screening    Astigmatism of both eyes with presbyopia      Sp pciol/YAG OU--pt got spex from outside optical last year, and has to take off to drive.  Suspect was given PALs w top for computer/bottom for near.  Wrote new spex Rx    PLAN:    Rtc 1 yr

## 2022-09-19 ENCOUNTER — CLINICAL SUPPORT (OUTPATIENT)
Dept: OTOLARYNGOLOGY | Facility: CLINIC | Age: 72
End: 2022-09-19
Payer: MEDICARE

## 2022-09-19 ENCOUNTER — OFFICE VISIT (OUTPATIENT)
Dept: OTOLARYNGOLOGY | Facility: CLINIC | Age: 72
End: 2022-09-19
Payer: MEDICARE

## 2022-09-19 VITALS
BODY MASS INDEX: 34.36 KG/M2 | TEMPERATURE: 98 F | WEIGHT: 253.69 LBS | HEART RATE: 71 BPM | SYSTOLIC BLOOD PRESSURE: 144 MMHG | HEIGHT: 72 IN | DIASTOLIC BLOOD PRESSURE: 77 MMHG

## 2022-09-19 DIAGNOSIS — R94.120 ABNORMALLY COMPLIANT MIDDLE EAR SYSTEM WITH TYPE AD TYMPANOGRAM CURVE OF BOTH EARS: ICD-10-CM

## 2022-09-19 DIAGNOSIS — Z78.9 HISTORY OF EXCESSIVE CERUMEN: ICD-10-CM

## 2022-09-19 DIAGNOSIS — H90.3 BILATERAL SENSORINEURAL HEARING LOSS: Primary | ICD-10-CM

## 2022-09-19 DIAGNOSIS — L29.9 EAR ITCH: ICD-10-CM

## 2022-09-19 DIAGNOSIS — H90.3 SENSORINEURAL HEARING LOSS, BILATERAL: ICD-10-CM

## 2022-09-19 DIAGNOSIS — H91.90 HEARING DISORDER, UNSPECIFIED LATERALITY: Primary | ICD-10-CM

## 2022-09-19 DIAGNOSIS — R09.81 NASAL CONGESTION: ICD-10-CM

## 2022-09-19 DIAGNOSIS — Z86.69 HISTORY OF EUSTACHIAN TUBE DYSFUNCTION: ICD-10-CM

## 2022-09-19 DIAGNOSIS — Z77.122 HISTORY OF EXPOSURE TO NOISE: ICD-10-CM

## 2022-09-19 DIAGNOSIS — H93.13 BILATERAL TINNITUS: ICD-10-CM

## 2022-09-19 PROCEDURE — 3077F PR MOST RECENT SYSTOLIC BLOOD PRESSURE >= 140 MM HG: ICD-10-PCS | Mod: CPTII,S$GLB,, | Performed by: OTOLARYNGOLOGY

## 2022-09-19 PROCEDURE — 3078F PR MOST RECENT DIASTOLIC BLOOD PRESSURE < 80 MM HG: ICD-10-PCS | Mod: CPTII,S$GLB,, | Performed by: OTOLARYNGOLOGY

## 2022-09-19 PROCEDURE — 1160F PR REVIEW ALL MEDS BY PRESCRIBER/CLIN PHARMACIST DOCUMENTED: ICD-10-PCS | Mod: CPTII,S$GLB,, | Performed by: OTOLARYNGOLOGY

## 2022-09-19 PROCEDURE — 3078F DIAST BP <80 MM HG: CPT | Mod: CPTII,S$GLB,, | Performed by: OTOLARYNGOLOGY

## 2022-09-19 PROCEDURE — 92550 PR TYMPANOMETRY AND REFLEX THRESHOLD MEASUREMENTS: ICD-10-PCS | Mod: S$GLB,,, | Performed by: AUDIOLOGIST-HEARING AID FITTER

## 2022-09-19 PROCEDURE — 1160F RVW MEDS BY RX/DR IN RCRD: CPT | Mod: CPTII,S$GLB,, | Performed by: OTOLARYNGOLOGY

## 2022-09-19 PROCEDURE — 92557 COMPREHENSIVE HEARING TEST: CPT | Mod: S$GLB,,, | Performed by: AUDIOLOGIST-HEARING AID FITTER

## 2022-09-19 PROCEDURE — 92550 TYMPANOMETRY & REFLEX THRESH: CPT | Mod: S$GLB,,, | Performed by: AUDIOLOGIST-HEARING AID FITTER

## 2022-09-19 PROCEDURE — 99214 OFFICE O/P EST MOD 30 MIN: CPT | Mod: S$GLB,,, | Performed by: OTOLARYNGOLOGY

## 2022-09-19 PROCEDURE — 3077F SYST BP >= 140 MM HG: CPT | Mod: CPTII,S$GLB,, | Performed by: OTOLARYNGOLOGY

## 2022-09-19 PROCEDURE — 3008F BODY MASS INDEX DOCD: CPT | Mod: CPTII,S$GLB,, | Performed by: OTOLARYNGOLOGY

## 2022-09-19 PROCEDURE — 1159F PR MEDICATION LIST DOCUMENTED IN MEDICAL RECORD: ICD-10-PCS | Mod: CPTII,S$GLB,, | Performed by: OTOLARYNGOLOGY

## 2022-09-19 PROCEDURE — 92557 PR COMPREHENSIVE HEARING TEST: ICD-10-PCS | Mod: S$GLB,,, | Performed by: AUDIOLOGIST-HEARING AID FITTER

## 2022-09-19 PROCEDURE — 99214 PR OFFICE/OUTPT VISIT, EST, LEVL IV, 30-39 MIN: ICD-10-PCS | Mod: S$GLB,,, | Performed by: OTOLARYNGOLOGY

## 2022-09-19 PROCEDURE — 1159F MED LIST DOCD IN RCRD: CPT | Mod: CPTII,S$GLB,, | Performed by: OTOLARYNGOLOGY

## 2022-09-19 PROCEDURE — 3008F PR BODY MASS INDEX (BMI) DOCUMENTED: ICD-10-PCS | Mod: CPTII,S$GLB,, | Performed by: OTOLARYNGOLOGY

## 2022-09-19 PROCEDURE — 4010F PR ACE/ARB THEARPY RXD/TAKEN: ICD-10-PCS | Mod: CPTII,S$GLB,, | Performed by: OTOLARYNGOLOGY

## 2022-09-19 PROCEDURE — 4010F ACE/ARB THERAPY RXD/TAKEN: CPT | Mod: CPTII,S$GLB,, | Performed by: OTOLARYNGOLOGY

## 2022-09-19 RX ORDER — AZELASTINE 1 MG/ML
SPRAY, METERED NASAL
Qty: 30 ML | Refills: 2 | Status: SHIPPED | OUTPATIENT
Start: 2022-09-19 | End: 2022-11-17

## 2022-09-19 NOTE — Clinical Note
Your patient, Julio Bernardo, was recently seen for an audiogram.  My assessment and recommendations are enclosed.  If you should have any questions or concerns, please contact me at [phone].   Sincerely, Earnest Dc, CCC-A Audiologist [facility]

## 2022-09-19 NOTE — PATIENT INSTRUCTIONS
Cleared for hearing aid trial.  Hearing protection.  Recheck one year unless change or problems prior.    Baby oil drops as needed for ear itch vs Vosol HC.    Change to azelastine nasal spray and use 2 sprays in each nostril twice a day regularly or as needed.      OTC Afrin nasal spray 2 sprays in each nostril about 30 minutes prior to flight and frequent insufflation exercises during flight.

## 2022-09-26 NOTE — PROGRESS NOTES
Earnest Dc, CCC-A  Audiologist - [facility] [address] [place] [address]  [email or web address]  [phone]    Patient: Julio Bernardo   MRN: [id number]  [address] [facility]   Home Phone [phone]   Work Phone [phone]   Mobile [phone]   : 1950  QUINTEROS: 2022      AUDIOLOGICAL EVALUATION      RECOMMENDATIONS:   It is recommended that Julio Bernardo:  Follow up medically with Dr. Robison to assess his hearing loss and tinnitus.  Receive binaural hearing aids to improve speech understanding.  Continue to receive audiological monitoring annually.  Use precaution and/or hearing protection in noisy environments.    If you should have any questions or concerns regarding the above information, please do not hesitate to contact me at [phone].      _______________________________  Earnest Dc, JIHAN-A  Audiologist

## 2022-10-03 ENCOUNTER — OFFICE VISIT (OUTPATIENT)
Dept: INTERNAL MEDICINE | Facility: CLINIC | Age: 72
End: 2022-10-03
Payer: MEDICARE

## 2022-10-03 VITALS
SYSTOLIC BLOOD PRESSURE: 126 MMHG | BODY MASS INDEX: 33.11 KG/M2 | WEIGHT: 249.81 LBS | HEIGHT: 73 IN | DIASTOLIC BLOOD PRESSURE: 86 MMHG | OXYGEN SATURATION: 96 % | HEART RATE: 79 BPM

## 2022-10-03 DIAGNOSIS — I10 PRIMARY HYPERTENSION: ICD-10-CM

## 2022-10-03 DIAGNOSIS — N18.31 STAGE 3A CHRONIC KIDNEY DISEASE: ICD-10-CM

## 2022-10-03 DIAGNOSIS — E78.5 HYPERLIPIDEMIA, UNSPECIFIED HYPERLIPIDEMIA TYPE: ICD-10-CM

## 2022-10-03 DIAGNOSIS — Z12.5 SCREENING PSA (PROSTATE SPECIFIC ANTIGEN): ICD-10-CM

## 2022-10-03 DIAGNOSIS — M1A.3710 CHRONIC GOUT OF RIGHT FOOT DUE TO RENAL IMPAIRMENT WITHOUT TOPHUS: ICD-10-CM

## 2022-10-03 DIAGNOSIS — M43.6 NECK STIFFNESS: Primary | ICD-10-CM

## 2022-10-03 DIAGNOSIS — K63.5 POLYP OF COLON, UNSPECIFIED PART OF COLON, UNSPECIFIED TYPE: ICD-10-CM

## 2022-10-03 DIAGNOSIS — I10 IDIOPATHIC HYPERTENSION: ICD-10-CM

## 2022-10-03 PROCEDURE — 3079F DIAST BP 80-89 MM HG: CPT | Mod: CPTII,S$GLB,, | Performed by: INTERNAL MEDICINE

## 2022-10-03 PROCEDURE — 3288F FALL RISK ASSESSMENT DOCD: CPT | Mod: CPTII,S$GLB,, | Performed by: INTERNAL MEDICINE

## 2022-10-03 PROCEDURE — 1159F PR MEDICATION LIST DOCUMENTED IN MEDICAL RECORD: ICD-10-PCS | Mod: CPTII,S$GLB,, | Performed by: INTERNAL MEDICINE

## 2022-10-03 PROCEDURE — 3079F PR MOST RECENT DIASTOLIC BLOOD PRESSURE 80-89 MM HG: ICD-10-PCS | Mod: CPTII,S$GLB,, | Performed by: INTERNAL MEDICINE

## 2022-10-03 PROCEDURE — 1101F PR PT FALLS ASSESS DOC 0-1 FALLS W/OUT INJ PAST YR: ICD-10-PCS | Mod: CPTII,S$GLB,, | Performed by: INTERNAL MEDICINE

## 2022-10-03 PROCEDURE — 1126F AMNT PAIN NOTED NONE PRSNT: CPT | Mod: CPTII,S$GLB,, | Performed by: INTERNAL MEDICINE

## 2022-10-03 PROCEDURE — 99214 PR OFFICE/OUTPT VISIT, EST, LEVL IV, 30-39 MIN: ICD-10-PCS | Mod: S$GLB,,, | Performed by: INTERNAL MEDICINE

## 2022-10-03 PROCEDURE — 1126F PR PAIN SEVERITY QUANTIFIED, NO PAIN PRESENT: ICD-10-PCS | Mod: CPTII,S$GLB,, | Performed by: INTERNAL MEDICINE

## 2022-10-03 PROCEDURE — 4010F PR ACE/ARB THEARPY RXD/TAKEN: ICD-10-PCS | Mod: CPTII,S$GLB,, | Performed by: INTERNAL MEDICINE

## 2022-10-03 PROCEDURE — 3288F PR FALLS RISK ASSESSMENT DOCUMENTED: ICD-10-PCS | Mod: CPTII,S$GLB,, | Performed by: INTERNAL MEDICINE

## 2022-10-03 PROCEDURE — 3008F PR BODY MASS INDEX (BMI) DOCUMENTED: ICD-10-PCS | Mod: CPTII,S$GLB,, | Performed by: INTERNAL MEDICINE

## 2022-10-03 PROCEDURE — 4010F ACE/ARB THERAPY RXD/TAKEN: CPT | Mod: CPTII,S$GLB,, | Performed by: INTERNAL MEDICINE

## 2022-10-03 PROCEDURE — 1159F MED LIST DOCD IN RCRD: CPT | Mod: CPTII,S$GLB,, | Performed by: INTERNAL MEDICINE

## 2022-10-03 PROCEDURE — 3008F BODY MASS INDEX DOCD: CPT | Mod: CPTII,S$GLB,, | Performed by: INTERNAL MEDICINE

## 2022-10-03 PROCEDURE — 99999 PR PBB SHADOW E&M-EST. PATIENT-LVL III: ICD-10-PCS | Mod: PBBFAC,,, | Performed by: INTERNAL MEDICINE

## 2022-10-03 PROCEDURE — 3074F PR MOST RECENT SYSTOLIC BLOOD PRESSURE < 130 MM HG: ICD-10-PCS | Mod: CPTII,S$GLB,, | Performed by: INTERNAL MEDICINE

## 2022-10-03 PROCEDURE — 3074F SYST BP LT 130 MM HG: CPT | Mod: CPTII,S$GLB,, | Performed by: INTERNAL MEDICINE

## 2022-10-03 PROCEDURE — 99999 PR PBB SHADOW E&M-EST. PATIENT-LVL III: CPT | Mod: PBBFAC,,, | Performed by: INTERNAL MEDICINE

## 2022-10-03 PROCEDURE — 1101F PT FALLS ASSESS-DOCD LE1/YR: CPT | Mod: CPTII,S$GLB,, | Performed by: INTERNAL MEDICINE

## 2022-10-03 PROCEDURE — 99214 OFFICE O/P EST MOD 30 MIN: CPT | Mod: S$GLB,,, | Performed by: INTERNAL MEDICINE

## 2022-10-03 RX ORDER — HYDROCHLOROTHIAZIDE 12.5 MG/1
12.5 CAPSULE ORAL DAILY
Qty: 90 CAPSULE | Refills: 3 | Status: SHIPPED | OUTPATIENT
Start: 2022-10-03 | End: 2022-11-25 | Stop reason: SDUPTHER

## 2022-10-03 RX ORDER — LOSARTAN POTASSIUM 100 MG/1
100 TABLET ORAL DAILY
Qty: 90 TABLET | Refills: 3 | Status: SHIPPED | OUTPATIENT
Start: 2022-10-03 | End: 2023-05-18

## 2022-10-03 NOTE — PROGRESS NOTES
"  Mr. Bernardo is a 72  -year-old gentleman coming in today for followup of his   ongoing medical problems . I last saw him in 10/01/2021.      1. He has hypertension. He has been on losartan 100 mg a day-- and  amlodipine- bp is 126/86     2. He has hyperlipidemia. He is on atorvastatin. Cholesterol from this visit was reviewed---tc was 148, hdl 52  , LDL 78.2.          3. . Colon polyps: He had 2 colon polyps in 2007, and 1 polyps in 9/2013-- Next c-scope was 9/2016- 1 polyp was found-- next c-scope is due late in 2019.  He has not had one-- look like he had an order for one recently but it was completed and he has not scheduled it yet.  Bowels are normal if he eats a lot of fiber.          4. . Gout-- last attack in 3 months ago after eating lamb for 2 straight nights/         5. Chronic neck pain-- pretty stable he plans to get massage soon-- worse on the right vs the left.  He says it is doing terrible and he is going to get massage after he leaves today .          ROS : Gen - no fatigue -- she has lost 5 # since last visit.    Eyes - no eye pain or visual changes  ENT - no hoarseness or sore throat  CV - No chest pain or SOB.  NO palpitations.  Pulm - no cough or wheezing  GI - no N/V/D   no dysuria or incontinence  MS - no joint pain or muscle pain  Skin - no rash, or c/o of skin lesions  Neuro - no HA, dizziness--- memory is doing well.   Heme - no abnormal bleeding or bruising  Endo - no polydipsia, or temperature changes  Psych - no anxiety or depression      /86 (BP Location: Left arm, Patient Position: Sitting, BP Method: Large (Manual))   Pulse 79   Ht 6' 0.5" (1.842 m)   Wt 113.3 kg (249 lb 12.5 oz)   SpO2 96%   BMI 33.41 kg/m²             PHYSICAL EXAM: A well-appearing gentleman.   NECK: Supple with no JVD. Thyroid is not enlarged.   Chest : CTA bilaterally   CARDIOVASCULAR: S1, S2, regular rate and rhythm without murmur, gallop, or  rub.   Lungs: Clear bilaterally   ABDOMEN: Soft, " nontender.   LOWER EXTREMITIES: No edema  He has no cervical, axillary, or inguinal adenopathy.   Bicep reflexs: nomal and equal bilaterally  Lower extremities: Normal muscle strength. No edema or cyanosis. TP/PT pulses+2 bilaterally.bilateral LE edema        ASSESSMENT:   1. Hyperlipidemia. continue the atorvastatin.   2. Hypertension.  losartan-- and  hctz. make sure he is drinking enough fluids--if he has more giout- may need to change to another medicine     3. Obesity. Spoke to him about diet and exercise. He actually seems to be exercising pretty well. Lost 5 # since last visit.      4. Colon polyp- c-scope due  again in 5/2027-- has 3 polyps on 5/2022 c-scope    5. Gout-  No  recent episode. - We reviewed the gout diet.  6  ckd stage 3 A-- stable-- will monitor-- avoid nsaids    7. Has a cavitary lung lesion following with pulmonary for this.  PET reviewed from 2/2022.    reviewed Pulmonary note form 4/2022-- ct follow up  in 12/2022.

## 2022-10-06 ENCOUNTER — OFFICE VISIT (OUTPATIENT)
Dept: PODIATRY | Facility: CLINIC | Age: 72
End: 2022-10-06
Payer: MEDICARE

## 2022-10-06 VITALS
DIASTOLIC BLOOD PRESSURE: 81 MMHG | HEART RATE: 66 BPM | HEIGHT: 73 IN | SYSTOLIC BLOOD PRESSURE: 128 MMHG | WEIGHT: 249.81 LBS | BODY MASS INDEX: 33.11 KG/M2

## 2022-10-06 DIAGNOSIS — N18.31 STAGE 3A CHRONIC KIDNEY DISEASE: Primary | ICD-10-CM

## 2022-10-06 DIAGNOSIS — L60.0 INGROWN TOENAIL OF LEFT FOOT: ICD-10-CM

## 2022-10-06 PROCEDURE — 4010F ACE/ARB THERAPY RXD/TAKEN: CPT | Mod: CPTII,S$GLB,, | Performed by: PODIATRIST

## 2022-10-06 PROCEDURE — 3079F PR MOST RECENT DIASTOLIC BLOOD PRESSURE 80-89 MM HG: ICD-10-PCS | Mod: CPTII,S$GLB,, | Performed by: PODIATRIST

## 2022-10-06 PROCEDURE — 99213 PR OFFICE/OUTPT VISIT, EST, LEVL III, 20-29 MIN: ICD-10-PCS | Mod: S$GLB,,, | Performed by: PODIATRIST

## 2022-10-06 PROCEDURE — 3074F PR MOST RECENT SYSTOLIC BLOOD PRESSURE < 130 MM HG: ICD-10-PCS | Mod: CPTII,S$GLB,, | Performed by: PODIATRIST

## 2022-10-06 PROCEDURE — 99999 PR PBB SHADOW E&M-EST. PATIENT-LVL III: CPT | Mod: PBBFAC,,, | Performed by: PODIATRIST

## 2022-10-06 PROCEDURE — 3079F DIAST BP 80-89 MM HG: CPT | Mod: CPTII,S$GLB,, | Performed by: PODIATRIST

## 2022-10-06 PROCEDURE — 4010F PR ACE/ARB THEARPY RXD/TAKEN: ICD-10-PCS | Mod: CPTII,S$GLB,, | Performed by: PODIATRIST

## 2022-10-06 PROCEDURE — 99213 OFFICE O/P EST LOW 20 MIN: CPT | Mod: S$GLB,,, | Performed by: PODIATRIST

## 2022-10-06 PROCEDURE — 1125F PR PAIN SEVERITY QUANTIFIED, PAIN PRESENT: ICD-10-PCS | Mod: CPTII,S$GLB,, | Performed by: PODIATRIST

## 2022-10-06 PROCEDURE — 1160F RVW MEDS BY RX/DR IN RCRD: CPT | Mod: CPTII,S$GLB,, | Performed by: PODIATRIST

## 2022-10-06 PROCEDURE — 3008F PR BODY MASS INDEX (BMI) DOCUMENTED: ICD-10-PCS | Mod: CPTII,S$GLB,, | Performed by: PODIATRIST

## 2022-10-06 PROCEDURE — 1125F AMNT PAIN NOTED PAIN PRSNT: CPT | Mod: CPTII,S$GLB,, | Performed by: PODIATRIST

## 2022-10-06 PROCEDURE — 1159F PR MEDICATION LIST DOCUMENTED IN MEDICAL RECORD: ICD-10-PCS | Mod: CPTII,S$GLB,, | Performed by: PODIATRIST

## 2022-10-06 PROCEDURE — 3074F SYST BP LT 130 MM HG: CPT | Mod: CPTII,S$GLB,, | Performed by: PODIATRIST

## 2022-10-06 PROCEDURE — 1159F MED LIST DOCD IN RCRD: CPT | Mod: CPTII,S$GLB,, | Performed by: PODIATRIST

## 2022-10-06 PROCEDURE — 99999 PR PBB SHADOW E&M-EST. PATIENT-LVL III: ICD-10-PCS | Mod: PBBFAC,,, | Performed by: PODIATRIST

## 2022-10-06 PROCEDURE — 1160F PR REVIEW ALL MEDS BY PRESCRIBER/CLIN PHARMACIST DOCUMENTED: ICD-10-PCS | Mod: CPTII,S$GLB,, | Performed by: PODIATRIST

## 2022-10-06 PROCEDURE — 3008F BODY MASS INDEX DOCD: CPT | Mod: CPTII,S$GLB,, | Performed by: PODIATRIST

## 2022-10-06 NOTE — PROGRESS NOTES
"Subjective:      Patient ID: Julio Bernardo is a 72 y.o. male.    Chief Complaint: Nail Problem (L foot big toe )    Julio Bernardo, 72 y.o., male  returns to clinic for reassessment of L great toe ingrown nail. Has been bothering him on and off for months and was feeling better previously but now starting to bother him with pain. Wanted to have it assessed. Has not tried to self treat other than wider shoes.     Vitals:    10/06/22 1403   BP: 128/81   Pulse: 66   Weight: 113.3 kg (249 lb 12.5 oz)   Height: 6' 0.5" (1.842 m)   PainSc:   2   PainLoc: Foot         Review of Systems   Constitutional: Negative for chills and fever.   Cardiovascular:  Negative for chest pain, claudication and leg swelling.   Respiratory:  Negative for cough and shortness of breath.    Skin:  Positive for dry skin and nail changes.   Musculoskeletal:  Positive for gout (stable).        L great toenail pain   Gastrointestinal:  Negative for nausea and vomiting.   Neurological:  Negative for numbness and paresthesias.   Psychiatric/Behavioral:  Negative for altered mental status.          Objective:      Physical Exam  Vitals reviewed.   Constitutional:       Appearance: He is well-developed.   HENT:      Head: Normocephalic.   Cardiovascular:      Pulses:           Dorsalis pedis pulses are 1+ on the right side and 1+ on the left side.        Posterior tibial pulses are 1+ on the right side and 1+ on the left side.      Comments: CRT < 3 sec to tips of toes.    Pulmonary:      Effort: No respiratory distress.   Musculoskeletal:      Right lower le+ Edema present.      Left lower le+ Edema present.      Comments: Pain with palpation of L hallux distal medial nail border. See skin section.     Semi-reducible hammertoe contractures noted to toes 2-4 b/l-asymptomatic. HAV, mild, non trackbound noted b/l with mild medial bony prominence at 1st met head--asymptomatic.     Hypermobility noted to 1st ray b/l with near complete collapse " of medial longitudinal arch b/l with loading.     Otherwise rectus foot and toe position bilateral with no major deformities noted. Mild equinus noted b/l ankles with < 10 deg DF noted. MMT 5/5 in DF/PF/Inv/Ev resistance with no reproduction of pain in any direction. Passive range of motion of ankle and pedal joints is painless b/l.     Skin:     General: Skin is warm and dry.      Findings: No erythema.      Comments: No open lesions, lacerations or wounds noted. L hallux distal medial nail border moderately incurvated with hyperkeratotic tissue along distal medial border. No open wound, drainage or SOI. Small spicule of nail noted embedded within skin. Remaining toenails are dystrophic but well trimmed to R 1-5 and L 1-5. Interdigital spaces clean, dry and intact b/l. No erythema noted to b/l foot. Skin texture normal. Pedal hair normal. Mild hyperpigmentation to skin of both ankles and/or feet, consistent with hemosiderin deposits.  Pedal hair diminished b/l. Toes cool to touch b/l.      Neurological:      Mental Status: He is alert and oriented to person, place, and time.      Sensory: No sensory deficit.      Comments: Light touch, proprioception, and sharp/dull sensation are all intact bilaterally.     Psychiatric:         Behavior: Behavior normal.         Thought Content: Thought content normal.         Judgment: Judgment normal.             Assessment:       Encounter Diagnoses   Name Primary?    Stage 3a chronic kidney disease Yes    Ingrown toenail of left foot              Plan:       Julio was seen today for nail problem.    Diagnoses and all orders for this visit:    Stage 3a chronic kidney disease    Ingrown toenail of left foot        I counseled the patient on his conditions, their implications and medical management.     - Shoe inspection. General Foot Education. Patient reminded of the importance of good nutrition. Patient instructed on proper foot hygeine. We discussed wearing proper shoe gear,  daily foot inspections, never walking without protective shoe gear, caution putting sharp instruments to feet     - Discussed general foot care:  Wear comfortable, proper fitting shoes. Wash feet daily. Dry well. After drying, apply moisturizer to feet (no lotion to webspaces). Inspect feet daily for skin breaks, blisters, swelling, or redness. Wear cotton socks (preferably white)  Change socks every day. Do NOT walk barefoot. Do NOT use heating pads or warm/hot water soaks      Utilizing sterile toenail clippers I aggressively trimmed the offending nail border/s approximately 3 mm from its/their edge and carried the nail plate down at an angle in order to wedge out the offending cryptotic portion of the nail plate. The offending border was then removed in toto. The area was cleansed with alcohol. Patient tolerated the procedure well and related significant relief. No wound or signs of infection noted to the area at this time.     Once again discussed phenol matrixectomy of affected borders of affected toenail if routine trimming does not help improve the pain. Discussed alternatives, risks and complications of the procedure along with post procedure protocol and expectations.     The affected area was cleansed with an alcohol prep pad. Next, utilizing a 5mm curette, the hyperkeratotic tissues were trimmed from distal medial L hallux nail border, down to appropriate level of skin. Care was taken to remove any nucleated core from the center of the lesion. No pinpoint bleeding was encountered. The patient tolerated relief following this procedure.      RTC 2-3 weeks for phenol procedure, sooner PRN

## 2022-10-17 ENCOUNTER — PATIENT MESSAGE (OUTPATIENT)
Dept: PODIATRY | Facility: CLINIC | Age: 72
End: 2022-10-17
Payer: MEDICARE

## 2022-10-17 ENCOUNTER — TELEPHONE (OUTPATIENT)
Dept: PODIATRY | Facility: CLINIC | Age: 72
End: 2022-10-17
Payer: MEDICARE

## 2022-10-17 NOTE — TELEPHONE ENCOUNTER
----- Message from Remi Paulino sent at 10/17/2022 12:13 PM CDT -----  Contact: pt wife  Pt wife requesting call back RE: would like to r/s procedure for tomorrow, she states anytime after next Wednesday          Confirmed contact below:  Contact Name:Julio Bernardo  Phone Number: [phone]

## 2022-10-31 ENCOUNTER — PATIENT MESSAGE (OUTPATIENT)
Dept: PODIATRY | Facility: CLINIC | Age: 72
End: 2022-10-31
Payer: MEDICARE

## 2022-11-11 NOTE — PROGRESS NOTES
Subjective:       Patient ID: Julio Bernardo is a 72 y.o. male.    Chief Complaint: Follow-up (And audio)    Returns for audiogram and follow-up.  Last seen as a new patient on 08/11/2022 complaining of ear fullness, ear itch, ringing.  Had excessive cerumen bilaterally partially occluding which was fully cleared.  However tinnitus remains described as the sound of cicadas AU.  Reported no history of acoustical trauma except some exposure to loud music.  Ear itch is better though still occasional and still using Q-tips.  Has been on Flonase daily though occasionally misses.  Has history of lots of ear pain on flight descent.        Review of Systems     Constitutional: Negative for appetite change, chills, fatigue, fever and unexpected weight loss.      HENT: Positive for postnasal drip.      Eyes:  Positive for eye itching.     Respiratory:  Positive for shortness of breath.      Cardiovascular:  Positive for foot swelling.     Gastrointestinal:  Negative for abdominal pain, acid reflux, constipation, diarrhea, heartburn and vomiting.     Genitourinary: Negative for difficulty urinating, sexual problems and frequent urination.     Musc: Positive for neck pain.     Skin: Negative for rash.     Allergy: Negative for food allergies and seasonal allergies.     Endocrine: Negative for cold intolerance and heat intolerance.      Neurological: Negative for dizziness, headaches, light-headedness, seizures and tremors.      Hematologic: Negative for bruises/bleeds easily and swollen glands.      Psychiatric: Negative for decreased concentration, depression, nervous/anxious and sleep disturbance.        Patient's self populated ROS above noted.          Objective:        Vitals:    09/19/22 1429   BP: (!) 144/77   Pulse: 71   Temp: 97.5 °F (36.4 °C)     Body mass index is 34.41 kg/m².  Physical Exam  Constitutional:       General: He is not in acute distress.     Appearance: He is well-developed.   HENT:      Head:  Normocephalic and atraumatic.      Right Ear: Tympanic membrane, ear canal and external ear normal.      Left Ear: Tympanic membrane, ear canal and external ear normal.      Nose: No nasal deformity, mucosal edema or rhinorrhea.      Mouth/Throat:      Mouth: Mucous membranes are moist.      Pharynx: No pharyngeal swelling, oropharyngeal exudate or posterior oropharyngeal erythema.   Neck:      Trachea: Phonation normal.   Pulmonary:      Effort: Pulmonary effort is normal. No respiratory distress.   Musculoskeletal:      Cervical back: Neck supple.   Lymphadenopathy:      Cervical: No cervical adenopathy.   Skin:     General: Skin is warm and dry.   Neurological:      Mental Status: He is alert and oriented to person, place, and time.   Psychiatric:         Speech: Speech normal.         Behavior: Behavior normal.       Tests / Results:      Reviewed/discussed above audiogram which demonstrates bilateral sensorineural hearing loss, 100% speech discrimination bilaterally, hyper mobile tymp bilaterally.        Assessment:       1. Sensorineural hearing loss, bilateral    2. 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 History of eustachian tube dysfunction    4. Nasal congestion    5. History of excessive cerumen    6. Ear itch        Plan:       Reviewed all above and considerations and recommendations and answered questions.      Discussed and cleared for hearing aid trial.   Proper ear care, hearing protection, tinnitus discussed and answered questions.     Baby oil drops as needed for ear itch versus VoSol HC.      Change to azelastine nasal spray and use 2 sprays in each nostril twice daily regularly or as needed.      OTC Afrin nasal spray 2 sprays in each nostril about 30 minutes prior to flight and frequent insufflation exercises during flight.      Recheck one year unless change or problems prior.

## 2022-11-15 ENCOUNTER — PROCEDURE VISIT (OUTPATIENT)
Dept: PODIATRY | Facility: CLINIC | Age: 72
End: 2022-11-15
Payer: MEDICARE

## 2022-11-15 VITALS
WEIGHT: 257.94 LBS | DIASTOLIC BLOOD PRESSURE: 80 MMHG | SYSTOLIC BLOOD PRESSURE: 161 MMHG | HEART RATE: 84 BPM | BODY MASS INDEX: 34.94 KG/M2 | RESPIRATION RATE: 18 BRPM | HEIGHT: 72 IN

## 2022-11-15 DIAGNOSIS — M79.675 PAIN AROUND TOENAIL, LEFT FOOT: ICD-10-CM

## 2022-11-15 DIAGNOSIS — L60.0 INGROWN TOENAIL OF LEFT FOOT: Primary | ICD-10-CM

## 2022-11-15 PROCEDURE — 99213 PR OFFICE/OUTPT VISIT, EST, LEVL III, 20-29 MIN: ICD-10-PCS | Mod: S$GLB,,, | Performed by: PODIATRIST

## 2022-11-15 PROCEDURE — 99213 OFFICE O/P EST LOW 20 MIN: CPT | Mod: S$GLB,,, | Performed by: PODIATRIST

## 2022-11-15 NOTE — PROGRESS NOTES
Subjective:      Patient ID: Julio Bernardo is a 72 y.o. male.    Chief Complaint: Ingrown Toenail (Left great toenail procedure ) and Toe Pain    Julio Bernardo, 72 y.o., male  returns to clinic for reassessment of L great toe ingrown nail. Has been bothering him on and off for months. Felt better after last slant back trimming. Was scheduled for phenol procedure today but states he would like to wait on procedure since it is doing better. Wanted to have it assessed again to make sure the corner does not need to be trimmed out again. No other pedal concerns.      Vitals:    11/15/22 0944   BP: (!) 161/80   Pulse: 84   Resp: 18   Weight: 117 kg (257 lb 15 oz)   Height: 6' (1.829 m)   PainSc: 0-No pain         Review of Systems   Constitutional: Negative for chills and fever.   Cardiovascular:  Negative for chest pain, claudication and leg swelling.   Respiratory:  Negative for cough and shortness of breath.    Skin:  Positive for dry skin and nail changes.   Musculoskeletal:  Positive for gout (stable).        L great toenail pain   Gastrointestinal:  Negative for nausea and vomiting.   Neurological:  Negative for numbness and paresthesias.   Psychiatric/Behavioral:  Negative for altered mental status.          Objective:      Physical Exam  Vitals reviewed.   Constitutional:       Appearance: He is well-developed.   HENT:      Head: Normocephalic.   Cardiovascular:      Pulses:           Dorsalis pedis pulses are 1+ on the right side and 1+ on the left side.        Posterior tibial pulses are 1+ on the right side and 1+ on the left side.      Comments: CRT < 3 sec to tips of toes.    Pulmonary:      Effort: No respiratory distress.   Musculoskeletal:      Right lower le+ Edema present.      Left lower le+ Edema present.      Comments: Pain with palpation of L hallux distal medial nail border. See skin section.     Semi-reducible hammertoe contractures noted to toes 2-4 b/l-asymptomatic. HAV, mild, non  trackbound noted b/l with mild medial bony prominence at 1st met head--asymptomatic.     Hypermobility noted to 1st ray b/l with near complete collapse of medial longitudinal arch b/l with loading.     Otherwise rectus foot and toe position bilateral with no major deformities noted. Mild equinus noted b/l ankles with < 10 deg DF noted. MMT 5/5 in DF/PF/Inv/Ev resistance with no reproduction of pain in any direction. Passive range of motion of ankle and pedal joints is painless b/l.     Skin:     General: Skin is warm and dry.      Findings: No erythema.      Comments: No open lesions, lacerations or wounds noted. L hallux distal medial nail border moderately incurvated with hyperkeratotic tissue along distal medial border. No open wound, drainage or SOI. Small spicule of nail noted embedded within skin. Remaining toenails are dystrophic but well trimmed to R 1-5 and L 1-5. Interdigital spaces clean, dry and intact b/l. No erythema noted to b/l foot. Skin texture normal. Pedal hair normal. Mild hyperpigmentation to skin of both ankles and/or feet, consistent with hemosiderin deposits.  Pedal hair diminished b/l. Toes cool to touch b/l.      Neurological:      Mental Status: He is alert and oriented to person, place, and time.      Sensory: No sensory deficit.      Comments: Light touch, proprioception, and sharp/dull sensation are all intact bilaterally.     Psychiatric:         Behavior: Behavior normal.         Thought Content: Thought content normal.         Judgment: Judgment normal.             Assessment:       Encounter Diagnoses   Name Primary?    Ingrown toenail of left foot Yes    Pain around toenail, left foot                Plan:       Julio was seen today for ingrown toenail and toe pain.    Diagnoses and all orders for this visit:    Ingrown toenail of left foot    Pain around toenail, left foot          I counseled the patient on his conditions, their implications and medical management.     - Shoe  inspection. General Foot Education. Patient reminded of the importance of good nutrition. Patient instructed on proper foot hygeine. We discussed wearing proper shoe gear, daily foot inspections, never walking without protective shoe gear, caution putting sharp instruments to feet     - Discussed general foot care:  Wear comfortable, proper fitting shoes. Wash feet daily. Dry well. After drying, apply moisturizer to feet (no lotion to webspaces). Inspect feet daily for skin breaks, blisters, swelling, or redness. Wear cotton socks (preferably white)  Change socks every day. Do NOT walk barefoot. Do NOT use heating pads or warm/hot water soaks      Utilizing sterile toenail clippers I aggressively trimmed the offending nail border/s approximately 3 mm from its/their edge and carried the nail plate down at an angle in order to wedge out the offending cryptotic portion of the nail plate. The offending border was then removed in toto. The area was cleansed with alcohol. Patient tolerated the procedure well and related significant relief. No wound or signs of infection noted to the area at this time.     Once again discussed phenol matrixectomy of affected borders of affected toenail if routine trimming does not help improve the pain. Discussed alternatives, risks and complications of the procedure along with post procedure protocol and expectations.     The affected area was cleansed with an alcohol prep pad. Next, utilizing a 5mm curette, the hyperkeratotic tissues were trimmed from distal medial L hallux nail border, down to appropriate level of skin. Care was taken to remove any nucleated core from the center of the lesion. No pinpoint bleeding was encountered. The patient tolerated relief following this procedure.      RTC 1 month for possible phenol procedure, sooner PRN

## 2022-12-13 ENCOUNTER — PROCEDURE VISIT (OUTPATIENT)
Dept: PODIATRY | Facility: CLINIC | Age: 72
End: 2022-12-13
Payer: MEDICARE

## 2022-12-13 VITALS
SYSTOLIC BLOOD PRESSURE: 120 MMHG | HEART RATE: 77 BPM | HEIGHT: 72 IN | DIASTOLIC BLOOD PRESSURE: 73 MMHG | BODY MASS INDEX: 35.11 KG/M2 | WEIGHT: 259.25 LBS

## 2022-12-13 DIAGNOSIS — L60.0 INGROWN TOENAIL OF LEFT FOOT: Primary | ICD-10-CM

## 2022-12-13 DIAGNOSIS — M79.675 PAIN AROUND TOENAIL, LEFT FOOT: ICD-10-CM

## 2022-12-13 PROCEDURE — 11750 EXCISION NAIL&NAIL MATRIX: CPT | Mod: TA,S$GLB,, | Performed by: PODIATRIST

## 2022-12-13 PROCEDURE — 11750 NAIL REMOVAL: ICD-10-PCS | Mod: TA,S$GLB,, | Performed by: PODIATRIST

## 2022-12-13 NOTE — PROGRESS NOTES
Subjective:      Patient ID: Julio Bernardo is a 72 y.o. male.    Chief Complaint: Foot Pain (Left big toe)      Julio Bernardo, 72 y.o., male  returns to clinic for reassessment of L great toe ingrown nail. Has been bothering him on and off for months. Previous slant back trimmings have helped but it has become and repetitive issue and he is here today for scheduled Phenol procedure. Still hurts to direct touch. Has noticed some redness but no drainage or pus. Ready for procedure today.     Vitals:    22 1016   BP: 120/73   Pulse: 77   Weight: 117.6 kg (259 lb 4.2 oz)   Height: 6' (1.829 m)             Review of Systems   Constitutional: Negative for chills and fever.   Cardiovascular:  Negative for chest pain, claudication and leg swelling.   Respiratory:  Negative for cough and shortness of breath.    Skin:  Positive for dry skin and nail changes.   Musculoskeletal:  Positive for gout (stable).        L great toenail pain   Gastrointestinal:  Negative for nausea and vomiting.   Neurological:  Negative for numbness and paresthesias.   Psychiatric/Behavioral:  Negative for altered mental status.          Objective:      Physical Exam  Vitals reviewed.   Constitutional:       Appearance: He is well-developed.   HENT:      Head: Normocephalic.   Cardiovascular:      Pulses:           Dorsalis pedis pulses are 1+ on the right side and 1+ on the left side.        Posterior tibial pulses are 1+ on the right side and 1+ on the left side.      Comments: CRT < 3 sec to tips of toes.    Pulmonary:      Effort: No respiratory distress.   Musculoskeletal:      Right lower le+ Edema present.      Left lower le+ Edema present.      Comments: Pain with palpation of L hallux distal medial nail border. See skin section.     Semi-reducible hammertoe contractures noted to toes 2-4 b/l-asymptomatic. HAV, mild, non trackbound noted b/l with mild medial bony prominence at 1st met head--asymptomatic.     Hypermobility  noted to 1st ray b/l with near complete collapse of medial longitudinal arch b/l with loading.     Otherwise rectus foot and toe position bilateral with no major deformities noted. Mild equinus noted b/l ankles with < 10 deg DF noted. MMT 5/5 in DF/PF/Inv/Ev resistance with no reproduction of pain in any direction. Passive range of motion of ankle and pedal joints is painless b/l.     Skin:     General: Skin is warm and dry.      Findings: No erythema.      Comments: No open lesions, lacerations or wounds noted. L hallux distal medial nail border moderately incurvated with hyperkeratotic tissue along distal medial border. No open wound, drainage or SOI. Small spicule of nail noted embedded within skin. Remaining toenails are dystrophic but well trimmed to R 1-5 and L 1-5. Interdigital spaces clean, dry and intact b/l. No erythema noted to b/l foot. Skin texture normal. Pedal hair normal. Mild hyperpigmentation to skin of both ankles and/or feet, consistent with hemosiderin deposits.  Pedal hair diminished b/l. Toes cool to touch b/l.      Neurological:      Mental Status: He is alert and oriented to person, place, and time.      Sensory: No sensory deficit.      Comments: Light touch, proprioception, and sharp/dull sensation are all intact bilaterally.     Psychiatric:         Behavior: Behavior normal.         Thought Content: Thought content normal.         Judgment: Judgment normal.             Assessment:       Encounter Diagnoses   Name Primary?    Ingrown toenail of left foot Yes    Pain around toenail, left foot                  Plan:       Julio was seen today for foot pain.    Diagnoses and all orders for this visit:    Ingrown toenail of left foot    Pain around toenail, left foot              I counseled the patient on his conditions, their implications and medical management.     Discussed the options of slant back vs permanent removal of offending corners of nail. Patient opted for more permanent  "solution due to recurrent issues with the nails. All alternatives, risks and complications were discussed in detail with patient regarding phenol matrixectomy. Patient elected for this procedure on the medial border of left great toe. Informed consent was discussed in detail and signed/witnessed. Procedure note separate.     AFTER TOENAIL PROCEDURE INSTRUCTIONS    1. Leave bandage intact until you shower (12-24 hours). If the bandage sticks as you try to remove it, soak it in warm water until it lifts off.    2. Dry foot completely after showering, and apply a small amount of triple antibiotic ointment (Neosporin works fine!) and a fabric or cloth bandaid ("plastic" bandaids tend to lift off with ointment use).  Wear open-toed shoes as needed for comfort.     3. Take Advil or Tylenol as needed for pain.     4. Your toe may drain for the next few days. Normal drainage is yellow-to-pink, and clear, much like the fluid in a blister. Watch for redness spreading up your toe into your foot, white thick drainage (pus), pain unrelieved by medication, or nausea/vomiting/fever/chills. These are signs of infection. Please call the clinic or visit your doctor.    5. You may stop dressing toe once it stops draining (about 3 - 7 days).    RTC 10-14 days, sooner PRN     "

## 2022-12-13 NOTE — PROCEDURES
Nail Removal    Date/Time: 12/13/2022 9:45 AM  Performed by: Sitnia Francois DPM  Authorized by: Sintia Francois DPM     Consent Done?:  Yes (Written)  Time out: Immediately prior to the procedure a time out was called    Prep: patient was prepped and draped in usual sterile fashion    Location:     Location:  Left foot    Location detail:  Left big toe  Anesthesia:     Anesthesia:  Local infiltration    * local anesthetic: 1:1 mix of 0.5% marcaine plain + 2% lidocaine plain.    Anesthetic total (ml):  3  Procedure Details:     Preparation:  Skin prepped with Betadine    Amount removed:  Partial    Side:  Medial    Wedge excision of skin of nail fold: No      Nail bed sutured?: No      Nail matrix removed:  Partial    Removal method:  Phenol and alcohol    Removed nail replaced and anchored: No      Dressing applied:  4x4, antibiotic ointment, dressing applied and gauze roll

## 2022-12-14 ENCOUNTER — PATIENT MESSAGE (OUTPATIENT)
Dept: PULMONOLOGY | Facility: CLINIC | Age: 72
End: 2022-12-14
Payer: MEDICARE

## 2022-12-22 ENCOUNTER — TELEPHONE (OUTPATIENT)
Dept: OTOLARYNGOLOGY | Facility: CLINIC | Age: 72
End: 2022-12-22
Payer: MEDICARE

## 2022-12-28 ENCOUNTER — OFFICE VISIT (OUTPATIENT)
Dept: PODIATRY | Facility: CLINIC | Age: 72
End: 2022-12-28
Payer: MEDICARE

## 2022-12-28 VITALS — HEIGHT: 74 IN | BODY MASS INDEX: 32.08 KG/M2 | WEIGHT: 250 LBS

## 2022-12-28 DIAGNOSIS — Z98.890 POST-OPERATIVE STATE: Primary | ICD-10-CM

## 2022-12-28 PROCEDURE — 1126F PR PAIN SEVERITY QUANTIFIED, NO PAIN PRESENT: ICD-10-PCS | Mod: CPTII,S$GLB,, | Performed by: PODIATRIST

## 2022-12-28 PROCEDURE — 3008F BODY MASS INDEX DOCD: CPT | Mod: CPTII,S$GLB,, | Performed by: PODIATRIST

## 2022-12-28 PROCEDURE — 4010F PR ACE/ARB THEARPY RXD/TAKEN: ICD-10-PCS | Mod: CPTII,S$GLB,, | Performed by: PODIATRIST

## 2022-12-28 PROCEDURE — 1160F RVW MEDS BY RX/DR IN RCRD: CPT | Mod: CPTII,S$GLB,, | Performed by: PODIATRIST

## 2022-12-28 PROCEDURE — 99999 PR PBB SHADOW E&M-EST. PATIENT-LVL III: CPT | Mod: PBBFAC,,, | Performed by: PODIATRIST

## 2022-12-28 PROCEDURE — 1126F AMNT PAIN NOTED NONE PRSNT: CPT | Mod: CPTII,S$GLB,, | Performed by: PODIATRIST

## 2022-12-28 PROCEDURE — 1160F PR REVIEW ALL MEDS BY PRESCRIBER/CLIN PHARMACIST DOCUMENTED: ICD-10-PCS | Mod: CPTII,S$GLB,, | Performed by: PODIATRIST

## 2022-12-28 PROCEDURE — 4010F ACE/ARB THERAPY RXD/TAKEN: CPT | Mod: CPTII,S$GLB,, | Performed by: PODIATRIST

## 2022-12-28 PROCEDURE — 99999 PR PBB SHADOW E&M-EST. PATIENT-LVL III: ICD-10-PCS | Mod: PBBFAC,,, | Performed by: PODIATRIST

## 2022-12-28 PROCEDURE — 99024 POSTOP FOLLOW-UP VISIT: CPT | Mod: S$GLB,,, | Performed by: PODIATRIST

## 2022-12-28 PROCEDURE — 1159F PR MEDICATION LIST DOCUMENTED IN MEDICAL RECORD: ICD-10-PCS | Mod: CPTII,S$GLB,, | Performed by: PODIATRIST

## 2022-12-28 PROCEDURE — 99024 PR POST-OP FOLLOW-UP VISIT: ICD-10-PCS | Mod: S$GLB,,, | Performed by: PODIATRIST

## 2022-12-28 PROCEDURE — 1159F MED LIST DOCD IN RCRD: CPT | Mod: CPTII,S$GLB,, | Performed by: PODIATRIST

## 2022-12-28 PROCEDURE — 3008F PR BODY MASS INDEX (BMI) DOCUMENTED: ICD-10-PCS | Mod: CPTII,S$GLB,, | Performed by: PODIATRIST

## 2022-12-28 NOTE — PROGRESS NOTES
SUBJECTIVE: Patient returns to the clinic 2 weeks status post L medial hallux phenol nail procedure.  Patient has no complaints of fever chills or sweats.  Minimal to no pain.  Patient has been dressing as instructed with neosporin and bandaid daily.     Physical examination: General: Pt. is in no acute distress, alert and oriented x 3.    Podiatric examination: Vascular: Palpable pedal pulses b/l. Capillary refill time is within normal limits to surgical foot.   Dermatologic: Surgical sites are clean without any signs of infection. Minimal drainage. Mild serosanguinous crusting to surgical borders. No erythema, active drainage or purulence or other SOI. Healing well.     IMPRESSION: 2 weeks postop nail procedure with excellent progress no signs of infection.    PLAN: With patients permission, a sterile soft tissue curette was used to remove debris and fibrous slough from affected borders of L hallux toenail. Tolerated well. Applied betadine and bandaid today. Patient to continue local care with betadine (additional provided today) and bandaid daily until completely healed with no drainage and no redness--likely additional 1-2 weeks or sooner. Bandaid not necessary if no drainage or pain. Patient should call the clinic immediately if any signs of infection such as fever chills sweats increased redness or pain but was otherwise discharged.    RTC as needed if any problems arise.

## 2023-01-24 ENCOUNTER — TELEPHONE (OUTPATIENT)
Dept: SPORTS MEDICINE | Facility: CLINIC | Age: 73
End: 2023-01-24
Payer: MEDICARE

## 2023-01-24 DIAGNOSIS — M25.562 LEFT KNEE PAIN, UNSPECIFIED CHRONICITY: Primary | ICD-10-CM

## 2023-01-24 NOTE — TELEPHONE ENCOUNTER
Called and spoke with patient Mani to let him know that we will need to get x-ray first at [facility] @ 9:30 then he will see Abdi right after.  Patient understand.

## 2023-01-25 ENCOUNTER — OFFICE VISIT (OUTPATIENT)
Dept: SPORTS MEDICINE | Facility: CLINIC | Age: 73
End: 2023-01-25
Payer: MEDICARE

## 2023-01-25 ENCOUNTER — HOSPITAL ENCOUNTER (OUTPATIENT)
Dept: RADIOLOGY | Facility: HOSPITAL | Age: 73
Discharge: HOME | End: 2023-01-25
Attending: PHYSICIAN ASSISTANT
Payer: MEDICARE

## 2023-01-25 VITALS
WEIGHT: 254.88 LBS | DIASTOLIC BLOOD PRESSURE: 82 MMHG | SYSTOLIC BLOOD PRESSURE: 142 MMHG | BODY MASS INDEX: 33.78 KG/M2 | HEIGHT: 73 IN

## 2023-01-25 DIAGNOSIS — M25.562 LEFT KNEE PAIN, UNSPECIFIED CHRONICITY: ICD-10-CM

## 2023-01-25 DIAGNOSIS — M25.462 EFFUSION OF LEFT KNEE JOINT: ICD-10-CM

## 2023-01-25 DIAGNOSIS — M17.0 BILATERAL PRIMARY OSTEOARTHRITIS OF KNEE: Primary | ICD-10-CM

## 2023-01-25 PROCEDURE — 3077F PR MOST RECENT SYSTOLIC BLOOD PRESSURE >= 140 MM HG: ICD-10-PCS | Mod: CPTII,S$GLB,, | Performed by: PHYSICIAN ASSISTANT

## 2023-01-25 PROCEDURE — 3079F DIAST BP 80-89 MM HG: CPT | Mod: CPTII,S$GLB,, | Performed by: PHYSICIAN ASSISTANT

## 2023-01-25 PROCEDURE — 1101F PR PT FALLS ASSESS DOC 0-1 FALLS W/OUT INJ PAST YR: ICD-10-PCS | Mod: CPTII,S$GLB,, | Performed by: PHYSICIAN ASSISTANT

## 2023-01-25 PROCEDURE — 99204 PR OFFICE/OUTPT VISIT, NEW, LEVL IV, 45-59 MIN: ICD-10-PCS | Mod: 25,S$GLB,, | Performed by: PHYSICIAN ASSISTANT

## 2023-01-25 PROCEDURE — 1125F PR PAIN SEVERITY QUANTIFIED, PAIN PRESENT: ICD-10-PCS | Mod: CPTII,S$GLB,, | Performed by: PHYSICIAN ASSISTANT

## 2023-01-25 PROCEDURE — 20611 LARGE JOINT ASPIRATION/INJECTION: L KNEE: ICD-10-PCS | Mod: LT,S$GLB,, | Performed by: PHYSICIAN ASSISTANT

## 2023-01-25 PROCEDURE — 1159F PR MEDICATION LIST DOCUMENTED IN MEDICAL RECORD: ICD-10-PCS | Mod: CPTII,S$GLB,, | Performed by: PHYSICIAN ASSISTANT

## 2023-01-25 PROCEDURE — 3077F SYST BP >= 140 MM HG: CPT | Mod: CPTII,S$GLB,, | Performed by: PHYSICIAN ASSISTANT

## 2023-01-25 PROCEDURE — 3288F PR FALLS RISK ASSESSMENT DOCUMENTED: ICD-10-PCS | Mod: CPTII,S$GLB,, | Performed by: PHYSICIAN ASSISTANT

## 2023-01-25 PROCEDURE — 1160F RVW MEDS BY RX/DR IN RCRD: CPT | Mod: CPTII,S$GLB,, | Performed by: PHYSICIAN ASSISTANT

## 2023-01-25 PROCEDURE — 73562 XR KNEE ORTHO LEFT WITH FLEXION: ICD-10-PCS | Mod: 26,RT,, | Performed by: RADIOLOGY

## 2023-01-25 PROCEDURE — 1160F PR REVIEW ALL MEDS BY PRESCRIBER/CLIN PHARMACIST DOCUMENTED: ICD-10-PCS | Mod: CPTII,S$GLB,, | Performed by: PHYSICIAN ASSISTANT

## 2023-01-25 PROCEDURE — 20611 DRAIN/INJ JOINT/BURSA W/US: CPT | Mod: LT,S$GLB,, | Performed by: PHYSICIAN ASSISTANT

## 2023-01-25 PROCEDURE — 1125F AMNT PAIN NOTED PAIN PRSNT: CPT | Mod: CPTII,S$GLB,, | Performed by: PHYSICIAN ASSISTANT

## 2023-01-25 PROCEDURE — 99999 PR PBB SHADOW E&M-EST. PATIENT-LVL III: ICD-10-PCS | Mod: PBBFAC,,, | Performed by: PHYSICIAN ASSISTANT

## 2023-01-25 PROCEDURE — 73564 X-RAY EXAM KNEE 4 OR MORE: CPT | Mod: 26,LT,, | Performed by: RADIOLOGY

## 2023-01-25 PROCEDURE — 3079F PR MOST RECENT DIASTOLIC BLOOD PRESSURE 80-89 MM HG: ICD-10-PCS | Mod: CPTII,S$GLB,, | Performed by: PHYSICIAN ASSISTANT

## 2023-01-25 PROCEDURE — 99999 PR PBB SHADOW E&M-EST. PATIENT-LVL III: CPT | Mod: PBBFAC,,, | Performed by: PHYSICIAN ASSISTANT

## 2023-01-25 PROCEDURE — 1159F MED LIST DOCD IN RCRD: CPT | Mod: CPTII,S$GLB,, | Performed by: PHYSICIAN ASSISTANT

## 2023-01-25 PROCEDURE — 3008F PR BODY MASS INDEX (BMI) DOCUMENTED: ICD-10-PCS | Mod: CPTII,S$GLB,, | Performed by: PHYSICIAN ASSISTANT

## 2023-01-25 PROCEDURE — 3288F FALL RISK ASSESSMENT DOCD: CPT | Mod: CPTII,S$GLB,, | Performed by: PHYSICIAN ASSISTANT

## 2023-01-25 PROCEDURE — 73562 X-RAY EXAM OF KNEE 3: CPT | Mod: 26,RT,, | Performed by: RADIOLOGY

## 2023-01-25 PROCEDURE — 73564 X-RAY EXAM KNEE 4 OR MORE: CPT | Mod: TC,PN,LT

## 2023-01-25 PROCEDURE — 73564 XR KNEE ORTHO LEFT WITH FLEXION: ICD-10-PCS | Mod: 26,LT,, | Performed by: RADIOLOGY

## 2023-01-25 PROCEDURE — 3008F BODY MASS INDEX DOCD: CPT | Mod: CPTII,S$GLB,, | Performed by: PHYSICIAN ASSISTANT

## 2023-01-25 PROCEDURE — 99204 OFFICE O/P NEW MOD 45 MIN: CPT | Mod: 25,S$GLB,, | Performed by: PHYSICIAN ASSISTANT

## 2023-01-25 PROCEDURE — 1101F PT FALLS ASSESS-DOCD LE1/YR: CPT | Mod: CPTII,S$GLB,, | Performed by: PHYSICIAN ASSISTANT

## 2023-01-25 RX ORDER — TRIAMCINOLONE ACETONIDE 40 MG/ML
40 INJECTION, SUSPENSION INTRA-ARTICULAR; INTRAMUSCULAR
Status: DISCONTINUED | OUTPATIENT
Start: 2023-01-25 | End: 2023-01-25 | Stop reason: HOSPADM

## 2023-01-25 RX ORDER — BUPIVACAINE HYDROCHLORIDE 2.5 MG/ML
2 INJECTION, SOLUTION INFILTRATION; PERINEURAL
Status: DISCONTINUED | OUTPATIENT
Start: 2023-01-25 | End: 2023-01-25 | Stop reason: HOSPADM

## 2023-01-25 RX ADMIN — TRIAMCINOLONE ACETONIDE 40 MG: 40 INJECTION, SUSPENSION INTRA-ARTICULAR; INTRAMUSCULAR at 10:01

## 2023-01-25 RX ADMIN — BUPIVACAINE HYDROCHLORIDE 2 ML: 2.5 INJECTION, SOLUTION INFILTRATION; PERINEURAL at 10:01

## 2023-01-25 NOTE — PROGRESS NOTES
NEW PATIENT    HISTORY OF PRESENT ILLNESS   72 y.o. Male with a history of left knee pain who is a former patient of Dr. Devendra Esquivel.  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  He enjoys walking, traveling, and watching his granddaughter play soccer at [facility].  He complains today of having progressive medial-sided knee pain with occasional catching sensations over the past month and a half.  He denies having anyone specific injury but attributes his symptoms to exercise.  He states that he did a lot of squats and lunges one day and then went for a jog.  He began having some discomfort later that evening and his symptoms have persisted ever since.  He states the pain seems to be worse at night when sleeping.  He has a throbbing, aching sensation throughout the knee but particularly located along the medial aspect of the knee.  He has had knee pain in the past and has done well with viscosupplementation.  His last Euflexxa injections were given to him in 2019.  He previously underwent a right knee arthroscopy with medial meniscectomy and chondroplasty done by Dr. Esquivel in 2011.  He states his right knee occasionally gives him some discomfort but his left knee is worse today.        + mechanical symptoms, - instability    Is affecting ADLs.  Pain is 6/10 at it's worst.        PAST MEDICAL HISTORY    Past Medical History:   Diagnosis Date    Allergy     Arthritis 2019    Colon polyps     Disorder of kidney and ureter 2019    Hyperlipemia     Hypertension     Posterior capsular opacification        PAST SURGICAL HISTORY     Past Surgical History:   Procedure Laterality Date    CATARACT EXTRACTION      both eyes    COLONOSCOPY N/A 09/27/2016    Procedure: COLONOSCOPY;  Surgeon: Jan Argueta MD;  Location: [facility] ([address] [place] [address]);  Service: Endoscopy;  Laterality: N/A;    COLONOSCOPY N/A 05/19/2022    Procedure: COLONOSCOPY;  Surgeon: Bobo Delcid MD;  Location: [facility] ([facility]  PAT);  Service: Endoscopy;  Laterality: N/A;  4/4 fully vaccinated; instructions to portal-st    EYE SURGERY      HERNIA REPAIR      WISDOM TOOTH EXTRACTION      YAG      Left Eye       FAMILY HISTORY    Family History   Problem Relation Age of Onset    Hypertension Father     Cataracts Father     No Known Problems Maternal Uncle     Prostate cancer Paternal Uncle         prostate    Amblyopia Neg Hx     Blindness Neg Hx     Glaucoma Neg Hx     Macular degeneration Neg Hx     Retinal detachment Neg Hx     Strabismus Neg Hx        SOCIAL HISTORY    Social History     Socioeconomic History    Marital status:      Spouse name: Ginger    Number of children: 4   Occupational History     Employer: [employer]   Tobacco Use    Smoking status: Never    Smokeless tobacco: Never   Substance and Sexual Activity    Alcohol use: Yes     Alcohol/week: 6.0 standard drinks     Types: 4 Glasses of wine, 1 Shots of liquor, 1 Standard drinks or equivalent per week     Comment: socially- been a while since he drank    Drug use: Never    Sexual activity: Yes     Partners: Female     Birth control/protection: None     Social Determinants of Health     Financial Resource Strain: Low Risk     Difficulty of Paying Living Expenses: Not hard at all   Food Insecurity: No Food Insecurity    Worried About Running Out of Food in the Last Year: Never true    Ran Out of Food in the Last Year: Never true   Transportation Needs: No Transportation Needs    Lack of Transportation (Medical): No    Lack of Transportation (Non-Medical): No   Physical Activity: Sufficiently Active    Days of Exercise per Week: 5 days    Minutes of Exercise per Session: 90 min   Stress: No Stress Concern Present    Feeling of Stress : Not at all   Social Connections: Unknown    Frequency of Communication with Friends and Family: More than three times a week    Frequency of Social Gatherings with Friends and Family: More than three times a week    Active  Member of Clubs or Organizations: Yes    Attends Club or Organization Meetings: More than 4 times per year    Marital Status:    Housing Stability: Low Risk     Unable to Pay for Housing in the Last Year: No    Number of Places Lived in the Last Year: 1    Unstable Housing in the Last Year: No       MEDICATIONS      Current Outpatient Medications:     albuterol (PROVENTIL/VENTOLIN HFA) 90 mcg/actuation inhaler, Inhale 2 puffs into the lungs every 6 (six) hours as needed (cough)., Disp: 8.5 g, Rfl: 2    ascorbic acid (VITAMIN C) 1000 MG tablet, Take 1,000 mg by mouth once daily., Disp: , Rfl:     aspirin 81 mg Tab, Take by mouth. 1 Tablet Oral Every day.  Last taken 3/25/11, Disp: , Rfl:     atorvastatin (LIPITOR) 40 MG tablet, TAKE 1 TABLET BY MOUTH EVERY DAY, Disp: 90 tablet, Rfl: 1    azelastine (ASTELIN) 137 mcg (0.1 %) nasal spray, INSTILL 2 SPRAYS IN EACH NOSTRIL TWICE A DAY, Disp: 30 mL, Rfl: 2    ciclopirox (PENLAC) 8 % Soln, Apply topically nightly., Disp: 6.6 mL, Rfl: 11    colchicine (COLCRYS) 0.6 mg tablet, Take 1 tablet (0.6 mg total) by mouth once daily., Disp: 15 tablet, Rfl: 0    fexofenadine HCl (ALLEGRA ORAL), Take by mouth., Disp: , Rfl:     fish oil-omega-3 fatty acids 300-1,000 mg capsule, Take 2 g by mouth once daily., Disp: , Rfl:     fluticasone propionate (FLONASE) 50 mcg/actuation nasal spray, USE 1 SPRAY BY EACH NARE ROUTE ONCE DAILY., Disp: 48 mL, Rfl: 5    GAVILYTE-G 236-22.74-6.74 -5.86 gram suspension, Take 4,000 mLs by mouth once., Disp: , Rfl:     hydroCHLOROthiazide (MICROZIDE) 12.5 mg capsule, Take 1 capsule (12.5 mg total) by mouth once daily., Disp: 90 capsule, Rfl: 3    losartan (COZAAR) 100 MG tablet, Take 1 tablet (100 mg total) by mouth once daily., Disp: 90 tablet, Rfl: 3    multivitamin capsule, Take 1 capsule by mouth once daily., Disp: , Rfl:     omeprazole (PRILOSEC) 20 MG capsule, Take 1 capsule (20 mg total) by mouth 2 (two) times daily., Disp: 180 capsule, Rfl:  "1    ALLERGIES     Review of patient's allergies indicates:  No Known Allergies      REVIEW OF SYSTEMS   Constitution: Negative. Negative for chills, fever and night sweats.   HENT: Negative for congestion and headaches.    Eyes: Negative for blurred vision, left vision loss and right vision loss.   Cardiovascular: Negative for chest pain and syncope.   Respiratory: Negative for cough and shortness of breath.    Endocrine: Negative for polydipsia, polyphagia and polyuria.   Hematologic/Lymphatic: Negative for bleeding problem. Does not bruise/bleed easily.   Skin: Negative for dry skin, itching and rash.   Musculoskeletal: Negative for falls. Positive for bilateral knee pain (L>R).  Gastrointestinal: Negative for abdominal pain and bowel incontinence.   Genitourinary: Negative for bladder incontinence and nocturia.   Neurological: Negative for disturbances in coordination, loss of balance and seizures.   Psychiatric/Behavioral: Negative for depression. The patient does not have insomnia.    Allergic/Immunologic: Negative for hives and persistent infections.     PHYSICAL EXAMINATION    Vitals: BP (!) 142/82 (Patient Position: Sitting, BP Method: Large (Manual))   Ht 6' 1" (1.854 m)   Wt 115.6 kg (254 lb 13.6 oz)   BMI 33.62 kg/m²     General: The patient appears active and healthy with no apparent physical problems.  No disturbance of mood or affect is demonstrated. Alert and Oriented.    HEENT: Eyes normal, pupils equally round, nose normal.    Resp: Equal and symmetrical chest rises. No wheezing    CV: Regular rate    Neck: Supple; nonpainful range of motion.    Extremities: no cyanosis, clubbing, edema, or diffuse swelling.  Palpable pulses, good capillary refill of the digits.  No coolness, discoloration, edema or obvious varicosities.    Skin: no lesions noted.    Lymphatic: no detected adenopathy in the upper or lower extremities.    Neurologic: normal mental status, normal reflexes, normal gait and balance. "  Patient is alert and oriented to person, place and time.  No flaccidity or spasticity is noted.  No motor or sensory deficits are noted.  Light touch is intact    Orthopaedic: Knee Musculoskeletal Exam  Gait    Antalgic: left    Inspection    Right      Effusion: mild        Alignment: varus        Previous incision: arthroscopic portals    Left      Erythema: none        Effusion: mild        Edema: none        Ecchymosis: none        Alignment: normal        Previous incision: no previous incision    Palpation    Right      Crepitus: patellofemoral        Tenderness: present          MCL: mild          Medial joint line: moderate          Medial retinaculum: mild      Left      Increased warmth: none        Masses: none        Crepitus: patellofemoral        Tenderness: present          MCL: mild          Medial joint line: moderate          Medial retinaculum: mild      Range of Motion    Right      Active extension: -5      Passive extension: -5      Active flexion: 115      Passive flexion: 115    Left      Left knee range of motion is normal and full.        Active extension: -5      Passive extension: -5      Active flexion: 115      Passive flexion: 115    Strength    Left      Left knee strength is normal.       Extension: 5/5. Extension is not affected by pain.       Flexion: 5/5. Flexion is not affected by pain.      Instability    Left      Varus stress grade: normal      Valgus stress grade: normal      Anterior drawer: normal      Posterior drawer: normal      Lachman: negative    Neurovascular    Left      Left knee neurovascular exam is normal.        Pulses - DP: normal      Pulses - PT: normal    Special Signs    Left      Left knee special signs are normal.      Straight leg raise: normal      J sign: none        Patellar compression: none        Patellar apprehension: none        IMAGING    X-ray Knee Ortho Left with Flexion  Narrative: EXAMINATION:  XR KNEE ORTHO LEFT WITH FLEXION    CLINICAL  HISTORY:  Pain in left knee    TECHNIQUE:  AP standing view of both knees, PA flexion standing views of both knees, and Merchant views of both knees were performed. A lateral view of the left knee was also performed.    COMPARISON:  May 9, 2019    FINDINGS:  Left knee:    No fracture.  Mild narrowing of medial compartment without genu varum.  No definite narrowing lateral compartment.  Mild narrowing lateral patellofemoral compartment.  Minimal periarticular spurring about tibial spines and posterior patella..  No suprapatellar bursal effusion or prepatellar soft tissue swelling.    Right knee:    No fracture.  Moderate to severe narrowing of medial compartment and genu varum.  No obvious narrowing of lateral compartment.  Mild narrowing lateral patellofemoral compartment.  Periarticular spurring about the medial compartment, tibial spines, and posterior patella.  No prepatellar soft tissue swelling.  Impression: No acute fractures, bilateral, right greater than left osteoarthritic changes as described.    Electronically signed by: Fabiano Quevedo  Date:    01/25/2023  Time:    10:38    X-rays including four views of the left knee were ordered and completed today.  The images and report have been reviewed by me personally.  There are degenerative changes seen in the medial and patellofemoral compartments, worse in the right knee.  There is moderate to severe narrowing with near bone-on-bone contact.  Kellgren Brent grade 2 on the left and grade 3/4 on the right.    IMPRESSION       ICD-10-CM ICD-9-CM   1. Bilateral primary osteoarthritis of knee  M17.0 715.16   2. Effusion of left knee joint  M25.462 719.06       MEDICATIONS PRESCRIBED      None    RECOMMENDATIONS     Left knee aspiration as intra-articular corticosteroid injection was performed under ultrasound guidance today  Repeat bilateral knee Euflexxa injections ordered  Continue activity modification, ice and elevation, and the use of the topical NSAID as  needed for pain and swelling; limit NSAID use due to kidney disease  Return to clinic once Euflexxa injection series has been approved      Large Joint Aspiration/Injection: L knee    Date/Time: 1/25/2023 10:00 AM  Performed by: Abdi Stanford PA-C  Authorized by: Abdi Stanford PA-C     Consent Done?:  Yes (Verbal)  Indications:  Joint swelling and pain  Site marked: the procedure site was marked    Timeout: prior to procedure the correct patient, procedure, and site was verified    Prep: patient was prepped and draped in usual sterile fashion      Local anesthesia used?: Yes    Anesthesia:  Local infiltration  Local anesthetic:  Co-phenylcaine spray    Details:  Needle Size:  18 G  Ultrasonic Guidance for needle placement?: Yes (Ultrasound guidance was used for needle localization.  Images were saved and stored for documentation.  Dynamic visualization of the needle was continuous throughout the procedure and maintained accurate placement)    Images are saved and documented.  Approach: superolateral.  Location:  Knee  Site:  L knee  Medications:  40 mg triamcinolone acetonide 40 mg/mL; 2 mL BUPivacaine 0.25% (2.5 mg/ml) 0.25 % (2.5 mg/mL)  Aspirate amount (mL):  35  Aspirate:  Clear and yellow  Patient tolerance:  Patient tolerated the procedure well with no immediate complications     All questions were answered, pt will contact us for questions or concerns in the interim.

## 2023-02-08 ENCOUNTER — TELEPHONE (OUTPATIENT)
Dept: SPORTS MEDICINE | Facility: CLINIC | Age: 73
End: 2023-02-08
Payer: MEDICARE

## 2023-02-08 NOTE — TELEPHONE ENCOUNTER
Called and spoke with patient to let him know that his Euflexxa injection is approved but we don't have enough euflexxa in the office to provide to him, reschedule his appointment for tomorrow 02/09/23 at 8 am.

## 2023-02-09 ENCOUNTER — OFFICE VISIT (OUTPATIENT)
Dept: SPORTS MEDICINE | Facility: CLINIC | Age: 73
End: 2023-02-09
Payer: MEDICARE

## 2023-02-09 VITALS
WEIGHT: 254 LBS | SYSTOLIC BLOOD PRESSURE: 164 MMHG | HEART RATE: 67 BPM | DIASTOLIC BLOOD PRESSURE: 86 MMHG | HEIGHT: 73 IN | BODY MASS INDEX: 33.66 KG/M2

## 2023-02-09 DIAGNOSIS — M17.0 BILATERAL PRIMARY OSTEOARTHRITIS OF KNEE: Primary | ICD-10-CM

## 2023-02-09 PROCEDURE — 99499 NO LOS: ICD-10-PCS | Mod: S$GLB,,, | Performed by: PHYSICIAN ASSISTANT

## 2023-02-09 PROCEDURE — 3079F PR MOST RECENT DIASTOLIC BLOOD PRESSURE 80-89 MM HG: ICD-10-PCS | Mod: CPTII,S$GLB,, | Performed by: PHYSICIAN ASSISTANT

## 2023-02-09 PROCEDURE — 99499 UNLISTED E&M SERVICE: CPT | Mod: S$GLB,,, | Performed by: PHYSICIAN ASSISTANT

## 2023-02-09 PROCEDURE — 1101F PT FALLS ASSESS-DOCD LE1/YR: CPT | Mod: CPTII,S$GLB,, | Performed by: PHYSICIAN ASSISTANT

## 2023-02-09 PROCEDURE — 20611 DRAIN/INJ JOINT/BURSA W/US: CPT | Mod: 50,S$GLB,, | Performed by: PHYSICIAN ASSISTANT

## 2023-02-09 PROCEDURE — 1101F PR PT FALLS ASSESS DOC 0-1 FALLS W/OUT INJ PAST YR: ICD-10-PCS | Mod: CPTII,S$GLB,, | Performed by: PHYSICIAN ASSISTANT

## 2023-02-09 PROCEDURE — 20611 LARGE JOINT ASPIRATION/INJECTION: BILATERAL KNEE: ICD-10-PCS | Mod: 50,S$GLB,, | Performed by: PHYSICIAN ASSISTANT

## 2023-02-09 PROCEDURE — 3288F PR FALLS RISK ASSESSMENT DOCUMENTED: ICD-10-PCS | Mod: CPTII,S$GLB,, | Performed by: PHYSICIAN ASSISTANT

## 2023-02-09 PROCEDURE — 3008F BODY MASS INDEX DOCD: CPT | Mod: CPTII,S$GLB,, | Performed by: PHYSICIAN ASSISTANT

## 2023-02-09 PROCEDURE — 99999 PR PBB SHADOW E&M-EST. PATIENT-LVL III: CPT | Mod: PBBFAC,,, | Performed by: PHYSICIAN ASSISTANT

## 2023-02-09 PROCEDURE — 1125F AMNT PAIN NOTED PAIN PRSNT: CPT | Mod: CPTII,S$GLB,, | Performed by: PHYSICIAN ASSISTANT

## 2023-02-09 PROCEDURE — 1125F PR PAIN SEVERITY QUANTIFIED, PAIN PRESENT: ICD-10-PCS | Mod: CPTII,S$GLB,, | Performed by: PHYSICIAN ASSISTANT

## 2023-02-09 PROCEDURE — 3008F PR BODY MASS INDEX (BMI) DOCUMENTED: ICD-10-PCS | Mod: CPTII,S$GLB,, | Performed by: PHYSICIAN ASSISTANT

## 2023-02-09 PROCEDURE — 3079F DIAST BP 80-89 MM HG: CPT | Mod: CPTII,S$GLB,, | Performed by: PHYSICIAN ASSISTANT

## 2023-02-09 PROCEDURE — 1159F PR MEDICATION LIST DOCUMENTED IN MEDICAL RECORD: ICD-10-PCS | Mod: CPTII,S$GLB,, | Performed by: PHYSICIAN ASSISTANT

## 2023-02-09 PROCEDURE — 3288F FALL RISK ASSESSMENT DOCD: CPT | Mod: CPTII,S$GLB,, | Performed by: PHYSICIAN ASSISTANT

## 2023-02-09 PROCEDURE — 3077F PR MOST RECENT SYSTOLIC BLOOD PRESSURE >= 140 MM HG: ICD-10-PCS | Mod: CPTII,S$GLB,, | Performed by: PHYSICIAN ASSISTANT

## 2023-02-09 PROCEDURE — 99999 PR PBB SHADOW E&M-EST. PATIENT-LVL III: ICD-10-PCS | Mod: PBBFAC,,, | Performed by: PHYSICIAN ASSISTANT

## 2023-02-09 PROCEDURE — 3077F SYST BP >= 140 MM HG: CPT | Mod: CPTII,S$GLB,, | Performed by: PHYSICIAN ASSISTANT

## 2023-02-09 PROCEDURE — 1159F MED LIST DOCD IN RCRD: CPT | Mod: CPTII,S$GLB,, | Performed by: PHYSICIAN ASSISTANT

## 2023-02-09 NOTE — PROGRESS NOTES
"Subjective:     CC: bilateral knee pain/osteoarthritis    Mani is a 72 y.o. male coming in today for their 1st Euflexxa injection to the bilateral knee.     Objective:     VITAL SIGNS: BP (!) 164/86   Pulse 67   Ht 6' 1" (1.854 m)   Wt 115.2 kg (254 lb)   BMI 33.51 kg/m²      Euflexxa Injection Procedure #1     A time out was performed, including verification of patient ID, procedure, site and side, availability of information and equipment, review of safety issues, and agreement with consent, the procedure site was marked.    Location: Knee joint, bilateral     Procedure: The patient was prepped aseptically with alcohol and chlorhexidine. Ethyl Chloride spray was used prior to skin puncture to help numb the superficial skin. After cold spray was applied, using a 22G, 1.5" needle 2cc of Euflexxa was injected into the knee joint under ultrasound guidance. The patient was in the supine position during the duration of this procedure and the injection approach was from the superolateral aspect.       Patient tolerance: The patient tolerated the procedure well with no immediate complications. There were no adverse reactions to the medication. Patient was instructed to apply ice to the joint for up to 20 minutes at a time and avoid strenuous activities for 24-36 hours following the injection. Following that time, the patient can resume activities as prior to the injection.     The patient was reminded to call the clinic immediately for any adverse side effects as explained in clinic today.     Euflexxa - Left Knee:  NDC: [address]-65  Product Code: [id number]  Lot: [id number]  Exp: 04-    Euflexxa - Right Knee:  NDC: [address]-44  Product Code: [id number]  Lot: [id number]  Exp: 04-      Assessment:      Encounter Diagnosis   Name Primary?    Bilateral primary osteoarthritis of knee Yes          Plan:     1. Euflexxa injection of bilateral knee received today (see procedure note above)  2. Follow-up in 1 " week for 2nd injection of 3 injection series  3. Patient agreeable to today's plan and all questions were answered    Large Joint Aspiration/Injection: bilateral knee    Date/Time: 2/9/2023 8:00 AM  Performed by: Abdi Stanford PA-C  Authorized by: Abdi Stanford PA-C     Consent Done?:  Yes (Verbal)  Indications:  Pain and arthritis  Site marked: the procedure site was marked    Timeout: prior to procedure the correct patient, procedure, and site was verified    Prep: patient was prepped and draped in usual sterile fashion      Local anesthesia used?: Yes    Local anesthetic:  Co-phenylcaine spray    Details:  Needle Size:  22 G  Ultrasonic Guidance for needle placement?: Yes (Ultrasound guidance was utilized for needle localization.  Dynamic visualization of the needle was continuous throughout the procedure and maintained accurate placement.  Images were saved and stored for documentation.)    Images are saved and documented.  Approach: superolateral.  Location:  Knee  Laterality:  Bilateral  Site:  Bilateral knee  Medications (Right):  20 mg sodium hyaluronate (EUFLEXXA) 10 mg/mL(mw 2.4 -3.6 million)  Medications (Left):  20 mg sodium hyaluronate (EUFLEXXA) 10 mg/mL(mw 2.4 -3.6 million)  Patient tolerance:  Patient tolerated the procedure well with no immediate complications    This note is dictated using the M*Modal Fluency Direct word recognition program. There are word recognition mistakes that are occasionally missed on review.

## 2023-02-15 ENCOUNTER — OFFICE VISIT (OUTPATIENT)
Dept: SPORTS MEDICINE | Facility: CLINIC | Age: 73
End: 2023-02-15
Payer: MEDICARE

## 2023-02-15 VITALS
HEIGHT: 73 IN | WEIGHT: 254 LBS | BODY MASS INDEX: 33.66 KG/M2 | HEART RATE: 83 BPM | DIASTOLIC BLOOD PRESSURE: 75 MMHG | SYSTOLIC BLOOD PRESSURE: 131 MMHG

## 2023-02-15 DIAGNOSIS — M17.0 BILATERAL PRIMARY OSTEOARTHRITIS OF KNEE: Primary | ICD-10-CM

## 2023-02-15 PROCEDURE — 3078F PR MOST RECENT DIASTOLIC BLOOD PRESSURE < 80 MM HG: ICD-10-PCS | Mod: CPTII,S$GLB,, | Performed by: PHYSICIAN ASSISTANT

## 2023-02-15 PROCEDURE — 1160F RVW MEDS BY RX/DR IN RCRD: CPT | Mod: CPTII,S$GLB,, | Performed by: PHYSICIAN ASSISTANT

## 2023-02-15 PROCEDURE — 1101F PT FALLS ASSESS-DOCD LE1/YR: CPT | Mod: CPTII,S$GLB,, | Performed by: PHYSICIAN ASSISTANT

## 2023-02-15 PROCEDURE — 1160F PR REVIEW ALL MEDS BY PRESCRIBER/CLIN PHARMACIST DOCUMENTED: ICD-10-PCS | Mod: CPTII,S$GLB,, | Performed by: PHYSICIAN ASSISTANT

## 2023-02-15 PROCEDURE — 3008F PR BODY MASS INDEX (BMI) DOCUMENTED: ICD-10-PCS | Mod: CPTII,S$GLB,, | Performed by: PHYSICIAN ASSISTANT

## 2023-02-15 PROCEDURE — 1126F AMNT PAIN NOTED NONE PRSNT: CPT | Mod: CPTII,S$GLB,, | Performed by: PHYSICIAN ASSISTANT

## 2023-02-15 PROCEDURE — 3075F PR MOST RECENT SYSTOLIC BLOOD PRESS GE 130-139MM HG: ICD-10-PCS | Mod: CPTII,S$GLB,, | Performed by: PHYSICIAN ASSISTANT

## 2023-02-15 PROCEDURE — 1159F PR MEDICATION LIST DOCUMENTED IN MEDICAL RECORD: ICD-10-PCS | Mod: CPTII,S$GLB,, | Performed by: PHYSICIAN ASSISTANT

## 2023-02-15 PROCEDURE — 1159F MED LIST DOCD IN RCRD: CPT | Mod: CPTII,S$GLB,, | Performed by: PHYSICIAN ASSISTANT

## 2023-02-15 PROCEDURE — 3075F SYST BP GE 130 - 139MM HG: CPT | Mod: CPTII,S$GLB,, | Performed by: PHYSICIAN ASSISTANT

## 2023-02-15 PROCEDURE — 99999 PR PBB SHADOW E&M-EST. PATIENT-LVL III: CPT | Mod: PBBFAC,,, | Performed by: PHYSICIAN ASSISTANT

## 2023-02-15 PROCEDURE — 3008F BODY MASS INDEX DOCD: CPT | Mod: CPTII,S$GLB,, | Performed by: PHYSICIAN ASSISTANT

## 2023-02-15 PROCEDURE — 99999 PR PBB SHADOW E&M-EST. PATIENT-LVL III: ICD-10-PCS | Mod: PBBFAC,,, | Performed by: PHYSICIAN ASSISTANT

## 2023-02-15 PROCEDURE — 20611 LARGE JOINT ASPIRATION/INJECTION: BILATERAL KNEE: ICD-10-PCS | Mod: 50,S$GLB,, | Performed by: PHYSICIAN ASSISTANT

## 2023-02-15 PROCEDURE — 3078F DIAST BP <80 MM HG: CPT | Mod: CPTII,S$GLB,, | Performed by: PHYSICIAN ASSISTANT

## 2023-02-15 PROCEDURE — 1126F PR PAIN SEVERITY QUANTIFIED, NO PAIN PRESENT: ICD-10-PCS | Mod: CPTII,S$GLB,, | Performed by: PHYSICIAN ASSISTANT

## 2023-02-15 PROCEDURE — 1101F PR PT FALLS ASSESS DOC 0-1 FALLS W/OUT INJ PAST YR: ICD-10-PCS | Mod: CPTII,S$GLB,, | Performed by: PHYSICIAN ASSISTANT

## 2023-02-15 PROCEDURE — 99499 NO LOS: ICD-10-PCS | Mod: S$GLB,,, | Performed by: PHYSICIAN ASSISTANT

## 2023-02-15 PROCEDURE — 20611 DRAIN/INJ JOINT/BURSA W/US: CPT | Mod: 50,S$GLB,, | Performed by: PHYSICIAN ASSISTANT

## 2023-02-15 PROCEDURE — 99499 UNLISTED E&M SERVICE: CPT | Mod: S$GLB,,, | Performed by: PHYSICIAN ASSISTANT

## 2023-02-15 PROCEDURE — 3288F FALL RISK ASSESSMENT DOCD: CPT | Mod: CPTII,S$GLB,, | Performed by: PHYSICIAN ASSISTANT

## 2023-02-15 PROCEDURE — 3288F PR FALLS RISK ASSESSMENT DOCUMENTED: ICD-10-PCS | Mod: CPTII,S$GLB,, | Performed by: PHYSICIAN ASSISTANT

## 2023-02-15 NOTE — PROGRESS NOTES
"Subjective:     CC: bilateral knee pain/osteoarthritis    Mani is a 72 y.o. male coming in today for their 2nd Euflexxa injection to the bilateral knee.     Objective:     VITAL SIGNS: /75   Pulse 83   Ht 6' 1" (1.854 m)   Wt 115.2 kg (254 lb)   BMI 33.51 kg/m²      Euflexxa Injection Procedure #2     A time out was performed, including verification of patient ID, procedure, site and side, availability of information and equipment, review of safety issues, and agreement with consent, the procedure site was marked.    Location: Knee joint, bilateral     Procedure: The patient was prepped aseptically with alcohol and chlorhexidine. Ethyl Chloride spray was used prior to skin puncture to help numb the superficial skin. After cold spray was applied, using a 22G, 1.5" needle 2cc of Euflexxa was injected into the knee joint under ultrasound guidance. The patient was in the supine position during the duration of this procedure and the injection approach was from the superolateral aspect.       Patient tolerance: The patient tolerated the procedure well with no immediate complications. There were no adverse reactions to the medication. Patient was instructed to apply ice to the joint for up to 20 minutes at a time and avoid strenuous activities for 24-36 hours following the injection. Following that time, the patient can resume activities as prior to the injection.     The patient was reminded to call the clinic immediately for any adverse side effects as explained in clinic today.     Euflexxa - Left Knee:  NDC: [address]-60  Product Code: [id number]  Lot: [id number]  Exp: 12-    Euflexxa - Right Knee:  NDC: [address]-87  Product Code: [id number]  Lot: [id number]  Exp: 12-      Assessment:      Encounter Diagnosis   Name Primary?    Bilateral primary osteoarthritis of knee Yes            Plan:     1. Euflexxa injection of bilateral knee received today (see procedure note above)  2. Follow-up in 1 " week for 3rd injection of 3 injection series  3. Patient agreeable to today's plan and all questions were answered    Large Joint Aspiration/Injection: bilateral knee    Date/Time: 2/15/2023 10:00 AM  Performed by: Abdi Stanford PA-C  Authorized by: Abdi Stanford PA-C     Consent Done?:  Yes (Verbal)  Indications:  Arthritis  Site marked: the procedure site was marked    Timeout: prior to procedure the correct patient, procedure, and site was verified    Prep: patient was prepped and draped in usual sterile fashion      Local anesthesia used?: Yes    Local anesthetic:  Co-phenylcaine spray    Details:  Needle Size:  22 G  Ultrasonic Guidance for needle placement?: Yes (Ultrasound guidance was utilized for needle localization.  Dynamic visualization of the needle was continuous throughout the procedure and maintained accurate placement.  Images were saved and stored for documentation.)    Images are saved and documented.  Approach: superolateral.  Location:  Knee  Laterality:  Bilateral  Site:  Bilateral knee  Medications (Right):  20 mg sodium hyaluronate (EUFLEXXA) 10 mg/mL(mw 2.4 -3.6 million)  Medications (Left):  20 mg sodium hyaluronate (EUFLEXXA) 10 mg/mL(mw 2.4 -3.6 million)  Patient tolerance:  Patient tolerated the procedure well with no immediate complications    This note is dictated using the M*Modal Fluency Direct word recognition program. There are word recognition mistakes that are occasionally missed on review.

## 2023-02-17 ENCOUNTER — TELEPHONE (OUTPATIENT)
Dept: PULMONOLOGY | Facility: CLINIC | Age: 73
End: 2023-02-17
Payer: MEDICARE

## 2023-02-17 ENCOUNTER — PES CALL (OUTPATIENT)
Dept: ADMINISTRATIVE | Facility: CLINIC | Age: 73
End: 2023-02-17
Payer: MEDICARE

## 2023-02-17 DIAGNOSIS — J98.4 CAVITARY LESION OF LUNG: Primary | ICD-10-CM

## 2023-02-17 NOTE — TELEPHONE ENCOUNTER
Pt reminded that he needs to follow up on his cavitary lesion and overdue for repeat CT chest surveillance. Pt  will need to follow up with Dr. Buenrostro after his CT chest. Pt verbalize understanding. Pt also reports not using his cpap.  that he needs to contact DME regarding mask fit and whether or not he needs to return his equipment. He also needs to follow up with Dr. Buenrostro for pap titration study since he has severe SUSANNAH and recommended to comply with treatment.

## 2023-02-22 ENCOUNTER — OFFICE VISIT (OUTPATIENT)
Dept: SPORTS MEDICINE | Facility: CLINIC | Age: 73
End: 2023-02-22
Payer: MEDICARE

## 2023-02-22 VITALS
SYSTOLIC BLOOD PRESSURE: 142 MMHG | WEIGHT: 254 LBS | HEART RATE: 43 BPM | HEIGHT: 72 IN | DIASTOLIC BLOOD PRESSURE: 64 MMHG | BODY MASS INDEX: 34.4 KG/M2

## 2023-02-22 DIAGNOSIS — M17.0 BILATERAL PRIMARY OSTEOARTHRITIS OF KNEE: Primary | ICD-10-CM

## 2023-02-22 PROCEDURE — 1126F PR PAIN SEVERITY QUANTIFIED, NO PAIN PRESENT: ICD-10-PCS | Mod: CPTII,S$GLB,, | Performed by: PHYSICIAN ASSISTANT

## 2023-02-22 PROCEDURE — 1159F PR MEDICATION LIST DOCUMENTED IN MEDICAL RECORD: ICD-10-PCS | Mod: CPTII,S$GLB,, | Performed by: PHYSICIAN ASSISTANT

## 2023-02-22 PROCEDURE — 1159F MED LIST DOCD IN RCRD: CPT | Mod: CPTII,S$GLB,, | Performed by: PHYSICIAN ASSISTANT

## 2023-02-22 PROCEDURE — 3288F FALL RISK ASSESSMENT DOCD: CPT | Mod: CPTII,S$GLB,, | Performed by: PHYSICIAN ASSISTANT

## 2023-02-22 PROCEDURE — 20611 DRAIN/INJ JOINT/BURSA W/US: CPT | Mod: 50,S$GLB,, | Performed by: PHYSICIAN ASSISTANT

## 2023-02-22 PROCEDURE — 1101F PT FALLS ASSESS-DOCD LE1/YR: CPT | Mod: CPTII,S$GLB,, | Performed by: PHYSICIAN ASSISTANT

## 2023-02-22 PROCEDURE — 99999 PR PBB SHADOW E&M-EST. PATIENT-LVL III: CPT | Mod: PBBFAC,,, | Performed by: PHYSICIAN ASSISTANT

## 2023-02-22 PROCEDURE — 3008F BODY MASS INDEX DOCD: CPT | Mod: CPTII,S$GLB,, | Performed by: PHYSICIAN ASSISTANT

## 2023-02-22 PROCEDURE — 99499 UNLISTED E&M SERVICE: CPT | Mod: S$GLB,,, | Performed by: PHYSICIAN ASSISTANT

## 2023-02-22 PROCEDURE — 99499 NO LOS: ICD-10-PCS | Mod: S$GLB,,, | Performed by: PHYSICIAN ASSISTANT

## 2023-02-22 PROCEDURE — 3078F PR MOST RECENT DIASTOLIC BLOOD PRESSURE < 80 MM HG: ICD-10-PCS | Mod: CPTII,S$GLB,, | Performed by: PHYSICIAN ASSISTANT

## 2023-02-22 PROCEDURE — 3008F PR BODY MASS INDEX (BMI) DOCUMENTED: ICD-10-PCS | Mod: CPTII,S$GLB,, | Performed by: PHYSICIAN ASSISTANT

## 2023-02-22 PROCEDURE — 99999 PR PBB SHADOW E&M-EST. PATIENT-LVL III: ICD-10-PCS | Mod: PBBFAC,,, | Performed by: PHYSICIAN ASSISTANT

## 2023-02-22 PROCEDURE — 20611 LARGE JOINT ASPIRATION/INJECTION: BILATERAL KNEE: ICD-10-PCS | Mod: 50,S$GLB,, | Performed by: PHYSICIAN ASSISTANT

## 2023-02-22 PROCEDURE — 3077F SYST BP >= 140 MM HG: CPT | Mod: CPTII,S$GLB,, | Performed by: PHYSICIAN ASSISTANT

## 2023-02-22 PROCEDURE — 3077F PR MOST RECENT SYSTOLIC BLOOD PRESSURE >= 140 MM HG: ICD-10-PCS | Mod: CPTII,S$GLB,, | Performed by: PHYSICIAN ASSISTANT

## 2023-02-22 PROCEDURE — 1126F AMNT PAIN NOTED NONE PRSNT: CPT | Mod: CPTII,S$GLB,, | Performed by: PHYSICIAN ASSISTANT

## 2023-02-22 PROCEDURE — 3288F PR FALLS RISK ASSESSMENT DOCUMENTED: ICD-10-PCS | Mod: CPTII,S$GLB,, | Performed by: PHYSICIAN ASSISTANT

## 2023-02-22 PROCEDURE — 3078F DIAST BP <80 MM HG: CPT | Mod: CPTII,S$GLB,, | Performed by: PHYSICIAN ASSISTANT

## 2023-02-22 PROCEDURE — 1101F PR PT FALLS ASSESS DOC 0-1 FALLS W/OUT INJ PAST YR: ICD-10-PCS | Mod: CPTII,S$GLB,, | Performed by: PHYSICIAN ASSISTANT

## 2023-02-22 NOTE — PROGRESS NOTES
"Subjective:     CC: bilateral knee pain/osteoarthritis    Mani is a 72 y.o. male coming in today for their 3rd Euflexxa injection to the bilateral knee.     Objective:     VITAL SIGNS: BP (!) 142/64   Pulse (!) 43   Ht 6' (1.829 m)   Wt 115.2 kg (254 lb)   BMI 34.45 kg/m²      Euflexxa Injection Procedure #3     A time out was performed, including verification of patient ID, procedure, site and side, availability of information and equipment, review of safety issues, and agreement with consent, the procedure site was marked.    Location: Knee joint, bilateral     Procedure: The patient was prepped aseptically with alcohol and chlorhexidine. Ethyl Chloride spray was used prior to skin puncture to help numb the superficial skin. After cold spray was applied, using a 22G, 1.5" needle 2cc of Euflexxa was injected into the knee joint under ultrasound guidance. The patient was in the supine position during the duration of this procedure and the injection approach was from the superolateral aspect.       Patient tolerance: The patient tolerated the procedure well with no immediate complications. There were no adverse reactions to the medication. Patient was instructed to apply ice to the joint for up to 20 minutes at a time and avoid strenuous activities for 24-36 hours following the injection. Following that time, the patient can resume activities as prior to the injection.     The patient was reminded to call the clinic immediately for any adverse side effects as explained in clinic today.     Euflexxa - Left Knee:  NDC: [address]-74  Product Code: [id number]  Lot: [id number]  Exp: 12-    Euflexxa - Right Knee:  NDC: [address]-39  Product Code: [address]-3  Lot: [id number]  Exp: 12-      Assessment:      Encounter Diagnosis   Name Primary?    Bilateral primary osteoarthritis of knee Yes              Plan:     1. Euflexxa injection of bilateral knee received today (see procedure note above)  2. Follow-up " in 6 weeks for repeat evaluation of both knees  3. Patient agreeable to today's plan and all questions were answered    Large Joint Aspiration/Injection: bilateral knee    Date/Time: 2/22/2023 10:30 AM  Performed by: Abdi Stanford PA-C  Authorized by: Abdi Stanford PA-C     Consent Done?:  Yes (Verbal)  Indications:  Pain and arthritis  Site marked: the procedure site was marked    Timeout: prior to procedure the correct patient, procedure, and site was verified    Prep: patient was prepped and draped in usual sterile fashion      Local anesthesia used?: Yes    Local anesthetic:  Co-phenylcaine spray    Details:  Needle Size:  22 G  Ultrasonic Guidance for needle placement?: Yes (Ultrasound guidance was utilized for needle localization.  Dynamic visualization of the needle was continuous throughout the procedure and maintained accurate placement.  Images were saved and stored for documentation.)    Images are saved and documented.  Approach: superolateral.  Location:  Knee  Laterality:  Bilateral  Site:  Bilateral knee  Medications (Right):  20 mg sodium hyaluronate (EUFLEXXA) 10 mg/mL(mw 2.4 -3.6 million)  Medications (Left):  20 mg sodium hyaluronate (EUFLEXXA) 10 mg/mL(mw 2.4 -3.6 million)  Patient tolerance:  Patient tolerated the procedure well with no immediate complications    This note is dictated using the M*Modal Fluency Direct word recognition program. There are word recognition mistakes that are occasionally missed on review.

## 2023-03-03 ENCOUNTER — HOSPITAL ENCOUNTER (OUTPATIENT)
Dept: RADIOLOGY | Facility: HOSPITAL | Age: 73
Discharge: HOME | End: 2023-03-03
Attending: NURSE PRACTITIONER
Payer: MEDICARE

## 2023-03-03 ENCOUNTER — TELEPHONE (OUTPATIENT)
Dept: SPORTS MEDICINE | Facility: CLINIC | Age: 73
End: 2023-03-03
Payer: MEDICARE

## 2023-03-03 DIAGNOSIS — J98.4 CAVITARY LESION OF LUNG: ICD-10-CM

## 2023-03-03 PROCEDURE — 71250 CT CHEST WITHOUT CONTRAST: ICD-10-PCS | Mod: 26,,, | Performed by: RADIOLOGY

## 2023-03-03 PROCEDURE — 71250 CT THORAX DX C-: CPT | Mod: TC

## 2023-03-03 PROCEDURE — 71250 CT THORAX DX C-: CPT | Mod: 26,,, | Performed by: RADIOLOGY

## 2023-03-03 NOTE — TELEPHONE ENCOUNTER
Called and spoke with patient to let her know that Abdi will not be at the [facility] location starting 03/20/23 and if she can be seen at the [place] office. Appointment reschedule at [place] office on 04/12/23 at 10:30.

## 2023-03-08 RX ORDER — ALBUTEROL SULFATE 90 UG/1
AEROSOL, METERED RESPIRATORY (INHALATION)
Refills: 2 | OUTPATIENT
Start: 2023-03-08

## 2023-03-08 NOTE — TELEPHONE ENCOUNTER
Refill Decision Note   Julio Valderramaup  is requesting a refill authorization.  Brief Assessment and Rationale for Refill:  Quick Discontinue     Medication Therapy Plan:       Medication Reconciliation Completed: No   Comments:     No Care Gaps recommended.     Note composed:4:37 PM 03/08/2023

## 2023-03-08 NOTE — TELEPHONE ENCOUNTER
No new care gaps identified.  [facility] Embedded Care Gaps. Reference number: [id number]. 3/08/2023   4:08:09 PM CST

## 2023-03-13 ENCOUNTER — PES CALL (OUTPATIENT)
Dept: ADMINISTRATIVE | Facility: CLINIC | Age: 73
End: 2023-03-13
Payer: MEDICARE

## 2023-03-17 ENCOUNTER — OFFICE VISIT (OUTPATIENT)
Dept: PULMONOLOGY | Facility: CLINIC | Age: 73
End: 2023-03-17
Payer: MEDICARE

## 2023-03-17 VITALS
BODY MASS INDEX: 34.55 KG/M2 | OXYGEN SATURATION: 95 % | WEIGHT: 255.06 LBS | SYSTOLIC BLOOD PRESSURE: 174 MMHG | HEART RATE: 41 BPM | HEIGHT: 72 IN | DIASTOLIC BLOOD PRESSURE: 69 MMHG

## 2023-03-17 DIAGNOSIS — J98.4 CAVITARY LESION OF LUNG: ICD-10-CM

## 2023-03-17 DIAGNOSIS — G47.33 OSA (OBSTRUCTIVE SLEEP APNEA): ICD-10-CM

## 2023-03-17 DIAGNOSIS — R09.81 CHRONIC NASAL CONGESTION: ICD-10-CM

## 2023-03-17 DIAGNOSIS — R05.3 CHRONIC COUGH: ICD-10-CM

## 2023-03-17 PROCEDURE — 3078F PR MOST RECENT DIASTOLIC BLOOD PRESSURE < 80 MM HG: ICD-10-PCS | Mod: CPTII,S$GLB,, | Performed by: INTERNAL MEDICINE

## 2023-03-17 PROCEDURE — 1101F PT FALLS ASSESS-DOCD LE1/YR: CPT | Mod: CPTII,S$GLB,, | Performed by: INTERNAL MEDICINE

## 2023-03-17 PROCEDURE — 99215 OFFICE O/P EST HI 40 MIN: CPT | Mod: S$GLB,,, | Performed by: INTERNAL MEDICINE

## 2023-03-17 PROCEDURE — 3078F DIAST BP <80 MM HG: CPT | Mod: CPTII,S$GLB,, | Performed by: INTERNAL MEDICINE

## 2023-03-17 PROCEDURE — 1159F MED LIST DOCD IN RCRD: CPT | Mod: CPTII,S$GLB,, | Performed by: INTERNAL MEDICINE

## 2023-03-17 PROCEDURE — 3288F PR FALLS RISK ASSESSMENT DOCUMENTED: ICD-10-PCS | Mod: CPTII,S$GLB,, | Performed by: INTERNAL MEDICINE

## 2023-03-17 PROCEDURE — 99999 PR PBB SHADOW E&M-EST. PATIENT-LVL IV: ICD-10-PCS | Mod: PBBFAC,,, | Performed by: INTERNAL MEDICINE

## 2023-03-17 PROCEDURE — 3008F BODY MASS INDEX DOCD: CPT | Mod: CPTII,S$GLB,, | Performed by: INTERNAL MEDICINE

## 2023-03-17 PROCEDURE — 99999 PR PBB SHADOW E&M-EST. PATIENT-LVL IV: CPT | Mod: PBBFAC,,, | Performed by: INTERNAL MEDICINE

## 2023-03-17 PROCEDURE — 3077F PR MOST RECENT SYSTOLIC BLOOD PRESSURE >= 140 MM HG: ICD-10-PCS | Mod: CPTII,S$GLB,, | Performed by: INTERNAL MEDICINE

## 2023-03-17 PROCEDURE — 3008F PR BODY MASS INDEX (BMI) DOCUMENTED: ICD-10-PCS | Mod: CPTII,S$GLB,, | Performed by: INTERNAL MEDICINE

## 2023-03-17 PROCEDURE — 3288F FALL RISK ASSESSMENT DOCD: CPT | Mod: CPTII,S$GLB,, | Performed by: INTERNAL MEDICINE

## 2023-03-17 PROCEDURE — 1126F AMNT PAIN NOTED NONE PRSNT: CPT | Mod: CPTII,S$GLB,, | Performed by: INTERNAL MEDICINE

## 2023-03-17 PROCEDURE — 3077F SYST BP >= 140 MM HG: CPT | Mod: CPTII,S$GLB,, | Performed by: INTERNAL MEDICINE

## 2023-03-17 PROCEDURE — 1126F PR PAIN SEVERITY QUANTIFIED, NO PAIN PRESENT: ICD-10-PCS | Mod: CPTII,S$GLB,, | Performed by: INTERNAL MEDICINE

## 2023-03-17 PROCEDURE — 1101F PR PT FALLS ASSESS DOC 0-1 FALLS W/OUT INJ PAST YR: ICD-10-PCS | Mod: CPTII,S$GLB,, | Performed by: INTERNAL MEDICINE

## 2023-03-17 PROCEDURE — 99215 PR OFFICE/OUTPT VISIT, EST, LEVL V, 40-54 MIN: ICD-10-PCS | Mod: S$GLB,,, | Performed by: INTERNAL MEDICINE

## 2023-03-17 PROCEDURE — 1159F PR MEDICATION LIST DOCUMENTED IN MEDICAL RECORD: ICD-10-PCS | Mod: CPTII,S$GLB,, | Performed by: INTERNAL MEDICINE

## 2023-03-17 NOTE — PROGRESS NOTES
Julio Bernardo  was seen as a new patient to me for the evaluation of  abnormal CT.    CHIEF COMPLAINT:  Results      HISTORY OF PRESENT ILLNESS: Julio Bernardo is a 72 y.o. male  has a past medical history of Allergy, Arthritis (2019), Colon polyps, Disorder of kidney and ureter (2019), Hyperlipemia, Hypertension, and Posterior capsular opacification.Patient was seen by MERLYN Torres for cavitary lung lesion.  3x 1.7 cm maria teresa cavitary lesion was first noted 12/15/21 CT chest which was ordered due to persistent cough.  Quantiferon and sputum afb were negative.  Pet scan 4/18/22 without increase uptake.  Patient has been monitor with serial imaging.  Last ct was 2/17/23.     No new issue since last visit.  Walking 2-5 miles daily without issue.  No chest pain.  Intermittent cough mainly at night.  +left nasal congestion.  No gerd symptoms.      Diagnosed with yovana in 12/2021 with ahi of 34.  Have not been using cpap.  Pressure is too high.  +dry mouth with cpap.  Sleep is restful.  No day time sleepiness.      PAST MEDICAL HISTORY:    Active Ambulatory Problems     Diagnosis Date Noted    After-cataract, obscuring vision - Left Eye 05/15/2013    Vitreous detachment - Both Eyes 05/15/2013    Idiopathic hypertension 05/15/2013    Astigmatism - Both Eyes 05/15/2013    Post-operative state - Left Eye 06/03/2013    Hyperlipemia     Hypertension     ED (erectile dysfunction) 08/28/2013    Colon polyp 08/28/2013    Decreased taste and smell 01/26/2016    Right posterior capsular opacification 10/26/2016    Pseudophakia 10/26/2016    CKD (chronic kidney disease) stage 3, GFR 30-59 ml/min 09/07/2017    Chronic gout 07/31/2018    Primary osteoarthritis of both knees 05/09/2019    Decreased range of motion of neck 12/12/2019    Segmental dysfunction of cervical region 12/12/2019    Abnormal posture 12/12/2019    Neck stiffness 12/12/2019    Chronic cough 12/03/2021    Cavitary lesion of lung 12/20/2021    Elevated hemidiaphragm  12/20/2021    SUSANNAH (obstructive sleep apnea)     Mucous retention cyst of maxillary sinus 04/25/2022    Chronic nasal congestion 04/25/2022    Atherosclerosis of aorta 06/28/2022     Resolved Ambulatory Problems     Diagnosis Date Noted    Aftercataract - Both Eyes 05/09/2013    After-cataract, unspecified - Right Eye 05/15/2013    QUINTEROS (dyspnea on exertion) 09/07/2017     Past Medical History:   Diagnosis Date    Allergy     Arthritis 2019    Colon polyps     Disorder of kidney and ureter 2019    Posterior capsular opacification                 PAST SURGICAL HISTORY:    Past Surgical History:   Procedure Laterality Date    CATARACT EXTRACTION      both eyes    COLONOSCOPY N/A 09/27/2016    Procedure: COLONOSCOPY;  Surgeon: Jan Argueta MD;  Location: [facility] ENDO (4TH FLR);  Service: Endoscopy;  Laterality: N/A;    COLONOSCOPY N/A 05/19/2022    Procedure: COLONOSCOPY;  Surgeon: Bobo Delcid MD;  Location: [facility] ENDO (4TH FLR);  Service: Endoscopy;  Laterality: N/A;  4/4 fully vaccinated; instructions to [place]    EYE SURGERY      HERNIA REPAIR      WISDOM TOOTH EXTRACTION      YAG      Left Eye         FAMILY HISTORY:                Family History   Problem Relation Age of Onset    Hypertension Father     Cataracts Father     No Known Problems Maternal Uncle     Prostate cancer Paternal Uncle         prostate    Amblyopia Neg Hx     Blindness Neg Hx     Glaucoma Neg Hx     Macular degeneration Neg Hx     Retinal detachment Neg Hx     Strabismus Neg Hx        SOCIAL HISTORY:          Tobacco:   Social History     Tobacco Use   Smoking Status Never   Smokeless Tobacco Never     alcohol use:    Social History     Substance and Sexual Activity   Alcohol Use Yes    Alcohol/week: 6.0 standard drinks    Types: 4 Glasses of wine, 1 Shots of liquor, 1 Standard drinks or equivalent per week    Comment: socially- been a while since he drank               Occupation: real estate     ALLERGIES:  Review of patient's allergies  indicates:  No Known Allergies    CURRENT MEDICATIONS:    Current Outpatient Medications   Medication Sig Dispense Refill    albuterol (PROVENTIL/VENTOLIN HFA) 90 mcg/actuation inhaler Inhale 2 puffs into the lungs every 6 (six) hours as needed (cough). 8.5 g 2    ascorbic acid (VITAMIN C) 1000 MG tablet Take 1,000 mg by mouth once daily.      aspirin 81 mg Tab Take by mouth. 1 Tablet Oral Every day.  Last taken 3/25/11      atorvastatin (LIPITOR) 40 MG tablet TAKE 1 TABLET BY MOUTH EVERY DAY 90 tablet 1    azelastine (ASTELIN) 137 mcg (0.1 %) nasal spray INSTILL 2 SPRAYS IN EACH NOSTRIL TWICE A DAY 30 mL 2    ciclopirox (PENLAC) 8 % Soln Apply topically nightly. 6.6 mL 11    colchicine (COLCRYS) 0.6 mg tablet Take 1 tablet (0.6 mg total) by mouth once daily. 15 tablet 0    fexofenadine HCl (ALLEGRA ORAL) Take by mouth.      fish oil-omega-3 fatty acids 300-1,000 mg capsule Take 2 g by mouth once daily.      fluticasone propionate (FLONASE) 50 mcg/actuation nasal spray USE 1 SPRAY BY EACH NARE ROUTE ONCE DAILY. 48 mL 5    GAVILYTE-G 236-22.74-6.74 -5.86 gram suspension Take 4,000 mLs by mouth once.      hydroCHLOROthiazide (MICROZIDE) 12.5 mg capsule Take 1 capsule (12.5 mg total) by mouth once daily. 90 capsule 3    losartan (COZAAR) 100 MG tablet Take 1 tablet (100 mg total) by mouth once daily. 90 tablet 3    multivitamin capsule Take 1 capsule by mouth once daily.      omeprazole (PRILOSEC) 20 MG capsule Take 1 capsule (20 mg total) by mouth 2 (two) times daily. 180 capsule 1     No current facility-administered medications for this visit.                  REVIEW OF SYSTEMS:     Pulmonary related symptoms as per HPI.  Gen:  no weight loss, no fever, no night sweat  HEENT:  no visual changes, no sore throat, no hearing loss  CV:  No chest pain, no orthopnea, no PND  GI:  no melena, no hematochezia, no diarhea, no constipation.  :  no dysuria, no hematuria, no hesistancy, no dribbling  Neuro:  no syncope, no  vertigo, no tinitus  Psych:  No homocide or suicide ideation; no depression.  Endocrine:  No heat or cold intolerance.  Sleep:  No snoring; no witnessed apnea.  Otherwise, a balance of systems reviewed is negative.          PHYSICAL EXAM:  Vitals:    03/17/23 0813   BP: (!) 174/69   Pulse: (!) 41   SpO2: 95%   Weight: 115.7 kg (255 lb 1.2 oz)   Height: 6' (1.829 m)   PainSc: 0-No pain     Body mass index is 34.59 kg/m².     GENERAL:  well develop; no apparent distress  HEENT:  no nasal congestion; no discharge noted; class 3 modified mallampatti.   NECK:  supple; no palpable masses.  CARDIO: regular rate and rhythm  PULM:  clear to auscultation bilaterally; no intercostals retractions; no accessory muscle usage   ABDOMEN:  soft nontender/nondistended.  +bowel sound  EXTREMITIES no cce  NEURO:  CN II-XII intact.  5/5 motor in all extremities.  sensation grossly intact   to light touch.  PSYCH:  normal affect.  Alert and oriented x 4    LABS  Pulmonary Functions Testing Results(personally reviewed):    PFT 12/15/21 Ratio of 78%; FVC 3.13 L (83%); FEV1 2.06 L (74%); TLC 5.32 L (71%); dlco 25 (88%) t   ABG (personally reviewed):  none    CT CHEST(personally reviewed):  2/17/23 stable maria teresa triangular lesion when compared to 12/5/21 CT.  Lesion is ~ 3 x 1.7 cm with central bronchogram.  Stable subcentimeter nodules as well.    PET scan (personally reviewed) no increased FDG activities.    Echo 6/5/20  Normal left ventricular systolic function. The estimated ejection fraction is 60%.  Mild right ventricular enlargement.  Normal right ventricular systolic function.  Normal LV diastolic function.  Severe biatrial enlargement.  The estimated PA systolic pressure is 31 mmHg.  Normal central venous pressure (3 mmHg).    Sputum afb 1/18 negative x 3  Respiratory culture 5/3/22 normal saud.    Quantiferon 1/18/22 negative    ASSESSMENT/PLAN  Problem List Items Addressed This Visit       Cavitary lesion of lung    Overview      -maria teresa triagular opacity noted since 12/21.  S/p empiric antibiotics infectious pna hx walking pna  -quantiferon and afb negative negative  -Stable non-hypermetabolic lesion since 12/2021, minimal symptoms overall improving -normal pulmonary function  -stable over 15 months.  Most likely residual scarring issue.         Relevant Orders    CT Chest Without Contrast    Chronic cough    Overview     -patulous esophagus on ct.  No overt gerd symptoms other than cough with supine position.  -recommend starting ppi x 6-8 weeks and reassess           Chronic nasal congestion    Overview     -sinus irrigation and nasal steroid.         SUSANNAH (obstructive sleep apnea)    Overview     -HST 12/29/2021: AHI 34  -poor tolerance.  Desensitization protocol d/w patient.  Advise patient to bring apa to next clinic appointment.  May consider titration if not better.                    Patient will No follow-ups on file.     45 minutes of total time spent on the encounter, which includes face to face time and non-face to face time preparing to see the patient (eg, review of tests), Obtaining and/or reviewing separately obtained history, documenting clinical information in the electronic or other health record, independently interpreting results (not separately reported) and communicating results to the patient/family/caregiver, or Care coordination (not separately reported).

## 2023-03-17 NOTE — PATIENT INSTRUCTIONS
1.  NeilMed Sinus Rinse Regular Kit    Recipe 1/4 teaspoon of salt and 1/4 teaspoon of baking soda in distilled water.  Be sure to tilt head forward as shown in picture.        Step 1:  Wear the CPAP mask at home while awake for one hour each day. Practice breathing through the mask while watching television, reading, or performing another sedentary activity that keeps your mind off your anxiety.     Step 2: Use the CPAP mask  during scheduled one hour naps at home.     Step 3: Use CPAP mask  during the initial 3-4 hours of nocturnal sleep.     Step 4: Use CPAP mask  through the entire night of sleep.

## 2023-03-27 ENCOUNTER — LAB VISIT (OUTPATIENT)
Dept: LAB | Facility: HOSPITAL | Age: 73
End: 2023-03-27
Attending: INTERNAL MEDICINE
Payer: MEDICARE

## 2023-03-27 DIAGNOSIS — Z12.5 SCREENING PSA (PROSTATE SPECIFIC ANTIGEN): ICD-10-CM

## 2023-03-27 DIAGNOSIS — E78.5 HYPERLIPIDEMIA, UNSPECIFIED HYPERLIPIDEMIA TYPE: ICD-10-CM

## 2023-03-27 LAB
ALBUMIN SERPL BCP-MCNC: 3.6 G/DL (ref 3.5–5.2)
ALP SERPL-CCNC: 106 U/L (ref 55–135)
ALT SERPL W/O P-5'-P-CCNC: 15 U/L (ref 10–44)
ANION GAP SERPL CALC-SCNC: 10 MMOL/L (ref 8–16)
AST SERPL-CCNC: 22 U/L (ref 10–40)
BILIRUB SERPL-MCNC: 0.9 MG/DL (ref 0.1–1)
BUN SERPL-MCNC: 14 MG/DL (ref 8–23)
CALCIUM SERPL-MCNC: 9.1 MG/DL (ref 8.7–10.5)
CHLORIDE SERPL-SCNC: 106 MMOL/L (ref 95–110)
CHOLEST SERPL-MCNC: 154 MG/DL (ref 120–199)
CHOLEST/HDLC SERPL: 3.1 {RATIO} (ref 2–5)
CO2 SERPL-SCNC: 26 MMOL/L (ref 23–29)
COMPLEXED PSA SERPL-MCNC: 1 NG/ML (ref 0–4)
CREAT SERPL-MCNC: 1.3 MG/DL (ref 0.5–1.4)
EST. GFR  (NO RACE VARIABLE): 58 ML/MIN/1.73 M^2
GLUCOSE SERPL-MCNC: 111 MG/DL (ref 70–110)
HDLC SERPL-MCNC: 50 MG/DL (ref 40–75)
HDLC SERPL: 32.5 % (ref 20–50)
LDLC SERPL CALC-MCNC: 82.8 MG/DL (ref 63–159)
NONHDLC SERPL-MCNC: 104 MG/DL
POTASSIUM SERPL-SCNC: 4 MMOL/L (ref 3.5–5.1)
PROT SERPL-MCNC: 6.7 G/DL (ref 6–8.4)
SODIUM SERPL-SCNC: 142 MMOL/L (ref 136–145)
TRIGL SERPL-MCNC: 106 MG/DL (ref 30–150)

## 2023-03-27 PROCEDURE — 80061 LIPID PANEL: CPT | Performed by: INTERNAL MEDICINE

## 2023-03-27 PROCEDURE — 36415 COLL VENOUS BLD VENIPUNCTURE: CPT | Mod: PN | Performed by: INTERNAL MEDICINE

## 2023-03-27 PROCEDURE — 84153 ASSAY OF PSA TOTAL: CPT | Performed by: INTERNAL MEDICINE

## 2023-03-27 PROCEDURE — 80053 COMPREHEN METABOLIC PANEL: CPT | Performed by: INTERNAL MEDICINE

## 2023-03-28 ENCOUNTER — OFFICE VISIT (OUTPATIENT)
Dept: PODIATRY | Facility: CLINIC | Age: 73
End: 2023-03-28
Payer: MEDICARE

## 2023-03-28 VITALS
SYSTOLIC BLOOD PRESSURE: 156 MMHG | BODY MASS INDEX: 35.5 KG/M2 | HEIGHT: 72 IN | HEART RATE: 63 BPM | DIASTOLIC BLOOD PRESSURE: 77 MMHG | WEIGHT: 262.13 LBS

## 2023-03-28 DIAGNOSIS — L84 CORN OR CALLUS: ICD-10-CM

## 2023-03-28 DIAGNOSIS — G60.9 IDIOPATHIC PERIPHERAL NEUROPATHY: ICD-10-CM

## 2023-03-28 DIAGNOSIS — N18.31 STAGE 3A CHRONIC KIDNEY DISEASE: Primary | ICD-10-CM

## 2023-03-28 DIAGNOSIS — B35.1 ONYCHOMYCOSIS DUE TO DERMATOPHYTE: ICD-10-CM

## 2023-03-28 PROCEDURE — 1159F MED LIST DOCD IN RCRD: CPT | Mod: CPTII,S$GLB,, | Performed by: PODIATRIST

## 2023-03-28 PROCEDURE — 11057 PARNG/CUTG B9 HYPRKR LES >4: CPT | Mod: Q9,S$GLB,, | Performed by: PODIATRIST

## 2023-03-28 PROCEDURE — 3077F PR MOST RECENT SYSTOLIC BLOOD PRESSURE >= 140 MM HG: ICD-10-PCS | Mod: CPTII,S$GLB,, | Performed by: PODIATRIST

## 2023-03-28 PROCEDURE — 3078F PR MOST RECENT DIASTOLIC BLOOD PRESSURE < 80 MM HG: ICD-10-PCS | Mod: CPTII,S$GLB,, | Performed by: PODIATRIST

## 2023-03-28 PROCEDURE — 3078F DIAST BP <80 MM HG: CPT | Mod: CPTII,S$GLB,, | Performed by: PODIATRIST

## 2023-03-28 PROCEDURE — 3008F BODY MASS INDEX DOCD: CPT | Mod: CPTII,S$GLB,, | Performed by: PODIATRIST

## 2023-03-28 PROCEDURE — 1160F PR REVIEW ALL MEDS BY PRESCRIBER/CLIN PHARMACIST DOCUMENTED: ICD-10-PCS | Mod: CPTII,S$GLB,, | Performed by: PODIATRIST

## 2023-03-28 PROCEDURE — 3077F SYST BP >= 140 MM HG: CPT | Mod: CPTII,S$GLB,, | Performed by: PODIATRIST

## 2023-03-28 PROCEDURE — 1159F PR MEDICATION LIST DOCUMENTED IN MEDICAL RECORD: ICD-10-PCS | Mod: CPTII,S$GLB,, | Performed by: PODIATRIST

## 2023-03-28 PROCEDURE — 11057 PR TRIM BENIGN HYPERKERATOTIC SKIN LESION,>4: ICD-10-PCS | Mod: Q9,S$GLB,, | Performed by: PODIATRIST

## 2023-03-28 PROCEDURE — 11721 DEBRIDE NAIL 6 OR MORE: CPT | Mod: Q9,59,S$GLB, | Performed by: PODIATRIST

## 2023-03-28 PROCEDURE — 99499 NO LOS: ICD-10-PCS | Mod: S$GLB,,, | Performed by: PODIATRIST

## 2023-03-28 PROCEDURE — 11721 PR DEBRIDEMENT OF NAILS, 6 OR MORE: ICD-10-PCS | Mod: Q9,59,S$GLB, | Performed by: PODIATRIST

## 2023-03-28 PROCEDURE — 1160F RVW MEDS BY RX/DR IN RCRD: CPT | Mod: CPTII,S$GLB,, | Performed by: PODIATRIST

## 2023-03-28 PROCEDURE — 1126F PR PAIN SEVERITY QUANTIFIED, NO PAIN PRESENT: ICD-10-PCS | Mod: CPTII,S$GLB,, | Performed by: PODIATRIST

## 2023-03-28 PROCEDURE — 3008F PR BODY MASS INDEX (BMI) DOCUMENTED: ICD-10-PCS | Mod: CPTII,S$GLB,, | Performed by: PODIATRIST

## 2023-03-28 PROCEDURE — 99499 UNLISTED E&M SERVICE: CPT | Mod: S$GLB,,, | Performed by: PODIATRIST

## 2023-03-28 PROCEDURE — 99999 PR PBB SHADOW E&M-EST. PATIENT-LVL IV: CPT | Mod: PBBFAC,,, | Performed by: PODIATRIST

## 2023-03-28 PROCEDURE — 99999 PR PBB SHADOW E&M-EST. PATIENT-LVL IV: ICD-10-PCS | Mod: PBBFAC,,, | Performed by: PODIATRIST

## 2023-03-28 PROCEDURE — 1126F AMNT PAIN NOTED NONE PRSNT: CPT | Mod: CPTII,S$GLB,, | Performed by: PODIATRIST

## 2023-03-28 NOTE — PROGRESS NOTES
Subjective:      Patient ID: Julio Bernardo is a 72 y.o. male.    Chief Complaint: Nail Care    Julio Bernardo, 72 y.o., male returns to clinic for follow up high risk foot care. L great toe doing much better with 0/10 pain after phenol procedure on medial border. No new concerns, doing well otherwise.        Vitals:    23 1530   BP: (!) 156/77   Pulse: 63   Weight: 118.9 kg (262 lb 2 oz)   Height: 6' (1.829 m)   PainSc: 0-No pain         Review of Systems   Constitutional: Negative for chills and fever.   Cardiovascular:  Negative for chest pain, claudication and leg swelling.   Respiratory:  Negative for cough and shortness of breath.    Skin:  Positive for dry skin and nail changes.   Musculoskeletal:  Positive for gout (stable).   Gastrointestinal:  Negative for nausea and vomiting.   Neurological:  Negative for numbness and paresthesias.   Psychiatric/Behavioral:  Negative for altered mental status.          Objective:      Physical Exam  Vitals reviewed.   Constitutional:       Appearance: He is well-developed.   HENT:      Head: Normocephalic.   Cardiovascular:      Pulses:           Dorsalis pedis pulses are 1+ on the right side and 1+ on the left side.        Posterior tibial pulses are 1+ on the right side and 1+ on the left side.      Comments: CRT < 3 sec to tips of toes.    Pulmonary:      Effort: No respiratory distress.   Musculoskeletal:      Right lower le+ Edema present.      Left lower le+ Edema present.      Comments: Semi-reducible hammertoe contractures noted to toes 2-4 b/l-asymptomatic. HAV, mild, non trackbound noted b/l with mild medial bony prominence at 1st met head--asymptomatic.     Hypermobility noted to 1st ray b/l with near complete collapse of medial longitudinal arch b/l with loading.     Otherwise rectus foot and toe position bilateral with no major deformities noted. Mild equinus noted b/l ankles with < 10 deg DF noted. MMT 5/5 in DF/PF/Inv/Ev resistance with  no reproduction of pain in any direction. Passive range of motion of ankle and pedal joints is painless b/l.     Skin:     General: Skin is warm and dry.      Findings: No erythema.      Comments: No open lesions, lacerations or wounds noted. Nails are thickened, elongated, discolored yellow/brown with subungual debris and brittleness to R 1-5 and L 1-5. Interdigital spaces clean, dry and intact b/l. No erythema noted to b/l foot. Skin texture normal. Pedal hair normal. Mild hyperpigmentation to skin of both ankles and/or feet, consistent with hemosiderin deposits.  Pedal hair diminished b/l. Toes cool to touch b/l. Hyperkeratotic lesion noted to distal tips of toes 1,3,5 b/l. Skin lines present to lesion/s. No signs of deep tissue injury.       Neurological:      Mental Status: He is alert and oriented to person, place, and time.      Sensory: Sensory deficit present.      Comments: Light touch, proprioception, and sharp/dull sensation are all intact bilaterally. Protective threshold with the [place] monofilament is decreased at toes bilaterally.     Psychiatric:         Behavior: Behavior normal.         Thought Content: Thought content normal.         Judgment: Judgment normal.             Assessment:       Encounter Diagnoses   Name Primary?    Stage 3a chronic kidney disease Yes    Idiopathic peripheral neuropathy     Onychomycosis due to dermatophyte     Corn or callus            Plan:       Julio was seen today for nail care.    Diagnoses and all orders for this visit:    Stage 3a chronic kidney disease    Idiopathic peripheral neuropathy    Onychomycosis due to dermatophyte    Corn or callus        I counseled the patient on his conditions, their implications and medical management.     - Shoe inspection. General Foot Education. Patient reminded of the importance of good nutrition. Patient instructed on proper foot hygeine. We discussed wearing proper shoe gear, daily foot inspections, never  walking without protective shoe gear, caution putting sharp instruments to feet     - Discussed general foot care:  Wear comfortable, proper fitting shoes. Wash feet daily. Dry well. After drying, apply moisturizer to feet (no lotion to webspaces). Inspect feet daily for skin breaks, blisters, swelling, or redness. Wear cotton socks (preferably white)  Change socks every day. Do NOT walk barefoot. Do NOT use heating pads or warm/hot water soaks     With patient's permission, nails were aggressively reduced and debrided 1,2,3,4, 5 R and 1,2, 3,4,5 L and filed to their soft tissue attachment mechanically and with electric , removing all offending nail and debris. Patient tolerated this well and no blood was drawn. Patient reports relief following the procedure.     The affected area was cleansed with an alcohol prep pad. Next, utilizing a 5mm curette, the hyperkeratotic tissues were trimmed from distal tips of toes 1,3,5 b/l, down to appropriate level of skin. Care was taken to remove any nucleated core from the center of the lesion. No pinpoint bleeding was encountered. The patient tolerated relief following this procedure.     RTC 9 weeks, sooner PRN

## 2023-04-03 ENCOUNTER — OFFICE VISIT (OUTPATIENT)
Dept: INTERNAL MEDICINE | Facility: CLINIC | Age: 73
End: 2023-04-03
Payer: MEDICARE

## 2023-04-03 VITALS
HEART RATE: 78 BPM | BODY MASS INDEX: 33.07 KG/M2 | DIASTOLIC BLOOD PRESSURE: 84 MMHG | WEIGHT: 257.69 LBS | OXYGEN SATURATION: 97 % | HEIGHT: 74 IN | SYSTOLIC BLOOD PRESSURE: 124 MMHG

## 2023-04-03 DIAGNOSIS — I70.0 ATHEROSCLEROSIS OF AORTA: ICD-10-CM

## 2023-04-03 DIAGNOSIS — R00.2 PALPITATION: Primary | ICD-10-CM

## 2023-04-03 DIAGNOSIS — I10 IDIOPATHIC HYPERTENSION: ICD-10-CM

## 2023-04-03 DIAGNOSIS — E78.5 HYPERLIPIDEMIA, UNSPECIFIED HYPERLIPIDEMIA TYPE: ICD-10-CM

## 2023-04-03 PROCEDURE — 3079F DIAST BP 80-89 MM HG: CPT | Mod: CPTII,S$GLB,, | Performed by: INTERNAL MEDICINE

## 2023-04-03 PROCEDURE — 3074F SYST BP LT 130 MM HG: CPT | Mod: CPTII,S$GLB,, | Performed by: INTERNAL MEDICINE

## 2023-04-03 PROCEDURE — 1101F PT FALLS ASSESS-DOCD LE1/YR: CPT | Mod: CPTII,S$GLB,, | Performed by: INTERNAL MEDICINE

## 2023-04-03 PROCEDURE — 1159F PR MEDICATION LIST DOCUMENTED IN MEDICAL RECORD: ICD-10-PCS | Mod: CPTII,S$GLB,, | Performed by: INTERNAL MEDICINE

## 2023-04-03 PROCEDURE — 99214 OFFICE O/P EST MOD 30 MIN: CPT | Mod: S$GLB,,, | Performed by: INTERNAL MEDICINE

## 2023-04-03 PROCEDURE — 4010F ACE/ARB THERAPY RXD/TAKEN: CPT | Mod: CPTII,S$GLB,, | Performed by: INTERNAL MEDICINE

## 2023-04-03 PROCEDURE — 1159F MED LIST DOCD IN RCRD: CPT | Mod: CPTII,S$GLB,, | Performed by: INTERNAL MEDICINE

## 2023-04-03 PROCEDURE — 4010F PR ACE/ARB THEARPY RXD/TAKEN: ICD-10-PCS | Mod: CPTII,S$GLB,, | Performed by: INTERNAL MEDICINE

## 2023-04-03 PROCEDURE — 3079F PR MOST RECENT DIASTOLIC BLOOD PRESSURE 80-89 MM HG: ICD-10-PCS | Mod: CPTII,S$GLB,, | Performed by: INTERNAL MEDICINE

## 2023-04-03 PROCEDURE — 3288F PR FALLS RISK ASSESSMENT DOCUMENTED: ICD-10-PCS | Mod: CPTII,S$GLB,, | Performed by: INTERNAL MEDICINE

## 2023-04-03 PROCEDURE — 1126F PR PAIN SEVERITY QUANTIFIED, NO PAIN PRESENT: ICD-10-PCS | Mod: CPTII,S$GLB,, | Performed by: INTERNAL MEDICINE

## 2023-04-03 PROCEDURE — 99999 PR PBB SHADOW E&M-EST. PATIENT-LVL III: CPT | Mod: PBBFAC,,, | Performed by: INTERNAL MEDICINE

## 2023-04-03 PROCEDURE — 3074F PR MOST RECENT SYSTOLIC BLOOD PRESSURE < 130 MM HG: ICD-10-PCS | Mod: CPTII,S$GLB,, | Performed by: INTERNAL MEDICINE

## 2023-04-03 PROCEDURE — 99214 PR OFFICE/OUTPT VISIT, EST, LEVL IV, 30-39 MIN: ICD-10-PCS | Mod: S$GLB,,, | Performed by: INTERNAL MEDICINE

## 2023-04-03 PROCEDURE — 99999 PR PBB SHADOW E&M-EST. PATIENT-LVL III: ICD-10-PCS | Mod: PBBFAC,,, | Performed by: INTERNAL MEDICINE

## 2023-04-03 PROCEDURE — 3288F FALL RISK ASSESSMENT DOCD: CPT | Mod: CPTII,S$GLB,, | Performed by: INTERNAL MEDICINE

## 2023-04-03 PROCEDURE — 3008F PR BODY MASS INDEX (BMI) DOCUMENTED: ICD-10-PCS | Mod: CPTII,S$GLB,, | Performed by: INTERNAL MEDICINE

## 2023-04-03 PROCEDURE — 3008F BODY MASS INDEX DOCD: CPT | Mod: CPTII,S$GLB,, | Performed by: INTERNAL MEDICINE

## 2023-04-03 PROCEDURE — 1101F PR PT FALLS ASSESS DOC 0-1 FALLS W/OUT INJ PAST YR: ICD-10-PCS | Mod: CPTII,S$GLB,, | Performed by: INTERNAL MEDICINE

## 2023-04-03 PROCEDURE — 1126F AMNT PAIN NOTED NONE PRSNT: CPT | Mod: CPTII,S$GLB,, | Performed by: INTERNAL MEDICINE

## 2023-04-03 NOTE — PROGRESS NOTES
Mr. Bernardo is a 72  -year-old gentleman coming in today for followup of his   ongoing medical problems . I last saw him in 10/03/2022.      He has been having some palpitations.  He went to [place] recently and drank a bunch of Coca-Cola.  He had gotten all soft drinks.  Then about 1 week ago he felt light headed and half palpitations.  He felt little dizzy.  He did not have any syncope.  It lasted about 12 minutes.  His brother took his pulse and said his pulse is 40 after this had cleared.  He denied under emergency room and did not have any evaluation.  Since Saturday he says he has been feeling fine though.  Blood pressure is 124/84 and is also on his visit today is 78.  He has not been having any palpitations besides over the weekend.  He has not had any more dizziness.  He has had no syncope.  He he has had no chest pain.  He is not having any shortness of breath.  He is able to do his normal activities without any difficulties.          1. He has hypertension. He has been on losartan 100 mg a day-- and  amlodipine- bp is 126/86     2. He has hyperlipidemia. He is on atorvastatin. Cholesterol from this visit was reviewed---tc was 148, hdl 52  , LDL 78.2.          3. . Colon polyps: He had 2 colon polyps in 2007, and 1 polyps in 9/2013--last colonoscopy was back in May of 2022.  Three polyps were removed.  If this colonoscopy should be and 2027.  This was reviewed today.  Bowels are normal if he eats a lot of fiber.          4. . Gout-- last attack in 3 months ago after eating lamb for 2 straight nights/         5. Chronic neck pain-- pretty stable he plans to get massage soon-- worse on the right vs the left.  He says it is doing terrible and he is going to get massage after he leaves today .          ROS : Gen - no fatigue -- she has lost 5 # since last visit.    Eyes - no eye pain or visual changes  ENT - no hoarseness or sore throat  CV - as above  Pulm - no cough or wheezing  GI - no N/V/D   no  "dysuria or incontinence  MS - no joint pain or muscle pain  Skin - no rash, or c/o of skin lesions  Neuro - no HA, dizziness--- memory is doing well.   Heme - no abnormal bleeding or bruising  Endo - no polydipsia, or temperature changes  Psych - no anxiety or depression     /84 (BP Location: Right arm, Patient Position: Sitting, BP Method: Large (Manual))   Pulse 78   Ht 6' 1.5" (1.867 m)   Wt 116.9 kg (257 lb 11.5 oz)   SpO2 97%   BMI 33.54 kg/m²              PHYSICAL EXAM: A well-appearing gentleman.   NECK: Supple with no JVD. Thyroid is not enlarged.   Chest : CTA bilaterally   CARDIOVASCULAR: S1, S2, regular rate and rhythm without murmur, gallop, or  rub.   Lungs: Clear bilaterally   ABDOMEN: Soft, nontender.   LOWER EXTREMITIES: No edema  He has no cervical, axillary, or inguinal adenopathy.   Bicep reflexs: nomal and equal bilaterally  Lower extremities: Normal muscle strength. No edema or cyanosis. TP/PT pulses+2 bilaterally.bilateral LE edema        ASSESSMENT:   Palpitations after says caffeine.  I do not believe his pulse is 40.  We are going get echocardiogram and a Holter monitor.  I want him to avoid excessive caffeine and other stimulants.    1. Hyperlipidemia. continue the atorvastatin.   2. Hypertension.  losartan-- and  hctz. make sure he is drinking enough fluids--if he has more giout- may need to change to another medicine     3. Obesity. Spoke to him about diet and exercise. He actually seems to be exercising pretty well. L 4. Colon polyp- c-scope due  again in 5/2027-- has 3 polyps on 5/2022 c-scope    5. Gout-  No  recent episode. - We reviewed the gout diet.  6  ckd stage 3 A-- stable-- will monitor-- avoid nsaids    7. Has a cavitary lung lesion following with pulmonary for this.  PET reviewed from 2/2022.    reviewed Pulmonary note from March of 2023      Will continue monitoring the aortic arteriosclerosis.     "

## 2023-04-05 ENCOUNTER — HOSPITAL ENCOUNTER (OUTPATIENT)
Dept: CARDIOLOGY | Facility: HOSPITAL | Age: 73
Discharge: HOME | End: 2023-04-05
Attending: INTERNAL MEDICINE
Payer: MEDICARE

## 2023-04-05 VITALS
HEIGHT: 73 IN | WEIGHT: 257 LBS | SYSTOLIC BLOOD PRESSURE: 138 MMHG | BODY MASS INDEX: 34.06 KG/M2 | DIASTOLIC BLOOD PRESSURE: 85 MMHG | HEART RATE: 70 BPM

## 2023-04-05 DIAGNOSIS — R00.2 PALPITATION: ICD-10-CM

## 2023-04-05 LAB
ASCENDING AORTA: 3.19 CM
AV INDEX (PROSTH): 0.83
AV MEAN GRADIENT: 6 MMHG
AV PEAK GRADIENT: 11 MMHG
AV VALVE AREA: 3.52 CM2
AV VELOCITY RATIO: 0.87
BSA FOR ECHO PROCEDURE: 2.45 M2
CV ECHO LV RWT: 0.46 CM
DOP CALC AO PEAK VEL: 1.63 M/S
DOP CALC AO VTI: 34.24 CM
DOP CALC LVOT AREA: 4.2 CM2
DOP CALC LVOT DIAMETER: 2.32 CM
DOP CALC LVOT PEAK VEL: 1.42 M/S
DOP CALC LVOT STROKE VOLUME: 120.67 CM3
DOP CALCLVOT PEAK VEL VTI: 28.56 CM
E WAVE DECELERATION TIME: 282.14 MSEC
E/A RATIO: 0.62
E/E' RATIO: 6.75 M/S
ECHO LV POSTERIOR WALL: 0.94 CM (ref 0.6–1.1)
EJECTION FRACTION: 60 %
FRACTIONAL SHORTENING: 26 % (ref 28–44)
INTERVENTRICULAR SEPTUM: 1.02 CM (ref 0.6–1.1)
LA MAJOR: 5.35 CM
LA MINOR: 4.78 CM
LA WIDTH: 3.68 CM
LEFT ATRIUM SIZE: 3.68 CM
LEFT ATRIUM VOLUME INDEX MOD: 25.7 ML/M2
LEFT ATRIUM VOLUME INDEX: 24.3 ML/M2
LEFT ATRIUM VOLUME MOD: 61.4 CM3
LEFT ATRIUM VOLUME: 58.12 CM3
LEFT INTERNAL DIMENSION IN SYSTOLE: 3.04 CM (ref 2.1–4)
LEFT VENTRICLE DIASTOLIC VOLUME INDEX: 31.37 ML/M2
LEFT VENTRICLE DIASTOLIC VOLUME: 74.97 ML
LEFT VENTRICLE MASS INDEX: 54 G/M2
LEFT VENTRICLE SYSTOLIC VOLUME INDEX: 15.2 ML/M2
LEFT VENTRICLE SYSTOLIC VOLUME: 36.26 ML
LEFT VENTRICULAR INTERNAL DIMENSION IN DIASTOLE: 4.12 CM (ref 3.5–6)
LEFT VENTRICULAR MASS: 129.41 G
LV LATERAL E/E' RATIO: 6 M/S
LV SEPTAL E/E' RATIO: 7.71 M/S
MV A" WAVE DURATION": 11.8 MSEC
MV PEAK A VEL: 0.87 M/S
MV PEAK E VEL: 0.54 M/S
MV STENOSIS PRESSURE HALF TIME: 81.82 MS
MV VALVE AREA P 1/2 METHOD: 2.69 CM2
PISA TR MAX VEL: 2.94 M/S
PULM VEIN S/D RATIO: 1.5
PV PEAK D VEL: 0.56 M/S
PV PEAK S VEL: 0.84 M/S
RA MAJOR: 4.66 CM
RA PRESSURE: 3 MMHG
RA WIDTH: 3.24 CM
RIGHT VENTRICULAR END-DIASTOLIC DIMENSION: 2.87 CM
SINUS: 2.83 CM
STJ: 2.85 CM
TDI LATERAL: 0.09 M/S
TDI SEPTAL: 0.07 M/S
TDI: 0.08 M/S
TR MAX PG: 35 MMHG
TRICUSPID ANNULAR PLANE SYSTOLIC EXCURSION: 2.13 CM
TV REST PULMONARY ARTERY PRESSURE: 38 MMHG

## 2023-04-05 PROCEDURE — 93306 ECHO (CUPID ONLY): ICD-10-PCS | Mod: 26,,, | Performed by: INTERNAL MEDICINE

## 2023-04-05 PROCEDURE — 93306 TTE W/DOPPLER COMPLETE: CPT | Mod: 26,,, | Performed by: INTERNAL MEDICINE

## 2023-04-05 PROCEDURE — 93306 TTE W/DOPPLER COMPLETE: CPT

## 2023-04-12 ENCOUNTER — PES CALL (OUTPATIENT)
Dept: ADMINISTRATIVE | Facility: CLINIC | Age: 73
End: 2023-04-12
Payer: MEDICARE

## 2023-04-12 ENCOUNTER — OFFICE VISIT (OUTPATIENT)
Dept: SPORTS MEDICINE | Facility: CLINIC | Age: 73
End: 2023-04-12
Payer: MEDICARE

## 2023-04-12 VITALS
DIASTOLIC BLOOD PRESSURE: 73 MMHG | WEIGHT: 255 LBS | HEART RATE: 74 BPM | HEIGHT: 73 IN | BODY MASS INDEX: 33.8 KG/M2 | SYSTOLIC BLOOD PRESSURE: 133 MMHG

## 2023-04-12 DIAGNOSIS — M17.0 BILATERAL PRIMARY OSTEOARTHRITIS OF KNEE: Primary | ICD-10-CM

## 2023-04-12 PROCEDURE — 1126F PR PAIN SEVERITY QUANTIFIED, NO PAIN PRESENT: ICD-10-PCS | Mod: CPTII,S$GLB,, | Performed by: PHYSICIAN ASSISTANT

## 2023-04-12 PROCEDURE — 1101F PT FALLS ASSESS-DOCD LE1/YR: CPT | Mod: CPTII,S$GLB,, | Performed by: PHYSICIAN ASSISTANT

## 2023-04-12 PROCEDURE — 3288F PR FALLS RISK ASSESSMENT DOCUMENTED: ICD-10-PCS | Mod: CPTII,S$GLB,, | Performed by: PHYSICIAN ASSISTANT

## 2023-04-12 PROCEDURE — 1101F PR PT FALLS ASSESS DOC 0-1 FALLS W/OUT INJ PAST YR: ICD-10-PCS | Mod: CPTII,S$GLB,, | Performed by: PHYSICIAN ASSISTANT

## 2023-04-12 PROCEDURE — 3075F SYST BP GE 130 - 139MM HG: CPT | Mod: CPTII,S$GLB,, | Performed by: PHYSICIAN ASSISTANT

## 2023-04-12 PROCEDURE — 99214 PR OFFICE/OUTPT VISIT, EST, LEVL IV, 30-39 MIN: ICD-10-PCS | Mod: S$GLB,,, | Performed by: PHYSICIAN ASSISTANT

## 2023-04-12 PROCEDURE — 3288F FALL RISK ASSESSMENT DOCD: CPT | Mod: CPTII,S$GLB,, | Performed by: PHYSICIAN ASSISTANT

## 2023-04-12 PROCEDURE — 1160F PR REVIEW ALL MEDS BY PRESCRIBER/CLIN PHARMACIST DOCUMENTED: ICD-10-PCS | Mod: CPTII,S$GLB,, | Performed by: PHYSICIAN ASSISTANT

## 2023-04-12 PROCEDURE — 3078F PR MOST RECENT DIASTOLIC BLOOD PRESSURE < 80 MM HG: ICD-10-PCS | Mod: CPTII,S$GLB,, | Performed by: PHYSICIAN ASSISTANT

## 2023-04-12 PROCEDURE — 3008F PR BODY MASS INDEX (BMI) DOCUMENTED: ICD-10-PCS | Mod: CPTII,S$GLB,, | Performed by: PHYSICIAN ASSISTANT

## 2023-04-12 PROCEDURE — 1126F AMNT PAIN NOTED NONE PRSNT: CPT | Mod: CPTII,S$GLB,, | Performed by: PHYSICIAN ASSISTANT

## 2023-04-12 PROCEDURE — 3008F BODY MASS INDEX DOCD: CPT | Mod: CPTII,S$GLB,, | Performed by: PHYSICIAN ASSISTANT

## 2023-04-12 PROCEDURE — 4010F PR ACE/ARB THEARPY RXD/TAKEN: ICD-10-PCS | Mod: CPTII,S$GLB,, | Performed by: PHYSICIAN ASSISTANT

## 2023-04-12 PROCEDURE — 99214 OFFICE O/P EST MOD 30 MIN: CPT | Mod: S$GLB,,, | Performed by: PHYSICIAN ASSISTANT

## 2023-04-12 PROCEDURE — 1160F RVW MEDS BY RX/DR IN RCRD: CPT | Mod: CPTII,S$GLB,, | Performed by: PHYSICIAN ASSISTANT

## 2023-04-12 PROCEDURE — 4010F ACE/ARB THERAPY RXD/TAKEN: CPT | Mod: CPTII,S$GLB,, | Performed by: PHYSICIAN ASSISTANT

## 2023-04-12 PROCEDURE — 3078F DIAST BP <80 MM HG: CPT | Mod: CPTII,S$GLB,, | Performed by: PHYSICIAN ASSISTANT

## 2023-04-12 PROCEDURE — 3075F PR MOST RECENT SYSTOLIC BLOOD PRESS GE 130-139MM HG: ICD-10-PCS | Mod: CPTII,S$GLB,, | Performed by: PHYSICIAN ASSISTANT

## 2023-04-12 PROCEDURE — 99999 PR PBB SHADOW E&M-EST. PATIENT-LVL III: ICD-10-PCS | Mod: PBBFAC,,, | Performed by: PHYSICIAN ASSISTANT

## 2023-04-12 PROCEDURE — 1159F MED LIST DOCD IN RCRD: CPT | Mod: CPTII,S$GLB,, | Performed by: PHYSICIAN ASSISTANT

## 2023-04-12 PROCEDURE — 1159F PR MEDICATION LIST DOCUMENTED IN MEDICAL RECORD: ICD-10-PCS | Mod: CPTII,S$GLB,, | Performed by: PHYSICIAN ASSISTANT

## 2023-04-12 PROCEDURE — 99999 PR PBB SHADOW E&M-EST. PATIENT-LVL III: CPT | Mod: PBBFAC,,, | Performed by: PHYSICIAN ASSISTANT

## 2023-04-12 NOTE — PROGRESS NOTES
ESTABLISHED PATIENT    History 4/12/2023:  Mani returns here today for follow-up evaluation of both knees.  He has a history of bilateral knee osteoarthritis (right greater than left) and recently completed the Euflexxa series.  He continues to have some intermittent discomfort along the medial aspect of his left knee, particularly after physical activity, but has noticed an improvement in his pain and function following the injections.  He was recently able to complete the [place] without significant discomfort or difficulty.  His current symptoms are tolerable and do not inhibit him in any way.    History 2/22/2023:  Mani returns here today for the 3rd Euflexxa injection into both knees.    History 2/15/2023:  Mani returns here today for the 2nd Euflexxa injection into both knees.    History 2/9/2023:  Mani returns here today for the 1st Euflexxa injection into both knees.    History 01/25/2023:  72 y.o. Male with a history of left knee pain who is a former patient of Dr. Devendra Esquivel.  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  He enjoys walking, traveling, and watching his granddaughter play soccer at [facility].  He complains today of having progressive medial-sided knee pain with occasional catching sensations over the past month and a half.  He denies having anyone specific injury but attributes his symptoms to exercise.  He states that he did a lot of squats and lunges one day and then went for a jog.  He began having some discomfort later that evening and his symptoms have persisted ever since.  He states the pain seems to be worse at night when sleeping.  He has a throbbing, aching sensation throughout the knee but particularly located along the medial aspect of the knee.  He has had knee pain in the past and has done well with viscosupplementation.  His last Euflexxa injections were given to him in 2019.  He previously underwent a right knee arthroscopy with  medial meniscectomy and chondroplasty done by Dr. Esquivel in 2011.  He states his right knee occasionally gives him some discomfort but his left knee is worse today.        + mechanical symptoms, - instability    Is affecting ADLs.  Pain is 6/10 at it's worst.        PAST MEDICAL HISTORY    Past Medical History:   Diagnosis Date    Allergy     Arthritis 2019    Colon polyps     Disorder of kidney and ureter 2019    Hyperlipemia     Hypertension     Posterior capsular opacification        PAST SURGICAL HISTORY     Past Surgical History:   Procedure Laterality Date    CATARACT EXTRACTION      both eyes    COLONOSCOPY N/A 09/27/2016    Procedure: COLONOSCOPY;  Surgeon: Jan Argueta MD;  Location: [facility] ENDO ([address] [place] [address]);  Service: Endoscopy;  Laterality: N/A;    COLONOSCOPY N/A 05/19/2022    Procedure: COLONOSCOPY;  Surgeon: Bobo Delcid MD;  Location: [facility] ([address] [place] [address]);  Service: Endoscopy;  Laterality: N/A;  4/4 fully vaccinated; instructions to [place]-    EYE SURGERY      HERNIA REPAIR      WISDOM TOOTH EXTRACTION      YAG      Left Eye       FAMILY HISTORY    Family History   Problem Relation Age of Onset    Hypertension Father     Cataracts Father     No Known Problems Maternal Uncle     Prostate cancer Paternal Uncle         prostate    Amblyopia Neg Hx     Blindness Neg Hx     Glaucoma Neg Hx     Macular degeneration Neg Hx     Retinal detachment Neg Hx     Strabismus Neg Hx        SOCIAL HISTORY    Social History     Socioeconomic History    Marital status:      Spouse name: Ginger    Number of children: 4   Occupational History     Employer: [employer]   Tobacco Use    Smoking status: Never    Smokeless tobacco: Never   Substance and Sexual Activity    Alcohol use: Yes     Alcohol/week: 6.0 standard drinks     Types: 4 Glasses of wine, 1 Shots of liquor, 1 Standard drinks or equivalent per week     Comment: socially- been a while since he drank    Drug use: Never    Sexual activity:  Yes     Partners: Female     Birth control/protection: None     Social Determinants of Health     Financial Resource Strain: Low Risk     Difficulty of Paying Living Expenses: Not hard at all   Food Insecurity: No Food Insecurity    Worried About Running Out of Food in the Last Year: Never true    Ran Out of Food in the Last Year: Never true   Transportation Needs: No Transportation Needs    Lack of Transportation (Medical): No    Lack of Transportation (Non-Medical): No   Physical Activity: Sufficiently Active    Days of Exercise per Week: 5 days    Minutes of Exercise per Session: 90 min   Stress: No Stress Concern Present    Feeling of Stress : Not at all   Social Connections: Unknown    Frequency of Communication with Friends and Family: More than three times a week    Frequency of Social Gatherings with Friends and Family: More than three times a week    Active Member of Clubs or Organizations: Yes    Attends Club or Organization Meetings: More than 4 times per year    Marital Status:    Housing Stability: Low Risk     Unable to Pay for Housing in the Last Year: No    Number of Places Lived in the Last Year: 1    Unstable Housing in the Last Year: No       MEDICATIONS      Current Outpatient Medications:     albuterol (PROVENTIL/VENTOLIN HFA) 90 mcg/actuation inhaler, Inhale 2 puffs into the lungs every 6 (six) hours as needed (cough)., Disp: 8.5 g, Rfl: 2    ascorbic acid (VITAMIN C) 1000 MG tablet, Take 1,000 mg by mouth once daily., Disp: , Rfl:     aspirin 81 mg Tab, Take by mouth. 1 Tablet Oral Every day.  Last taken 3/25/11, Disp: , Rfl:     atorvastatin (LIPITOR) 40 MG tablet, TAKE 1 TABLET BY MOUTH EVERY DAY, Disp: 90 tablet, Rfl: 1    azelastine (ASTELIN) 137 mcg (0.1 %) nasal spray, INSTILL 2 SPRAYS IN EACH NOSTRIL TWICE A DAY, Disp: 30 mL, Rfl: 2    ciclopirox (PENLAC) 8 % Soln, Apply topically nightly., Disp: 6.6 mL, Rfl: 11    colchicine (COLCRYS) 0.6 mg tablet, Take 1 tablet (0.6 mg total)  by mouth once daily., Disp: 15 tablet, Rfl: 0    fexofenadine HCl (ALLEGRA ORAL), Take by mouth., Disp: , Rfl:     fish oil-omega-3 fatty acids 300-1,000 mg capsule, Take 2 g by mouth once daily., Disp: , Rfl:     fluticasone propionate (FLONASE) 50 mcg/actuation nasal spray, USE 1 SPRAY BY EACH NARE ROUTE ONCE DAILY., Disp: 48 mL, Rfl: 5    GAVILYTE-G 236-22.74-6.74 -5.86 gram suspension, Take 4,000 mLs by mouth once., Disp: , Rfl:     hydroCHLOROthiazide (MICROZIDE) 12.5 mg capsule, Take 1 capsule (12.5 mg total) by mouth once daily., Disp: 90 capsule, Rfl: 3    losartan (COZAAR) 100 MG tablet, Take 1 tablet (100 mg total) by mouth once daily., Disp: 90 tablet, Rfl: 3    multivitamin capsule, Take 1 capsule by mouth once daily., Disp: , Rfl:     omeprazole (PRILOSEC) 20 MG capsule, Take 1 capsule (20 mg total) by mouth 2 (two) times daily., Disp: 180 capsule, Rfl: 1    ALLERGIES     Review of patient's allergies indicates:  No Known Allergies      REVIEW OF SYSTEMS   Constitution: Negative. Negative for chills, fever and night sweats.   HENT: Negative for congestion and headaches.    Eyes: Negative for blurred vision, left vision loss and right vision loss.   Cardiovascular: Negative for chest pain and syncope.   Respiratory: Negative for cough and shortness of breath.    Endocrine: Negative for polydipsia, polyphagia and polyuria.   Hematologic/Lymphatic: Negative for bleeding problem. Does not bruise/bleed easily.   Skin: Negative for dry skin, itching and rash.   Musculoskeletal: Negative for falls. Positive for bilateral knee pain (L>R).  Gastrointestinal: Negative for abdominal pain and bowel incontinence.   Genitourinary: Negative for bladder incontinence and nocturia.   Neurological: Negative for disturbances in coordination, loss of balance and seizures.   Psychiatric/Behavioral: Negative for depression. The patient does not have insomnia.    Allergic/Immunologic: Negative for hives and persistent  "infections.     PHYSICAL EXAMINATION    Vitals: /73   Pulse 74   Ht 6' 1" (1.854 m)   Wt 115.7 kg (255 lb)   BMI 33.64 kg/m²     General: The patient appears active and healthy with no apparent physical problems.  No disturbance of mood or affect is demonstrated. Alert and Oriented.    HEENT: Eyes normal, pupils equally round, nose normal.    Resp: Equal and symmetrical chest rises. No wheezing    CV: Regular rate    Neck: Supple; nonpainful range of motion.    Extremities: no cyanosis, clubbing, edema, or diffuse swelling.  Palpable pulses, good capillary refill of the digits.  No coolness, discoloration, edema or obvious varicosities.    Skin: no lesions noted.    Lymphatic: no detected adenopathy in the upper or lower extremities.    Neurologic: normal mental status, normal reflexes, normal gait and balance.  Patient is alert and oriented to person, place and time.  No flaccidity or spasticity is noted.  No motor or sensory deficits are noted.  Light touch is intact    Orthopaedic: Knee Musculoskeletal Exam  Gait    Antalgic: left    Inspection    Right      Effusion: mild        Alignment: varus        Previous incision: arthroscopic portals    Left      Erythema: none        Effusion: mild        Edema: none        Ecchymosis: none        Alignment: normal        Previous incision: no previous incision    Palpation    Right      Crepitus: patellofemoral        Tenderness: present          MCL: mild          Medial joint line: moderate          Medial retinaculum: mild      Left      Increased warmth: none        Masses: none        Crepitus: patellofemoral        Tenderness: present          MCL: mild          Medial joint line: moderate          Medial retinaculum: mild      Range of Motion    Right      Active extension: -5      Passive extension: -5      Active flexion: 115      Passive flexion: 115    Left      Left knee range of motion is normal and full.        Active extension: -5      Passive " extension: -5      Active flexion: 115      Passive flexion: 115    Strength    Left      Left knee strength is normal.       Extension: 5/5. Extension is not affected by pain.       Flexion: 5/5. Flexion is not affected by pain.      Instability    Left      Varus stress grade: normal      Valgus stress grade: normal      Anterior drawer: normal      Posterior drawer: normal      Lachman: negative    Neurovascular    Left      Left knee neurovascular exam is normal.        Pulses - DP: normal      Pulses - PT: normal    Special Signs    Left      Left knee special signs are normal.      Straight leg raise: normal      J sign: none        Patellar compression: none        Patellar apprehension: none        IMAGING    X-ray Knee Ortho Left with Flexion  Order: [id number]  Status: Final result     Visible to patient: Yes (seen)     Next appt: 04/18/2023 at 11:00 AM in Cardiology ([facility] [place])     Dx: Left knee pain, unspecified chronicity     0 Result Notes  Details    Reading Physician Reading Date Result Priority   Fabiano Quevedo MD  [phone] 1/25/2023 Routine     Narrative & Impression  EXAMINATION:  XR KNEE ORTHO LEFT WITH FLEXION     CLINICAL HISTORY:  Pain in left knee     TECHNIQUE:  AP standing view of both knees, PA flexion standing views of both knees, and Merchant views of both knees were performed. A lateral view of the left knee was also performed.     COMPARISON:  May 9, 2019     FINDINGS:  Left knee:     No fracture.  Mild narrowing of medial compartment without genu varum.  No definite narrowing lateral compartment.  Mild narrowing lateral patellofemoral compartment.  Minimal periarticular spurring about tibial spines and posterior patella..  No suprapatellar bursal effusion or prepatellar soft tissue swelling.     Right knee:     No fracture.  Moderate to severe narrowing of medial compartment and genu varum.  No obvious narrowing of lateral compartment.  Mild narrowing lateral  patellofemoral compartment.  Periarticular spurring about the medial compartment, tibial spines, and posterior patella.  No prepatellar soft tissue swelling.     Impression:     No acute fractures, bilateral, right greater than left osteoarthritic changes as described.        Electronically signed by: Fabiano Quevedo  Date:                                            01/25/2023  Time:                                           10:38           Exam Ended: 01/25/23 09:38 Last Resulted: 01/25/23 10:38             X-rays including four views of the left knee were ordered and completed today.  The images and report have been reviewed by me personally.  There are degenerative changes seen in the medial and patellofemoral compartments, worse in the right knee.  There is moderate to severe narrowing with near bone-on-bone contact.  Kellgren Brent grade 2 on the left and grade 3/4 on the right.    IMPRESSION       ICD-10-CM ICD-9-CM   1. Bilateral primary osteoarthritis of knee  M17.0 715.16         MEDICATIONS PRESCRIBED      None    RECOMMENDATIONS     Surgical and nonsurgical treatment options for both knees were discussed today; left knee appears to be a candidate for knee arthroscopy and I would recommend getting an MRI if his symptoms worsen and he decides to consider surgical intervention.  Has near bone-on-bone contact on the right and would require a partial versus total knee arthroplasty  Continue activity modification, ice and elevation, and the use of the topical NSAID as needed for pain and swelling; limit NSAID use due to kidney disease  Return to clinic as needed; can repeat the corticosteroid injection at any time and the HA injections in 4 months      Procedures     All questions were answered, pt will contact us for questions or concerns in the interim.

## 2023-04-18 ENCOUNTER — HOSPITAL ENCOUNTER (OUTPATIENT)
Dept: CARDIOLOGY | Facility: HOSPITAL | Age: 73
Discharge: HOME | End: 2023-04-18
Attending: INTERNAL MEDICINE
Payer: MEDICARE

## 2023-04-18 DIAGNOSIS — R00.2 PALPITATION: ICD-10-CM

## 2023-04-18 PROCEDURE — 93227 HOLTER MONITOR - 48 HOUR (CUPID ONLY): ICD-10-PCS | Mod: ,,, | Performed by: INTERNAL MEDICINE

## 2023-04-18 PROCEDURE — 93227 XTRNL ECG REC<48 HR R&I: CPT | Mod: ,,, | Performed by: INTERNAL MEDICINE

## 2023-04-18 PROCEDURE — 93226 XTRNL ECG REC<48 HR SCAN A/R: CPT

## 2023-04-24 ENCOUNTER — TELEPHONE (OUTPATIENT)
Dept: SPORTS MEDICINE | Facility: CLINIC | Age: 73
End: 2023-04-24
Payer: MEDICARE

## 2023-04-24 ENCOUNTER — PATIENT MESSAGE (OUTPATIENT)
Dept: SPORTS MEDICINE | Facility: CLINIC | Age: 73
End: 2023-04-24
Payer: MEDICARE

## 2023-04-24 DIAGNOSIS — M25.562 CHRONIC PAIN OF LEFT KNEE: Primary | ICD-10-CM

## 2023-04-24 DIAGNOSIS — G89.29 CHRONIC PAIN OF LEFT KNEE: Primary | ICD-10-CM

## 2023-04-24 NOTE — TELEPHONE ENCOUNTER
Spoke to the Pt about his appt he just made for his knees.  I asked him if it was further examination of his knees and he confirmed. Abdi mentioned getting an MRI for that which we scheduled.   We canceled this appt and will follow back up after the read.

## 2023-04-25 LAB
OHS CV EVENT MONITOR DAY: 0
OHS CV HOLTER LENGTH DECIMAL HOURS: 48
OHS CV HOLTER LENGTH HOURS: 48
OHS CV HOLTER LENGTH MINUTES: 0
OHS CV HOLTER SINUS AVERAGE HR: 69
OHS CV HOLTER SINUS MAX HR: 121
OHS CV HOLTER SINUS MIN HR: 52

## 2023-04-29 ENCOUNTER — HOSPITAL ENCOUNTER (OUTPATIENT)
Dept: RADIOLOGY | Facility: OTHER | Age: 73
Discharge: HOME | End: 2023-04-29
Attending: PHYSICIAN ASSISTANT
Payer: MEDICARE

## 2023-04-29 DIAGNOSIS — G89.29 CHRONIC PAIN OF LEFT KNEE: ICD-10-CM

## 2023-04-29 DIAGNOSIS — M25.562 CHRONIC PAIN OF LEFT KNEE: ICD-10-CM

## 2023-04-29 PROCEDURE — 73721 MRI JNT OF LWR EXTRE W/O DYE: CPT | Mod: 26,LT,, | Performed by: RADIOLOGY

## 2023-04-29 PROCEDURE — 73721 MRI JNT OF LWR EXTRE W/O DYE: CPT | Mod: TC,LT

## 2023-04-29 PROCEDURE — 73721 MRI KNEE WITHOUT CONTRAST LEFT: ICD-10-PCS | Mod: 26,LT,, | Performed by: RADIOLOGY

## 2023-05-02 ENCOUNTER — TELEPHONE (OUTPATIENT)
Dept: SPORTS MEDICINE | Facility: CLINIC | Age: 73
End: 2023-05-02
Payer: MEDICARE

## 2023-05-09 ENCOUNTER — TELEPHONE (OUTPATIENT)
Dept: INTERNAL MEDICINE | Facility: CLINIC | Age: 73
End: 2023-05-09
Payer: MEDICARE

## 2023-05-09 DIAGNOSIS — R00.2 PALPITATIONS: ICD-10-CM

## 2023-05-09 DIAGNOSIS — I47.20 VT (VENTRICULAR TACHYCARDIA): Primary | ICD-10-CM

## 2023-05-10 NOTE — TELEPHONE ENCOUNTER
Please call-- I have reviewed his echo and his holter monitor.  I would like him to see Cardiology-[facility].  Referral is in.  His echo looked ok, but his echo did show an arrhythmia.  Please let him know.  I tried to call him but could not reach him

## 2023-05-11 DIAGNOSIS — R00.2 PALPITATIONS: Primary | ICD-10-CM

## 2023-05-11 NOTE — TELEPHONE ENCOUNTER
Spoke to patient and advised of test results and recommendation. Patient verbalized understanding.       Appt scheduled

## 2023-05-12 ENCOUNTER — OFFICE VISIT (OUTPATIENT)
Dept: SPORTS MEDICINE | Facility: CLINIC | Age: 73
End: 2023-05-12
Payer: MEDICARE

## 2023-05-12 VITALS
SYSTOLIC BLOOD PRESSURE: 163 MMHG | BODY MASS INDEX: 33.53 KG/M2 | HEART RATE: 76 BPM | HEIGHT: 73 IN | WEIGHT: 253 LBS | DIASTOLIC BLOOD PRESSURE: 84 MMHG

## 2023-05-12 DIAGNOSIS — S83.242A ACUTE MEDIAL MENISCUS TEAR OF LEFT KNEE, INITIAL ENCOUNTER: Primary | ICD-10-CM

## 2023-05-12 DIAGNOSIS — S83.282A ACUTE LATERAL MENISCUS TEAR OF LEFT KNEE, INITIAL ENCOUNTER: ICD-10-CM

## 2023-05-12 PROCEDURE — 1125F AMNT PAIN NOTED PAIN PRSNT: CPT | Mod: CPTII,S$GLB,, | Performed by: PHYSICIAN ASSISTANT

## 2023-05-12 PROCEDURE — 99215 PR OFFICE/OUTPT VISIT, EST, LEVL V, 40-54 MIN: ICD-10-PCS | Mod: S$GLB,,, | Performed by: PHYSICIAN ASSISTANT

## 2023-05-12 PROCEDURE — 1160F PR REVIEW ALL MEDS BY PRESCRIBER/CLIN PHARMACIST DOCUMENTED: ICD-10-PCS | Mod: CPTII,S$GLB,, | Performed by: PHYSICIAN ASSISTANT

## 2023-05-12 PROCEDURE — 99999 PR PBB SHADOW E&M-EST. PATIENT-LVL III: CPT | Mod: PBBFAC,,, | Performed by: PHYSICIAN ASSISTANT

## 2023-05-12 PROCEDURE — 3288F PR FALLS RISK ASSESSMENT DOCUMENTED: ICD-10-PCS | Mod: CPTII,S$GLB,, | Performed by: PHYSICIAN ASSISTANT

## 2023-05-12 PROCEDURE — 99215 OFFICE O/P EST HI 40 MIN: CPT | Mod: S$GLB,,, | Performed by: PHYSICIAN ASSISTANT

## 2023-05-12 PROCEDURE — 3008F PR BODY MASS INDEX (BMI) DOCUMENTED: ICD-10-PCS | Mod: CPTII,S$GLB,, | Performed by: PHYSICIAN ASSISTANT

## 2023-05-12 PROCEDURE — 1125F PR PAIN SEVERITY QUANTIFIED, PAIN PRESENT: ICD-10-PCS | Mod: CPTII,S$GLB,, | Performed by: PHYSICIAN ASSISTANT

## 2023-05-12 PROCEDURE — 3079F PR MOST RECENT DIASTOLIC BLOOD PRESSURE 80-89 MM HG: ICD-10-PCS | Mod: CPTII,S$GLB,, | Performed by: PHYSICIAN ASSISTANT

## 2023-05-12 PROCEDURE — 4010F ACE/ARB THERAPY RXD/TAKEN: CPT | Mod: CPTII,S$GLB,, | Performed by: PHYSICIAN ASSISTANT

## 2023-05-12 PROCEDURE — 3288F FALL RISK ASSESSMENT DOCD: CPT | Mod: CPTII,S$GLB,, | Performed by: PHYSICIAN ASSISTANT

## 2023-05-12 PROCEDURE — 99999 PR PBB SHADOW E&M-EST. PATIENT-LVL III: ICD-10-PCS | Mod: PBBFAC,,, | Performed by: PHYSICIAN ASSISTANT

## 2023-05-12 PROCEDURE — 3008F BODY MASS INDEX DOCD: CPT | Mod: CPTII,S$GLB,, | Performed by: PHYSICIAN ASSISTANT

## 2023-05-12 PROCEDURE — 1101F PR PT FALLS ASSESS DOC 0-1 FALLS W/OUT INJ PAST YR: ICD-10-PCS | Mod: CPTII,S$GLB,, | Performed by: PHYSICIAN ASSISTANT

## 2023-05-12 PROCEDURE — 1101F PT FALLS ASSESS-DOCD LE1/YR: CPT | Mod: CPTII,S$GLB,, | Performed by: PHYSICIAN ASSISTANT

## 2023-05-12 PROCEDURE — 4010F PR ACE/ARB THEARPY RXD/TAKEN: ICD-10-PCS | Mod: CPTII,S$GLB,, | Performed by: PHYSICIAN ASSISTANT

## 2023-05-12 PROCEDURE — 1159F PR MEDICATION LIST DOCUMENTED IN MEDICAL RECORD: ICD-10-PCS | Mod: CPTII,S$GLB,, | Performed by: PHYSICIAN ASSISTANT

## 2023-05-12 PROCEDURE — 1160F RVW MEDS BY RX/DR IN RCRD: CPT | Mod: CPTII,S$GLB,, | Performed by: PHYSICIAN ASSISTANT

## 2023-05-12 PROCEDURE — 3077F SYST BP >= 140 MM HG: CPT | Mod: CPTII,S$GLB,, | Performed by: PHYSICIAN ASSISTANT

## 2023-05-12 PROCEDURE — 3077F PR MOST RECENT SYSTOLIC BLOOD PRESSURE >= 140 MM HG: ICD-10-PCS | Mod: CPTII,S$GLB,, | Performed by: PHYSICIAN ASSISTANT

## 2023-05-12 PROCEDURE — 1159F MED LIST DOCD IN RCRD: CPT | Mod: CPTII,S$GLB,, | Performed by: PHYSICIAN ASSISTANT

## 2023-05-12 PROCEDURE — 3079F DIAST BP 80-89 MM HG: CPT | Mod: CPTII,S$GLB,, | Performed by: PHYSICIAN ASSISTANT

## 2023-05-12 NOTE — PROGRESS NOTES
ESTABLISHED PATIENT    History 5/12/2023:  Mani returns here today for follow-up evaluation of his left knee.  He has had increased pain and functional limitations since his last visit.  An MRI was ordered and he is here today to discuss the results.  He reports his symptoms are exacerbated with any rotational movement.    History 4/12/2023:  Mani returns here today for follow-up evaluation of both knees.  He has a history of bilateral knee osteoarthritis (right greater than left) and recently completed the Euflexxa series.  He continues to have some intermittent discomfort along the medial aspect of his left knee, particularly after physical activity, but has noticed an improvement in his pain and function following the injections.  He was recently able to complete the [place] [facility] without significant discomfort or difficulty.  His current symptoms are tolerable and do not inhibit him in any way.    History 2/22/2023:  Mani returns here today for the 3rd Euflexxa injection into both knees.    History 2/15/2023:  Mani returns here today for the 2nd Euflexxa injection into both knees.    History 2/9/2023:  Mani returns here today for the 1st Euflexxa injection into both knees.    History 01/25/2023:  72 y.o. Male with a history of left knee pain who is a former patient of Dr. Devendra Esquivel.  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  He enjoys walking, traveling, and watching his granddaughter play soccer at [facility].  He complains today of having progressive medial-sided knee pain with occasional catching sensations over the past month and a half.  He denies having anyone specific injury but attributes his symptoms to exercise.  He states that he did a lot of squats and lunges one day and then went for a jog.  He began having some discomfort later that evening and his symptoms have persisted ever since.  He states the pain seems to be worse at night when sleeping.  He has a  throbbing, aching sensation throughout the knee but particularly located along the medial aspect of the knee.  He has had knee pain in the past and has done well with viscosupplementation.  His last Euflexxa injections were given to him in 2019.  He previously underwent a right knee arthroscopy with medial meniscectomy and chondroplasty done by Dr. Esquivel in 2011.  He states his right knee occasionally gives him some discomfort but his left knee is worse today.        + mechanical symptoms, - instability    Is affecting ADLs.  Pain is 6/10 at it's worst.        PAST MEDICAL HISTORY    Past Medical History:   Diagnosis Date    Allergy     Arthritis 2019    Colon polyps     Disorder of kidney and ureter 2019    Hyperlipemia     Hypertension     Posterior capsular opacification        PAST SURGICAL HISTORY     Past Surgical History:   Procedure Laterality Date    CATARACT EXTRACTION      both eyes    COLONOSCOPY N/A 09/27/2016    Procedure: COLONOSCOPY;  Surgeon: Jan Argueta MD;  [facility]  Service: Endoscopy;  Laterality: N/A;    COLONOSCOPY N/A 05/19/2022    Procedure: COLONOSCOPY;  Surgeon: Bobo Delcid MD;  Location[facility] ([facility]  Service: Endoscopy;  Laterality: N/A;  4/4 fully vaccinated; instructions to [place]    EYE SURGERY      HERNIA REPAIR      WISDOM TOOTH EXTRACTION      YAG      Left Eye       FAMILY HISTORY    Family History   Problem Relation Age of Onset    Hypertension Father     Cataracts Father     No Known Problems Maternal Uncle     Prostate cancer Paternal Uncle         prostate    Amblyopia Neg Hx     Blindness Neg Hx     Glaucoma Neg Hx     Macular degeneration Neg Hx     Retinal detachment Neg Hx     Strabismus Neg Hx        SOCIAL HISTORY    Social History     Socioeconomic History    Marital status:      Spouse name: Ginger    Number of children: 4   Occupational History     Employer: [employer]   Tobacco Use    Smoking status: Never     Smokeless tobacco: Never   Substance and Sexual Activity    Alcohol use: Yes     Alcohol/week: 6.0 standard drinks     Types: 4 Glasses of wine, 1 Shots of liquor, 1 Standard drinks or equivalent per week     Comment: socially- been a while since he drank    Drug use: Never    Sexual activity: Yes     Partners: Female     Birth control/protection: None     Social Determinants of Health     Financial Resource Strain: Low Risk     Difficulty of Paying Living Expenses: Not hard at all   Food Insecurity: No Food Insecurity    Worried About Running Out of Food in the Last Year: Never true    Ran Out of Food in the Last Year: Never true   Transportation Needs: No Transportation Needs    Lack of Transportation (Medical): No    Lack of Transportation (Non-Medical): No   Physical Activity: Sufficiently Active    Days of Exercise per Week: 5 days    Minutes of Exercise per Session: 90 min   Stress: No Stress Concern Present    Feeling of Stress : Not at all   Social Connections: Unknown    Frequency of Communication with Friends and Family: More than three times a week    Frequency of Social Gatherings with Friends and Family: More than three times a week    Active Member of Clubs or Organizations: Yes    Attends Club or Organization Meetings: More than 4 times per year    Marital Status:    Housing Stability: Low Risk     Unable to Pay for Housing in the Last Year: No    Number of Places Lived in the Last Year: 1    Unstable Housing in the Last Year: No       MEDICATIONS      Current Outpatient Medications:     albuterol (PROVENTIL/VENTOLIN HFA) 90 mcg/actuation inhaler, Inhale 2 puffs into the lungs every 6 (six) hours as needed (cough)., Disp: 8.5 g, Rfl: 2    ascorbic acid (VITAMIN C) 1000 MG tablet, Take 1,000 mg by mouth once daily., Disp: , Rfl:     aspirin 81 mg Tab, Take by mouth. 1 Tablet Oral Every day.  Last taken 3/25/11, Disp: , Rfl:     atorvastatin (LIPITOR) 40 MG tablet, Take 1 tablet (40 mg total)  by mouth once daily., Disp: 90 tablet, Rfl: 1    azelastine (ASTELIN) 137 mcg (0.1 %) nasal spray, INSTILL 2 SPRAYS IN EACH NOSTRIL TWICE A DAY, Disp: 30 mL, Rfl: 2    ciclopirox (PENLAC) 8 % Soln, Apply topically nightly., Disp: 6.6 mL, Rfl: 11    colchicine (COLCRYS) 0.6 mg tablet, Take 1 tablet (0.6 mg total) by mouth once daily., Disp: 15 tablet, Rfl: 0    fexofenadine HCl (ALLEGRA ORAL), Take by mouth., Disp: , Rfl:     fish oil-omega-3 fatty acids 300-1,000 mg capsule, Take 2 g by mouth once daily., Disp: , Rfl:     fluticasone propionate (FLONASE) 50 mcg/actuation nasal spray, USE 1 SPRAY BY EACH NARE ROUTE ONCE DAILY., Disp: 48 mL, Rfl: 5    GAVILYTE-G 236-22.74-6.74 -5.86 gram suspension, Take 4,000 mLs by mouth once., Disp: , Rfl:     hydroCHLOROthiazide (MICROZIDE) 12.5 mg capsule, Take 1 capsule (12.5 mg total) by mouth once daily., Disp: 90 capsule, Rfl: 3    losartan (COZAAR) 100 MG tablet, Take 1 tablet (100 mg total) by mouth once daily., Disp: 90 tablet, Rfl: 3    multivitamin capsule, Take 1 capsule by mouth once daily., Disp: , Rfl:     omeprazole (PRILOSEC) 20 MG capsule, Take 1 capsule (20 mg total) by mouth 2 (two) times daily., Disp: 180 capsule, Rfl: 1    ALLERGIES     Review of patient's allergies indicates:  No Known Allergies      REVIEW OF SYSTEMS   Constitution: Negative. Negative for chills, fever and night sweats.   HENT: Negative for congestion and headaches.    Eyes: Negative for blurred vision, left vision loss and right vision loss.   Cardiovascular: Negative for chest pain and syncope.   Respiratory: Negative for cough and shortness of breath.    Endocrine: Negative for polydipsia, polyphagia and polyuria.   Hematologic/Lymphatic: Negative for bleeding problem. Does not bruise/bleed easily.   Skin: Negative for dry skin, itching and rash.   Musculoskeletal: Negative for falls. Positive for bilateral knee pain (L>R).  Gastrointestinal: Negative for abdominal pain and bowel  "incontinence.   Genitourinary: Negative for bladder incontinence and nocturia.   Neurological: Negative for disturbances in coordination, loss of balance and seizures.   Psychiatric/Behavioral: Negative for depression. The patient does not have insomnia.    Allergic/Immunologic: Negative for hives and persistent infections.     PHYSICAL EXAMINATION    Vitals: BP (!) 163/84   Pulse 76   Ht 6' 1" (1.854 m)   Wt 114.8 kg (253 lb)   BMI 33.38 kg/m²     General: The patient appears active and healthy with no apparent physical problems.  No disturbance of mood or affect is demonstrated. Alert and Oriented.    HEENT: Eyes normal, pupils equally round, nose normal.    Resp: Equal and symmetrical chest rises. No wheezing    CV: Regular rate    Neck: Supple; nonpainful range of motion.    Extremities: no cyanosis, clubbing, edema, or diffuse swelling.  Palpable pulses, good capillary refill of the digits.  No coolness, discoloration, edema or obvious varicosities.    Skin: no lesions noted.    Lymphatic: no detected adenopathy in the upper or lower extremities.    Neurologic: normal mental status, normal reflexes, normal gait and balance.  Patient is alert and oriented to person, place and time.  No flaccidity or spasticity is noted.  No motor or sensory deficits are noted.  Light touch is intact    Orthopaedic: Knee Musculoskeletal Exam  Gait    Antalgic: left    Inspection    Right      Effusion: mild        Alignment: varus        Previous incision: arthroscopic portals    Left      Erythema: none        Effusion: moderate        Edema: none        Ecchymosis: none        Alignment: normal        Previous incision: no previous incision    Palpation    Right      Crepitus: patellofemoral        Tenderness: present          MCL: mild          Medial joint line: moderate          Medial retinaculum: mild      Left      Increased warmth: none        Masses: none        Crepitus: patellofemoral        Tenderness: present  "         MCL: mild          Medial joint line: moderate          Medial retinaculum: mild      Range of Motion    Right      Active extension: -5      Passive extension: -5      Active flexion: 115      Passive flexion: 115    Left      Left knee range of motion is normal and full.        Active extension: -5      Passive extension: -5      Active flexion: 115      Passive flexion: 115    Strength    Left      Left knee strength is normal.       Extension: 5/5. Extension is not affected by pain.       Flexion: 5/5. Flexion is not affected by pain.      Instability    Left      Varus stress grade: normal      Valgus stress grade: normal      Anterior drawer: normal      Posterior drawer: normal      Medial Lyric test: positive      Lachman: negative    Neurovascular    Left      Left knee neurovascular exam is normal.        Pulses - DP: normal      Pulses - PT: normal    Special Signs    Left      Left knee special signs are normal.      Straight leg raise: normal      J sign: none        Patellar compression: none        Patellar apprehension: none        IMAGING    MRI Knee Without Contrast Left  Narrative: EXAMINATION:  MRI KNEE WITHOUT CONTRAST LEFT    CLINICAL HISTORY:  Knee pain, chronic, degenerative disease on xray (Age >= 5y);Evaluate severity of chondral damage and bone marrow edema for potential surgical intervention;Pain in left knee    TECHNIQUE:  Multiplanar, multisequence images were performed about the LEFT knee.    COMPARISON:  01/25/2023    FINDINGS:  **MENISCI:    Medial meniscus: Horizontal tear posterior horn medial meniscus extending to the inferior articular surface, junction of the posterior horn and body segment.  Joint line edema/synovitis.    Lateral meniscus: Mild diffuse fraying.    Meniscal root attachment: Intact    Popliteal hiatus, superior and inferior meniscal fascicles:Intact    **LIGAMENTS:    Anterior cruciate ligament: Continuous, with normal signal.    Posterior cruciate  ligament: Continuous, with normal signal.    Medial collateral ligament: Intact.    Lateral collateral ligament and  biceps femoris: Intact.    Iliotibial band (ITB): No evidence for ITB syndrome.    Popliteus tendon and popliteofibular ligament: Intact.    Posterior medial and posterior lateral corners: Intact.    **TENDONS:    Quadriceps tendon: Intact.    Patella tendon: Intact.    Joint fluid: Moderate    Hoffa's fat pad: Normal.    Intact medial retinaculum/ MPFL.    Intact lateral retinaculum.    **CARTILAGE:    Patellofemoral:Preserved medial and fissuring lateral patellar facet cartilage.Median ridge demonstrates full-thickness fissuring..  Preserved trochlear groove cartilage.  Preservedanterior medial and anterior lateral femoral trochlea facet cartilage.    Medial tibiofemoral: Articular cartilage diffusely irregular, extending with full-thickness fissuring yet no evidence subchondral marrow edema.Internal aspect of medial femoral condyle cartilage diffusely irregular/fissuring.    Lateral tibiofemoral: Articular cartilage demonstrates focal full-thickness 3 mm defect extending over 2 cm, posterior compartment without subchondral marrow edema.Cartilage at posterior medial aspect of lateral tibial plateau demonstrates mild irregularity.    **OTHER:    Bone marrow: No fracture or marrow replacing process.    Miscellaneous: The gastrocnemius muscles are normal.No evident plica thickening.  Impression: Complex tear, predominantly oblique posterior horn and body segment medial meniscus.  Mild diffuse fraying of the lateral meniscus.    Moderate joint effusion.    Tricompartmental osteoarthritis, most evident patellofemoral and medial compartment manifest by diffuse cartilaginous fissuring in joint space narrowing without subchondral edema.    Electronically signed by: Juanito Thompson MD  Date:    04/29/2023  Time:    16:32        X-ray Knee Ortho Left with Flexion  Order: [id number]  Status: Final result      Visible to patient: Yes (seen)     Next appt: 04/18/2023 at 11:00 AM in Cardiology ([facility])     Dx: Left knee pain, unspecified chronicity     0 Result Notes  Details    Reading Physician Reading Date Result Priority   Fabiano Quevedo MD  [phone] 1/25/2023 Routine     Narrative & Impression  EXAMINATION:  XR KNEE ORTHO LEFT WITH FLEXION     CLINICAL HISTORY:  Pain in left knee     TECHNIQUE:  AP standing view of both knees, PA flexion standing views of both knees, and Merchant views of both knees were performed. A lateral view of the left knee was also performed.     COMPARISON:  May 9, 2019     FINDINGS:  Left knee:     No fracture.  Mild narrowing of medial compartment without genu varum.  No definite narrowing lateral compartment.  Mild narrowing lateral patellofemoral compartment.  Minimal periarticular spurring about tibial spines and posterior patella..  No suprapatellar bursal effusion or prepatellar soft tissue swelling.     Right knee:     No fracture.  Moderate to severe narrowing of medial compartment and genu varum.  No obvious narrowing of lateral compartment.  Mild narrowing lateral patellofemoral compartment.  Periarticular spurring about the medial compartment, tibial spines, and posterior patella.  No prepatellar soft tissue swelling.     Impression:     No acute fractures, bilateral, right greater than left osteoarthritic changes as described.        Electronically signed by: Fabiano Quevedo  Date:                                            01/25/2023  Time:                                           10:38           Exam Ended: 01/25/23 09:38 Last Resulted: 01/25/23 10:38             X-rays including four views of the left knee were ordered and completed today.  The images and report have been reviewed by me personally.  There are degenerative changes seen in the medial and patellofemoral compartments, worse in the right knee.  There is moderate to severe narrowing with near  bone-on-bone contact.  Kellgren Brent grade 2 on the left and grade 3/4 on the right.    IMPRESSION       ICD-10-CM ICD-9-CM   1. Acute medial meniscus tear of left knee, initial encounter (initial encounter)  S83.242A 836.0   2. Acute lateral meniscus tear of left knee, initial encounter (initial encounter)  S83.282A 836.1           MEDICATIONS PRESCRIBED      None    RECOMMENDATIONS     Surgical and nonsurgical treatment options for left knee medial meniscus tear were discussed; knee arthroscopy with partial medial meniscectomy, lateral meniscectomy, and chondroplasty with all other indicated procedures  The risks, benefits, and postoperative recovery of the procedure have been discussed in depth; he understands that this will not correct his underlying osteoarthritis and that he may need further surgical intervention in the future; he would like to proceed as soon as possible  Continue activity modification, ice and elevation, and the use of the topical NSAID as needed for pain and swelling; limit NSAID use due to kidney disease  Follow up with Brayan Payne MD for surgical planning  Return to clinic with me preoperatively      Procedures     All questions were answered, pt will contact us for questions or concerns in the interim.

## 2023-05-15 ENCOUNTER — TELEPHONE (OUTPATIENT)
Dept: INTERNAL MEDICINE | Facility: CLINIC | Age: 73
End: 2023-05-15
Payer: MEDICARE

## 2023-05-15 ENCOUNTER — OFFICE VISIT (OUTPATIENT)
Dept: SPORTS MEDICINE | Facility: CLINIC | Age: 73
End: 2023-05-15
Payer: MEDICARE

## 2023-05-15 VITALS — BODY MASS INDEX: 33.53 KG/M2 | HEIGHT: 73 IN | WEIGHT: 253 LBS

## 2023-05-15 DIAGNOSIS — S83.242A ACUTE MEDIAL MENISCUS TEAR OF LEFT KNEE, INITIAL ENCOUNTER: Primary | ICD-10-CM

## 2023-05-15 DIAGNOSIS — M22.42 CHONDROMALACIA OF PATELLOFEMORAL JOINT, LEFT: ICD-10-CM

## 2023-05-15 PROCEDURE — 1125F PR PAIN SEVERITY QUANTIFIED, PAIN PRESENT: ICD-10-PCS | Mod: CPTII,S$GLB,, | Performed by: ORTHOPAEDIC SURGERY

## 2023-05-15 PROCEDURE — 99999 PR PBB SHADOW E&M-EST. PATIENT-LVL III: CPT | Mod: PBBFAC,,, | Performed by: ORTHOPAEDIC SURGERY

## 2023-05-15 PROCEDURE — 3008F BODY MASS INDEX DOCD: CPT | Mod: CPTII,S$GLB,, | Performed by: ORTHOPAEDIC SURGERY

## 2023-05-15 PROCEDURE — 3008F PR BODY MASS INDEX (BMI) DOCUMENTED: ICD-10-PCS | Mod: CPTII,S$GLB,, | Performed by: ORTHOPAEDIC SURGERY

## 2023-05-15 PROCEDURE — 1159F MED LIST DOCD IN RCRD: CPT | Mod: CPTII,S$GLB,, | Performed by: ORTHOPAEDIC SURGERY

## 2023-05-15 PROCEDURE — 4010F ACE/ARB THERAPY RXD/TAKEN: CPT | Mod: CPTII,S$GLB,, | Performed by: ORTHOPAEDIC SURGERY

## 2023-05-15 PROCEDURE — 99999 PR PBB SHADOW E&M-EST. PATIENT-LVL III: ICD-10-PCS | Mod: PBBFAC,,, | Performed by: ORTHOPAEDIC SURGERY

## 2023-05-15 PROCEDURE — 1125F AMNT PAIN NOTED PAIN PRSNT: CPT | Mod: CPTII,S$GLB,, | Performed by: ORTHOPAEDIC SURGERY

## 2023-05-15 PROCEDURE — 99214 OFFICE O/P EST MOD 30 MIN: CPT | Mod: S$GLB,,, | Performed by: ORTHOPAEDIC SURGERY

## 2023-05-15 PROCEDURE — 1159F PR MEDICATION LIST DOCUMENTED IN MEDICAL RECORD: ICD-10-PCS | Mod: CPTII,S$GLB,, | Performed by: ORTHOPAEDIC SURGERY

## 2023-05-15 PROCEDURE — 99214 PR OFFICE/OUTPT VISIT, EST, LEVL IV, 30-39 MIN: ICD-10-PCS | Mod: S$GLB,,, | Performed by: ORTHOPAEDIC SURGERY

## 2023-05-15 PROCEDURE — 4010F PR ACE/ARB THEARPY RXD/TAKEN: ICD-10-PCS | Mod: CPTII,S$GLB,, | Performed by: ORTHOPAEDIC SURGERY

## 2023-05-15 NOTE — TELEPHONE ENCOUNTER
----- Message from Conrad Mckeon MA sent at 5/15/2023  2:39 PM CDT -----  Regarding: Surgery Clearance  Good Afternoon,    Mr. Bernardo is scheduled for a Left Knee Scope on 5/25/2023 with Dr. Payne.     Medical clearance is indicated prior to this. The anesthesia team will require explicit documentation regarding clearance status and perioperative medical recommendations. The medical clearance is good for 30 days and will need to be within this window of the scheduled surgery date.     Thank you for your help & let me know if I can assist in any way!        Conrad Mckeon MA  Medical Assistant - Dr. Brayan Payne  [facility] [place]

## 2023-05-15 NOTE — PROGRESS NOTES
NEW PATIENT    HISTORY OF PRESENT ILLNESS   72 y.o. Male with a history of left knee pain who is referred today by Abdi Stanford PA-C. 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 He used to be a runner. He reports popping and states that his knee has started to give out on him. He has had Euflexxa injections in the past. He reports swelling and medial sided knee pain. He also has a history of arthroscopic surgery on the right knee.    He states that surgery helped him tremendously for his right knee.    - mechanical symptoms, - instability    Is affecting ADLs.  Pain is 4/10 at it's worst.        PAST MEDICAL HISTORY    Past Medical History:   Diagnosis Date    Allergy     Arthritis 2019    Colon polyps     Disorder of kidney and ureter 2019    Hyperlipemia     Hypertension     Posterior capsular opacification        PAST SURGICAL HISTORY     Past Surgical History:   Procedure Laterality Date    CATARACT EXTRACTION      both eyes    COLONOSCOPY N/A 09/27/2016    Procedure: COLONOSCOPY;  Surgeon: Jan Argueta MD;  Location: [address]);  Service: Endoscopy;  Laterality: N/A;    COLONOSCOPY N/A 05/19/2022    Procedure: COLONOSCOPY;  Surgeon: Bobo Delcid MD;  Location: [facility] ([address] [place] [address]);  Service: Endoscopy;  Laterality: N/A;  4/4 fully vaccinated; instructions to [facility]    EYE SURGERY      HERNIA REPAIR      WISDOM TOOTH EXTRACTION      YAG      Left Eye       FAMILY HISTORY    Family History   Problem Relation Age of Onset    Hypertension Father     Cataracts Father     No Known Problems Maternal Uncle     Prostate cancer Paternal Uncle         prostate    Amblyopia Neg Hx     Blindness Neg Hx     Glaucoma Neg Hx     Macular degeneration Neg Hx     Retinal detachment Neg Hx     Strabismus Neg Hx        SOCIAL HISTORY    Social History     Socioeconomic History    Marital status:      Spouse name: Ginger    Number of children: 4   Occupational History      Employer: [employer]   Tobacco Use    Smoking status: Never    Smokeless tobacco: Never   Substance and Sexual Activity    Alcohol use: Yes     Alcohol/week: 6.0 standard drinks     Types: 4 Glasses of wine, 1 Shots of liquor, 1 Standard drinks or equivalent per week     Comment: socially- been a while since he drank    Drug use: Never    Sexual activity: Yes     Partners: Female     Birth control/protection: None     Social Determinants of Health     Financial Resource Strain: Low Risk     Difficulty of Paying Living Expenses: Not hard at all   Food Insecurity: No Food Insecurity    Worried About Running Out of Food in the Last Year: Never true    Ran Out of Food in the Last Year: Never true   Transportation Needs: No Transportation Needs    Lack of Transportation (Medical): No    Lack of Transportation (Non-Medical): No   Physical Activity: Sufficiently Active    Days of Exercise per Week: 5 days    Minutes of Exercise per Session: 90 min   Stress: No Stress Concern Present    Feeling of Stress : Not at all   Social Connections: Unknown    Frequency of Communication with Friends and Family: More than three times a week    Frequency of Social Gatherings with Friends and Family: More than three times a week    Active Member of Clubs or Organizations: Yes    Attends Club or Organization Meetings: More than 4 times per year    Marital Status:    Housing Stability: Low Risk     Unable to Pay for Housing in the Last Year: No    Number of Places Lived in the Last Year: 1    Unstable Housing in the Last Year: No       MEDICATIONS      Current Outpatient Medications:     albuterol (PROVENTIL/VENTOLIN HFA) 90 mcg/actuation inhaler, Inhale 2 puffs into the lungs every 6 (six) hours as needed (cough)., Disp: 8.5 g, Rfl: 2    ascorbic acid (VITAMIN C) 1000 MG tablet, Take 1,000 mg by mouth once daily., Disp: , Rfl:     aspirin 81 mg Tab, Take by mouth. 1 Tablet Oral Every day.  Last taken 3/25/11, Disp: , Rfl:      atorvastatin (LIPITOR) 40 MG tablet, Take 1 tablet (40 mg total) by mouth once daily., Disp: 90 tablet, Rfl: 1    azelastine (ASTELIN) 137 mcg (0.1 %) nasal spray, INSTILL 2 SPRAYS IN EACH NOSTRIL TWICE A DAY, Disp: 30 mL, Rfl: 2    ciclopirox (PENLAC) 8 % Soln, Apply topically nightly., Disp: 6.6 mL, Rfl: 11    colchicine (COLCRYS) 0.6 mg tablet, Take 1 tablet (0.6 mg total) by mouth once daily., Disp: 15 tablet, Rfl: 0    fexofenadine HCl (ALLEGRA ORAL), Take by mouth., Disp: , Rfl:     fish oil-omega-3 fatty acids 300-1,000 mg capsule, Take 2 g by mouth once daily., Disp: , Rfl:     fluticasone propionate (FLONASE) 50 mcg/actuation nasal spray, USE 1 SPRAY BY EACH NARE ROUTE ONCE DAILY., Disp: 48 mL, Rfl: 5    GAVILYTE-G 236-22.74-6.74 -5.86 gram suspension, Take 4,000 mLs by mouth once., Disp: , Rfl:     hydroCHLOROthiazide (MICROZIDE) 12.5 mg capsule, Take 1 capsule (12.5 mg total) by mouth once daily., Disp: 90 capsule, Rfl: 3    losartan (COZAAR) 100 MG tablet, Take 1 tablet (100 mg total) by mouth once daily., Disp: 90 tablet, Rfl: 3    multivitamin capsule, Take 1 capsule by mouth once daily., Disp: , Rfl:     omeprazole (PRILOSEC) 20 MG capsule, Take 1 capsule (20 mg total) by mouth 2 (two) times daily., Disp: 180 capsule, Rfl: 1    ALLERGIES     Review of patient's allergies indicates:  No Known Allergies      REVIEW OF SYSTEMS   Constitution: Negative. Negative for chills, fever and night sweats.   HENT: Negative for congestion and headaches.    Eyes: Negative for blurred vision, left vision loss and right vision loss.   Cardiovascular: Negative for chest pain and syncope.   Respiratory: Negative for cough and shortness of breath.    Endocrine: Negative for polydipsia, polyphagia and polyuria.   Hematologic/Lymphatic: Negative for bleeding problem. Does not bruise/bleed easily.   Skin: Negative for dry skin, itching and rash.   Musculoskeletal: Negative for falls. Positive for left knee  "pain  Gastrointestinal: Negative for abdominal pain and bowel incontinence.   Genitourinary: Negative for bladder incontinence and nocturia.   Neurological: Negative for disturbances in coordination, loss of balance and seizures.   Psychiatric/Behavioral: Negative for depression. The patient does not have insomnia.    Allergic/Immunologic: Negative for hives and persistent infections.     PHYSICAL EXAMINATION    Vitals: Ht 6' 1" (1.854 m)   Wt 114.8 kg (253 lb)   BMI 33.38 kg/m²     General: The patient appears active and healthy with no apparent physical problems.  No disturbance of mood or affect is demonstrated. Alert and Oriented.    HEENT: Eyes normal, pupils equally round, nose normal.    Resp: Equal and symmetrical chest rises. No wheezing    CV: Regular rate    Neck: Supple; nonpainful range of motion.    Extremities: no cyanosis, clubbing, edema, or diffuse swelling.  Palpable pulses, good capillary refill of the digits.  No coolness, discoloration, edema or obvious varicosities.    Skin: no lesions noted.    Lymphatic: no detected adenopathy in the upper or lower extremities.    Neurologic: normal mental status, normal reflexes, normal gait and balance.  Patient is alert and oriented to person, place and time.  No flaccidity or spasticity is noted.  No motor or sensory deficits are noted.  Light touch is intact    Orthopaedic:     KNEE EXAM - LEFT    Inspection:   Normal skin color and appearance with no scars, no ecchymosis and 1+ effusion.      ROM:   0° - 135°.      Palpation:   There is no tenderness along medial plica, medial collateral ligament, pes bursa, lateral joint line, iliotibial band, lateral collateral ligament, popliteal fossa, patellar tendon, or quadriceps tendon. Tender medial joint line    Stability: - anterior drawer, - Lachman, - pivot shift and - posterior drawer.      No instability with varus or valgus stress at 0° or 30°. Negative dial  test at 30° and 90°.    Tests:   + " Lyrics test.  - patellar compression - grind test, + patellofemoral crepitus.  There is no patellar apprehension.     - J Sign. - Sola's. - patellar tilt. - Tim. Lateral patella translation  2 quadrants. Medial patella translation 2 quadrants    Motor:   Quadriceps strength is 5/5 and hamstrings strength is 5/5. Hamstrings show no tightness.      Neuro:   Distal neurovascular status is normal    Vascular: Negative Homans and no palpable popliteal cords. 2+ pedal pulse with brisk cap refill    Gait Normal      IMAGING    MRI Knee Without Contrast Left  Narrative: EXAMINATION:  MRI KNEE WITHOUT CONTRAST LEFT    CLINICAL HISTORY:  Knee pain, chronic, degenerative disease on xray (Age >= 5y);Evaluate severity of chondral damage and bone marrow edema for potential surgical intervention;Pain in left knee    TECHNIQUE:  Multiplanar, multisequence images were performed about the LEFT knee.    COMPARISON:  01/25/2023    FINDINGS:  **MENISCI:    Medial meniscus: Horizontal tear posterior horn medial meniscus extending to the inferior articular surface, junction of the posterior horn and body segment.  Joint line edema/synovitis.    Lateral meniscus: Mild diffuse fraying.    Meniscal root attachment: Intact    Popliteal hiatus, superior and inferior meniscal fascicles:Intact    **LIGAMENTS:    Anterior cruciate ligament: Continuous, with normal signal.    Posterior cruciate ligament: Continuous, with normal signal.    Medial collateral ligament: Intact.    Lateral collateral ligament and  biceps femoris: Intact.    Iliotibial band (ITB): No evidence for ITB syndrome.    Popliteus tendon and popliteofibular ligament: Intact.    Posterior medial and posterior lateral corners: Intact.    **TENDONS:    Quadriceps tendon: Intact.    Patella tendon: Intact.    Joint fluid: Moderate    Hoffa's fat pad: Normal.    Intact medial retinaculum/ MPFL.    Intact lateral retinaculum.    **CARTILAGE:    Patellofemoral:Preserved medial  and fissuring lateral patellar facet cartilage.Median ridge demonstrates full-thickness fissuring..  Preserved trochlear groove cartilage.  Preservedanterior medial and anterior lateral femoral trochlea facet cartilage.    Medial tibiofemoral: Articular cartilage diffusely irregular, extending with full-thickness fissuring yet no evidence subchondral marrow edema.Internal aspect of medial femoral condyle cartilage diffusely irregular/fissuring.    Lateral tibiofemoral: Articular cartilage demonstrates focal full-thickness 3 mm defect extending over 2 cm, posterior compartment without subchondral marrow edema.Cartilage at posterior medial aspect of lateral tibial plateau demonstrates mild irregularity.    **OTHER:    Bone marrow: No fracture or marrow replacing process.    Miscellaneous: The gastrocnemius muscles are normal.No evident plica thickening.  Impression: Complex tear, predominantly oblique posterior horn and body segment medial meniscus.  Mild diffuse fraying of the lateral meniscus.    Moderate joint effusion.    Tricompartmental osteoarthritis, most evident patellofemoral and medial compartment manifest by diffuse cartilaginous fissuring in joint space narrowing without subchondral edema.    Electronically signed by: Juanito Thompson MD  Date:    04/29/2023  Time:    16:32        IMPRESSION       ICD-10-CM ICD-9-CM   1. Acute medial meniscus tear of left knee, initial encounter (initial encounter)  S83.242A 836.0   2. Chondromalacia of patellofemoral joint, left  M22.42 717.7       MEDICATIONS PRESCRIBED      None    RECOMMENDATIONS     Surgical versus non-surgical options discussed today; left knee arthroscopy with medial menisectomy and chondroplasty  The patient will require pre-operative clearance with his PCP and cardiologist  RTC for pre-op with Abdi Stanford PA-C      All questions were answered, pt will contact us for questions or concerns in the interim.

## 2023-05-16 ENCOUNTER — OFFICE VISIT (OUTPATIENT)
Dept: INTERNAL MEDICINE | Facility: CLINIC | Age: 73
End: 2023-05-16
Payer: MEDICARE

## 2023-05-16 VITALS
DIASTOLIC BLOOD PRESSURE: 86 MMHG | HEART RATE: 71 BPM | HEIGHT: 73 IN | BODY MASS INDEX: 33.92 KG/M2 | SYSTOLIC BLOOD PRESSURE: 130 MMHG | OXYGEN SATURATION: 96 % | WEIGHT: 255.94 LBS

## 2023-05-16 DIAGNOSIS — I47.20 VENTRICULAR TACHYCARDIA: Primary | ICD-10-CM

## 2023-05-16 DIAGNOSIS — S83.242A ACUTE MEDIAL MENISCUS TEAR OF LEFT KNEE, INITIAL ENCOUNTER: Primary | ICD-10-CM

## 2023-05-16 DIAGNOSIS — Z01.810 PREOP CARDIOVASCULAR EXAM: ICD-10-CM

## 2023-05-16 PROCEDURE — 1101F PT FALLS ASSESS-DOCD LE1/YR: CPT | Mod: CPTII,,, | Performed by: INTERNAL MEDICINE

## 2023-05-16 PROCEDURE — 99213 PR OFFICE/OUTPT VISIT, EST, LEVL III, 20-29 MIN: ICD-10-PCS | Mod: ,,, | Performed by: INTERNAL MEDICINE

## 2023-05-16 PROCEDURE — 3008F PR BODY MASS INDEX (BMI) DOCUMENTED: ICD-10-PCS | Mod: CPTII,,, | Performed by: INTERNAL MEDICINE

## 2023-05-16 PROCEDURE — 99214 OFFICE O/P EST MOD 30 MIN: CPT | Performed by: INTERNAL MEDICINE

## 2023-05-16 PROCEDURE — 1126F PR PAIN SEVERITY QUANTIFIED, NO PAIN PRESENT: ICD-10-PCS | Mod: CPTII,,, | Performed by: INTERNAL MEDICINE

## 2023-05-16 PROCEDURE — 4010F ACE/ARB THERAPY RXD/TAKEN: CPT | Mod: CPTII,,, | Performed by: INTERNAL MEDICINE

## 2023-05-16 PROCEDURE — 3079F PR MOST RECENT DIASTOLIC BLOOD PRESSURE 80-89 MM HG: ICD-10-PCS | Mod: CPTII,,, | Performed by: INTERNAL MEDICINE

## 2023-05-16 PROCEDURE — 99999 PR PBB SHADOW E&M-EST. PATIENT-LVL IV: ICD-10-PCS | Mod: PBBFAC,,, | Performed by: INTERNAL MEDICINE

## 2023-05-16 PROCEDURE — 1101F PR PT FALLS ASSESS DOC 0-1 FALLS W/OUT INJ PAST YR: ICD-10-PCS | Mod: CPTII,,, | Performed by: INTERNAL MEDICINE

## 2023-05-16 PROCEDURE — 3288F PR FALLS RISK ASSESSMENT DOCUMENTED: ICD-10-PCS | Mod: CPTII,,, | Performed by: INTERNAL MEDICINE

## 2023-05-16 PROCEDURE — 3008F BODY MASS INDEX DOCD: CPT | Mod: CPTII,,, | Performed by: INTERNAL MEDICINE

## 2023-05-16 PROCEDURE — 99213 OFFICE O/P EST LOW 20 MIN: CPT | Mod: ,,, | Performed by: INTERNAL MEDICINE

## 2023-05-16 PROCEDURE — 4010F PR ACE/ARB THEARPY RXD/TAKEN: ICD-10-PCS | Mod: CPTII,,, | Performed by: INTERNAL MEDICINE

## 2023-05-16 PROCEDURE — 3075F SYST BP GE 130 - 139MM HG: CPT | Mod: CPTII,,, | Performed by: INTERNAL MEDICINE

## 2023-05-16 PROCEDURE — 1126F AMNT PAIN NOTED NONE PRSNT: CPT | Mod: CPTII,,, | Performed by: INTERNAL MEDICINE

## 2023-05-16 PROCEDURE — 3288F FALL RISK ASSESSMENT DOCD: CPT | Mod: CPTII,,, | Performed by: INTERNAL MEDICINE

## 2023-05-16 PROCEDURE — 3075F PR MOST RECENT SYSTOLIC BLOOD PRESS GE 130-139MM HG: ICD-10-PCS | Mod: CPTII,,, | Performed by: INTERNAL MEDICINE

## 2023-05-16 PROCEDURE — 99999 PR PBB SHADOW E&M-EST. PATIENT-LVL IV: CPT | Mod: PBBFAC,,, | Performed by: INTERNAL MEDICINE

## 2023-05-16 PROCEDURE — 3079F DIAST BP 80-89 MM HG: CPT | Mod: CPTII,,, | Performed by: INTERNAL MEDICINE

## 2023-05-16 NOTE — PROGRESS NOTES
"  Mr. Bernardo is a 72  -year-old gentleman coming in today for pre-op for left knee  medial menisectomy and chondroplasty.  I last saw him in 4/03/2023.        He has been having some palpitations since he was 14 years old.  He went to [place] recently and drank a bunch of Coca[employer]a.  He had gotten all soft drinks.  Then about 1 week ago he felt light headed and half palpitations.  He felt little dizzy.  He did not have any syncope.  It lasted about 12 minutes.  His brother took his pulse and said his pulse is 40 after this had cleared.  He denied under emergency room and did not have any evaluation.  Since Saturday he says he has been feeling fine though.  Blood pressure is 124/84 and is also on his visit today is 78.  He has not been having any palpitations besides over the weekend.  He has not had any more dizziness.  He has had no syncope.  He he has had no chest pain.  He is not having any shortness of breath.  He is able to do his normal activities without any difficulties.      Holter showed "frequent dimorphic PVCs (13.9% burden) and runs of nonsustained VT up to 12 beats in duration. And Frequent PACs (1.5% burden) with 6 episodes of nonsustained atrial tachycardia lasting up to 8 beats in duration.  He is set up to see cards at the end of the month for this.  Surgery is set for next Thursday.    Echo  show normal EF-           1. He has hypertension. He has been on losartan 100 mg a day-- and  amlodipine- bp is 130/86     2. He has hyperlipidemia. He is on atorvastatin. Cholesterol from this visit was reviewed---tc was 148, hdl 52  , LDL 78.2.          3. . Colon polyps: He had 2 colon polyps in 2007, and 1 polyps in 9/2013--last colonoscopy was back in May of 2022.  Three polyps were removed.  If this colonoscopy should be and 2027.  This was reviewed today.  Bowels are normal if he eats a lot of fiber.          4. . Gout-- last attack in 3 months ago after eating lamb for 2 straight nights/         5. " "Chronic neck pain-- pretty stable he plans to get massage soon-- worse on the right vs the left.  He says it is doing terrible and he is going to get massage after he leaves today .          ROS : Gen - no fatigue -- she has lost 2 # since last visit.    Eyes - no eye pain or visual changes  ENT - no hoarseness or sore throat  CV - as above  Pulm - no cough or wheezing  GI - no N/V/D   no dysuria or incontinence  MS - no joint pain or muscle pain  Skin - no rash, or c/o of skin lesions  Neuro - no HA, dizziness--- memory is doing well.   Heme - no abnormal bleeding or bruising  Endo - no polydipsia, or temperature changes  Psych - no anxiety or depression        /86 (BP Location: Left arm, Patient Position: Sitting, BP Method: Large (Manual))   Pulse 71   Ht 6' 1" (1.854 m)   Wt 116.1 kg (255 lb 15.3 oz)   SpO2 96%   BMI 33.77 kg/m²           PHYSICAL EXAM: A well-appearing gentleman.   NECK: Supple with no JVD. Thyroid is not enlarged.   Chest : CTA bilaterally   CARDIOVASCULAR: S1, S2, regular rate and rhythm without murmur, gallop, or  rub.   Lungs: Clear bilaterally   ABDOMEN: Soft, nontender.   LOWER EXTREMITIES: No edema  He has no cervical, axillary, or inguinal adenopathy.   Bicep reflexs: nomal and equal bilaterally  Lower extremities: Normal muscle strength. No edema or cyanosis. TP/PT pulses+2 bilaterally.bilateral LE edema        ASSESSMENT:   Palpitations With some V-tach and Atrial tachycardia-- He needs to see EP for this before he has his surgery      1. Hyperlipidemia. continue the atorvastatin.   2. Hypertension.  losartan-- and  hctz. make sure he is drinking enough fluids--if he has more giout- may need to change to another medicine     3. Obesity. Spoke to him about diet and exercise. He actually seems to be exercising pretty well. L 4. Colon polyp- c-scope due  again in 5/2027-- has 3 polyps on 5/2022 c-scope    5. Gout-  No  recent episode. - We reviewed the gout diet.  6  ckd " stage 3 A-- stable-- will monitor-- avoid nsaids    7. Has a cavitary lung lesion following with pulmonary for this.  PET reviewed from 2/2022.    reviewed Pulmonary note from March of 2023       Let him see cards for his arrhythmia before surgery.  The amount of PVC sees having and his recent ventricular tachycardia makes me want him to see electrophysiology before he is cleared for his surgery.

## 2023-05-17 ENCOUNTER — PATIENT MESSAGE (OUTPATIENT)
Dept: PREADMISSION TESTING | Facility: HOSPITAL | Age: 73
End: 2023-05-17
Payer: MEDICARE

## 2023-05-17 NOTE — ANESTHESIA PAT ROS NOTE
05/17/2023  Julio Bernardo is a 72 y.o., male.      Pre-op Assessment    I have reviewed the Patient Summary Reports.       I have reviewed the Medications.     Review of Systems  Anesthesia Hx:  No problems with previous Anesthesia   History of prior surgery of interest to airway management or planning:  Previous anesthesia: MAC       5/19/2022  colonoscopy with MAC.  Procedure performed at an [facility].  Denies Family Hx of Anesthesia complications.    Denies Personal Hx of Anesthesia complications.                    Social:  Non-Smoker, Social Alcohol Use       Hematology/Oncology:  Hematology Normal   Oncology Normal                                   EENT/Dental:  chronic allergic rhinitis Allergy, Cataract Extractions both eyes, wisdom tooth extraction, Left eye surgery, After-cataract, obscuring vision - Left Eye,  Vitreous detachment - Both Eyes,  Astigmatism - Both Eyes,  Right posterior capsular opacification,  Pseudophakia            Cardiovascular:  Exercise tolerance: good   Hypertension    Denies CAD.    Dysrhythmias       hyperlipidemia  Denies QUINTEROS.  ECG has been reviewed. Atherosclerosis of aorta seen on CT,  Palpitations, frequent and dimorphic PVC's with runs of VT up to 12 beats duration and PAC's with non-sustained runs of Atrial Tachycardia with 8 beats duration per Holter Monitor                         Pulmonary:       Denies Shortness of breath.  Sleep Apnea Chronic cough,  Cavitary lesion of lung,  Elevated hemidiaphragm noted on PET scan,  Multiple right-sided subcentimeter pulmonary nodules            Education provided regarding risk of obstructive sleep apnea            Renal/:  Chronic Renal Disease, CKD   Stage 3 CKD, GFR = 58 on 3/27/2023, Creat = 1.3             Hepatic/GI:     GERD, well controlled Denies Liver Disease.  Colon polyps, H/O Hernia Repair           Musculoskeletal:  Arthritis   Acute medial meniscus tear of left knee,   Chondromalacia of patellofemoral joint, left,  Primary osteoarthritis bilateral knees, Chronic gout         Spine Disorders: cervical Chronic Pain           Neurological:  Neurology Normal   Denies CVA.    Denies Headaches. Denies Seizures.                                Endocrine:  Denies Diabetes. Denies Hypothyroidism.        Obesity / BMI > 30  Psych:  Psychiatric Normal                   Past Medical History:   Diagnosis Date    Allergy     Arthritis 2019    Colon polyps     Disorder of kidney and ureter 2019    Hyperlipemia     Hypertension     Posterior capsular opacification      Past Surgical History:   Procedure Laterality Date    CATARACT EXTRACTION      both eyes    COLONOSCOPY N/A 09/27/2016    Procedure: COLONOSCOPY;  Surgeon: Jan Argueta MD;  Location: [facility] ([address] [place] [address]);  Service: Endoscopy;  Laterality: N/A;    COLONOSCOPY N/A 05/19/2022    Procedure: COLONOSCOPY;  Surgeon: Bobo Delcid MD;  [facility]  Service: Endoscopy;  Laterality: N/A;  4/4 fully vaccinated; instructions to [facility]-st    EYE SURGERY      HERNIA REPAIR      WISDOM TOOTH EXTRACTION      YAG      Left Eye       Anesthesia Assessment: Preoperative EQUATION    Planned Procedure: Procedure(s) (LRB):  ARTHROSCOPY, KNEE, WITH MENISCECTOMY (Left)  Requested Anesthesia Type:General  Surgeon: Brayan Payne MD  Service: Orthopedics  Known or anticipated Date of Surgery:5/25/2023    Surgeon notes: reviewed    Electronic QUestionnaire Assessment completed via nurse interview with patient.        Triage considerations:     The patient has no apparent active cardiac condition (No unstable coronary Syndrome such as severe unstable angina or recent [<1 month] myocardial infarction, decompensated CHF, severe valvular   disease or significant arrhythmia)    Previous anesthesia records:MAC and No problems    [facility]   Subspecialty notes: Cardiology: EP    5/18/2023 Seen in EP, progress note reviewed. PET stress test was ordered but does not need to be done prior to surgery. Medications adjusted.     Seen by PCP on 5/16/23, referral to cardiology noted.    Other important co-morbidities: GERD, HLD, HTN, Obesity, and SUSANNAH       EKG 5/18/2023:  Vent. Rate : 075 BPM     Atrial Rate : 075 BPM      P-R Int : 150 ms          QRS Dur : 090 ms       QT Int : 390 ms       P-R-T Axes : 063 020 031 degrees      QTc Int : 435 ms   Normal sinus rhythm   Possible Inferior infarct ,age undetermined   Nonspecific ST and/or T wave abnormalities   Abnormal ECG   When compared with ECG of 03-APR-2023 11:44,   Premature ventricular complexes are no longer Present   Confirmed by Jefferson Bower MD ([id number]) on 5/18/2023 2:58:28 PM         Echo 4/3/2023:   Summary  The left ventricle is normal in size with normal systolic function.  The estimated ejection fraction is 60%.  Indeterminate left ventricular diastolic function.  Normal right ventricular size with normal right ventricular systolic function.  Normal central venous pressure (3 mmHg).  The estimated PA systolic pressure is 38 mmHg.  Frequent PVCs noted throughout study.              48 Hour Holter 4/18/2023:  Conclusion  Sinus rhythm with very frequent dimorphic PVCs (13.9% burden) and runs of nonsustained VT up to 12 beats in duration.  Frequent PACs (1.5% burden) with 6 episodes of nonsustained atrial tachycardia lasting up to 8 beats in duration.      Exercise Stress Echo 9/11/2017:  EKG Conclusions:     1. The EKG portion of this study is negative for ischemia at a moderate workload, and peak heart rate of 162 bpm (107% of predicted).   2. Exercise capacity is average.   3. Blood pressure response to exercise was normal (Presenting BP: 151/92 Peak BP: 216/76).   4. The following arrhythmias were present: occasional couplets.   5. The patient reported dyspnea during the protocol which  resolved in recovery.   6. The Duke treadmill score was 8 suggesting a low probability for future cardiovascular events.     CONCLUSIONS     1 - Normal left ventricular systolic function (EF 60-65%).     2 - No wall motion abnormalities.     3 - Indeterminate LV diastolic function.     4 - Normal right ventricular systolic function .     5 - Biatrial enlargement.   No evidence of stress induced myocardial ischemia.       Instructions given. (See in Nurse's note)      Ht: 6'1  Wt: 255 lb  BMI: 33.77  Vaccinated

## 2023-05-18 ENCOUNTER — HOSPITAL ENCOUNTER (OUTPATIENT)
Dept: CARDIOLOGY | Facility: CLINIC | Age: 73
Discharge: HOME | End: 2023-05-18
Payer: MEDICARE

## 2023-05-18 ENCOUNTER — OFFICE VISIT (OUTPATIENT)
Dept: ELECTROPHYSIOLOGY | Facility: CLINIC | Age: 73
End: 2023-05-18
Payer: MEDICARE

## 2023-05-18 VITALS
SYSTOLIC BLOOD PRESSURE: 126 MMHG | WEIGHT: 255.94 LBS | HEART RATE: 75 BPM | DIASTOLIC BLOOD PRESSURE: 78 MMHG | HEIGHT: 73 IN | BODY MASS INDEX: 33.92 KG/M2

## 2023-05-18 DIAGNOSIS — R00.2 PALPITATIONS: ICD-10-CM

## 2023-05-18 DIAGNOSIS — I49.3 PVC'S (PREMATURE VENTRICULAR CONTRACTIONS): Primary | ICD-10-CM

## 2023-05-18 DIAGNOSIS — R94.31 ABNORMAL ELECTROCARDIOGRAM (ECG) (EKG): ICD-10-CM

## 2023-05-18 DIAGNOSIS — I47.20 VT (VENTRICULAR TACHYCARDIA): ICD-10-CM

## 2023-05-18 PROCEDURE — 1126F PR PAIN SEVERITY QUANTIFIED, NO PAIN PRESENT: ICD-10-PCS | Mod: CPTII,,, | Performed by: INTERNAL MEDICINE

## 2023-05-18 PROCEDURE — 99204 PR OFFICE/OUTPT VISIT, NEW, LEVL IV, 45-59 MIN: ICD-10-PCS | Mod: ,,, | Performed by: INTERNAL MEDICINE

## 2023-05-18 PROCEDURE — 3078F DIAST BP <80 MM HG: CPT | Mod: CPTII,,, | Performed by: INTERNAL MEDICINE

## 2023-05-18 PROCEDURE — 1159F MED LIST DOCD IN RCRD: CPT | Mod: CPTII,,, | Performed by: INTERNAL MEDICINE

## 2023-05-18 PROCEDURE — 99204 OFFICE O/P NEW MOD 45 MIN: CPT | Mod: ,,, | Performed by: INTERNAL MEDICINE

## 2023-05-18 PROCEDURE — 4010F ACE/ARB THERAPY RXD/TAKEN: CPT | Mod: CPTII,,, | Performed by: INTERNAL MEDICINE

## 2023-05-18 PROCEDURE — 3008F PR BODY MASS INDEX (BMI) DOCUMENTED: ICD-10-PCS | Mod: CPTII,,, | Performed by: INTERNAL MEDICINE

## 2023-05-18 PROCEDURE — 1160F RVW MEDS BY RX/DR IN RCRD: CPT | Mod: CPTII,,, | Performed by: INTERNAL MEDICINE

## 2023-05-18 PROCEDURE — 1101F PT FALLS ASSESS-DOCD LE1/YR: CPT | Mod: CPTII,,, | Performed by: INTERNAL MEDICINE

## 2023-05-18 PROCEDURE — 93000 RHYTHM STRIP: ICD-10-PCS | Mod: ,,, | Performed by: INTERNAL MEDICINE

## 2023-05-18 PROCEDURE — 1101F PR PT FALLS ASSESS DOC 0-1 FALLS W/OUT INJ PAST YR: ICD-10-PCS | Mod: CPTII,,, | Performed by: INTERNAL MEDICINE

## 2023-05-18 PROCEDURE — 3288F PR FALLS RISK ASSESSMENT DOCUMENTED: ICD-10-PCS | Mod: CPTII,,, | Performed by: INTERNAL MEDICINE

## 2023-05-18 PROCEDURE — 99999 PR PBB SHADOW E&M-EST. PATIENT-LVL IV: CPT | Mod: PBBFAC,,, | Performed by: INTERNAL MEDICINE

## 2023-05-18 PROCEDURE — 3288F FALL RISK ASSESSMENT DOCD: CPT | Mod: CPTII,,, | Performed by: INTERNAL MEDICINE

## 2023-05-18 PROCEDURE — 3008F BODY MASS INDEX DOCD: CPT | Mod: CPTII,,, | Performed by: INTERNAL MEDICINE

## 2023-05-18 PROCEDURE — 99999 PR PBB SHADOW E&M-EST. PATIENT-LVL IV: ICD-10-PCS | Mod: PBBFAC,,, | Performed by: INTERNAL MEDICINE

## 2023-05-18 PROCEDURE — 3074F PR MOST RECENT SYSTOLIC BLOOD PRESSURE < 130 MM HG: ICD-10-PCS | Mod: CPTII,,, | Performed by: INTERNAL MEDICINE

## 2023-05-18 PROCEDURE — 93000 ELECTROCARDIOGRAM COMPLETE: CPT | Mod: ,,, | Performed by: INTERNAL MEDICINE

## 2023-05-18 PROCEDURE — 1126F AMNT PAIN NOTED NONE PRSNT: CPT | Mod: CPTII,,, | Performed by: INTERNAL MEDICINE

## 2023-05-18 PROCEDURE — 3078F PR MOST RECENT DIASTOLIC BLOOD PRESSURE < 80 MM HG: ICD-10-PCS | Mod: CPTII,,, | Performed by: INTERNAL MEDICINE

## 2023-05-18 PROCEDURE — 1159F PR MEDICATION LIST DOCUMENTED IN MEDICAL RECORD: ICD-10-PCS | Mod: CPTII,,, | Performed by: INTERNAL MEDICINE

## 2023-05-18 PROCEDURE — 1160F PR REVIEW ALL MEDS BY PRESCRIBER/CLIN PHARMACIST DOCUMENTED: ICD-10-PCS | Mod: CPTII,,, | Performed by: INTERNAL MEDICINE

## 2023-05-18 PROCEDURE — 3074F SYST BP LT 130 MM HG: CPT | Mod: CPTII,,, | Performed by: INTERNAL MEDICINE

## 2023-05-18 PROCEDURE — 4010F PR ACE/ARB THEARPY RXD/TAKEN: ICD-10-PCS | Mod: CPTII,,, | Performed by: INTERNAL MEDICINE

## 2023-05-18 RX ORDER — VERAPAMIL HYDROCHLORIDE 120 MG/1
120 CAPSULE, EXTENDED RELEASE ORAL DAILY
Qty: 30 CAPSULE | Refills: 11 | Status: SHIPPED | OUTPATIENT
Start: 2023-05-18 | End: 2023-07-13 | Stop reason: SDUPTHER

## 2023-05-18 RX ORDER — LOSARTAN POTASSIUM 100 MG/1
50 TABLET ORAL DAILY
Qty: 90 TABLET | Refills: 3 | Status: SHIPPED | OUTPATIENT
Start: 2023-05-18 | End: 2023-06-19

## 2023-05-18 NOTE — PROGRESS NOTES
Subjective:   Patient ID:  Julio Bernardo is a 72 y.o. male who presents for evaluation of PVCs    Referring Physician: Ryan Loera Jr, MD    HPI  I had the pleasure of seeing Mr. Bernardo today in our electrophysiology clinic in consultation for his palpitations, PVCs and nonsustained VT. As you are aware he is a pleasant 72 year-old man with palpitations and hypertension. Reports having intermittent palpitations since he was a teenager. Recently he began to feel palpitations and was light-headed. He reports this lasted ~12 minutes. This occurred after drinking a lot of [employer]. His brother took his pulse once symptoms resolved and measured a pulse rate of 40. He has never passed out. He feels fine since then. He wore a holter monitor which noted a 14% dimorphic PVC burden and a few short runs of nonsustained VT, most ~4-5 beats. He is active and exercises 5 days a week in [place]. Also just did the [place] classic. Has no angina. He has an upcoming knee arthroscopy procedure.    ECHO 4/2023 noted preserved LV function.  Exercise stress echo 2017 noted normal LV function and no ischemia.    I reviewed available ECGs in Epic which show sinus rhythm with PVCs. Stress test ECG from 2017 noted PVCs with LBBB/V4 transition (iso at V3)/inferior rightward axis. Also had a RBBB V2 pattern break PVC on the stress test. PVCs quieted down with exercise. ECG from 2006 notes LBBB concordant superior axis PVC.    My interpretation of today's in clinic ECG is sinus rhythm with normal intervals. Rhythm strip shows PVCs (RBBB, V5 transition, superior rightward axis)      Review of Systems   Constitutional: Negative for fever and malaise/fatigue.   HENT:  Negative for congestion and sore throat.    Eyes:  Negative for blurred vision and visual disturbance.   Cardiovascular:  Positive for palpitations. Negative for chest pain, dyspnea on exertion, irregular heartbeat, near-syncope and syncope.   Respiratory:  Negative for  cough and shortness of breath.    Hematologic/Lymphatic: Negative for bleeding problem. Does not bruise/bleed easily.   Skin: Negative.    Musculoskeletal: Negative.    Gastrointestinal:  Negative for bloating, abdominal pain, hematochezia and melena.   Neurological:  Negative for focal weakness and weakness.   Psychiatric/Behavioral: Negative.       Objective:   Physical Exam  Vitals reviewed.   Constitutional:       General: He is not in acute distress.     Appearance: He is well-developed. He is not diaphoretic.   HENT:      Head: Normocephalic and atraumatic.   Eyes:      General:         Right eye: No discharge.         Left eye: No discharge.      Conjunctiva/sclera: Conjunctivae normal.   Cardiovascular:      Rate and Rhythm: Normal rate and regular rhythm. Occasional Extrasystoles are present.     Heart sounds: No murmur heard.    No friction rub. No gallop.   Pulmonary:      Effort: Pulmonary effort is normal. No respiratory distress.      Breath sounds: Normal breath sounds. No wheezing or rales.   Abdominal:      General: Bowel sounds are normal. There is no distension.      Palpations: Abdomen is soft.      Tenderness: There is no abdominal tenderness.   Musculoskeletal:      Cervical back: Neck supple.   Skin:     General: Skin is warm and dry.   Neurological:      Mental Status: He is alert and oriented to person, place, and time.   Psychiatric:         Behavior: Behavior normal.         Thought Content: Thought content normal.         Judgment: Judgment normal.       Assessment:      1. PVC's (premature ventricular contractions)    2. VT (ventricular tachycardia)    3. Palpitations    4. Abnormal electrocardiogram (ECG) (EKG)        Plan:   In summary, Mr. Bernardo is a pleasant 72 year-old man with palpitations and hypertension presenting for evaluation for multi-focal PVCs and short nonsustained VT, fairly asymptomatic, in setting of a structurally normal heart. We discussed the etiology and  pathophysiology of PVCs in setting of a structurally normal heart (outflow tract and likely pap muscle). Recommend trial of verapamil. Reduce losartan to 50mg daily. Will get a PET stress test, however low concern for any significant ischemic disease. He is going to undergo a low risk procedure on his knee (left knee arthroscopy) and the test does not need to be done prior.     RTC in 3 months    Thank you for allowing me to participate in the care of this patient. Please do not hesitate to call me with any questions or concerns.    Brant Canela MD, PhD  Cardiac Electrophysiology

## 2023-05-23 ENCOUNTER — OFFICE VISIT (OUTPATIENT)
Dept: PODIATRY | Facility: CLINIC | Age: 73
End: 2023-05-23
Payer: MEDICARE

## 2023-05-23 VITALS
HEART RATE: 59 BPM | BODY MASS INDEX: 33.92 KG/M2 | DIASTOLIC BLOOD PRESSURE: 74 MMHG | SYSTOLIC BLOOD PRESSURE: 133 MMHG | WEIGHT: 255.94 LBS | HEIGHT: 73 IN

## 2023-05-23 DIAGNOSIS — L84 CORN OR CALLUS: ICD-10-CM

## 2023-05-23 DIAGNOSIS — N18.31 STAGE 3A CHRONIC KIDNEY DISEASE: Primary | ICD-10-CM

## 2023-05-23 DIAGNOSIS — B35.1 ONYCHOMYCOSIS DUE TO DERMATOPHYTE: ICD-10-CM

## 2023-05-23 DIAGNOSIS — G60.9 IDIOPATHIC PERIPHERAL NEUROPATHY: ICD-10-CM

## 2023-05-23 PROCEDURE — 4010F PR ACE/ARB THEARPY RXD/TAKEN: ICD-10-PCS | Mod: CPTII,S$GLB,, | Performed by: PODIATRIST

## 2023-05-23 PROCEDURE — 1160F PR REVIEW ALL MEDS BY PRESCRIBER/CLIN PHARMACIST DOCUMENTED: ICD-10-PCS | Mod: CPTII,S$GLB,, | Performed by: PODIATRIST

## 2023-05-23 PROCEDURE — 11057 PR TRIM BENIGN HYPERKERATOTIC SKIN LESION,>4: ICD-10-PCS | Mod: Q9,S$GLB,, | Performed by: PODIATRIST

## 2023-05-23 PROCEDURE — 3075F SYST BP GE 130 - 139MM HG: CPT | Mod: CPTII,S$GLB,, | Performed by: PODIATRIST

## 2023-05-23 PROCEDURE — 3078F DIAST BP <80 MM HG: CPT | Mod: CPTII,S$GLB,, | Performed by: PODIATRIST

## 2023-05-23 PROCEDURE — 99999 PR PBB SHADOW E&M-EST. PATIENT-LVL III: CPT | Mod: PBBFAC,,, | Performed by: PODIATRIST

## 2023-05-23 PROCEDURE — 99214 OFFICE O/P EST MOD 30 MIN: CPT | Mod: 25,S$GLB,, | Performed by: PODIATRIST

## 2023-05-23 PROCEDURE — 3008F PR BODY MASS INDEX (BMI) DOCUMENTED: ICD-10-PCS | Mod: CPTII,S$GLB,, | Performed by: PODIATRIST

## 2023-05-23 PROCEDURE — 3075F PR MOST RECENT SYSTOLIC BLOOD PRESS GE 130-139MM HG: ICD-10-PCS | Mod: CPTII,S$GLB,, | Performed by: PODIATRIST

## 2023-05-23 PROCEDURE — 3008F BODY MASS INDEX DOCD: CPT | Mod: CPTII,S$GLB,, | Performed by: PODIATRIST

## 2023-05-23 PROCEDURE — 1160F RVW MEDS BY RX/DR IN RCRD: CPT | Mod: CPTII,S$GLB,, | Performed by: PODIATRIST

## 2023-05-23 PROCEDURE — 1126F PR PAIN SEVERITY QUANTIFIED, NO PAIN PRESENT: ICD-10-PCS | Mod: CPTII,S$GLB,, | Performed by: PODIATRIST

## 2023-05-23 PROCEDURE — 1126F AMNT PAIN NOTED NONE PRSNT: CPT | Mod: CPTII,S$GLB,, | Performed by: PODIATRIST

## 2023-05-23 PROCEDURE — 99214 PR OFFICE/OUTPT VISIT, EST, LEVL IV, 30-39 MIN: ICD-10-PCS | Mod: 25,S$GLB,, | Performed by: PODIATRIST

## 2023-05-23 PROCEDURE — 1159F PR MEDICATION LIST DOCUMENTED IN MEDICAL RECORD: ICD-10-PCS | Mod: CPTII,S$GLB,, | Performed by: PODIATRIST

## 2023-05-23 PROCEDURE — 99999 PR PBB SHADOW E&M-EST. PATIENT-LVL III: ICD-10-PCS | Mod: PBBFAC,,, | Performed by: PODIATRIST

## 2023-05-23 PROCEDURE — 11720 DEBRIDE NAIL 1-5: CPT | Mod: 59,Q9,S$GLB, | Performed by: PODIATRIST

## 2023-05-23 PROCEDURE — 4010F ACE/ARB THERAPY RXD/TAKEN: CPT | Mod: CPTII,S$GLB,, | Performed by: PODIATRIST

## 2023-05-23 PROCEDURE — 11720 PR DEBRIDEMENT OF NAIL(S), 1-5: ICD-10-PCS | Mod: 59,Q9,S$GLB, | Performed by: PODIATRIST

## 2023-05-23 PROCEDURE — 1159F MED LIST DOCD IN RCRD: CPT | Mod: CPTII,S$GLB,, | Performed by: PODIATRIST

## 2023-05-23 PROCEDURE — 3078F PR MOST RECENT DIASTOLIC BLOOD PRESSURE < 80 MM HG: ICD-10-PCS | Mod: CPTII,S$GLB,, | Performed by: PODIATRIST

## 2023-05-23 PROCEDURE — 11057 PARNG/CUTG B9 HYPRKR LES >4: CPT | Mod: Q9,S$GLB,, | Performed by: PODIATRIST

## 2023-05-23 RX ORDER — KETOCONAZOLE 20 MG/G
CREAM TOPICAL DAILY
Qty: 60 G | Refills: 4 | Status: SHIPPED | OUTPATIENT
Start: 2023-05-23

## 2023-05-23 NOTE — PROGRESS NOTES
"   Subjective:      Patient ID: Julio Bernardo is a 72 y.o. male.    Chief Complaint: Follow-up (Left foot)    Julio Bernardo, 72 y.o., male returns to clinic for follow up high risk foot care. L great toe doing much better with 0/10 pain after phenol procedure on medial border in the past. Otherwise routine trimming has helped in the past. No new concerns, doing well otherwise.        Vitals:    23 1501   BP: 133/74   Pulse: (!) 59   Weight: 116.1 kg (255 lb 15.3 oz)   Height: 6' 1" (1.854 m)   PainSc: 0-No pain         Review of Systems   Constitutional: Negative for chills and fever.   Cardiovascular:  Negative for chest pain, claudication and leg swelling.   Respiratory:  Negative for cough and shortness of breath.    Skin:  Positive for dry skin and nail changes.   Musculoskeletal:  Positive for gout (stable).   Gastrointestinal:  Negative for nausea and vomiting.   Neurological:  Negative for numbness and paresthesias.   Psychiatric/Behavioral:  Negative for altered mental status.          Objective:      Physical Exam  Vitals reviewed.   Constitutional:       Appearance: He is well-developed.   HENT:      Head: Normocephalic.   Cardiovascular:      Pulses:           Dorsalis pedis pulses are 1+ on the right side and 1+ on the left side.        Posterior tibial pulses are 1+ on the right side and 1+ on the left side.      Comments: CRT < 3 sec to tips of toes.    Pulmonary:      Effort: No respiratory distress.   Musculoskeletal:      Right lower le+ Edema present.      Left lower le+ Edema present.      Comments: Semi-reducible hammertoe contractures noted to toes 2-4 b/l-asymptomatic. HAV, mild, non trackbound noted b/l with mild medial bony prominence at 1st met head--asymptomatic.     Hypermobility noted to 1st ray b/l with near complete collapse of medial longitudinal arch b/l with loading.     Otherwise rectus foot and toe position bilateral with no major deformities noted. Mild equinus " noted b/l ankles with < 10 deg DF noted. MMT 5/5 in DF/PF/Inv/Ev resistance with no reproduction of pain in any direction. Passive range of motion of ankle and pedal joints is painless b/l.     Skin:     General: Skin is warm and dry.      Findings: No erythema.      Comments: No open lesions, lacerations or wounds noted. Nails are thickened, elongated, discolored yellow/brown with subungual debris and brittleness to R 1-5 and L 1-5. Interdigital spaces clean, dry and intact b/l. No erythema noted to b/l foot. Skin texture normal. Pedal hair normal. Mild hyperpigmentation to skin of both ankles and/or feet, consistent with hemosiderin deposits.  Pedal hair diminished b/l. Toes cool to touch b/l. Hyperkeratotic lesion noted to distal tips of toes 1,3,5 b/l. Skin lines present to lesion/s. No signs of deep tissue injury.       Neurological:      Mental Status: He is alert and oriented to person, place, and time.      Sensory: Sensory deficit present.      Comments: Light touch, proprioception, and sharp/dull sensation are all intact bilaterally. Protective threshold with the [place]-Wienstein monofilament is decreased at toes bilaterally.     Psychiatric:         Behavior: Behavior normal.         Thought Content: Thought content normal.         Judgment: Judgment normal.             Assessment:       Encounter Diagnoses   Name Primary?    Stage 3a chronic kidney disease Yes    Idiopathic peripheral neuropathy     Onychomycosis due to dermatophyte     Corn or callus              Plan:       Julio was seen today for follow-up.    Diagnoses and all orders for this visit:    Stage 3a chronic kidney disease    Idiopathic peripheral neuropathy    Onychomycosis due to dermatophyte    Corn or callus    Other orders  -     ketoconazole (NIZORAL) 2 % cream; Apply topically once daily. Apply daily to affected toenails for 6-12 months          I counseled the patient on his conditions, their implications and medical  management.     - Shoe inspection. General Foot Education. Patient reminded of the importance of good nutrition. Patient instructed on proper foot hygeine. We discussed wearing proper shoe gear, daily foot inspections, never walking without protective shoe gear, caution putting sharp instruments to feet     - Discussed general foot care:  Wear comfortable, proper fitting shoes. Wash feet daily. Dry well. After drying, apply moisturizer to feet (no lotion to webspaces). Inspect feet daily for skin breaks, blisters, swelling, or redness. Wear cotton socks (preferably white)  Change socks every day. Do NOT walk barefoot. Do NOT use heating pads or warm/hot water soaks     With patient's permission, nails were aggressively reduced and debrided 1,2,3,4, 5 R and 1,2, 3,4,5 L and filed to their soft tissue attachment mechanically and with electric , removing all offending nail and debris. Patient tolerated this well and no blood was drawn. Patient reports relief following the procedure.     The affected area was cleansed with an alcohol prep pad. Next, utilizing a 5mm curette, the hyperkeratotic tissues were trimmed from distal tips of toes 1,3,5 b/l, down to appropriate level of skin. Care was taken to remove any nucleated core from the center of the lesion. No pinpoint bleeding was encountered. The patient tolerated relief following this procedure.     Rx Ketoconazole cream to be applied to affected toenails for 6 months up to 1 year.    RTC 9 weeks, sooner PRN

## 2023-05-24 ENCOUNTER — PES CALL (OUTPATIENT)
Dept: ADMINISTRATIVE | Facility: CLINIC | Age: 73
End: 2023-05-24
Payer: MEDICARE

## 2023-05-24 ENCOUNTER — OFFICE VISIT (OUTPATIENT)
Dept: SPORTS MEDICINE | Facility: CLINIC | Age: 73
End: 2023-05-24
Payer: MEDICARE

## 2023-05-24 VITALS
DIASTOLIC BLOOD PRESSURE: 69 MMHG | SYSTOLIC BLOOD PRESSURE: 135 MMHG | HEIGHT: 73 IN | BODY MASS INDEX: 33.53 KG/M2 | HEART RATE: 47 BPM | WEIGHT: 253 LBS

## 2023-05-24 DIAGNOSIS — M22.42 CHONDROMALACIA OF PATELLOFEMORAL JOINT, LEFT: ICD-10-CM

## 2023-05-24 DIAGNOSIS — S83.242A ACUTE MEDIAL MENISCUS TEAR OF LEFT KNEE, INITIAL ENCOUNTER: Primary | ICD-10-CM

## 2023-05-24 PROCEDURE — 99499 NO LOS: ICD-10-PCS | Mod: S$GLB,,, | Performed by: PHYSICIAN ASSISTANT

## 2023-05-24 PROCEDURE — 99999 PR PBB SHADOW E&M-EST. PATIENT-LVL IV: CPT | Mod: PBBFAC,,, | Performed by: PHYSICIAN ASSISTANT

## 2023-05-24 PROCEDURE — 99999 PR PBB SHADOW E&M-EST. PATIENT-LVL IV: ICD-10-PCS | Mod: PBBFAC,,, | Performed by: PHYSICIAN ASSISTANT

## 2023-05-24 PROCEDURE — 99499 UNLISTED E&M SERVICE: CPT | Mod: S$GLB,,, | Performed by: PHYSICIAN ASSISTANT

## 2023-05-24 RX ORDER — MUPIROCIN 20 MG/G
OINTMENT TOPICAL
Status: CANCELLED | OUTPATIENT
Start: 2023-05-24

## 2023-05-24 RX ORDER — HYDROCODONE BITARTRATE AND ACETAMINOPHEN 7.5; 325 MG/1; MG/1
1 TABLET ORAL EVERY 6 HOURS PRN
Qty: 20 TABLET | Refills: 0 | Status: SHIPPED | OUTPATIENT
Start: 2023-05-24 | End: 2024-03-11

## 2023-05-24 RX ORDER — ASPIRIN 81 MG/1
81 TABLET ORAL DAILY
Qty: 28 TABLET | Refills: 0 | Status: SHIPPED | OUTPATIENT
Start: 2023-05-26 | End: 2023-06-23

## 2023-05-24 RX ORDER — CEFAZOLIN SODIUM 2 G/50ML
2 SOLUTION INTRAVENOUS
Status: CANCELLED | OUTPATIENT
Start: 2023-05-24

## 2023-05-24 NOTE — PROGRESS NOTES
PREOPERATIVE APPOINTMENT    History 5/24/2023:  Mani returns here today for follow-up evaluation of his left knee and to complete preoperative paperwork.  He continues to have pain and functional limitations despite conservative treatment.  Surgical intervention has been recommended and he is scheduled to undergo a left knee arthroscopy with medial menisectomy and chondroplasty on May 25, 2023.  He was medically cleared for surgery by his internist on May 16, 2023.  He received cardiac clearance on May 18, 2023.    History 5/15/2023:  72 y.o. Male with a history of left knee pain who is referred today by Abdi Stanford PA-C. 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 He used to be a runner. He reports popping and states that his knee has started to give out on him. He has had Euflexxa injections in the past. He reports swelling and medial sided knee pain. He also has a history of arthroscopic surgery on the right knee.    He states that surgery helped him tremendously for his right knee.    History 5/12/2023:  Mani returns here today for follow-up evaluation of his left knee.  He has had increased pain and functional limitations since his last visit.  An MRI was ordered and he is here today to discuss the results.  He reports his symptoms are exacerbated with any rotational movement.    History 4/12/2023:  Mani returns here today for follow-up evaluation of both knees.  He has a history of bilateral knee osteoarthritis (right greater than left) and recently completed the Euflexxa series.  He continues to have some intermittent discomfort along the medial aspect of his left knee, particularly after physical activity, but has noticed an improvement in his pain and function following the injections.  He was recently able to complete the [place] Classic without significant discomfort or difficulty.  His current symptoms are tolerable and do not inhibit him in any way.    History  2/22/2023:  Mani returns here today for the 3rd Euflexxa injection into both knees.    History 2/15/2023:  Mani returns here today for the 2nd Euflexxa injection into both knees.    History 2/9/2023:  Mani returns here today for the 1st Euflexxa injection into both knees.    History 01/25/2023:  72 y.o. Male with a history of left knee pain who is a former patient of Dr. Devendra Esquivel.  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  He enjoys walking, traveling, and watching his granddaughter play soccer at [facility].  He complains today of having progressive medial-sided knee pain with occasional catching sensations over the past month and a half.  He denies having anyone specific injury but attributes his symptoms to exercise.  He states that he did a lot of squats and lunges one day and then went for a jog.  He began having some discomfort later that evening and his symptoms have persisted ever since.  He states the pain seems to be worse at night when sleeping.  He has a throbbing, aching sensation throughout the knee but particularly located along the medial aspect of the knee.  He has had knee pain in the past and has done well with viscosupplementation.  His last Euflexxa injections were given to him in 2019.  He previously underwent a right knee arthroscopy with medial meniscectomy and chondroplasty done by Dr. Esquivel in 2011.  He states his right knee occasionally gives him some discomfort but his left knee is worse today.      - mechanical symptoms, - instability    Is affecting ADLs.  Pain is 4/10 at it's worst.        PAST MEDICAL HISTORY    Past Medical History:   Diagnosis Date    Allergy     Arthritis 2019    Colon polyps     Disorder of kidney and ureter 2019    Hyperlipemia     Hypertension     Posterior capsular opacification        PAST SURGICAL HISTORY     Past Surgical History:   Procedure Laterality Date    CATARACT EXTRACTION      both eyes    COLONOSCOPY N/A  09/27/2016    Procedure: COLONOSCOPY;  Surgeon: Jan Argueta MD;  Location: [facility] ([address] [place] [address]);  Service: Endoscopy;  Laterality: N/A;    COLONOSCOPY N/A 05/19/2022    Procedure: COLONOSCOPY;  Surgeon: Bobo Delcid MD;  Location: [facility] ([address] [place] [address]);  Service: Endoscopy;  Laterality: N/A;  4/4 fully vaccinated; instructions to [facility]    EYE SURGERY      HERNIA REPAIR      WISDOM TOOTH EXTRACTION      YAG      Left Eye       FAMILY HISTORY    Family History   Problem Relation Age of Onset    Hypertension Father     Cataracts Father     No Known Problems Maternal Uncle     Prostate cancer Paternal Uncle         prostate    Amblyopia Neg Hx     Blindness Neg Hx     Glaucoma Neg Hx     Macular degeneration Neg Hx     Retinal detachment Neg Hx     Strabismus Neg Hx        SOCIAL HISTORY    Social History     Socioeconomic History    Marital status:      Spouse name: Ginger    Number of children: 4   Occupational History     Employer: [employer]   Tobacco Use    Smoking status: Never    Smokeless tobacco: Never   Substance and Sexual Activity    Alcohol use: Yes     Alcohol/week: 6.0 standard drinks     Types: 4 Glasses of wine, 1 Shots of liquor, 1 Standard drinks or equivalent per week     Comment: socially- been a while since he drank    Drug use: Never    Sexual activity: Yes     Partners: Female     Birth control/protection: None     Social Determinants of Health     Financial Resource Strain: Low Risk     Difficulty of Paying Living Expenses: Not hard at all   Food Insecurity: No Food Insecurity    Worried About Running Out of Food in the Last Year: Never true    Ran Out of Food in the Last Year: Never true   Transportation Needs: No Transportation Needs    Lack of Transportation (Medical): No    Lack of Transportation (Non-Medical): No   Physical Activity: Sufficiently Active    Days of Exercise per Week: 5 days    Minutes of Exercise per Session: 50 min   Stress: No Stress Concern Present     Feeling of Stress : Not at all   Social Connections: Unknown    Frequency of Communication with Friends and Family: More than three times a week    Frequency of Social Gatherings with Friends and Family: More than three times a week    Active Member of Clubs or Organizations: Yes    Attends Club or Organization Meetings: More than 4 times per year    Marital Status:    Housing Stability: Low Risk     Unable to Pay for Housing in the Last Year: No    Number of Places Lived in the Last Year: 1    Unstable Housing in the Last Year: No       MEDICATIONS      Current Outpatient Medications:     albuterol (PROVENTIL/VENTOLIN HFA) 90 mcg/actuation inhaler, Inhale 2 puffs into the lungs every 6 (six) hours as needed (cough)., Disp: 8.5 g, Rfl: 2    ascorbic acid (VITAMIN C) 1000 MG tablet, Take 1,000 mg by mouth once daily., Disp: , Rfl:     aspirin 81 mg Tab, Take by mouth. 1 Tablet Oral Every day.  Last taken 3/25/11, Disp: , Rfl:     atorvastatin (LIPITOR) 40 MG tablet, Take 1 tablet (40 mg total) by mouth once daily., Disp: 90 tablet, Rfl: 1    azelastine (ASTELIN) 137 mcg (0.1 %) nasal spray, INSTILL 2 SPRAYS IN EACH NOSTRIL TWICE A DAY, Disp: 30 mL, Rfl: 2    ciclopirox (PENLAC) 8 % Soln, Apply topically nightly., Disp: 6.6 mL, Rfl: 11    fexofenadine HCl (ALLEGRA ORAL), Take by mouth., Disp: , Rfl:     fish oil-omega-3 fatty acids 300-1,000 mg capsule, Take 2 g by mouth once daily., Disp: , Rfl:     GAVILYTE-G 236-22.74-6.74 -5.86 gram suspension, Take 4,000 mLs by mouth once., Disp: , Rfl:     hydroCHLOROthiazide (MICROZIDE) 12.5 mg capsule, Take 1 capsule (12.5 mg total) by mouth once daily., Disp: 90 capsule, Rfl: 3    ketoconazole (NIZORAL) 2 % cream, Apply topically once daily. Apply daily to affected toenails for 6-12 months, Disp: 60 g, Rfl: 4    losartan (COZAAR) 100 MG tablet, Take 0.5 tablets (50 mg total) by mouth once daily., Disp: 90 tablet, Rfl: 3    multivitamin capsule, Take 1 capsule by  "mouth once daily., Disp: , Rfl:     verapamiL (VERELAN) 120 MG C24P, Take 1 capsule (120 mg total) by mouth once daily., Disp: 30 capsule, Rfl: 11    fluticasone propionate (FLONASE) 50 mcg/actuation nasal spray, USE 1 SPRAY BY EACH NARE ROUTE ONCE DAILY. (Patient not taking: Reported on 5/18/2023), Disp: 48 mL, Rfl: 5    omeprazole (PRILOSEC) 20 MG capsule, Take 1 capsule (20 mg total) by mouth 2 (two) times daily., Disp: 180 capsule, Rfl: 1    ALLERGIES     Review of patient's allergies indicates:  No Known Allergies      REVIEW OF SYSTEMS   Constitution: Negative. Negative for chills, fever and night sweats.   HENT: Negative for congestion and headaches.    Eyes: Negative for blurred vision, left vision loss and right vision loss.   Cardiovascular: Negative for chest pain and syncope.   Respiratory: Negative for cough and shortness of breath.    Endocrine: Negative for polydipsia, polyphagia and polyuria.   Hematologic/Lymphatic: Negative for bleeding problem. Does not bruise/bleed easily.   Skin: Negative for dry skin, itching and rash.   Musculoskeletal: Negative for falls. Positive for left knee pain  Gastrointestinal: Negative for abdominal pain and bowel incontinence.   Genitourinary: Negative for bladder incontinence and nocturia.   Neurological: Negative for disturbances in coordination, loss of balance and seizures.   Psychiatric/Behavioral: Negative for depression. The patient does not have insomnia.    Allergic/Immunologic: Negative for hives and persistent infections.     PHYSICAL EXAMINATION    Vitals: /69   Pulse (!) 47   Ht 6' 1" (1.854 m)   Wt 114.8 kg (253 lb)   BMI 33.38 kg/m²     General: The patient appears active and healthy with no apparent physical problems.  No disturbance of mood or affect is demonstrated. Alert and Oriented.    HEENT: Eyes normal, pupils equally round, nose normal.    Resp: Equal and symmetrical chest rises. No wheezing    CV: Regular rate    Neck: Supple; " nonpainful range of motion.    Extremities: no cyanosis, clubbing, edema, or diffuse swelling.  Palpable pulses, good capillary refill of the digits.  No coolness, discoloration, edema or obvious varicosities.    Skin: no lesions noted.    Lymphatic: no detected adenopathy in the upper or lower extremities.    Neurologic: normal mental status, normal reflexes, normal gait and balance.  Patient is alert and oriented to person, place and time.  No flaccidity or spasticity is noted.  No motor or sensory deficits are noted.  Light touch is intact    Orthopaedic:     KNEE EXAM - LEFT    Inspection:   Normal skin color and appearance with no scars, no ecchymosis and 1+ effusion.      ROM:   0° - 135°.      Palpation:   There is no tenderness along medial plica, medial collateral ligament, pes bursa, lateral joint line, iliotibial band, lateral collateral ligament, popliteal fossa, patellar tendon, or quadriceps tendon. Tender medial joint line    Stability: - anterior drawer, - Lachman, - pivot shift and - posterior drawer.      No instability with varus or valgus stress at 0° or 30°. Negative dial  test at 30° and 90°.    Tests:   + Lyrics test.  - patellar compression - grind test, + patellofemoral crepitus.  There is no patellar apprehension.     - J Sign. - Sola's. - patellar tilt. - Tim. Lateral patella translation  2 quadrants. Medial patella translation 2 quadrants    Motor:   Quadriceps strength is 5/5 and hamstrings strength is 5/5. Hamstrings show no tightness.      Neuro:   Distal neurovascular status is normal    Vascular: Negative Homans and no palpable popliteal cords. 2+ pedal pulse with brisk cap refill    Gait Normal      IMAGING    Holter monitor - 48 hour  Addendum: · Sinus rhythm with very frequent dimorphic PVCs (13.9% burden) and runs  of nonsustained VT mostly 4-5 beats in duration   · Frequent PACs (1.5% burden) with 6 episodes of nonsustained atrial  tachycardia lasting up to 8 beats in  duration.  Narrative: · Sinus rhythm with very frequent dimorphic PVCs (13.9% burden) and runs   of nonsustained VT up to 12 beats in duration.  · Frequent PACs (1.5% burden) with 6 episodes of nonsustained atrial   tachycardia lasting up to 8 beats in duration.           IMPRESSION       ICD-10-CM ICD-9-CM   1. Acute medial meniscus tear of left knee, initial encounter  S83.242A 836.0   2. Chondromalacia of patellofemoral joint, left  M22.42 717.7         MEDICATIONS PRESCRIBED      Aspirin 81 mg  Hydrocodone 7.5/325 mg    RECOMMENDATIONS     Surgical versus non-surgical options discussed today; left knee arthroscopy with medial menisectomy and chondroplasty  The patient elected to proceed with operative intervention after all risks, benefits, and alternatives were discussed with the patient.  The risks of surgery include bleeding, scar formation, injuries to nerves, arteries and veins, need for additional surgeries, blood clots, infections and the risk of anesthesia. I personally obtained informed consent from the patient and documented a full history and physical  Physical therapy ordered - [facility]  He was medically cleared on 5/16/23 and cleared from cardiology on 5/18/23      All questions were answered, pt will contact us for questions or concerns in the interim.

## 2023-05-24 NOTE — H&P
H&P    History 5/24/2023:  Mani returns here today for follow-up evaluation of his left knee and to complete preoperative paperwork.  He continues to have pain and functional limitations despite conservative treatment.  Surgical intervention has been recommended and he is scheduled to undergo a left knee arthroscopy with medial menisectomy and chondroplasty on May 25, 2023.  He was medically cleared for surgery by his internist on May 16, 2023.  He received cardiac clearance on May 18, 2023.    History 5/15/2023:  72 y.o. Male with a history of left knee pain who is referred today by Abdi Stanford PA-C. 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 He used to be a runner. He reports popping and states that his knee has started to give out on him. He has had Euflexxa injections in the past. He reports swelling and medial sided knee pain. He also has a history of arthroscopic surgery on the right knee.    He states that surgery helped him tremendously for his right knee.    History 5/12/2023:  Mani returns here today for follow-up evaluation of his left knee.  He has had increased pain and functional limitations since his last visit.  An MRI was ordered and he is here today to discuss the results.  He reports his symptoms are exacerbated with any rotational movement.    History 4/12/2023:  Mani returns here today for follow-up evaluation of both knees.  He has a history of bilateral knee osteoarthritis (right greater than left) and recently completed the Euflexxa series.  He continues to have some intermittent discomfort along the medial aspect of his left knee, particularly after physical activity, but has noticed an improvement in his pain and function following the injections.  He was recently able to complete the [employer] without significant discomfort or difficulty.  His current symptoms are tolerable and do not inhibit him in any way.    History 2/22/2023:  Mani  returns here today for the 3rd Euflexxa injection into both knees.    History 2/15/2023:  Mani returns here today for the 2nd Euflexxa injection into both knees.    History 2/9/2023:  Mani returns here today for the 1st Euflexxa injection into both knees.    History 01/25/2023:  72 y.o. Male with a history of left knee pain who is a former patient of Dr. Devendra Esquivel.  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  He enjoys walking, traveling, and watching his granddaughter play soccer at [facility].  He complains today of having progressive medial-sided knee pain with occasional catching sensations over the past month and a half.  He denies having anyone specific injury but attributes his symptoms to exercise.  He states that he did a lot of squats and lunges one day and then went for a jog.  He began having some discomfort later that evening and his symptoms have persisted ever since.  He states the pain seems to be worse at night when sleeping.  He has a throbbing, aching sensation throughout the knee but particularly located along the medial aspect of the knee.  He has had knee pain in the past and has done well with viscosupplementation.  His last Euflexxa injections were given to him in 2019.  He previously underwent a right knee arthroscopy with medial meniscectomy and chondroplasty done by Dr. Esquivel in 2011.  He states his right knee occasionally gives him some discomfort but his left knee is worse today.      - mechanical symptoms, - instability    Is affecting ADLs.  Pain is 4/10 at it's worst.        PAST MEDICAL HISTORY    Past Medical History:   Diagnosis Date    Allergy     Arthritis 2019    Colon polyps     Disorder of kidney and ureter 2019    Hyperlipemia     Hypertension     Posterior capsular opacification        PAST SURGICAL HISTORY     Past Surgical History:   Procedure Laterality Date    CATARACT EXTRACTION      both eyes    COLONOSCOPY N/A 09/27/2016     Procedure: COLONOSCOPY;  Surgeon: Jan Argueta MD;  Location: [facility] ([address] [place] [address]);  Service: Endoscopy;  Laterality: N/A;    COLONOSCOPY N/A 05/19/2022    Procedure: COLONOSCOPY;  Surgeon: Bobo Delcid MD;  Location: [facility] ([address] [place] [address]);  Service: Endoscopy;  Laterality: N/A;  4/4 fully vaccinated; instructions to [facility]    EYE SURGERY      HERNIA REPAIR      WISDOM TOOTH EXTRACTION      YAG      Left Eye       FAMILY HISTORY    Family History   Problem Relation Age of Onset    Hypertension Father     Cataracts Father     No Known Problems Maternal Uncle     Prostate cancer Paternal Uncle         prostate    Amblyopia Neg Hx     Blindness Neg Hx     Glaucoma Neg Hx     Macular degeneration Neg Hx     Retinal detachment Neg Hx     Strabismus Neg Hx        SOCIAL HISTORY    Social History     Socioeconomic History    Marital status:      Spouse name: Ginger    Number of children: 4   Occupational History     Employer: [employer]   Tobacco Use    Smoking status: Never    Smokeless tobacco: Never   Substance and Sexual Activity    Alcohol use: Yes     Alcohol/week: 6.0 standard drinks     Types: 4 Glasses of wine, 1 Shots of liquor, 1 Standard drinks or equivalent per week     Comment: socially- been a while since he drank    Drug use: Never    Sexual activity: Yes     Partners: Female     Birth control/protection: None     Social Determinants of Health     Financial Resource Strain: Low Risk     Difficulty of Paying Living Expenses: Not hard at all   Food Insecurity: No Food Insecurity    Worried About Running Out of Food in the Last Year: Never true    Ran Out of Food in the Last Year: Never true   Transportation Needs: No Transportation Needs    Lack of Transportation (Medical): No    Lack of Transportation (Non-Medical): No   Physical Activity: Sufficiently Active    Days of Exercise per Week: 5 days    Minutes of Exercise per Session: 50 min   Stress: No Stress Concern Present    Feeling of  Stress : Not at all   Social Connections: Unknown    Frequency of Communication with Friends and Family: More than three times a week    Frequency of Social Gatherings with Friends and Family: More than three times a week    Active Member of Clubs or Organizations: Yes    Attends Club or Organization Meetings: More than 4 times per year    Marital Status:    Housing Stability: Low Risk     Unable to Pay for Housing in the Last Year: No    Number of Places Lived in the Last Year: 1    Unstable Housing in the Last Year: No       MEDICATIONS      Current Outpatient Medications:     albuterol (PROVENTIL/VENTOLIN HFA) 90 mcg/actuation inhaler, Inhale 2 puffs into the lungs every 6 (six) hours as needed (cough)., Disp: 8.5 g, Rfl: 2    ascorbic acid (VITAMIN C) 1000 MG tablet, Take 1,000 mg by mouth once daily., Disp: , Rfl:     aspirin 81 mg Tab, Take by mouth. 1 Tablet Oral Every day.  Last taken 3/25/11, Disp: , Rfl:     atorvastatin (LIPITOR) 40 MG tablet, Take 1 tablet (40 mg total) by mouth once daily., Disp: 90 tablet, Rfl: 1    azelastine (ASTELIN) 137 mcg (0.1 %) nasal spray, INSTILL 2 SPRAYS IN EACH NOSTRIL TWICE A DAY, Disp: 30 mL, Rfl: 2    ciclopirox (PENLAC) 8 % Soln, Apply topically nightly., Disp: 6.6 mL, Rfl: 11    fexofenadine HCl (ALLEGRA ORAL), Take by mouth., Disp: , Rfl:     fish oil-omega-3 fatty acids 300-1,000 mg capsule, Take 2 g by mouth once daily., Disp: , Rfl:     GAVILYTE-G 236-22.74-6.74 -5.86 gram suspension, Take 4,000 mLs by mouth once., Disp: , Rfl:     hydroCHLOROthiazide (MICROZIDE) 12.5 mg capsule, Take 1 capsule (12.5 mg total) by mouth once daily., Disp: 90 capsule, Rfl: 3    ketoconazole (NIZORAL) 2 % cream, Apply topically once daily. Apply daily to affected toenails for 6-12 months, Disp: 60 g, Rfl: 4    losartan (COZAAR) 100 MG tablet, Take 0.5 tablets (50 mg total) by mouth once daily., Disp: 90 tablet, Rfl: 3    multivitamin capsule, Take 1 capsule by mouth once  "daily., Disp: , Rfl:     verapamiL (VERELAN) 120 MG C24P, Take 1 capsule (120 mg total) by mouth once daily., Disp: 30 capsule, Rfl: 11    fluticasone propionate (FLONASE) 50 mcg/actuation nasal spray, USE 1 SPRAY BY EACH NARE ROUTE ONCE DAILY. (Patient not taking: Reported on 5/18/2023), Disp: 48 mL, Rfl: 5    omeprazole (PRILOSEC) 20 MG capsule, Take 1 capsule (20 mg total) by mouth 2 (two) times daily., Disp: 180 capsule, Rfl: 1    ALLERGIES     Review of patient's allergies indicates:  No Known Allergies      REVIEW OF SYSTEMS   Constitution: Negative. Negative for chills, fever and night sweats.   HENT: Negative for congestion and headaches.    Eyes: Negative for blurred vision, left vision loss and right vision loss.   Cardiovascular: Negative for chest pain and syncope.   Respiratory: Negative for cough and shortness of breath.    Endocrine: Negative for polydipsia, polyphagia and polyuria.   Hematologic/Lymphatic: Negative for bleeding problem. Does not bruise/bleed easily.   Skin: Negative for dry skin, itching and rash.   Musculoskeletal: Negative for falls. Positive for left knee pain  Gastrointestinal: Negative for abdominal pain and bowel incontinence.   Genitourinary: Negative for bladder incontinence and nocturia.   Neurological: Negative for disturbances in coordination, loss of balance and seizures.   Psychiatric/Behavioral: Negative for depression. The patient does not have insomnia.    Allergic/Immunologic: Negative for hives and persistent infections.     PHYSICAL EXAMINATION    Vitals: /69   Pulse (!) 47   Ht 6' 1" (1.854 m)   Wt 114.8 kg (253 lb)   BMI 33.38 kg/m²     General: The patient appears active and healthy with no apparent physical problems.  No disturbance of mood or affect is demonstrated. Alert and Oriented.    HEENT: Eyes normal, pupils equally round, nose normal.    Resp: Equal and symmetrical chest rises. No wheezing    CV: Regular rate    Neck: Supple; nonpainful range " of motion.    Extremities: no cyanosis, clubbing, edema, or diffuse swelling.  Palpable pulses, good capillary refill of the digits.  No coolness, discoloration, edema or obvious varicosities.    Skin: no lesions noted.    Lymphatic: no detected adenopathy in the upper or lower extremities.    Neurologic: normal mental status, normal reflexes, normal gait and balance.  Patient is alert and oriented to person, place and time.  No flaccidity or spasticity is noted.  No motor or sensory deficits are noted.  Light touch is intact    Orthopaedic:     KNEE EXAM - LEFT    Inspection:   Normal skin color and appearance with no scars, no ecchymosis and 1+ effusion.      ROM:   0° - 135°.      Palpation:   There is no tenderness along medial plica, medial collateral ligament, pes bursa, lateral joint line, iliotibial band, lateral collateral ligament, popliteal fossa, patellar tendon, or quadriceps tendon. Tender medial joint line    Stability: - anterior drawer, - Lachman, - pivot shift and - posterior drawer.      No instability with varus or valgus stress at 0° or 30°. Negative dial  test at 30° and 90°.    Tests:   + Lyrics test.  - patellar compression - grind test, + patellofemoral crepitus.  There is no patellar apprehension.     - J Sign. - Sola's. - patellar tilt. - Tim. Lateral patella translation  2 quadrants. Medial patella translation 2 quadrants    Motor:   Quadriceps strength is 5/5 and hamstrings strength is 5/5. Hamstrings show no tightness.      Neuro:   Distal neurovascular status is normal    Vascular: Negative Homans and no palpable popliteal cords. 2+ pedal pulse with brisk cap refill    Gait Normal      IMAGING    Holter monitor - 48 hour  Addendum: · Sinus rhythm with very frequent dimorphic PVCs (13.9% burden) and runs  of nonsustained VT mostly 4-5 beats in duration   · Frequent PACs (1.5% burden) with 6 episodes of nonsustained atrial  tachycardia lasting up to 8 beats in duration.  Narrative:  · Sinus rhythm with very frequent dimorphic PVCs (13.9% burden) and runs   of nonsustained VT up to 12 beats in duration.  · Frequent PACs (1.5% burden) with 6 episodes of nonsustained atrial   tachycardia lasting up to 8 beats in duration.           IMPRESSION       ICD-10-CM ICD-9-CM   1. Acute medial meniscus tear of left knee, initial encounter  S83.242A 836.0   2. Chondromalacia of patellofemoral joint, left  M22.42 717.7         MEDICATIONS PRESCRIBED      Aspirin 81 mg  Hydrocodone 7.5/325 mg    RECOMMENDATIONS     Surgical versus non-surgical options discussed today; left knee arthroscopy with medial menisectomy and chondroplasty  The patient elected to proceed with operative intervention after all risks, benefits, and alternatives were discussed with the patient.  The risks of surgery include bleeding, scar formation, injuries to nerves, arteries and veins, need for additional surgeries, blood clots, infections and the risk of anesthesia. I personally obtained informed consent from the patient and documented a full history and physical  Physical therapy ordered - [facility]  He was medically cleared on 5/16/23 and cleared from cardiology on 5/18/23      All questions were answered, pt will contact us for questions or concerns in the interim.

## 2023-05-24 NOTE — H&P (VIEW-ONLY)
H&P    History 5/24/2023:  Mani returns here today for follow-up evaluation of his left knee and to complete preoperative paperwork.  He continues to have pain and functional limitations despite conservative treatment.  Surgical intervention has been recommended and he is scheduled to undergo a left knee arthroscopy with medial menisectomy and chondroplasty on May 25, 2023.  He was medically cleared for surgery by his internist on May 16, 2023.  He received cardiac clearance on May 18, 2023.    History 5/15/2023:  72 y.o. Male with a history of left knee pain who is referred today by Abdi Stanford PA-C. 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 He used to be a runner. He reports popping and states that his knee has started to give out on him. He has had Euflexxa injections in the past. He reports swelling and medial sided knee pain. He also has a history of arthroscopic surgery on the right knee.    He states that surgery helped him tremendously for his right knee.    History 5/12/2023:  Mani returns here today for follow-up evaluation of his left knee.  He has had increased pain and functional limitations since his last visit.  An MRI was ordered and he is here today to discuss the results.  He reports his symptoms are exacerbated with any rotational movement.    History 4/12/2023:  Mani returns here today for follow-up evaluation of both knees.  He has a history of bilateral knee osteoarthritis (right greater than left) and recently completed the Euflexxa series.  He continues to have some intermittent discomfort along the medial aspect of his left knee, particularly after physical activity, but has noticed an improvement in his pain and function following the injections.  He was recently able to complete the [place] without significant discomfort or difficulty.  His current symptoms are tolerable and do not inhibit him in any way.    History 2/22/2023:  Mani  returns here today for the 3rd Euflexxa injection into both knees.    History 2/15/2023:  Mani returns here today for the 2nd Euflexxa injection into both knees.    History 2/9/2023:  Mani returns here today for the 1st Euflexxa injection into both knees.    History 01/25/2023:  72 y.o. Male with a history of left knee pain who is a former patient of Dr. Devendra Esquivel.  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  He enjoys walking, traveling, and watching his granddaughter play soccer at [facility].  He complains today of having progressive medial-sided knee pain with occasional catching sensations over the past month and a half.  He denies having anyone specific injury but attributes his symptoms to exercise.  He states that he did a lot of squats and lunges one day and then went for a jog.  He began having some discomfort later that evening and his symptoms have persisted ever since.  He states the pain seems to be worse at night when sleeping.  He has a throbbing, aching sensation throughout the knee but particularly located along the medial aspect of the knee.  He has had knee pain in the past and has done well with viscosupplementation.  His last Euflexxa injections were given to him in 2019.  He previously underwent a right knee arthroscopy with medial meniscectomy and chondroplasty done by Dr. Esquivel in 2011.  He states his right knee occasionally gives him some discomfort but his left knee is worse today.      - mechanical symptoms, - instability    Is affecting ADLs.  Pain is 4/10 at it's worst.        PAST MEDICAL HISTORY    Past Medical History:   Diagnosis Date    Allergy     Arthritis 2019    Colon polyps     Disorder of kidney and ureter 2019    Hyperlipemia     Hypertension     Posterior capsular opacification        PAST SURGICAL HISTORY     Past Surgical History:   Procedure Laterality Date    CATARACT EXTRACTION      both eyes    COLONOSCOPY N/A 09/27/2016     Procedure: COLONOSCOPY;  Surgeon: Jan Argueta MD;  Location: [facility] ([address] [place] [address]);  Service: Endoscopy;  Laterality: N/A;    COLONOSCOPY N/A 05/19/2022    Procedure: COLONOSCOPY;  Surgeon: Bobo Delcid MD;  Location: [facility] ([address] [place] [address]);  Service: Endoscopy;  Laterality: N/A;  4/4 fully vaccinated; instructions to [place]    EYE SURGERY      HERNIA REPAIR      WISDOM TOOTH EXTRACTION      YAG      Left Eye       FAMILY HISTORY    Family History   Problem Relation Age of Onset    Hypertension Father     Cataracts Father     No Known Problems Maternal Uncle     Prostate cancer Paternal Uncle         prostate    Amblyopia Neg Hx     Blindness Neg Hx     Glaucoma Neg Hx     Macular degeneration Neg Hx     Retinal detachment Neg Hx     Strabismus Neg Hx        SOCIAL HISTORY    Social History     Socioeconomic History    Marital status:      Spouse name: Ginger    Number of children: 4   Occupational History     Employer: [employer]   Tobacco Use    Smoking status: Never    Smokeless tobacco: Never   Substance and Sexual Activity    Alcohol use: Yes     Alcohol/week: 6.0 standard drinks     Types: 4 Glasses of wine, 1 Shots of liquor, 1 Standard drinks or equivalent per week     Comment: socially- been a while since he drank    Drug use: Never    Sexual activity: Yes     Partners: Female     Birth control/protection: None     Social Determinants of Health     Financial Resource Strain: Low Risk     Difficulty of Paying Living Expenses: Not hard at all   Food Insecurity: No Food Insecurity    Worried About Running Out of Food in the Last Year: Never true    Ran Out of Food in the Last Year: Never true   Transportation Needs: No Transportation Needs    Lack of Transportation (Medical): No    Lack of Transportation (Non-Medical): No   Physical Activity: Sufficiently Active    Days of Exercise per Week: 5 days    Minutes of Exercise per Session: 50 min   Stress: No Stress Concern Present    Feeling of  Stress : Not at all   Social Connections: Unknown    Frequency of Communication with Friends and Family: More than three times a week    Frequency of Social Gatherings with Friends and Family: More than three times a week    Active Member of Clubs or Organizations: Yes    Attends Club or Organization Meetings: More than 4 times per year    Marital Status:    Housing Stability: Low Risk     Unable to Pay for Housing in the Last Year: No    Number of Places Lived in the Last Year: 1    Unstable Housing in the Last Year: No       MEDICATIONS      Current Outpatient Medications:     albuterol (PROVENTIL/VENTOLIN HFA) 90 mcg/actuation inhaler, Inhale 2 puffs into the lungs every 6 (six) hours as needed (cough)., Disp: 8.5 g, Rfl: 2    ascorbic acid (VITAMIN C) 1000 MG tablet, Take 1,000 mg by mouth once daily., Disp: , Rfl:     aspirin 81 mg Tab, Take by mouth. 1 Tablet Oral Every day.  Last taken 3/25/11, Disp: , Rfl:     atorvastatin (LIPITOR) 40 MG tablet, Take 1 tablet (40 mg total) by mouth once daily., Disp: 90 tablet, Rfl: 1    azelastine (ASTELIN) 137 mcg (0.1 %) nasal spray, INSTILL 2 SPRAYS IN EACH NOSTRIL TWICE A DAY, Disp: 30 mL, Rfl: 2    ciclopirox (PENLAC) 8 % Soln, Apply topically nightly., Disp: 6.6 mL, Rfl: 11    fexofenadine HCl (ALLEGRA ORAL), Take by mouth., Disp: , Rfl:     fish oil-omega-3 fatty acids 300-1,000 mg capsule, Take 2 g by mouth once daily., Disp: , Rfl:     GAVILYTE-G 236-22.74-6.74 -5.86 gram suspension, Take 4,000 mLs by mouth once., Disp: , Rfl:     hydroCHLOROthiazide (MICROZIDE) 12.5 mg capsule, Take 1 capsule (12.5 mg total) by mouth once daily., Disp: 90 capsule, Rfl: 3    ketoconazole (NIZORAL) 2 % cream, Apply topically once daily. Apply daily to affected toenails for 6-12 months, Disp: 60 g, Rfl: 4    losartan (COZAAR) 100 MG tablet, Take 0.5 tablets (50 mg total) by mouth once daily., Disp: 90 tablet, Rfl: 3    multivitamin capsule, Take 1 capsule by mouth once  "daily., Disp: , Rfl:     verapamiL (VERELAN) 120 MG C24P, Take 1 capsule (120 mg total) by mouth once daily., Disp: 30 capsule, Rfl: 11    fluticasone propionate (FLONASE) 50 mcg/actuation nasal spray, USE 1 SPRAY BY EACH NARE ROUTE ONCE DAILY. (Patient not taking: Reported on 5/18/2023), Disp: 48 mL, Rfl: 5    omeprazole (PRILOSEC) 20 MG capsule, Take 1 capsule (20 mg total) by mouth 2 (two) times daily., Disp: 180 capsule, Rfl: 1    ALLERGIES     Review of patient's allergies indicates:  No Known Allergies      REVIEW OF SYSTEMS   Constitution: Negative. Negative for chills, fever and night sweats.   HENT: Negative for congestion and headaches.    Eyes: Negative for blurred vision, left vision loss and right vision loss.   Cardiovascular: Negative for chest pain and syncope.   Respiratory: Negative for cough and shortness of breath.    Endocrine: Negative for polydipsia, polyphagia and polyuria.   Hematologic/Lymphatic: Negative for bleeding problem. Does not bruise/bleed easily.   Skin: Negative for dry skin, itching and rash.   Musculoskeletal: Negative for falls. Positive for left knee pain  Gastrointestinal: Negative for abdominal pain and bowel incontinence.   Genitourinary: Negative for bladder incontinence and nocturia.   Neurological: Negative for disturbances in coordination, loss of balance and seizures.   Psychiatric/Behavioral: Negative for depression. The patient does not have insomnia.    Allergic/Immunologic: Negative for hives and persistent infections.     PHYSICAL EXAMINATION    Vitals: /69   Pulse (!) 47   Ht 6' 1" (1.854 m)   Wt 114.8 kg (253 lb)   BMI 33.38 kg/m²     General: The patient appears active and healthy with no apparent physical problems.  No disturbance of mood or affect is demonstrated. Alert and Oriented.    HEENT: Eyes normal, pupils equally round, nose normal.    Resp: Equal and symmetrical chest rises. No wheezing    CV: Regular rate    Neck: Supple; nonpainful range " of motion.    Extremities: no cyanosis, clubbing, edema, or diffuse swelling.  Palpable pulses, good capillary refill of the digits.  No coolness, discoloration, edema or obvious varicosities.    Skin: no lesions noted.    Lymphatic: no detected adenopathy in the upper or lower extremities.    Neurologic: normal mental status, normal reflexes, normal gait and balance.  Patient is alert and oriented to person, place and time.  No flaccidity or spasticity is noted.  No motor or sensory deficits are noted.  Light touch is intact    Orthopaedic:     KNEE EXAM - LEFT    Inspection:   Normal skin color and appearance with no scars, no ecchymosis and 1+ effusion.      ROM:   0° - 135°.      Palpation:   There is no tenderness along medial plica, medial collateral ligament, pes bursa, lateral joint line, iliotibial band, lateral collateral ligament, popliteal fossa, patellar tendon, or quadriceps tendon. Tender medial joint line    Stability: - anterior drawer, - Lachman, - pivot shift and - posterior drawer.      No instability with varus or valgus stress at 0° or 30°. Negative dial  test at 30° and 90°.    Tests:   + Lyrics test.  - patellar compression - grind test, + patellofemoral crepitus.  There is no patellar apprehension.     - J Sign. - Sola's. - patellar tilt. - Tim. Lateral patella translation  2 quadrants. Medial patella translation 2 quadrants    Motor:   Quadriceps strength is 5/5 and hamstrings strength is 5/5. Hamstrings show no tightness.      Neuro:   Distal neurovascular status is normal    Vascular: Negative Homans and no palpable popliteal cords. 2+ pedal pulse with brisk cap refill    Gait Normal      IMAGING    Holter monitor - 48 hour  Addendum: · Sinus rhythm with very frequent dimorphic PVCs (13.9% burden) and runs  of nonsustained VT mostly 4-5 beats in duration   · Frequent PACs (1.5% burden) with 6 episodes of nonsustained atrial  tachycardia lasting up to 8 beats in duration.  Narrative:  · Sinus rhythm with very frequent dimorphic PVCs (13.9% burden) and runs   of nonsustained VT up to 12 beats in duration.  · Frequent PACs (1.5% burden) with 6 episodes of nonsustained atrial   tachycardia lasting up to 8 beats in duration.           IMPRESSION       ICD-10-CM ICD-9-CM   1. Acute medial meniscus tear of left knee, initial encounter  S83.242A 836.0   2. Chondromalacia of patellofemoral joint, left  M22.42 717.7         MEDICATIONS PRESCRIBED      Aspirin 81 mg  Hydrocodone 7.5/325 mg    RECOMMENDATIONS     Surgical versus non-surgical options discussed today; left knee arthroscopy with medial menisectomy and chondroplasty  The patient elected to proceed with operative intervention after all risks, benefits, and alternatives were discussed with the patient.  The risks of surgery include bleeding, scar formation, injuries to nerves, arteries and veins, need for additional surgeries, blood clots, infections and the risk of anesthesia. I personally obtained informed consent from the patient and documented a full history and physical  Physical therapy ordered - [facility]  He was medically cleared on 5/16/23 and cleared from cardiology on 5/18/23      All questions were answered, pt will contact us for questions or concerns in the interim.

## 2023-05-25 ENCOUNTER — ANESTHESIA EVENT (OUTPATIENT)
Dept: SURGERY | Facility: HOSPITAL | Age: 73
End: 2023-05-25
Payer: MEDICARE

## 2023-05-25 ENCOUNTER — ANESTHESIA (OUTPATIENT)
Dept: SURGERY | Facility: HOSPITAL | Age: 73
End: 2023-05-25
Payer: MEDICARE

## 2023-05-25 ENCOUNTER — HOSPITAL ENCOUNTER (OUTPATIENT)
Facility: HOSPITAL | Age: 73
Discharge: HOME | End: 2023-05-25
Attending: ORTHOPAEDIC SURGERY | Admitting: ORTHOPAEDIC SURGERY
Payer: MEDICARE

## 2023-05-25 VITALS
DIASTOLIC BLOOD PRESSURE: 90 MMHG | HEIGHT: 73 IN | RESPIRATION RATE: 12 BRPM | TEMPERATURE: 98 F | BODY MASS INDEX: 33.53 KG/M2 | SYSTOLIC BLOOD PRESSURE: 141 MMHG | HEART RATE: 65 BPM | OXYGEN SATURATION: 95 % | WEIGHT: 253 LBS

## 2023-05-25 DIAGNOSIS — M22.42 CHONDROMALACIA OF PATELLOFEMORAL JOINT, LEFT: ICD-10-CM

## 2023-05-25 DIAGNOSIS — S83.262A PERIPHERAL TEAR OF LATERAL MENISCUS OF LEFT KNEE AS CURRENT INJURY, INITIAL ENCOUNTER: ICD-10-CM

## 2023-05-25 DIAGNOSIS — M94.262 CHONDROMALACIA OF LEFT KNEE: ICD-10-CM

## 2023-05-25 DIAGNOSIS — S83.242A ACUTE MEDIAL MENISCUS TEAR OF LEFT KNEE, INITIAL ENCOUNTER: Primary | ICD-10-CM

## 2023-05-25 DIAGNOSIS — I49.9 ARRHYTHMIA: ICD-10-CM

## 2023-05-25 PROCEDURE — 36000710: Performed by: ORTHOPAEDIC SURGERY

## 2023-05-25 PROCEDURE — 99900035 HC TECH TIME PER 15 MIN (STAT)

## 2023-05-25 PROCEDURE — 63600175 PHARM REV CODE 636 W HCPCS: Performed by: PHYSICIAN ASSISTANT

## 2023-05-25 PROCEDURE — 25000003 PHARM REV CODE 250: Performed by: NURSE ANESTHETIST, CERTIFIED REGISTERED

## 2023-05-25 PROCEDURE — 29880 ARTHRS KNE SRG MNISECTMY M&L: CPT | Mod: LT,,, | Performed by: ORTHOPAEDIC SURGERY

## 2023-05-25 PROCEDURE — 71000015 HC POSTOP RECOV 1ST HR: Performed by: ORTHOPAEDIC SURGERY

## 2023-05-25 PROCEDURE — 27201423 OPTIME MED/SURG SUP & DEVICES STERILE SUPPLY: Performed by: ORTHOPAEDIC SURGERY

## 2023-05-25 PROCEDURE — 94761 N-INVAS EAR/PLS OXIMETRY MLT: CPT

## 2023-05-25 PROCEDURE — 27200651 HC AIRWAY, LMA: Performed by: ANESTHESIOLOGY

## 2023-05-25 PROCEDURE — 93010 EKG 12-LEAD: ICD-10-PCS | Mod: ,,, | Performed by: INTERNAL MEDICINE

## 2023-05-25 PROCEDURE — 25000003 PHARM REV CODE 250: Performed by: PHYSICIAN ASSISTANT

## 2023-05-25 PROCEDURE — 93010 ELECTROCARDIOGRAM REPORT: CPT | Mod: ,,, | Performed by: INTERNAL MEDICINE

## 2023-05-25 PROCEDURE — 36000711: Performed by: ORTHOPAEDIC SURGERY

## 2023-05-25 PROCEDURE — 63600175 PHARM REV CODE 636 W HCPCS: Performed by: NURSE ANESTHETIST, CERTIFIED REGISTERED

## 2023-05-25 PROCEDURE — D9220A PRA ANESTHESIA: Mod: CRNA,,, | Performed by: NURSE ANESTHETIST, CERTIFIED REGISTERED

## 2023-05-25 PROCEDURE — 25000003 PHARM REV CODE 250: Performed by: ORTHOPAEDIC SURGERY

## 2023-05-25 PROCEDURE — 29880 ARTHRS KNE SRG MNISECTMY M&L: CPT | Mod: AS,LT,, | Performed by: PHYSICIAN ASSISTANT

## 2023-05-25 PROCEDURE — 37000009 HC ANESTHESIA EA ADD 15 MINS: Performed by: ORTHOPAEDIC SURGERY

## 2023-05-25 PROCEDURE — 29880 PR KNEE SCOPE MED/LAT MENISCECTOMY: ICD-10-PCS | Mod: LT,,, | Performed by: ORTHOPAEDIC SURGERY

## 2023-05-25 PROCEDURE — 93005 ELECTROCARDIOGRAM TRACING: CPT

## 2023-05-25 PROCEDURE — 63600175 PHARM REV CODE 636 W HCPCS: Performed by: ORTHOPAEDIC SURGERY

## 2023-05-25 PROCEDURE — 25000003 PHARM REV CODE 250: Performed by: ANESTHESIOLOGY

## 2023-05-25 PROCEDURE — D9220A PRA ANESTHESIA: Mod: ANES,,, | Performed by: ANESTHESIOLOGY

## 2023-05-25 PROCEDURE — D9220A PRA ANESTHESIA: ICD-10-PCS | Mod: CRNA,,, | Performed by: NURSE ANESTHETIST, CERTIFIED REGISTERED

## 2023-05-25 PROCEDURE — 71000033 HC RECOVERY, INTIAL HOUR: Performed by: ORTHOPAEDIC SURGERY

## 2023-05-25 PROCEDURE — 37000008 HC ANESTHESIA 1ST 15 MINUTES: Performed by: ORTHOPAEDIC SURGERY

## 2023-05-25 PROCEDURE — D9220A PRA ANESTHESIA: ICD-10-PCS | Mod: ANES,,, | Performed by: ANESTHESIOLOGY

## 2023-05-25 PROCEDURE — 29880 PR KNEE SCOPE MED/LAT MENISCECTOMY: ICD-10-PCS | Mod: AS,LT,, | Performed by: PHYSICIAN ASSISTANT

## 2023-05-25 RX ORDER — OXYCODONE HYDROCHLORIDE 5 MG/1
5 TABLET ORAL
Status: DISCONTINUED | OUTPATIENT
Start: 2023-05-25 | End: 2023-05-25 | Stop reason: HOSPADM

## 2023-05-25 RX ORDER — ONDANSETRON 2 MG/ML
4 INJECTION INTRAMUSCULAR; INTRAVENOUS DAILY PRN
Status: DISCONTINUED | OUTPATIENT
Start: 2023-05-25 | End: 2023-05-25 | Stop reason: HOSPADM

## 2023-05-25 RX ORDER — BUPIVACAINE HYDROCHLORIDE AND EPINEPHRINE 5; 5 MG/ML; UG/ML
INJECTION, SOLUTION EPIDURAL; INTRACAUDAL; PERINEURAL
Status: DISCONTINUED | OUTPATIENT
Start: 2023-05-25 | End: 2023-05-25 | Stop reason: HOSPADM

## 2023-05-25 RX ORDER — MUPIROCIN 20 MG/G
OINTMENT TOPICAL
Status: DISCONTINUED | OUTPATIENT
Start: 2023-05-25 | End: 2023-05-25 | Stop reason: HOSPADM

## 2023-05-25 RX ORDER — HALOPERIDOL 5 MG/ML
0.5 INJECTION INTRAMUSCULAR EVERY 10 MIN PRN
Status: DISCONTINUED | OUTPATIENT
Start: 2023-05-25 | End: 2023-05-25 | Stop reason: HOSPADM

## 2023-05-25 RX ORDER — EPINEPHRINE 1 MG/ML
INJECTION, SOLUTION, CONCENTRATE INTRAVENOUS
Status: DISCONTINUED | OUTPATIENT
Start: 2023-05-25 | End: 2023-05-25 | Stop reason: HOSPADM

## 2023-05-25 RX ORDER — ACETAMINOPHEN 500 MG
1000 TABLET ORAL ONCE
Status: COMPLETED | OUTPATIENT
Start: 2023-05-25 | End: 2023-05-25

## 2023-05-25 RX ORDER — FAMOTIDINE 10 MG/ML
INJECTION INTRAVENOUS
Status: DISCONTINUED | OUTPATIENT
Start: 2023-05-25 | End: 2023-05-25

## 2023-05-25 RX ORDER — FENTANYL CITRATE 50 UG/ML
INJECTION, SOLUTION INTRAMUSCULAR; INTRAVENOUS
Status: DISCONTINUED | OUTPATIENT
Start: 2023-05-25 | End: 2023-05-25

## 2023-05-25 RX ORDER — BACITRACIN ZINC 500 UNIT/G
OINTMENT (GRAM) TOPICAL
Status: DISCONTINUED | OUTPATIENT
Start: 2023-05-25 | End: 2023-05-25 | Stop reason: HOSPADM

## 2023-05-25 RX ORDER — DEXAMETHASONE SODIUM PHOSPHATE 4 MG/ML
INJECTION, SOLUTION INTRA-ARTICULAR; INTRALESIONAL; INTRAMUSCULAR; INTRAVENOUS; SOFT TISSUE
Status: DISCONTINUED | OUTPATIENT
Start: 2023-05-25 | End: 2023-05-25

## 2023-05-25 RX ORDER — PROPOFOL 10 MG/ML
VIAL (ML) INTRAVENOUS
Status: DISCONTINUED | OUTPATIENT
Start: 2023-05-25 | End: 2023-05-25

## 2023-05-25 RX ORDER — LIDOCAINE HYDROCHLORIDE 20 MG/ML
INJECTION INTRAVENOUS
Status: DISCONTINUED | OUTPATIENT
Start: 2023-05-25 | End: 2023-05-25

## 2023-05-25 RX ORDER — MIDAZOLAM HYDROCHLORIDE 1 MG/ML
INJECTION, SOLUTION INTRAMUSCULAR; INTRAVENOUS
Status: DISCONTINUED | OUTPATIENT
Start: 2023-05-25 | End: 2023-05-25

## 2023-05-25 RX ORDER — ONDANSETRON 2 MG/ML
INJECTION INTRAMUSCULAR; INTRAVENOUS
Status: DISCONTINUED | OUTPATIENT
Start: 2023-05-25 | End: 2023-05-25

## 2023-05-25 RX ORDER — FENTANYL CITRATE 50 UG/ML
25 INJECTION, SOLUTION INTRAMUSCULAR; INTRAVENOUS EVERY 5 MIN PRN
Status: DISCONTINUED | OUTPATIENT
Start: 2023-05-25 | End: 2023-05-25 | Stop reason: HOSPADM

## 2023-05-25 RX ADMIN — MIDAZOLAM HYDROCHLORIDE 2 MG: 1 INJECTION, SOLUTION INTRAMUSCULAR; INTRAVENOUS at 12:05

## 2023-05-25 RX ADMIN — FENTANYL CITRATE 50 MCG: 50 INJECTION, SOLUTION INTRAMUSCULAR; INTRAVENOUS at 12:05

## 2023-05-25 RX ADMIN — CEFAZOLIN 2 G: 2 INJECTION, POWDER, FOR SOLUTION INTRAMUSCULAR; INTRAVENOUS at 12:05

## 2023-05-25 RX ADMIN — ONDANSETRON 4 MG: 2 INJECTION, SOLUTION INTRAMUSCULAR; INTRAVENOUS at 12:05

## 2023-05-25 RX ADMIN — SODIUM CHLORIDE: 9 INJECTION, SOLUTION INTRAVENOUS at 12:05

## 2023-05-25 RX ADMIN — LIDOCAINE HYDROCHLORIDE 100 MG: 20 INJECTION INTRAVENOUS at 12:05

## 2023-05-25 RX ADMIN — ACETAMINOPHEN 1000 MG: 500 TABLET ORAL at 11:05

## 2023-05-25 RX ADMIN — MUPIROCIN: 20 OINTMENT TOPICAL at 10:05

## 2023-05-25 RX ADMIN — FAMOTIDINE 20 MG: 10 INJECTION, SOLUTION INTRAVENOUS at 12:05

## 2023-05-25 RX ADMIN — PROPOFOL 200 MG: 10 INJECTION, EMULSION INTRAVENOUS at 12:05

## 2023-05-25 RX ADMIN — DEXAMETHASONE SODIUM PHOSPHATE 8 MG: 4 INJECTION, SOLUTION INTRAMUSCULAR; INTRAVENOUS at 12:05

## 2023-05-25 RX ADMIN — FENTANYL CITRATE 50 MCG: 50 INJECTION, SOLUTION INTRAMUSCULAR; INTRAVENOUS at 01:05

## 2023-05-25 NOTE — ANESTHESIA POSTPROCEDURE EVALUATION
Anesthesia Post Evaluation    Patient: Julio Bernardo    Procedure(s) Performed: Procedure(s) (LRB):  ARTHROSCOPY, KNEE, WITH MENISCECTOMY (Left)  ARTHROSCOPY, KNEE, WITH CHONDROPLASTY (Left)    Final Anesthesia Type: general      Patient location during evaluation: PACU  Patient participation: Yes- Able to Participate  Level of consciousness: awake and alert and oriented  Post-procedure vital signs: reviewed and stable  Pain management: adequate  Airway patency: patent    PONV status at discharge: No PONV  Anesthetic complications: no      Cardiovascular status: hemodynamically stable  Respiratory status: unassisted, spontaneous ventilation and room air  Hydration status: euvolemic  Follow-up not needed.          Vitals Value Taken Time   /70 05/25/23 1403   Temp 36.4 °C (97.6 °F) 05/25/23 1333   Pulse 68 05/25/23 1415   Resp 17 05/25/23 1415   SpO2 95 % 05/25/23 1415   Vitals shown include unvalidated device data.      Event Time   Out of Recovery 14:01:00         Pain/Blanco Score: Pain Rating Prior to Med Admin: 0 (5/25/2023 11:01 AM)

## 2023-05-25 NOTE — PLAN OF CARE
Anesthesia updated on pt EKG rhythm, noted frequent PVCs on monitor. Respiratory at bedside EKG done.

## 2023-05-25 NOTE — OP NOTE
[facility])  Surgery Department  Operative Note    SUMMARY     Date of Procedure: 5/25/2023     Pre-Operative Diagnosis:   Left Knee Tear, Medial meniscus, acute S83.249A  Left Knee Tear, Lateral meniscus, old M23.202  Left Knee Chondromalacia, (excludes patella) M94.29  Left Knee Chondromalacia, patella M22.40  Left Knee Synovitis M65.9    Post-Operative Diagnosis:   Left Knee Tear, Medial meniscus, acute S83.249A  Left Knee Tear, Lateral meniscus, old M23.202  Left Knee Chondromalacia, (excludes patella) M94.29  Left Knee Chondromalacia, patella M22.40  Left Knee Synovitis M65.9    Procedure:  1. Left Knee Arthroscopy, with meniscectomy (medial And lateral) 69603  2.  Left Knee Arthroscopy, debridement/shaving of articular cartilage (chondroplasty) 96577  3.  Left Knee Arthroscopy, knee, synovectomy, limited 27314    Surgeon(s) and Role:     * Brayan Payne MD - Primary    Assistant:   Abdi Stanford PA-C    No resident or fellow was available throughout the entire procedure as a result it was medically necessary for Abdi Stanford PA-C to perform first assist duties.      Anesthesia: General    Complications: None    Tourniquet: None    Implants: * No implants in log *    Arthroscopic Findings:   Patellofemoral Compartment  Medial Patella Cartilage:  Grade 2 changes  Central Patella Cartilage:  Grade 2 changes  Lateral Patella Cartilage:  Grade 2  Trochlea Cartilage:  Focal areas of grade 3 changes  Plica:  None  Medial Gutter: Clear of loose bodies or debris  Lateral Gutter:Clear of loose bodies or debris  Synovitis throughout the suprapatellar pouch    Ligaments  ACL:Intact  PCL:Intact    Medial Compartment:  Femoral Cartilage:  Grade 2  Tibial Cartilage:  Areas of grade 2 and grade 3 changes  Meniscus:  Degenerative meniscal tear with what appeared to be an acute component with the radial split in the posterior horn of the medial meniscus.    Lateral Compartment:  Femoral Cartilage:   Grade 4 focal lesion on the lateral aspect of the lateral femoral condyle  Tibial Cartilage:Intact  Meniscus:  Degenerative tear in the posterior horn of the lateral meniscus involving the white-white zone with some fraying extending along the white-white zone in the midportion.    Exam Under Anesthesia:  Moderate effusion    Indication for Procedure: 72 y.o. Male with a history of left knee pain who is referred today by Abdi Stanford PA-C. 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 He used to be a runner. He reports popping and states that his knee has started to give out on him. He has had Euflexxa injections in the past. He reports swelling and medial sided knee pain. He also has a history of arthroscopic surgery on the right knee.    He states that surgery helped him tremendously for his right knee.    Surgery in Detail:  The patient was marked identified in the holding room.  He was brought back to the operating room and placed in the supine position.  After general endotracheal anesthesia was administered the left lower extremity was prepped and draped in usual sterile fashion for a knee arthroscopy. The contralateral leg was secured and well padded. Preoperative antibiotics were given within 1 hour the procedure.  A time-out was taken prior starting. We used 0.25% Marcaine and epinephrine for local periportal anesthetic.  We created an anterior lateral portal and under direct visualization an anterior medial portal was created.    Arthroscopic biters were introduced into the medial compartment debridement was done of the posterior horn of the medial meniscus extending to the midbody.  A vertical border was created.  Vapor was then used to stabilize the edges.  Allison were also used to perform a chondroplasty of the medial femoral condyle to remove any loose chondral debris.  We then moved to the lateral compartment and arthroscopic biters were used to debride the  posterior horn of the lateral meniscus the white-white zone.  This was taken to a vertical border.  We then extended the debridement midbody in the white-white zone lateral meniscus.  Had a large focal chondral defect in the lateral femoral condyle.  This was debrided to stable edges with our shaver.  Arthrocare Wand was used to stabilize the edges of the chondral defect.  We then moved to the patellofemoral compartment and a synovectomy was completed in the patellofemoral compartment.  Allison were then used to remove any loose chondral debris from the patella.  An area of a focal chondral defect along the lateral aspect of the trochlea.    The arthroscopes were removed from the joint. The patient was dressed with Adaptic, bacitracin, 4x4s, Webril and an Ace wrap from the toes up to the proximal thigh.  The patient was extubated and taken recovery in satisfactory condition.      Post-Op Plan:  Knee arthroscopy protocol    Attestation: I was present and scrubbed for the entire procedure.

## 2023-05-25 NOTE — PLAN OF CARE
VSS.  Patient tolerating oral liquids without difficulty.   No apparent s&s of distress noted at this time, no complaints voiced at this time.   Discharge instructions reviewed with patient and spouse with good verbal feedback received.   Post op medications delivered to bedside, DME crutches given, accutherm intact.  Patient ready for discharge.

## 2023-05-25 NOTE — PATIENT INSTRUCTIONS
POST-OPERATIVE DISCHARGE INSTRUCTIONS    Diet: Ice chips, clear liquids, and then diet as tolerated.  Drink plenty of liquids after surgery.  Ice the area at least three times a day (20 minutes per session) if possible.    Elevate the extremity above the level of the heart to help reduce swelling.  Progress weight-bearing and range of motion as tolerated.  Pain medication can be taken every four to six hours as needed.  It is helpful to take pain medication prior to physical therapy. If pain is not controlled, please take 800 mg ibuprofen every 8 hours as needed unless contraindicated (NSAID allergy, kidney disease, heart disease, currently taking blood thinners, etc.).  Any activity that requires precise thinking or accuracy should be avoided for a minimum of 72 hours after surgery and while on narcotic pain medication.  This includes operating machinery and/or driving a vehicle.  All sutures will be removed approximately 10-14 days from the time of surgery. Leave steri-strips (skin tapes) in place until sutures are removed.  If skin glue is used instead of stitches, do not apply any ointments or solutions to the incision.  Keep the incision dry.  The skin glue will peel off in 3-4 weeks.  Dressing change directions, unless otherwise instructed:     ?  Knee scope Change dressing on the first post-op day.  Use gauze for the first 3 days, then start using waterproof Band-Aids over the incision sites.       All casts, splints, braces, slings, crutches, abduction pillows, etc. are to be worn as instructed.  Wilver Hose or Sequential Compression Devices are often used following surgery to help with swelling and prevent blood clots.  They can be removed for 2-3 hours daily as needed.  If the hose becomes uncomfortable after a few days, switch to an Ace Wrap if desired.  Keep the incision dry for 10-14 days.  A waterproof dressing ([facility] or [facility]) can be used to shower.  No bath, pool, or hot tub until instructed.  You  should notify our office if you notice any of the following:  A temperature greater than 101 F  Severe or increasing pain  Excessive drainage or redness of the incision  Calf swelling and pain  Chest pain or shortness of breath

## 2023-05-25 NOTE — BRIEF OP NOTE
[facility]  Brief Operative Note    Surgery Date: 5/25/2023     Surgeon(s) and Role:     * Brayan Payne MD - Primary    Assisting Provider:     * Abdi Stanford PA-C - First Assist    No resident or fellow was available throughout the entire procedure as a result it was medically necessary for Abdi Stanford PA-C to perform first assistant duties.    Pre-Operative Diagnosis: Acute medial meniscus tear of left knee, initial encounter [S83.242A]    Post-Operative Diagnosis: Post-Op Diagnosis Codes:     * Acute medial meniscus tear of left knee, initial encounter [S83.242A]    Procedure(s) (LRB):  ARTHROSCOPY, KNEE, WITH MENISCECTOMY (Left)  ARTHROSCOPY, KNEE, WITH CHONDROPLASTY (Left)    Anesthesia: General    Operative Findings: See Op note.    Estimated Blood Loss: * No values recorded between 5/25/2023  1:04 PM and 5/25/2023  1:30 PM *         Specimens:   Specimen (24h ago, onward)      None              Discharge Note    OUTCOME: Patient tolerated treatment/procedure well without complication and is now ready for discharge.    DISPOSITION: Home or Self Care    FINAL DIAGNOSIS:  Post-Op Diagnosis Codes:     * Acute medial meniscus tear of left knee, initial encounter [S83.242A]    FOLLOWUP: In clinic    DISCHARGE INSTRUCTIONS: See attached.

## 2023-05-25 NOTE — ANESTHESIA PREPROCEDURE EVALUATION
05/25/2023  Julio Bernardo is a 72 y.o., male.    Procedure Summary    Case: [id number] Date/Time: 05/25/23 1414   Procedure: ARTHROSCOPY, KNEE, WITH MENISCECTOMY (Left) - NO REGIONAL BLOCK   Anesthesia type: General   Diagnosis: Acute medial meniscus tear of left knee, initial encounter [S83.242A]     Patient Active Problem List   Diagnosis    After-cataract, obscuring vision - Left Eye    Vitreous detachment - Both Eyes    Idiopathic hypertension    Astigmatism - Both Eyes    Post-operative state - Left Eye    Hyperlipemia    Hypertension    ED (erectile dysfunction)    Colon polyp    Decreased taste and smell    Right posterior capsular opacification    Pseudophakia    CKD (chronic kidney disease) stage 3, GFR 30-59 ml/min    Chronic gout    Primary osteoarthritis of both knees    Decreased range of motion of neck    Segmental dysfunction of cervical region    Abnormal posture    Neck stiffness    Chronic cough    Cavitary lesion of lung    Elevated hemidiaphragm    SUSANNAH (obstructive sleep apnea)    Mucous retention cyst of maxillary sinus    Chronic nasal congestion    Atherosclerosis of aorta     Past Surgical History:   Procedure Laterality Date    CATARACT EXTRACTION      both eyes    COLONOSCOPY N/A 09/27/2016    Procedure: COLONOSCOPY;  Surgeon: Jan Argueta MD;  Location: [address]);  Service: Endoscopy;  Laterality: N/A;    COLONOSCOPY N/A 05/19/2022    Procedure: COLONOSCOPY;  Surgeon: Bobo Delcid MD;  Location: [address]);  Service: Endoscopy;  Laterality: N/A;  4/4 fully vaccinated; instructions to portal-    EYE SURGERY      HERNIA REPAIR      KNEE ARTHROSCOPY Right 01/01/2014    WISDOM TOOTH EXTRACTION      YAG      Left Eye     Current Discharge Medication List      CONTINUE these medications which have NOT CHANGED    Details    atorvastatin (LIPITOR) 40 MG tablet Take 1 tablet (40 mg total) by mouth once daily.  Qty: 90 tablet, Refills: 1      azelastine (ASTELIN) 137 mcg (0.1 %) nasal spray INSTILL 2 SPRAYS IN EACH NOSTRIL TWICE A DAY  Qty: 30 mL, Refills: 2      fexofenadine HCl (ALLEGRA ORAL) Take by mouth.      hydroCHLOROthiazide (MICROZIDE) 12.5 mg capsule Take 1 capsule (12.5 mg total) by mouth once daily.  Qty: 90 capsule, Refills: 3    Comments: .      losartan (COZAAR) 100 MG tablet Take 0.5 tablets (50 mg total) by mouth once daily.  Qty: 90 tablet, Refills: 3    Comments: .  Associated Diagnoses: PVC's (premature ventricular contractions)      omeprazole (PRILOSEC) 20 MG capsule Take 1 capsule (20 mg total) by mouth 2 (two) times daily.  Qty: 180 capsule, Refills: 1    Associated Diagnoses: Chronic cough      verapamiL (VERELAN) 120 MG C24P Take 1 capsule (120 mg total) by mouth once daily.  Qty: 30 capsule, Refills: 11    Comments: .  Associated Diagnoses: PVC's (premature ventricular contractions)      albuterol (PROVENTIL/VENTOLIN HFA) 90 mcg/actuation inhaler Inhale 2 puffs into the lungs every 6 (six) hours as needed (cough).  Qty: 8.5 g, Refills: 2      ascorbic acid (VITAMIN C) 1000 MG tablet Take 1,000 mg by mouth once daily.      aspirin (ECOTRIN) 81 MG EC tablet Take 1 tablet (81 mg total) by mouth once daily.  Qty: 28 tablet, Refills: 0    Associated Diagnoses: Acute medial meniscus tear of left knee, initial encounter; Chondromalacia of patellofemoral joint, left      aspirin 81 mg Tab Take by mouth. 1 Tablet Oral Every day.  Last taken 3/25/11      ciclopirox (PENLAC) 8 % Soln Apply topically nightly.  Qty: 6.6 mL, Refills: 11      fish oil-omega-3 fatty acids 300-1,000 mg capsule Take 2 g by mouth once daily.      fluticasone propionate (FLONASE) 50 mcg/actuation nasal spray USE 1 SPRAY BY EACH NARE ROUTE ONCE DAILY.  Qty: 48 mL, Refills: 5      GAVILYTE-G 236-22.74-6.74 -5.86 gram suspension Take 4,000 mLs by  mouth once.      HYDROcodone-acetaminophen (NORCO) 7.5-325 mg per tablet Take 1 tablet by mouth every 6 (six) hours as needed for Pain.  Qty: 20 tablet, Refills: 0    Comments: Quantity prescribed more than 7 day supply? No  Associated Diagnoses: Acute medial meniscus tear of left knee, initial encounter; Chondromalacia of patellofemoral joint, left      ketoconazole (NIZORAL) 2 % cream Apply topically once daily. Apply daily to affected toenails for 6-12 months  Qty: 60 g, Refills: 4      multivitamin capsule Take 1 capsule by mouth once daily.             Pre-op Assessment    I have reviewed the Patient Summary Reports.     I have reviewed the Nursing Notes. I have reviewed the NPO Status.   I have reviewed the Medications.     Review of Systems  Anesthesia Hx:  No problems with previous Anesthesia  History of prior surgery of interest to airway management or planning: Previous anesthesia: MAC  5/19/2022  colonoscopy with MAC.  Procedure performed at an [facility]. Denies Family Hx of Anesthesia complications.   Denies Personal Hx of Anesthesia complications.   Social:  Non-Smoker, Social Alcohol Use    Hematology/Oncology:  Hematology Normal   Oncology Normal     EENT/Dental:   chronic allergic rhinitis Allergy, Cataract Extractions both eyes, wisdom tooth extraction, Left eye surgery, After-cataract, obscuring vision - Left Eye,  Vitreous detachment - Both Eyes,  Astigmatism - Both Eyes,  Right posterior capsular opacification,  Pseudophakia     Cardiovascular:   Exercise tolerance: good Hypertension Denies CAD.   Dysrhythmias  hyperlipidemia  Denies QUINTEROS. ECG has been reviewed. Atherosclerosis of aorta seen on CT,  Palpitations, frequent and dimorphic PVC's with runs of VT up to 12 beats duration and PAC's with non-sustained runs of Atrial Tachycardia with 8 beats duration per Holter Monitor   Pulmonary:   Denies Shortness of breath. Sleep Apnea Chronic cough,  Cavitary lesion of lung,  Elevated  hemidiaphragm noted on PET scan,  Multiple right-sided subcentimeter pulmonary nodules   Education provided regarding risk of obstructive sleep apnea     Renal/:   Chronic Renal Disease, CKD Stage 3 CKD, GFR = 58 on 3/27/2023, Creat = 1.3   Hepatic/GI:   GERD, well controlled Denies Liver Disease. Colon polyps, H/O Hernia Repair   Musculoskeletal:   Arthritis  Acute medial meniscus tear of left knee,   Chondromalacia of patellofemoral joint, left,  Primary osteoarthritis bilateral knees, Chronic gout Spine Disorders: cervical Chronic Pain    Neurological:  Neurology Normal  Denies CVA.  Denies Headaches. Denies Seizures.    Endocrine:   Denies Diabetes. Denies Hypothyroidism.  Obesity / BMI > 30  Psych:  Psychiatric Normal           Physical Exam    Airway:  Mallampati: II / II  Mouth Opening: Normal  Tongue: Normal  Neck ROM: Normal ROM    Dental:  Intact      Past Medical History:   Diagnosis Date    Allergy     Arthritis 2019    Colon polyps     Disorder of kidney and ureter 2019    Hyperlipemia     Hypertension     Posterior capsular opacification      Past Surgical History:   Procedure Laterality Date    CATARACT EXTRACTION      both eyes    COLONOSCOPY N/A 09/27/2016    Procedure: COLONOSCOPY;  Surgeon: Jan Argueta MD;  Location: [address]);  Service: Endoscopy;  Laterality: N/A;    COLONOSCOPY N/A 05/19/2022    Procedure: COLONOSCOPY;  Surgeon: Bobo Delcid MD;  Location: [address]);  Service: Endoscopy;  Laterality: N/A;  4/4 fully vaccinated; instructions to portal-st    EYE SURGERY      HERNIA REPAIR      KNEE ARTHROSCOPY Right 01/01/2014    WISDOM TOOTH EXTRACTION      YAG      Left Eye       Anesthesia Assessment: Preoperative EQUATION    Planned Procedure: Procedure(s) (LRB):  ARTHROSCOPY, KNEE, WITH MENISCECTOMY (Left)  Requested Anesthesia Type:General  Surgeon: Brayan Payne MD  Service: Orthopedics  Known or anticipated Date of Surgery:5/25/2023    Surgeon  notes: reviewed    Electronic QUestionnaire Assessment completed via nurse interview with patient.        Triage considerations:     The patient has no apparent active cardiac condition (No unstable coronary Syndrome such as severe unstable angina or recent [<1 month] myocardial infarction, decompensated CHF, severe valvular   disease or significant arrhythmia)    Previous anesthesia records:MAC and No problems    Last PCP note: within 1 month , [facility]   Subspecialty notes: Cardiology: EP    5/18/2023 Seen in EP, progress note reviewed. PET stress test was ordered but does not need to be done prior to surgery. Medications adjusted.     Seen by PCP on 5/16/23, referral to cardiology noted.    Other important co-morbidities: GERD, HLD, HTN, Obesity, and SUSANNAH       EKG 5/18/2023:  Vent. Rate : 075 BPM     Atrial Rate : 075 BPM      P-R Int : 150 ms          QRS Dur : 090 ms       QT Int : 390 ms       P-R-T Axes : 063 020 031 degrees      QTc Int : 435 ms   Normal sinus rhythm   Possible Inferior infarct ,age undetermined   Nonspecific ST and/or T wave abnormalities   Abnormal ECG   When compared with ECG of 03-APR-2023 11:44,   Premature ventricular complexes are no longer Present   Confirmed by Jefferson Bower MD ([id number]) on 5/18/2023 2:58:28 PM         Echo 4/3/2023:   Summary   The left ventricle is normal in size with normal systolic function.   The estimated ejection fraction is 60%.   Indeterminate left ventricular diastolic function.   Normal right ventricular size with normal right ventricular systolic function.   Normal central venous pressure (3 mmHg).   The estimated PA systolic pressure is 38 mmHg.   Frequent PVCs noted throughout study.              48 Hour Holter 4/18/2023:  Conclusion   Sinus rhythm with very frequent dimorphic PVCs (13.9% burden) and runs of nonsustained VT up to 12 beats in duration.   Frequent PACs (1.5% burden) with 6 episodes of nonsustained atrial tachycardia lasting  up to 8 beats in duration.      Exercise Stress Echo 9/11/2017:  EKG Conclusions:     1. The EKG portion of this study is negative for ischemia at a moderate workload, and peak heart rate of 162 bpm (107% of predicted).   2. Exercise capacity is average.   3. Blood pressure response to exercise was normal (Presenting BP: 151/92 Peak BP: 216/76).   4. The following arrhythmias were present: occasional couplets.   5. The patient reported dyspnea during the protocol which resolved in recovery.   6. The Duke treadmill score was 8 suggesting a low probability for future cardiovascular events.     CONCLUSIONS     1 - Normal left ventricular systolic function (EF 60-65%).     2 - No wall motion abnormalities.     3 - Indeterminate LV diastolic function.     4 - Normal right ventricular systolic function .     5 - Biatrial enlargement.   No evidence of stress induced myocardial ischemia.       Instructions given. (See in Nurse's note)      Ht: 6'1  Wt: 255 lb  BMI: 33.77  Vaccinated      Anesthesia Plan  Type of Anesthesia, risks & benefits discussed:    Anesthesia Type: Gen Supraglottic Airway  Intra-op Monitoring Plan: Standard ASA Monitors  Post Op Pain Control Plan: multimodal analgesia and IV/PO Opioids PRN  Airway Plan: Direct  Informed Consent: Informed consent signed with the Patient and all parties understand the risks and agree with anesthesia plan.  All questions answered.   ASA Score: 2  Anesthesia Plan Notes: Patient has PVCs today not new. Please refer to cardiology note from 5/18/23 for cardiac clearance. Patient is at low risk.    Ready For Surgery From Anesthesia Perspective.       .

## 2023-05-25 NOTE — OPERATIVE NOTE ADDENDUM
Certification of Assistant at Surgery       Surgery Date: 5/25/2023     Participating Surgeons:  Surgeon(s) and Role:     * Brayan Payne MD - Primary    Assistant:   Abdi Stanford PA-C    No resident or fellow was available throughout the entire procedure as a result it was medically necessary for Abdi Stanford PA-C to perform first assist duties.      Procedures:  Procedure(s) (LRB):  ARTHROSCOPY, KNEE, WITH MENISCECTOMY (Left)  ARTHROSCOPY, KNEE, WITH CHONDROPLASTY (Left)    Assistant Surgeon's Certification of Necessity:  I understand that section 1842 (b) (6) (d) of the Social Security Act generally prohibits Medicare Part B reasonable charge payment for the services of assistants at surgery in teaching hospitals when qualified residents are available to furnish such services. I certify that the services for which payment is claimed were medically necessary, and that no qualified resident was available to perform the services. I further understand that these services are subject to post-payment review by the Medicare carrier.      Abdi Stanford PA-C    05/25/2023  1:47 PM

## 2023-05-25 NOTE — ANESTHESIA PROCEDURE NOTES
Intubation    Date/Time: 5/25/2023 12:50 PM  Performed by: Tabatha Weston CRNA  Authorized by: Melva Solomon MD     Intubation:     Induction:  Intravenous    Intubated:  Postinduction    Mask Ventilation:  Easy mask    Attempts:  1    Attempted By:  CRNA    Difficult Airway Encountered?: No      Complications:  None    Airway Device:  Supraglottic airway/LMA    Airway Device Size:  4.5    Style/Cuff Inflation:  Cuffed (inflated to minimal occlusive pressure)    Secured at:  The lips    Placement Verified By:  Capnometry    Complicating Factors:  None    Findings Post-Intubation:  BS equal bilateral and atraumatic/condition of teeth unchanged

## 2023-05-25 NOTE — TRANSFER OF CARE
"Anesthesia Transfer of Care Note    Patient: Julio Bernardo    Procedure(s) Performed: Procedure(s) (LRB):  ARTHROSCOPY, KNEE, WITH MENISCECTOMY (Left)  ARTHROSCOPY, KNEE, WITH CHONDROPLASTY (Left)    Patient location: PACU    Anesthesia Type: general    Transport from OR: Transported from OR on 100% O2 by closed face mask with adequate spontaneous ventilation    Post pain: adequate analgesia    Post assessment: no apparent anesthetic complications and tolerated procedure well    Post vital signs: stable    Level of consciousness: sedated and responds to stimulation    Nausea/Vomiting: no nausea/vomiting    Complications: none    Transfer of care protocol was followed      Last vitals:   Visit Vitals  BP (!) 141/90 (BP Location: Left arm, Patient Position: Lying)   Pulse 65   Temp 36.4 °C (97.6 °F) (Temporal)   Resp 12   Ht 6' 1" (1.854 m)   Wt 114.8 kg (253 lb)   SpO2 95%   BMI 33.38 kg/m²     "

## 2023-05-28 NOTE — TELEPHONE ENCOUNTER
No care due was identified.  [facility] Embedded Care Due Messages. Reference number: [id number].   5/28/2023 7:26:35 AM CDT

## 2023-05-29 RX ORDER — FLUTICASONE PROPIONATE 50 MCG
SPRAY, SUSPENSION (ML) NASAL
Qty: 32 ML | Refills: 3 | Status: SHIPPED | OUTPATIENT
Start: 2023-05-29

## 2023-05-29 NOTE — TELEPHONE ENCOUNTER
Refill Decision Note      Refill Decision Note   Julio Bernardo  is requesting a refill authorization.  Brief Assessment and Rationale for Refill:  Approve     Medication Therapy Plan:         Comments:     Note composed:6:00 AM 05/29/2023             Appointments     Last Visit   5/16/2023 Ryan Loera Jr., MD   Next Visit   Visit date not found Ryan Loera Jr., MD

## 2023-05-31 RX ORDER — HYDROCHLOROTHIAZIDE 12.5 MG/1
12.5 CAPSULE ORAL DAILY
Qty: 90 CAPSULE | Refills: 3 | Status: SHIPPED | OUTPATIENT
Start: 2023-05-31 | End: 2024-03-11 | Stop reason: ALTCHOICE

## 2023-05-31 NOTE — TELEPHONE ENCOUNTER
No care due was identified.  [facility] Embedded Care Due Messages. Reference number: [id number].   5/31/2023 11:37:14 AM CDT

## 2023-06-05 RX ORDER — AZELASTINE 1 MG/ML
SPRAY, METERED NASAL
Qty: 30 ML | Refills: 2 | Status: SHIPPED | OUTPATIENT
Start: 2023-06-05

## 2023-06-08 ENCOUNTER — TELEPHONE (OUTPATIENT)
Dept: SPORTS MEDICINE | Facility: CLINIC | Age: 73
End: 2023-06-08
Payer: MEDICARE

## 2023-06-08 NOTE — TELEPHONE ENCOUNTER
Called and spoke with patient to have patient come in the afternoon due to Abdi has an meeting in the morning.  Patient is fine changing his time to 1:30 on 7/7/23.

## 2023-06-09 ENCOUNTER — OFFICE VISIT (OUTPATIENT)
Dept: SPORTS MEDICINE | Facility: CLINIC | Age: 73
End: 2023-06-09
Payer: MEDICARE

## 2023-06-09 VITALS
WEIGHT: 254 LBS | DIASTOLIC BLOOD PRESSURE: 87 MMHG | HEIGHT: 73 IN | HEART RATE: 69 BPM | SYSTOLIC BLOOD PRESSURE: 152 MMHG | BODY MASS INDEX: 33.66 KG/M2

## 2023-06-09 DIAGNOSIS — Z98.890 S/P LATERAL MENISCECTOMY OF LEFT KNEE: ICD-10-CM

## 2023-06-09 DIAGNOSIS — Z98.890 S/P MEDIAL MENISCECTOMY OF LEFT KNEE: Primary | ICD-10-CM

## 2023-06-09 PROCEDURE — 3288F FALL RISK ASSESSMENT DOCD: CPT | Mod: CPTII,S$GLB,, | Performed by: ORTHOPAEDIC SURGERY

## 2023-06-09 PROCEDURE — 1159F MED LIST DOCD IN RCRD: CPT | Mod: CPTII,S$GLB,, | Performed by: ORTHOPAEDIC SURGERY

## 2023-06-09 PROCEDURE — 3077F PR MOST RECENT SYSTOLIC BLOOD PRESSURE >= 140 MM HG: ICD-10-PCS | Mod: CPTII,S$GLB,, | Performed by: ORTHOPAEDIC SURGERY

## 2023-06-09 PROCEDURE — 1126F AMNT PAIN NOTED NONE PRSNT: CPT | Mod: CPTII,S$GLB,, | Performed by: ORTHOPAEDIC SURGERY

## 2023-06-09 PROCEDURE — 3288F PR FALLS RISK ASSESSMENT DOCUMENTED: ICD-10-PCS | Mod: CPTII,S$GLB,, | Performed by: ORTHOPAEDIC SURGERY

## 2023-06-09 PROCEDURE — 99999 PR PBB SHADOW E&M-EST. PATIENT-LVL III: ICD-10-PCS | Mod: PBBFAC,,, | Performed by: ORTHOPAEDIC SURGERY

## 2023-06-09 PROCEDURE — 99024 POSTOP FOLLOW-UP VISIT: CPT | Mod: S$GLB,,, | Performed by: ORTHOPAEDIC SURGERY

## 2023-06-09 PROCEDURE — 3008F PR BODY MASS INDEX (BMI) DOCUMENTED: ICD-10-PCS | Mod: CPTII,S$GLB,, | Performed by: ORTHOPAEDIC SURGERY

## 2023-06-09 PROCEDURE — 1126F PR PAIN SEVERITY QUANTIFIED, NO PAIN PRESENT: ICD-10-PCS | Mod: CPTII,S$GLB,, | Performed by: ORTHOPAEDIC SURGERY

## 2023-06-09 PROCEDURE — 3079F PR MOST RECENT DIASTOLIC BLOOD PRESSURE 80-89 MM HG: ICD-10-PCS | Mod: CPTII,S$GLB,, | Performed by: ORTHOPAEDIC SURGERY

## 2023-06-09 PROCEDURE — 3077F SYST BP >= 140 MM HG: CPT | Mod: CPTII,S$GLB,, | Performed by: ORTHOPAEDIC SURGERY

## 2023-06-09 PROCEDURE — 1101F PR PT FALLS ASSESS DOC 0-1 FALLS W/OUT INJ PAST YR: ICD-10-PCS | Mod: CPTII,S$GLB,, | Performed by: ORTHOPAEDIC SURGERY

## 2023-06-09 PROCEDURE — 4010F PR ACE/ARB THEARPY RXD/TAKEN: ICD-10-PCS | Mod: CPTII,S$GLB,, | Performed by: ORTHOPAEDIC SURGERY

## 2023-06-09 PROCEDURE — 1159F PR MEDICATION LIST DOCUMENTED IN MEDICAL RECORD: ICD-10-PCS | Mod: CPTII,S$GLB,, | Performed by: ORTHOPAEDIC SURGERY

## 2023-06-09 PROCEDURE — 3079F DIAST BP 80-89 MM HG: CPT | Mod: CPTII,S$GLB,, | Performed by: ORTHOPAEDIC SURGERY

## 2023-06-09 PROCEDURE — 99999 PR PBB SHADOW E&M-EST. PATIENT-LVL III: CPT | Mod: PBBFAC,,, | Performed by: ORTHOPAEDIC SURGERY

## 2023-06-09 PROCEDURE — 4010F ACE/ARB THERAPY RXD/TAKEN: CPT | Mod: CPTII,S$GLB,, | Performed by: ORTHOPAEDIC SURGERY

## 2023-06-09 PROCEDURE — 3008F BODY MASS INDEX DOCD: CPT | Mod: CPTII,S$GLB,, | Performed by: ORTHOPAEDIC SURGERY

## 2023-06-09 PROCEDURE — 1101F PT FALLS ASSESS-DOCD LE1/YR: CPT | Mod: CPTII,S$GLB,, | Performed by: ORTHOPAEDIC SURGERY

## 2023-06-09 PROCEDURE — 99024 PR POST-OP FOLLOW-UP VISIT: ICD-10-PCS | Mod: S$GLB,,, | Performed by: ORTHOPAEDIC SURGERY

## 2023-06-09 NOTE — PROGRESS NOTES
"POST-OPERATIVE EXAMINATION    72 y.o. Male who returns for follow after surgery. He is 2 weeks s/p    Procedure:  1. Left Knee Arthroscopy, with meniscectomy (medial And lateral) 00042  2.  Left Knee Arthroscopy, debridement/shaving of articular cartilage (chondroplasty) 10115  3.  Left Knee Arthroscopy, knee, synovectomy, limited 51476    He is doing well without any issues.       PHYSICAL EXAMINATION:  BP (!) 152/87   Pulse 69   Ht 6' 1" (1.854 m)   Wt 115.2 kg (254 lb)   BMI 33.51 kg/m²   General: Well-developed well-nourished 72 y.o. malein no acute distress   Cardiovascular: Regular rhythm   Lungs: No labored breathing or wheezing appreciated   Neuro: Alert and oriented ×3   Psychiatric: well oriented to person, place and time, demonstrates normal mood and affect   Skin: No rashes, lesions or ulcers, normal temperature, turgor, and texture on involved extremity    ORTHOPEDIC EXAM:  Normal post-operative swelling  Normal post-operative scarring  Strength: WNL  ROM: WNL  Tests: None today    ASSESSMENT:      ICD-10-CM ICD-9-CM   1. S/P medial meniscectomy of left knee  Z98.890 V45.89   2. S/P lateral meniscectomy of left knee  Z98.890 V45.89       PLAN:       Sutures removed today  Continue physical therapy  RTC in 1 month with Abdi Stanford PA-C            "

## 2023-06-14 ENCOUNTER — PES CALL (OUTPATIENT)
Dept: ADMINISTRATIVE | Facility: CLINIC | Age: 73
End: 2023-06-14
Payer: MEDICARE

## 2023-06-16 ENCOUNTER — PATIENT MESSAGE (OUTPATIENT)
Dept: INTERNAL MEDICINE | Facility: CLINIC | Age: 73
End: 2023-06-16
Payer: MEDICARE

## 2023-06-19 ENCOUNTER — PATIENT MESSAGE (OUTPATIENT)
Dept: INTERNAL MEDICINE | Facility: CLINIC | Age: 73
End: 2023-06-19
Payer: MEDICARE

## 2023-06-19 RX ORDER — LOSARTAN POTASSIUM 50 MG/1
50 TABLET ORAL DAILY
Qty: 90 TABLET | Refills: 3 | Status: SHIPPED | OUTPATIENT
Start: 2023-06-19 | End: 2023-08-07 | Stop reason: SDUPTHER

## 2023-06-21 ENCOUNTER — PATIENT MESSAGE (OUTPATIENT)
Dept: PULMONOLOGY | Facility: CLINIC | Age: 73
End: 2023-06-21
Payer: MEDICARE

## 2023-06-21 DIAGNOSIS — R05.3 CHRONIC COUGH: ICD-10-CM

## 2023-06-21 RX ORDER — OMEPRAZOLE 20 MG/1
20 CAPSULE, DELAYED RELEASE ORAL 2 TIMES DAILY
Qty: 180 CAPSULE | Refills: 1 | Status: SHIPPED | OUTPATIENT
Start: 2023-06-21 | End: 2024-06-20

## 2023-07-05 ENCOUNTER — TELEPHONE (OUTPATIENT)
Dept: CARDIOLOGY | Facility: HOSPITAL | Age: 73
End: 2023-07-05
Payer: MEDICARE

## 2023-07-07 ENCOUNTER — HOSPITAL ENCOUNTER (OUTPATIENT)
Dept: CARDIOLOGY | Facility: HOSPITAL | Age: 73
Discharge: HOME | End: 2023-07-07
Attending: INTERNAL MEDICINE
Payer: MEDICARE

## 2023-07-07 ENCOUNTER — OFFICE VISIT (OUTPATIENT)
Dept: SPORTS MEDICINE | Facility: CLINIC | Age: 73
End: 2023-07-07
Payer: MEDICARE

## 2023-07-07 VITALS
WEIGHT: 253.88 LBS | HEIGHT: 73 IN | HEART RATE: 61 BPM | SYSTOLIC BLOOD PRESSURE: 161 MMHG | BODY MASS INDEX: 33.65 KG/M2 | DIASTOLIC BLOOD PRESSURE: 85 MMHG

## 2023-07-07 VITALS
WEIGHT: 254 LBS | BODY MASS INDEX: 33.66 KG/M2 | HEART RATE: 73 BPM | DIASTOLIC BLOOD PRESSURE: 87 MMHG | HEIGHT: 73 IN | SYSTOLIC BLOOD PRESSURE: 155 MMHG

## 2023-07-07 DIAGNOSIS — I47.20 VT (VENTRICULAR TACHYCARDIA): ICD-10-CM

## 2023-07-07 DIAGNOSIS — Z98.890 S/P MEDIAL MENISCECTOMY OF LEFT KNEE: Primary | ICD-10-CM

## 2023-07-07 DIAGNOSIS — Z98.890 S/P LATERAL MENISCECTOMY OF LEFT KNEE: ICD-10-CM

## 2023-07-07 DIAGNOSIS — R94.31 ABNORMAL ELECTROCARDIOGRAM (ECG) (EKG): ICD-10-CM

## 2023-07-07 DIAGNOSIS — I49.3 PVC'S (PREMATURE VENTRICULAR CONTRACTIONS): ICD-10-CM

## 2023-07-07 LAB
CFR FLOW - ANTERIOR: 3.15
CFR FLOW - INFERIOR: 2.8
CFR FLOW - LATERAL: 2.96
CFR FLOW - MAX: 3.94
CFR FLOW - MIN: 1.94
CFR FLOW - SEPTAL: 3.23
CFR FLOW - WHOLE HEART: 3.04
CV PHARM DOSE: 0.4 MG
CV STRESS BASE HR: 73 BPM
DIASTOLIC BLOOD PRESSURE: 87 MMHG
EJECTION FRACTION- HIGH: 59 %
END DIASTOLIC INDEX-HIGH: 155 ML/M2
END DIASTOLIC INDEX-LOW: 91 ML/M2
END SYSTOLIC INDEX-HIGH: 78 ML/M2
END SYSTOLIC INDEX-LOW: 40 ML/M2
NUC REST DIASTOLIC VOLUME INDEX: 108
NUC REST EJECTION FRACTION: 63
NUC REST SYSTOLIC VOLUME INDEX: 40
NUC STRESS DIASTOLIC VOLUME INDEX: 97
NUC STRESS EJECTION FRACTION: 59 %
NUC STRESS SYSTOLIC VOLUME INDEX: 40
OHS CV CPX 85 PERCENT MAX PREDICTED HEART RATE MALE: 124
OHS CV CPX MAX PREDICTED HEART RATE: 146
OHS CV CPX PATIENT IS FEMALE: 0
OHS CV CPX PATIENT IS MALE: 1
OHS CV CPX PEAK DIASTOLIC BLOOD PRESSURE: 81 MMHG
OHS CV CPX PEAK HEAR RATE: 80 BPM
OHS CV CPX PEAK RATE PRESSURE PRODUCT: NORMAL
OHS CV CPX PEAK SYSTOLIC BLOOD PRESSURE: 144 MMHG
OHS CV CPX PERCENT MAX PREDICTED HEART RATE ACHIEVED: 55
OHS CV CPX RATE PRESSURE PRODUCT PRESENTING: NORMAL
PERFUSION DEFECT 1 SIZE IN %: 14 %
PERFUSION DEFECT 2 SIZE IN %: 11 %
PERFUSION DEFECT SIZE WORSENS % 1: 16 %
REST FLOW - ANTERIOR: 0.62 CC/MIN/G
REST FLOW - INFERIOR: 0.57 CC/MIN/G
REST FLOW - LATERAL: 0.55 CC/MIN/G
REST FLOW - MAX: 1.04 CC/MIN/G
REST FLOW - MIN: 0.26 CC/MIN/G
REST FLOW - SEPTAL: 0.69 CC/MIN/G
REST FLOW - WHOLE HEART: 0.61 CC/MIN/G
RETIRED EF AND QEF - SEE NOTES: 47 %
STRESS FLOW - ANTERIOR: 1.96 CC/MIN/G
STRESS FLOW - INFERIOR: 1.62 CC/MIN/G
STRESS FLOW - LATERAL: 1.67 CC/MIN/G
STRESS FLOW - MAX: 3.03 CC/MIN/G
STRESS FLOW - MIN: 0.58 CC/MIN/G
STRESS FLOW - SEPTAL: 2.26 CC/MIN/G
STRESS FLOW - WHOLE HEART: 1.88 CC/MIN/G
SYSTOLIC BLOOD PRESSURE: 155 MMHG

## 2023-07-07 PROCEDURE — 78434 AQMBF PET REST & RX STRESS: CPT

## 2023-07-07 PROCEDURE — 93018 CARDIAC PET SCAN STRESS (CUPID ONLY): ICD-10-PCS | Mod: ,,, | Performed by: INTERNAL MEDICINE

## 2023-07-07 PROCEDURE — 3079F PR MOST RECENT DIASTOLIC BLOOD PRESSURE 80-89 MM HG: ICD-10-PCS | Mod: CPTII,S$GLB,, | Performed by: PHYSICIAN ASSISTANT

## 2023-07-07 PROCEDURE — 99999 PR PBB SHADOW E&M-EST. PATIENT-LVL IV: ICD-10-PCS | Mod: PBBFAC,,, | Performed by: PHYSICIAN ASSISTANT

## 2023-07-07 PROCEDURE — 1159F PR MEDICATION LIST DOCUMENTED IN MEDICAL RECORD: ICD-10-PCS | Mod: CPTII,S$GLB,, | Performed by: PHYSICIAN ASSISTANT

## 2023-07-07 PROCEDURE — 78431 CARDIAC PET SCAN STRESS (CUPID ONLY): ICD-10-PCS | Mod: 26,,, | Performed by: INTERNAL MEDICINE

## 2023-07-07 PROCEDURE — 63600175 PHARM REV CODE 636 W HCPCS: Performed by: INTERNAL MEDICINE

## 2023-07-07 PROCEDURE — 93018 CV STRESS TEST I&R ONLY: CPT | Mod: ,,, | Performed by: INTERNAL MEDICINE

## 2023-07-07 PROCEDURE — 1160F PR REVIEW ALL MEDS BY PRESCRIBER/CLIN PHARMACIST DOCUMENTED: ICD-10-PCS | Mod: CPTII,S$GLB,, | Performed by: PHYSICIAN ASSISTANT

## 2023-07-07 PROCEDURE — 3288F PR FALLS RISK ASSESSMENT DOCUMENTED: ICD-10-PCS | Mod: CPTII,S$GLB,, | Performed by: PHYSICIAN ASSISTANT

## 2023-07-07 PROCEDURE — 99024 POSTOP FOLLOW-UP VISIT: CPT | Mod: S$GLB,,, | Performed by: PHYSICIAN ASSISTANT

## 2023-07-07 PROCEDURE — 78434 CARDIAC PET SCAN STRESS (CUPID ONLY): ICD-10-PCS | Mod: 26,,, | Performed by: INTERNAL MEDICINE

## 2023-07-07 PROCEDURE — 78434 AQMBF PET REST & RX STRESS: CPT | Mod: 26,,, | Performed by: INTERNAL MEDICINE

## 2023-07-07 PROCEDURE — 3077F SYST BP >= 140 MM HG: CPT | Mod: CPTII,S$GLB,, | Performed by: PHYSICIAN ASSISTANT

## 2023-07-07 PROCEDURE — 1159F MED LIST DOCD IN RCRD: CPT | Mod: CPTII,S$GLB,, | Performed by: PHYSICIAN ASSISTANT

## 2023-07-07 PROCEDURE — 3077F PR MOST RECENT SYSTOLIC BLOOD PRESSURE >= 140 MM HG: ICD-10-PCS | Mod: CPTII,S$GLB,, | Performed by: PHYSICIAN ASSISTANT

## 2023-07-07 PROCEDURE — 1101F PT FALLS ASSESS-DOCD LE1/YR: CPT | Mod: CPTII,S$GLB,, | Performed by: PHYSICIAN ASSISTANT

## 2023-07-07 PROCEDURE — 4010F ACE/ARB THERAPY RXD/TAKEN: CPT | Mod: CPTII,S$GLB,, | Performed by: PHYSICIAN ASSISTANT

## 2023-07-07 PROCEDURE — 3079F DIAST BP 80-89 MM HG: CPT | Mod: CPTII,S$GLB,, | Performed by: PHYSICIAN ASSISTANT

## 2023-07-07 PROCEDURE — 3288F FALL RISK ASSESSMENT DOCD: CPT | Mod: CPTII,S$GLB,, | Performed by: PHYSICIAN ASSISTANT

## 2023-07-07 PROCEDURE — 3008F PR BODY MASS INDEX (BMI) DOCUMENTED: ICD-10-PCS | Mod: CPTII,S$GLB,, | Performed by: PHYSICIAN ASSISTANT

## 2023-07-07 PROCEDURE — 93016 CARDIAC PET SCAN STRESS (CUPID ONLY): ICD-10-PCS | Mod: ,,, | Performed by: INTERNAL MEDICINE

## 2023-07-07 PROCEDURE — 1126F PR PAIN SEVERITY QUANTIFIED, NO PAIN PRESENT: ICD-10-PCS | Mod: CPTII,S$GLB,, | Performed by: PHYSICIAN ASSISTANT

## 2023-07-07 PROCEDURE — 3008F BODY MASS INDEX DOCD: CPT | Mod: CPTII,S$GLB,, | Performed by: PHYSICIAN ASSISTANT

## 2023-07-07 PROCEDURE — 1160F RVW MEDS BY RX/DR IN RCRD: CPT | Mod: CPTII,S$GLB,, | Performed by: PHYSICIAN ASSISTANT

## 2023-07-07 PROCEDURE — 1101F PR PT FALLS ASSESS DOC 0-1 FALLS W/OUT INJ PAST YR: ICD-10-PCS | Mod: CPTII,S$GLB,, | Performed by: PHYSICIAN ASSISTANT

## 2023-07-07 PROCEDURE — 78431 MYOCRD IMG PET RST&STRS CT: CPT

## 2023-07-07 PROCEDURE — 1126F AMNT PAIN NOTED NONE PRSNT: CPT | Mod: CPTII,S$GLB,, | Performed by: PHYSICIAN ASSISTANT

## 2023-07-07 PROCEDURE — 99999 PR PBB SHADOW E&M-EST. PATIENT-LVL IV: CPT | Mod: PBBFAC,,, | Performed by: PHYSICIAN ASSISTANT

## 2023-07-07 PROCEDURE — 78431 MYOCRD IMG PET RST&STRS CT: CPT | Mod: 26,,, | Performed by: INTERNAL MEDICINE

## 2023-07-07 PROCEDURE — 4010F PR ACE/ARB THEARPY RXD/TAKEN: ICD-10-PCS | Mod: CPTII,S$GLB,, | Performed by: PHYSICIAN ASSISTANT

## 2023-07-07 PROCEDURE — 93016 CV STRESS TEST SUPVJ ONLY: CPT | Mod: ,,, | Performed by: INTERNAL MEDICINE

## 2023-07-07 PROCEDURE — 99024 PR POST-OP FOLLOW-UP VISIT: ICD-10-PCS | Mod: S$GLB,,, | Performed by: PHYSICIAN ASSISTANT

## 2023-07-07 RX ORDER — REGADENOSON 0.08 MG/ML
0.4 INJECTION, SOLUTION INTRAVENOUS
Status: COMPLETED | OUTPATIENT
Start: 2023-07-07 | End: 2023-07-07

## 2023-07-07 RX ADMIN — REGADENOSON 0.4 MG: 0.08 INJECTION, SOLUTION INTRAVENOUS at 07:07

## 2023-07-07 NOTE — PROGRESS NOTES
"POST-OPERATIVE EXAMINATION    73 y.o. Male who returns for follow after surgery. He is 6 weeks s/p    Procedure:  1.  Left Knee Arthroscopy, with meniscectomy (medial And lateral) 52172  2.  Left Knee Arthroscopy, debridement/shaving of articular cartilage (chondroplasty) 26853  3.  Left Knee Arthroscopy, knee, synovectomy, limited 90478    He is doing well without any issues.  He has noticed a significant improvement in his preoperative pain.  He has been able to return to walking in the park without significant discomfort or difficulty.      PHYSICAL EXAMINATION:  BP (!) 161/85   Pulse 61   Ht 6' 1" (1.854 m)   Wt 115.2 kg (253 lb 13.8 oz)   BMI 33.49 kg/m²   General: Well-developed well-nourished 73 y.o. malein no acute distress   Cardiovascular: Regular rhythm   Lungs: No labored breathing or wheezing appreciated   Neuro: Alert and oriented ×3   Psychiatric: well oriented to person, place and time, demonstrates normal mood and affect   Skin: No rashes, lesions or ulcers, normal temperature, turgor, and texture on involved extremity    ORTHOPEDIC EXAM:  Normal post-operative swelling  Normal post-operative scarring  Strength: WNL  ROM: WNL  Tests: None today    ASSESSMENT:      ICD-10-CM ICD-9-CM   1. S/P medial meniscectomy of left knee  Z98.890 V45.89   2. S/P lateral meniscectomy of left knee  Z98.890 V45.89         PLAN:       Continue physical therapy  RTC in 2 months                "

## 2023-07-10 ENCOUNTER — TELEPHONE (OUTPATIENT)
Dept: ELECTROPHYSIOLOGY | Facility: CLINIC | Age: 73
End: 2023-07-10
Payer: MEDICARE

## 2023-07-10 DIAGNOSIS — I47.29 NONSUSTAINED VENTRICULAR TACHYCARDIA: ICD-10-CM

## 2023-07-10 DIAGNOSIS — R94.39 ABNORMAL STRESS TEST: Primary | ICD-10-CM

## 2023-07-10 NOTE — TELEPHONE ENCOUNTER
Spoke with patient regarding abnormal PET stress test done for dimorphic PVCs and nonsustained VT. I told him I would like for him to see Dr. Burns. I will have my office work on a visit with him. He understood.

## 2023-07-13 ENCOUNTER — LAB VISIT (OUTPATIENT)
Dept: LAB | Facility: HOSPITAL | Age: 73
End: 2023-07-13
Attending: INTERNAL MEDICINE
Payer: MEDICARE

## 2023-07-13 ENCOUNTER — OFFICE VISIT (OUTPATIENT)
Dept: CARDIOLOGY | Facility: CLINIC | Age: 73
End: 2023-07-13
Payer: MEDICARE

## 2023-07-13 VITALS
HEIGHT: 73 IN | DIASTOLIC BLOOD PRESSURE: 84 MMHG | HEART RATE: 67 BPM | SYSTOLIC BLOOD PRESSURE: 133 MMHG | WEIGHT: 253 LBS | RESPIRATION RATE: 20 BRPM | BODY MASS INDEX: 33.53 KG/M2

## 2023-07-13 DIAGNOSIS — I49.3 PVC'S (PREMATURE VENTRICULAR CONTRACTIONS): ICD-10-CM

## 2023-07-13 DIAGNOSIS — I10 PRIMARY HYPERTENSION: ICD-10-CM

## 2023-07-13 DIAGNOSIS — I25.119 CORONARY ARTERY DISEASE INVOLVING NATIVE CORONARY ARTERY OF NATIVE HEART WITH ANGINA PECTORIS: ICD-10-CM

## 2023-07-13 DIAGNOSIS — I70.0 ATHEROSCLEROSIS OF AORTA: ICD-10-CM

## 2023-07-13 DIAGNOSIS — I25.119 CORONARY ARTERY DISEASE INVOLVING NATIVE CORONARY ARTERY OF NATIVE HEART WITH ANGINA PECTORIS: Primary | ICD-10-CM

## 2023-07-13 DIAGNOSIS — R94.39 ABNORMAL STRESS TEST: ICD-10-CM

## 2023-07-13 DIAGNOSIS — E78.5 HYPERLIPIDEMIA, UNSPECIFIED HYPERLIPIDEMIA TYPE: ICD-10-CM

## 2023-07-13 LAB
ANION GAP SERPL CALC-SCNC: 11 MMOL/L (ref 8–16)
BUN SERPL-MCNC: 15 MG/DL (ref 8–23)
CALCIUM SERPL-MCNC: 9.7 MG/DL (ref 8.7–10.5)
CHLORIDE SERPL-SCNC: 103 MMOL/L (ref 95–110)
CO2 SERPL-SCNC: 27 MMOL/L (ref 23–29)
CREAT SERPL-MCNC: 1.4 MG/DL (ref 0.5–1.4)
ERYTHROCYTE [DISTWIDTH] IN BLOOD BY AUTOMATED COUNT: 13.1 % (ref 11.5–14.5)
EST. GFR  (NO RACE VARIABLE): 52.7 ML/MIN/1.73 M^2
GLUCOSE SERPL-MCNC: 81 MG/DL (ref 70–110)
HCT VFR BLD AUTO: 47.3 % (ref 40–54)
HGB BLD-MCNC: 14.8 G/DL (ref 14–18)
MCH RBC QN AUTO: 26.7 PG (ref 27–31)
MCHC RBC AUTO-ENTMCNC: 31.3 G/DL (ref 32–36)
MCV RBC AUTO: 85 FL (ref 82–98)
PLATELET # BLD AUTO: 216 K/UL (ref 150–450)
PMV BLD AUTO: 12 FL (ref 9.2–12.9)
POTASSIUM SERPL-SCNC: 4.2 MMOL/L (ref 3.5–5.1)
RBC # BLD AUTO: 5.54 M/UL (ref 4.6–6.2)
SODIUM SERPL-SCNC: 141 MMOL/L (ref 136–145)
WBC # BLD AUTO: 8.04 K/UL (ref 3.9–12.7)

## 2023-07-13 PROCEDURE — 1101F PT FALLS ASSESS-DOCD LE1/YR: CPT | Mod: CPTII,S$GLB,, | Performed by: INTERNAL MEDICINE

## 2023-07-13 PROCEDURE — 3075F PR MOST RECENT SYSTOLIC BLOOD PRESS GE 130-139MM HG: ICD-10-PCS | Mod: CPTII,S$GLB,, | Performed by: INTERNAL MEDICINE

## 2023-07-13 PROCEDURE — 99999 PR PBB SHADOW E&M-EST. PATIENT-LVL IV: CPT | Mod: PBBFAC,,, | Performed by: INTERNAL MEDICINE

## 2023-07-13 PROCEDURE — 4010F PR ACE/ARB THEARPY RXD/TAKEN: ICD-10-PCS | Mod: CPTII,S$GLB,, | Performed by: INTERNAL MEDICINE

## 2023-07-13 PROCEDURE — 3008F PR BODY MASS INDEX (BMI) DOCUMENTED: ICD-10-PCS | Mod: CPTII,S$GLB,, | Performed by: INTERNAL MEDICINE

## 2023-07-13 PROCEDURE — 3075F SYST BP GE 130 - 139MM HG: CPT | Mod: CPTII,S$GLB,, | Performed by: INTERNAL MEDICINE

## 2023-07-13 PROCEDURE — 3288F PR FALLS RISK ASSESSMENT DOCUMENTED: ICD-10-PCS | Mod: CPTII,S$GLB,, | Performed by: INTERNAL MEDICINE

## 2023-07-13 PROCEDURE — 1159F PR MEDICATION LIST DOCUMENTED IN MEDICAL RECORD: ICD-10-PCS | Mod: CPTII,S$GLB,, | Performed by: INTERNAL MEDICINE

## 2023-07-13 PROCEDURE — 99205 OFFICE O/P NEW HI 60 MIN: CPT | Mod: S$GLB,,, | Performed by: INTERNAL MEDICINE

## 2023-07-13 PROCEDURE — 1101F PR PT FALLS ASSESS DOC 0-1 FALLS W/OUT INJ PAST YR: ICD-10-PCS | Mod: CPTII,S$GLB,, | Performed by: INTERNAL MEDICINE

## 2023-07-13 PROCEDURE — 3008F BODY MASS INDEX DOCD: CPT | Mod: CPTII,S$GLB,, | Performed by: INTERNAL MEDICINE

## 2023-07-13 PROCEDURE — 4010F ACE/ARB THERAPY RXD/TAKEN: CPT | Mod: CPTII,S$GLB,, | Performed by: INTERNAL MEDICINE

## 2023-07-13 PROCEDURE — 1126F AMNT PAIN NOTED NONE PRSNT: CPT | Mod: CPTII,S$GLB,, | Performed by: INTERNAL MEDICINE

## 2023-07-13 PROCEDURE — 36415 COLL VENOUS BLD VENIPUNCTURE: CPT | Performed by: INTERNAL MEDICINE

## 2023-07-13 PROCEDURE — 1160F RVW MEDS BY RX/DR IN RCRD: CPT | Mod: CPTII,S$GLB,, | Performed by: INTERNAL MEDICINE

## 2023-07-13 PROCEDURE — 1160F PR REVIEW ALL MEDS BY PRESCRIBER/CLIN PHARMACIST DOCUMENTED: ICD-10-PCS | Mod: CPTII,S$GLB,, | Performed by: INTERNAL MEDICINE

## 2023-07-13 PROCEDURE — 1126F PR PAIN SEVERITY QUANTIFIED, NO PAIN PRESENT: ICD-10-PCS | Mod: CPTII,S$GLB,, | Performed by: INTERNAL MEDICINE

## 2023-07-13 PROCEDURE — 99205 PR OFFICE/OUTPT VISIT, NEW, LEVL V, 60-74 MIN: ICD-10-PCS | Mod: S$GLB,,, | Performed by: INTERNAL MEDICINE

## 2023-07-13 PROCEDURE — 3079F PR MOST RECENT DIASTOLIC BLOOD PRESSURE 80-89 MM HG: ICD-10-PCS | Mod: CPTII,S$GLB,, | Performed by: INTERNAL MEDICINE

## 2023-07-13 PROCEDURE — 1159F MED LIST DOCD IN RCRD: CPT | Mod: CPTII,S$GLB,, | Performed by: INTERNAL MEDICINE

## 2023-07-13 PROCEDURE — 99999 PR PBB SHADOW E&M-EST. PATIENT-LVL IV: ICD-10-PCS | Mod: PBBFAC,,, | Performed by: INTERNAL MEDICINE

## 2023-07-13 PROCEDURE — 85027 COMPLETE CBC AUTOMATED: CPT | Performed by: INTERNAL MEDICINE

## 2023-07-13 PROCEDURE — 3079F DIAST BP 80-89 MM HG: CPT | Mod: CPTII,S$GLB,, | Performed by: INTERNAL MEDICINE

## 2023-07-13 PROCEDURE — 80048 BASIC METABOLIC PNL TOTAL CA: CPT | Performed by: INTERNAL MEDICINE

## 2023-07-13 PROCEDURE — 3288F FALL RISK ASSESSMENT DOCD: CPT | Mod: CPTII,S$GLB,, | Performed by: INTERNAL MEDICINE

## 2023-07-13 RX ORDER — VERAPAMIL HYDROCHLORIDE 240 MG/1
240 CAPSULE, EXTENDED RELEASE ORAL DAILY
Qty: 90 CAPSULE | Refills: 3 | Status: SHIPPED | OUTPATIENT
Start: 2023-07-13 | End: 2023-10-17 | Stop reason: SDUPTHER

## 2023-07-13 RX ORDER — SODIUM CHLORIDE 9 MG/ML
INJECTION, SOLUTION INTRAVENOUS ONCE
Status: CANCELLED | OUTPATIENT
Start: 2023-07-13 | End: 2023-07-13

## 2023-07-13 RX ORDER — SODIUM CHLORIDE 0.9 % (FLUSH) 0.9 %
10 SYRINGE (ML) INJECTION
Status: SHIPPED | OUTPATIENT
Start: 2023-07-13

## 2023-07-13 RX ORDER — ATORVASTATIN CALCIUM 80 MG/1
80 TABLET, FILM COATED ORAL DAILY
Qty: 90 TABLET | Refills: 3 | Status: SHIPPED | OUTPATIENT
Start: 2023-07-13

## 2023-07-13 NOTE — PROGRESS NOTES
HISTORY:    73-year-old male with a history of CAD, hypertension, hyperlipidemia, PVCs, and OA s/p RLE TKR '23 referred for evaluation by Dr. Zimmerman for abnormal PET stress.      The patient denies any symptoms of chest pain. Mild QUINTEROS since an early covid infection that he can walk through.    Exercise tolerance is good. The patient [place] 1 mile [place].    Long h/o palpitations dating back to a young age. Has not really progressed. Did have a transient episode of increased palpitations with light headedness back in May that sparked castellon resulting in holter and EPS evaluation. 14% PVC burden with anml LVEF. Started on verapamil by Dr. Canela. No decrease in symptoms. PET stress w 2 abnormalities.    Patient currently tolerates aspirin 81 x 1, hydrochlorothiazide 12.5 x 1, losartan 50 x 1, verapamil 120 x 1, and atorvastatin 40 x 1.    PHYSICAL EXAM:    Vitals:    07/13/23 0932   BP: 133/84   Pulse: 67   Resp: 20       NAD, A+Ox3.  No jvd, no bruit.  RRR nml s1,s2. No murmurs.  CTA B no wheezes or crackles.  No edema.    LABS/STUDIES (imaging reviewed during clinic visit):    June 2022 CBC normal.  March 2023 creatinine 1.3 with a BUN of 14.  Albumin 3.6.  LDL 82 and HDL 50.  Triglycerides 106.  ECG July 2023 demonstrates sinus rhythm with no Q-waves or ST changes.  Holter April 2023 sinus rhythm with a 13.9% PVC burden.  Some short runs of NSVT mostly 4-5 beats in duration.  1.5% Pac burden.    TTE April 2023 normal LV size and function with EF 60%.  CVP 3.  Frequent ectopy.    PET stress July 2023 normal LVEF.  LAD and left circ calcification.  14% lateral perfusion abnormality involving 14% of LV myocardium and increasing to 16% with stress.  11% reversible defect in the LAD territory as well.      ASSESSMENT & PLAN:    1. Coronary artery disease involving native coronary artery of native heart with angina pectoris    2. Primary hypertension    3.  Hyperlipidemia, unspecified hyperlipidemia type    4. Atherosclerosis of aorta    5. PVC's (premature ventricular contractions)    6. Abnormal stress test        Orders Placed This Encounter    Basic Metabolic Panel    CBC Without Differential    Vital signs    Cardiac Monitoring - Adult    Full code    sodium chloride 0.9% flush 10 mL    verapamiL (VERELAN) 240 MG C24P    atorvastatin (LIPITOR) 80 MG tablet    Case Request-Cath Lab: ANGIOGRAM, CORONARY ARTERY        Patient being worked up for PVCs that have likely been present for some time. PET stress w 2 defects. Pt with some QUINTEROS.     Discussed invasive vs non-invasive evaluation. Patient prefers SCA for evaluation of coronary anatomy, which I agree with.     Cont asa 81x1. Cont verapamil. Can increase to 240x1 for elevated Bps. Continue hydrochlorothiazide 12.5 x 1 and losartan 50 x 1.     LDL 82 on atorvastatin 40x1. Increase to 80x1.       Follow up in about 3 months (around 10/13/2023).      Shola Burns MD

## 2023-07-18 ENCOUNTER — TELEPHONE (OUTPATIENT)
Dept: CARDIOLOGY | Facility: CLINIC | Age: 73
End: 2023-07-18
Payer: MEDICARE

## 2023-07-18 NOTE — TELEPHONE ENCOUNTER
Spoke with wife;  Procedure 7/24/2023; second floor registration  Arrival time: 9:00 am  Procedure time: 11:00 am  Consents signed and scanned 7/13/2023;  Labs done 7/13/2023;   Reviewed fasting status with wife;  Wife verbally stated she understood

## 2023-07-21 ENCOUNTER — TELEPHONE (OUTPATIENT)
Dept: CARDIOLOGY | Facility: CLINIC | Age: 73
End: 2023-07-21
Payer: MEDICARE

## 2023-07-24 ENCOUNTER — TELEPHONE (OUTPATIENT)
Dept: CARDIAC REHAB | Facility: CLINIC | Age: 73
End: 2023-07-24
Payer: MEDICARE

## 2023-07-24 ENCOUNTER — HOSPITAL ENCOUNTER (OUTPATIENT)
Facility: HOSPITAL | Age: 73
Discharge: HOME | End: 2023-07-24
Attending: INTERNAL MEDICINE | Admitting: INTERNAL MEDICINE
Payer: MEDICARE

## 2023-07-24 VITALS
TEMPERATURE: 98 F | SYSTOLIC BLOOD PRESSURE: 130 MMHG | RESPIRATION RATE: 19 BRPM | OXYGEN SATURATION: 98 % | DIASTOLIC BLOOD PRESSURE: 68 MMHG | HEART RATE: 53 BPM

## 2023-07-24 DIAGNOSIS — I25.119 CORONARY ARTERY DISEASE INVOLVING NATIVE CORONARY ARTERY OF NATIVE HEART WITH ANGINA PECTORIS: ICD-10-CM

## 2023-07-24 DIAGNOSIS — I49.3 PVC'S (PREMATURE VENTRICULAR CONTRACTIONS): ICD-10-CM

## 2023-07-24 DIAGNOSIS — R94.39 ABNORMAL STRESS TEST: ICD-10-CM

## 2023-07-24 PROCEDURE — C1725 CATH, TRANSLUMIN NON-LASER: HCPCS | Performed by: INTERNAL MEDICINE

## 2023-07-24 PROCEDURE — 92978 ENDOLUMINL IVUS OCT C 1ST: CPT | Mod: 26,LD,, | Performed by: INTERNAL MEDICINE

## 2023-07-24 PROCEDURE — 92978 PR IVUS, CORONARY, 1ST VESSEL: ICD-10-PCS | Mod: 26,LD,, | Performed by: INTERNAL MEDICINE

## 2023-07-24 PROCEDURE — 63600175 PHARM REV CODE 636 W HCPCS: Performed by: INTERNAL MEDICINE

## 2023-07-24 PROCEDURE — C1874 STENT, COATED/COV W/DEL SYS: HCPCS | Performed by: INTERNAL MEDICINE

## 2023-07-24 PROCEDURE — C1769 GUIDE WIRE: HCPCS | Performed by: INTERNAL MEDICINE

## 2023-07-24 PROCEDURE — 92928 PR STENT: ICD-10-PCS | Mod: LD,,, | Performed by: INTERNAL MEDICINE

## 2023-07-24 PROCEDURE — 93458 PR CATH PLACE/CORON ANGIO, IMG SUPER/INTERP,W LEFT HEART VENTRICULOGRAPHY: ICD-10-PCS | Mod: 26,59,51, | Performed by: INTERNAL MEDICINE

## 2023-07-24 PROCEDURE — 25000003 PHARM REV CODE 250: Performed by: INTERNAL MEDICINE

## 2023-07-24 PROCEDURE — 85347 COAGULATION TIME ACTIVATED: CPT | Performed by: INTERNAL MEDICINE

## 2023-07-24 PROCEDURE — C1887 CATHETER, GUIDING: HCPCS | Performed by: INTERNAL MEDICINE

## 2023-07-24 PROCEDURE — 99900035 HC TECH TIME PER 15 MIN (STAT)

## 2023-07-24 PROCEDURE — 25500020 PHARM REV CODE 255: Performed by: INTERNAL MEDICINE

## 2023-07-24 PROCEDURE — C1894 INTRO/SHEATH, NON-LASER: HCPCS | Performed by: INTERNAL MEDICINE

## 2023-07-24 PROCEDURE — C1753 CATH, INTRAVAS ULTRASOUND: HCPCS | Performed by: INTERNAL MEDICINE

## 2023-07-24 PROCEDURE — 92928 PRQ TCAT PLMT NTRAC ST 1 LES: CPT | Mod: LD,,, | Performed by: INTERNAL MEDICINE

## 2023-07-24 PROCEDURE — 99152 MOD SED SAME PHYS/QHP 5/>YRS: CPT | Performed by: INTERNAL MEDICINE

## 2023-07-24 PROCEDURE — 27201423 OPTIME MED/SURG SUP & DEVICES STERILE SUPPLY: Performed by: INTERNAL MEDICINE

## 2023-07-24 PROCEDURE — C9600 PERC DRUG-EL COR STENT SING: HCPCS | Mod: LD | Performed by: INTERNAL MEDICINE

## 2023-07-24 PROCEDURE — 99153 MOD SED SAME PHYS/QHP EA: CPT | Performed by: INTERNAL MEDICINE

## 2023-07-24 PROCEDURE — 93458 L HRT ARTERY/VENTRICLE ANGIO: CPT | Mod: 26,59,51, | Performed by: INTERNAL MEDICINE

## 2023-07-24 PROCEDURE — 94761 N-INVAS EAR/PLS OXIMETRY MLT: CPT | Mod: 59

## 2023-07-24 PROCEDURE — 99152 PR MOD CONSCIOUS SEDATION, SAME PHYS, 5+ YRS, FIRST 15 MIN: ICD-10-PCS | Mod: ,,, | Performed by: INTERNAL MEDICINE

## 2023-07-24 PROCEDURE — 92978 ENDOLUMINL IVUS OCT C 1ST: CPT | Mod: LD | Performed by: INTERNAL MEDICINE

## 2023-07-24 PROCEDURE — 93458 L HRT ARTERY/VENTRICLE ANGIO: CPT | Mod: 59 | Performed by: INTERNAL MEDICINE

## 2023-07-24 PROCEDURE — 99152 MOD SED SAME PHYS/QHP 5/>YRS: CPT | Mod: ,,, | Performed by: INTERNAL MEDICINE

## 2023-07-24 DEVICE — EVEROLIMUS-ELUTING PLATINUM CHROMIUM CORONARY STENT SYSTEM
Type: IMPLANTABLE DEVICE | Site: HEART | Status: FUNCTIONAL
Brand: SYNERGY™ XD

## 2023-07-24 RX ORDER — MIDAZOLAM HYDROCHLORIDE 1 MG/ML
INJECTION INTRAMUSCULAR; INTRAVENOUS
Status: DISCONTINUED | OUTPATIENT
Start: 2023-07-24 | End: 2023-07-24 | Stop reason: HOSPADM

## 2023-07-24 RX ORDER — SODIUM CHLORIDE 9 MG/ML
INJECTION, SOLUTION INTRAVENOUS CONTINUOUS
Status: ACTIVE | OUTPATIENT
Start: 2023-07-24 | End: 2023-07-24

## 2023-07-24 RX ORDER — LIDOCAINE HYDROCHLORIDE 10 MG/ML
INJECTION INFILTRATION; PERINEURAL
Status: DISCONTINUED | OUTPATIENT
Start: 2023-07-24 | End: 2023-07-24 | Stop reason: HOSPADM

## 2023-07-24 RX ORDER — HEPARIN SODIUM 1000 [USP'U]/ML
INJECTION, SOLUTION INTRAVENOUS; SUBCUTANEOUS
Status: DISCONTINUED | OUTPATIENT
Start: 2023-07-24 | End: 2023-07-24 | Stop reason: HOSPADM

## 2023-07-24 RX ORDER — FENTANYL CITRATE 50 UG/ML
INJECTION, SOLUTION INTRAMUSCULAR; INTRAVENOUS
Status: DISCONTINUED | OUTPATIENT
Start: 2023-07-24 | End: 2023-07-24 | Stop reason: HOSPADM

## 2023-07-24 RX ORDER — HEPARIN SOD,PORCINE/0.9 % NACL 1000/500ML
INTRAVENOUS SOLUTION INTRAVENOUS
Status: DISCONTINUED | OUTPATIENT
Start: 2023-07-24 | End: 2023-07-24 | Stop reason: HOSPADM

## 2023-07-24 RX ORDER — SODIUM CHLORIDE 9 MG/ML
INJECTION, SOLUTION INTRAVENOUS ONCE
Status: DISCONTINUED | OUTPATIENT
Start: 2023-07-24 | End: 2023-07-24 | Stop reason: HOSPADM

## 2023-07-24 RX ORDER — ACETAMINOPHEN 325 MG/1
650 TABLET ORAL EVERY 4 HOURS PRN
Status: DISCONTINUED | OUTPATIENT
Start: 2023-07-24 | End: 2023-07-24 | Stop reason: HOSPADM

## 2023-07-24 RX ORDER — ONDANSETRON 8 MG/1
8 TABLET, ORALLY DISINTEGRATING ORAL EVERY 8 HOURS PRN
Status: DISCONTINUED | OUTPATIENT
Start: 2023-07-24 | End: 2023-07-24 | Stop reason: HOSPADM

## 2023-07-24 NOTE — Clinical Note
The catheter was inserted into the distal   left anterior descending. And IVUS was performed of the LAD to LM. Catheter was removed.

## 2023-07-24 NOTE — OP NOTE
Brief Operative Note:    : Shola Burns MD     Preoperative Diagnosis: QUINTEROS, PVCs    Postop Diagnosis: CAD    Treatments/Procedures: SCA/LHC/PCI mLAD w IVUS guidance.     Findings:     See catheterization report for full details.    Closure device: Vascband    Estimated Blood loss: 5 cc    Specimens removed: Veronika Burns MD

## 2023-07-24 NOTE — Clinical Note
The catheter was inserted into the left ventricle. Hemodynamics were performed.  and Pullback was recorded.  Catheter was removed. Never smoker

## 2023-07-24 NOTE — DISCHARGE INSTRUCTIONS
TR Band Post Procedure Discharge Instructions:  Do no lifting with affected arm x 24 hours   Apply manual pressure and call physician immediately if bleeding or hematoma occurs at the site.  Remove dressing the next day and keep site clean, dry and covered with a new band aid daily until healed.  Avoid submersion of site in water x 3 days.  Slight tenderness at the site or tingling of the fingers and hand for up to 3 days can be expected. Report symptoms such as severe pain in the first 3 days or persistent numbness.

## 2023-07-24 NOTE — PLAN OF CARE
1009 - Pt arrived to PACU bay 16 from cath lab via stretcher w/ cath lab team. SpO2 99% on RA, respirations even and unlabored. Pt awake and alert. VascBand to R wrist w/ 18 mL air in balloon. +2 radial pulses above and below band. VSS, NAD. Bed locked in lowest position w/ SR up x 2.     1305 - VascBand removed, bleeding controlled. Site dressed w/ gauze and tegaderm. +2 radial pulse noted, cap refill <2 sec. VSS, NAD.     1345 - Discharge instructions given and explained to patient and family with verbalization of understanding all instructions. Prescription given and explained next time and doses of each medication. Patients v/s stable, denies n/v and tolerating po, rates pain level tolerable, IV removed, and family at bedside for patient discharge home.

## 2023-07-24 NOTE — LETTER
July 24, 2023    Julio Bernardo  [address] [facility] [address]             [facility]  [address] [facility].  [place] [address]  Phone: [phone]                                  [facility]   [address] [facility]   [place] [address]  [phone]         [facility]   [address] [place] [address] [phone]         [facility]    [address] [facility] [address]  [place], [place] [address] [phone]   Re: Julio Bernardo  Clinic number: [id number]    Dear Mr. Bernardo:    You were recently admitted to an [facility] facility for cardiac (heart) problem.  Your physician has referred you to [facility].  Cardiac Rehab Phase 2 is an educational and exercise program, conducted in a outpatient setting, proven to help reduce your risk for recurrent heart events.    Cardiac rehab has two major parts:    1. Exercise training to help you achieve cardiovascular fitness while learning how to exercise safely and improve muscle strength and endurance.  Your exercise prescription will be based on the results of the cardiopulmonary stress test (CPX) which will be done before entering the program and at completion.  2. Education, counseling and training to help you understand your heart condition and find ways to reduce your risk of future heart problems.  The cardiac rehab team will help you learn how to cope with the stress of adjusting to a new lifestyle and to deal with your fears about the future.    Phase 2 is a 36-session program, meeting 3 times a week for 12 weeks.  Each session consists of an hour of exercise and half-hour dedicated to the educational topic of the day.  Class days vary per location.  Please contact your nearest facility for details.    Through cardiac rehab you will learn:  About your heart condition, medical therapies, and medication  Risk factors in y our lifestyle contributing to heart  disease  New strategies to modify your risk factors  About a healthy diet that can lower your blood cholesterol, control weight, help prevent or control high blood pressure, and diabetes  How to stop smoking  How to manage stress    If you are interested in getting started, call the [facility] of your choosing.     Sincerely,     [facility]

## 2023-07-24 NOTE — LETTER
July 24, 2023    Julio Bernardo  [address] [facility] [address]             [facility]  [address] [facility].  [place] [address]  Phone: [phone]                                  [facility]   [address] [facility]   [place] [address]  [phone]         [facility]   [address] [place] [address] [phone]         [facility]    [address] [facility] [address]  [place], [place] [address] [phone]   Re: Julio Bernardo  Clinic number: [id number]    Dear Mr. Bernardo:    You were recently admitted to an [facility] facility for cardiac (heart) problem.  Your physician has referred you to [facility].  Cardiac Rehab Phase 2 is an educational and exercise program, conducted in a outpatient setting, proven to help reduce your risk for recurrent heart events.    Cardiac rehab has two major parts:    1. Exercise training to help you achieve cardiovascular fitness while learning how to exercise safely and improve muscle strength and endurance.  Your exercise prescription will be based on the results of the cardiopulmonary stress test (CPX) which will be done before entering the program and at completion.  2. Education, counseling and training to help you understand your heart condition and find ways to reduce your risk of future heart problems.  The cardiac rehab team will help you learn how to cope with the stress of adjusting to a new lifestyle and to deal with your fears about the future.    Phase 2 is a 36-session program, meeting 3 times a week for 12 weeks.  Each session consists of an hour of exercise and half-hour dedicated to the educational topic of the day.  Class days vary per location.  Please contact your nearest facility for details.    Through cardiac rehab you will learn:  About your heart condition, medical therapies, and medication  Risk factors in y our lifestyle contributing to heart  disease  New strategies to modify your risk factors  About a healthy diet that can lower your blood cholesterol, control weight, help prevent or control high blood pressure, and diabetes  How to stop smoking  How to manage stress    If you are interested in getting started, call the [facility] of your choosing.     Sincerely,     [facility]

## 2023-07-24 NOTE — TELEPHONE ENCOUNTER
Information packet & letter regarding Phase II cardiac rehab was sent to patient.  Will contact patient in 2 weeks to see if interested.  Also, information letter sent to [facility].  Tiffanie Shirley RN  Cardiac Rehab Nurse

## 2023-07-24 NOTE — DISCHARGE SUMMARY
Discharge Summary  Interventional Cardiology      Admit Date: 7/24/2023    Discharge Date:  7/24/2023    Attending Physician: Shola Burns    Discharge Physician: Shola Burns MD    Principal Diagnoses: QUINTEROS/PVCs/CAD    Indication for Admission: Angiogram, Coronary, with Left Heart Cath (N/A), IVUS, Coronary, Stent, Drug Eluting, Single Vessel, Coronary    Discharged Condition: Good    Hospital Course:     Patient presented for outpatient cardiac catheterization which went without complication. See cardiac catheterization report for details.    Outpatient Plan:  -  Patient to start ticagrelor 90 mg BID  - follow-up with Dr. Burns in 4 weeks    Diet: Cardiac diet    Activity: wound care instructions provided    Disposition: Home or Self Care    Follow Up:  - 4 weeks    Discharge Medications:      Medication List        START taking these medications      ticagrelor 90 mg tablet  Commonly known as: BRILINTA  Take 1 tablet (90 mg total) by mouth 2 (two) times daily.            CONTINUE taking these medications      aspirin 81 mg Tab     atorvastatin 80 MG tablet  Commonly known as: LIPITOR  Take 1 tablet (80 mg total) by mouth once daily.     fish oil-omega-3 fatty acids 300-1,000 mg capsule     hydroCHLOROthiazide 12.5 mg capsule  Commonly known as: MICROZIDE  Take 1 capsule (12.5 mg total) by mouth once daily.     losartan 50 MG tablet  Commonly known as: COZAAR  Take 1 tablet (50 mg total) by mouth once daily.     verapamiL 240 MG C24p  Commonly known as: VERELAN  Take 1 capsule (240 mg total) by mouth once daily.            ASK your doctor about these medications      albuterol 90 mcg/actuation inhaler  Commonly known as: PROVENTIL/VENTOLIN HFA  Inhale 2 puffs into the lungs every 6 (six) hours as needed (cough).     ALLEGRA ORAL     ascorbic acid (vitamin C) 1000 MG tablet  Commonly known as: VITAMIN C     azelastine 137 mcg (0.1 %) nasal spray  Commonly known as: ASTELIN  INSTILL 2 SPRAYS IN EACH NOSTRIL TWICE A  DAY     ciclopirox 8 % Soln  Commonly known as: PENLAC  Apply topically nightly.     fluticasone propionate 50 mcg/actuation nasal spray  Commonly known as: FLONASE  USE 1 SPRAY BY EACH NARE ROUTE ONCE DAILY.     GAVILYTE-G 236-22.74-6.74 -5.86 gram suspension  Generic drug: polyethylene glycol     HYDROcodone-acetaminophen 7.5-325 mg per tablet  Commonly known as: NORCO  Take 1 tablet by mouth every 6 (six) hours as needed for Pain.     ketoconazole 2 % cream  Commonly known as: NIZORAL  Apply topically once daily. Apply daily to affected toenails for 6-12 months     multivitamin capsule     omeprazole 20 MG capsule  Commonly known as: PRILOSEC  Take 1 capsule (20 mg total) by mouth 2 (two) times daily.               Where to Get Your Medications        These medications were sent to [facility] [address] [place] [address]      Hours: Mon-Fri, 8a-5:30p Phone: [phone]   ticagrelor 90 mg tablet

## 2023-07-24 NOTE — INTERVAL H&P NOTE
The patient has been examined and the H&P has been reviewed:    I concur with the findings and no changes have occurred since H&P was written.    Anesthesia/Surgery risks, benefits and alternative options discussed and understood by patient/family.

## 2023-08-04 ENCOUNTER — TELEPHONE (OUTPATIENT)
Dept: CARDIAC REHAB | Facility: CLINIC | Age: 73
End: 2023-08-04
Payer: MEDICARE

## 2023-08-04 NOTE — TELEPHONE ENCOUNTER
----- Message from Carmen Henry sent at 8/3/2023 12:02 PM CDT -----  Regarding: Appt Needed  Pt Requesting appt     Contact @ [phone]    Thanks

## 2023-08-04 NOTE — TELEPHONE ENCOUNTER
----- Message from Carmen Henry sent at 8/4/2023  9:27 AM CDT -----  Regarding: Returning Call  Good Morning,     Pt returning call for appt.     Contact @ [phone]    Thanks

## 2023-08-04 NOTE — TELEPHONE ENCOUNTER
Attempted to return patient's call.  Unable to leave message due to mailbox is full.  Tiffanie Shirley, KAYLIN  Cardiac Rehab Nurse

## 2023-08-04 NOTE — TELEPHONE ENCOUNTER
Returned patient's call.  Is interested in attending Phase II cardiac rehab.  Insurance information sent for authorization.  Will contact patient once info is back.  Tiffanie Shirley RN  Cardiac Rehab Nurse

## 2023-08-07 ENCOUNTER — OFFICE VISIT (OUTPATIENT)
Dept: CARDIOLOGY | Facility: CLINIC | Age: 73
End: 2023-08-07
Payer: MEDICARE

## 2023-08-07 VITALS
DIASTOLIC BLOOD PRESSURE: 76 MMHG | BODY MASS INDEX: 33 KG/M2 | SYSTOLIC BLOOD PRESSURE: 145 MMHG | RESPIRATION RATE: 20 BRPM | WEIGHT: 249 LBS | HEART RATE: 56 BPM | HEIGHT: 73 IN

## 2023-08-07 DIAGNOSIS — I49.3 PVC'S (PREMATURE VENTRICULAR CONTRACTIONS): ICD-10-CM

## 2023-08-07 DIAGNOSIS — Z98.61 POSTSURGICAL PERCUTANEOUS TRANSLUMINAL CORONARY ANGIOPLASTY STATUS: Primary | ICD-10-CM

## 2023-08-07 DIAGNOSIS — I25.10 CORONARY ARTERY DISEASE INVOLVING NATIVE CORONARY ARTERY OF NATIVE HEART WITHOUT ANGINA PECTORIS: ICD-10-CM

## 2023-08-07 DIAGNOSIS — I87.2 VENOUS INSUFFICIENCY: ICD-10-CM

## 2023-08-07 DIAGNOSIS — E78.5 HYPERLIPIDEMIA, UNSPECIFIED HYPERLIPIDEMIA TYPE: ICD-10-CM

## 2023-08-07 DIAGNOSIS — I25.10 CORONARY ARTERY DISEASE, UNSPECIFIED VESSEL OR LESION TYPE, UNSPECIFIED WHETHER ANGINA PRESENT, UNSPECIFIED WHETHER NATIVE OR TRANSPLANTED HEART: ICD-10-CM

## 2023-08-07 DIAGNOSIS — I10 PRIMARY HYPERTENSION: ICD-10-CM

## 2023-08-07 DIAGNOSIS — I70.0 ATHEROSCLEROSIS OF AORTA: Primary | ICD-10-CM

## 2023-08-07 PROCEDURE — 3078F DIAST BP <80 MM HG: CPT | Mod: CPTII,S$GLB,, | Performed by: INTERNAL MEDICINE

## 2023-08-07 PROCEDURE — 1159F MED LIST DOCD IN RCRD: CPT | Mod: CPTII,S$GLB,, | Performed by: INTERNAL MEDICINE

## 2023-08-07 PROCEDURE — 3008F BODY MASS INDEX DOCD: CPT | Mod: CPTII,S$GLB,, | Performed by: INTERNAL MEDICINE

## 2023-08-07 PROCEDURE — 3077F PR MOST RECENT SYSTOLIC BLOOD PRESSURE >= 140 MM HG: ICD-10-PCS | Mod: CPTII,S$GLB,, | Performed by: INTERNAL MEDICINE

## 2023-08-07 PROCEDURE — 3288F FALL RISK ASSESSMENT DOCD: CPT | Mod: CPTII,S$GLB,, | Performed by: INTERNAL MEDICINE

## 2023-08-07 PROCEDURE — 1159F PR MEDICATION LIST DOCUMENTED IN MEDICAL RECORD: ICD-10-PCS | Mod: CPTII,S$GLB,, | Performed by: INTERNAL MEDICINE

## 2023-08-07 PROCEDURE — 1160F RVW MEDS BY RX/DR IN RCRD: CPT | Mod: CPTII,S$GLB,, | Performed by: INTERNAL MEDICINE

## 2023-08-07 PROCEDURE — 99999 PR PBB SHADOW E&M-EST. PATIENT-LVL IV: CPT | Mod: PBBFAC,,, | Performed by: INTERNAL MEDICINE

## 2023-08-07 PROCEDURE — 3008F PR BODY MASS INDEX (BMI) DOCUMENTED: ICD-10-PCS | Mod: CPTII,S$GLB,, | Performed by: INTERNAL MEDICINE

## 2023-08-07 PROCEDURE — 3077F SYST BP >= 140 MM HG: CPT | Mod: CPTII,S$GLB,, | Performed by: INTERNAL MEDICINE

## 2023-08-07 PROCEDURE — 1126F PR PAIN SEVERITY QUANTIFIED, NO PAIN PRESENT: ICD-10-PCS | Mod: CPTII,S$GLB,, | Performed by: INTERNAL MEDICINE

## 2023-08-07 PROCEDURE — 1126F AMNT PAIN NOTED NONE PRSNT: CPT | Mod: CPTII,S$GLB,, | Performed by: INTERNAL MEDICINE

## 2023-08-07 PROCEDURE — 99214 OFFICE O/P EST MOD 30 MIN: CPT | Mod: S$GLB,,, | Performed by: INTERNAL MEDICINE

## 2023-08-07 PROCEDURE — 3078F PR MOST RECENT DIASTOLIC BLOOD PRESSURE < 80 MM HG: ICD-10-PCS | Mod: CPTII,S$GLB,, | Performed by: INTERNAL MEDICINE

## 2023-08-07 PROCEDURE — 3288F PR FALLS RISK ASSESSMENT DOCUMENTED: ICD-10-PCS | Mod: CPTII,S$GLB,, | Performed by: INTERNAL MEDICINE

## 2023-08-07 PROCEDURE — 1160F PR REVIEW ALL MEDS BY PRESCRIBER/CLIN PHARMACIST DOCUMENTED: ICD-10-PCS | Mod: CPTII,S$GLB,, | Performed by: INTERNAL MEDICINE

## 2023-08-07 PROCEDURE — 99999 PR PBB SHADOW E&M-EST. PATIENT-LVL IV: ICD-10-PCS | Mod: PBBFAC,,, | Performed by: INTERNAL MEDICINE

## 2023-08-07 PROCEDURE — 4010F PR ACE/ARB THEARPY RXD/TAKEN: ICD-10-PCS | Mod: CPTII,S$GLB,, | Performed by: INTERNAL MEDICINE

## 2023-08-07 PROCEDURE — 4010F ACE/ARB THERAPY RXD/TAKEN: CPT | Mod: CPTII,S$GLB,, | Performed by: INTERNAL MEDICINE

## 2023-08-07 PROCEDURE — 1101F PT FALLS ASSESS-DOCD LE1/YR: CPT | Mod: CPTII,S$GLB,, | Performed by: INTERNAL MEDICINE

## 2023-08-07 PROCEDURE — 1101F PR PT FALLS ASSESS DOC 0-1 FALLS W/OUT INJ PAST YR: ICD-10-PCS | Mod: CPTII,S$GLB,, | Performed by: INTERNAL MEDICINE

## 2023-08-07 PROCEDURE — 99214 PR OFFICE/OUTPT VISIT, EST, LEVL IV, 30-39 MIN: ICD-10-PCS | Mod: S$GLB,,, | Performed by: INTERNAL MEDICINE

## 2023-08-07 RX ORDER — LOSARTAN POTASSIUM 100 MG/1
100 TABLET ORAL DAILY
Qty: 90 TABLET | Refills: 3 | Status: SHIPPED | OUTPATIENT
Start: 2023-08-07

## 2023-08-07 NOTE — PROGRESS NOTES
HISTORY:    73-year-old male with a history of CAD s/p LAD PCI July '23, hypertension, hyperlipidemia, PVCs, and OA s/p RLE TKR '23 referred for evaluation by Dr. Zimmerman for abnormal PET stress.      The patient denies any symptoms of chest pain. QUINTEROS improved post PCI.    Exercise tolerance is good and has improved post PCI. The [place]    Long h/o palpitations dating back to a young age. Has not really progressed. Did have a transient episode of increased palpitations with light headedness back in May that sparked castellon resulting in holter and EPS evaluation. 14% PVC burden with anml LVEF. Started on verapamil by Dr. Canela. No decrease in symptoms. PET stress w 2 abnormalities.    Patient does have intermittent le edema for years.     Patient currently tolerates aspirin 81 x 1, ticagrelor 90 x 2, hydrochlorothiazide 12.5 x 1, losartan 50 x 1, verapamil 240 x 1, and atorvastatin 40 x 1.    PHYSICAL EXAM:    Vitals:    08/07/23 1404   BP: (!) 145/76   Pulse: (!) 56   Resp: 20         NAD, A+Ox3.  No jvd, no bruit.  RRR nml s1,s2. No murmurs.  CTA B no wheezes or crackles.  No edema.    LABS/STUDIES (imaging reviewed during clinic visit):    July 2023 CBC and CMP normal. March 2023 LDL 82 and HDL 50.  Triglycerides 106.  ECG July 2023 demonstrates sinus rhythm with no Q-waves or ST changes.  Holter April 2023 sinus rhythm with a 13.9% PVC burden.  Some short runs of NSVT mostly 4-5 beats in duration.  1.5% Pac burden.    TTE April 2023 normal LV size and function with EF 60%.  CVP 3.  Frequent ectopy.    PET stress July 2023 normal LVEF.  LAD and left circ calcification.  14% lateral perfusion abnormality involving 14% of LV myocardium and increasing to 16% with stress.  11% reversible defect in the LAD territory as well.    Cardiac catheterization July 2023 99% mid LAD stenosis status post successful PCI with TEODORA x1 (2.70v76hm with IVUS guided  post-dilation to 2.0 distally and 3.0 proximally). IVUS demonstrated a large, patent left main with mild luminal irregularities.  Patent left circ and large, dominant RCA system. LVEDP 20.      ASSESSMENT & PLAN:    1. Atherosclerosis of aorta    2. Coronary artery disease involving native coronary artery of native heart without angina pectoris    3. Hyperlipidemia, unspecified hyperlipidemia type    4. Primary hypertension    5. PVC's (premature ventricular contractions)    6. Venous insufficiency          Orders Placed This Encounter    Basic Metabolic Panel    Lipid Panel    CBC Without Differential    CV US Lower Extremity Veins Bilateral Insufficiency    losartan (COZAAR) 100 MG tablet        Patient being worked up for PVCs that have likely been present for some time. PET stress w 2 defects and patient with some QUINTEROS now s/p LAD PCI w nice angiographic result. QUINTEROS subjectively improved.     Cont asa 81x1 and ticagrelor 90x2.      Bps slightly above goal on verapamil 240x1, hydrochlorothiazide 12.5 x 1, and losartan 50 x 1. Increase losartan to 100x1.     LDL 82 on atorvastatin 40x1. Increased to 80x1. Follow-up LDL.     Venous insufficiency. Check a reflux study. Compression stockings as tolerated.     PVC management per Dr. Canela.     Follow up in about 6 months (around 2/7/2024).      Shola Burns MD

## 2023-08-14 ENCOUNTER — PATIENT MESSAGE (OUTPATIENT)
Dept: CARDIOLOGY | Facility: CLINIC | Age: 73
End: 2023-08-14
Payer: MEDICARE

## 2023-08-15 ENCOUNTER — PATIENT MESSAGE (OUTPATIENT)
Dept: CARDIOLOGY | Facility: CLINIC | Age: 73
End: 2023-08-15
Payer: MEDICARE

## 2023-08-15 ENCOUNTER — LAB VISIT (OUTPATIENT)
Dept: LAB | Facility: HOSPITAL | Age: 73
End: 2023-08-15
Attending: INTERNAL MEDICINE
Payer: MEDICARE

## 2023-08-15 DIAGNOSIS — I49.3 PVC'S (PREMATURE VENTRICULAR CONTRACTIONS): ICD-10-CM

## 2023-08-15 DIAGNOSIS — I25.10 CORONARY ARTERY DISEASE INVOLVING NATIVE CORONARY ARTERY OF NATIVE HEART WITHOUT ANGINA PECTORIS: ICD-10-CM

## 2023-08-15 LAB
ANION GAP SERPL CALC-SCNC: 10 MMOL/L (ref 8–16)
BUN SERPL-MCNC: 12 MG/DL (ref 8–23)
CALCIUM SERPL-MCNC: 9.1 MG/DL (ref 8.7–10.5)
CHLORIDE SERPL-SCNC: 104 MMOL/L (ref 95–110)
CHOLEST SERPL-MCNC: 118 MG/DL (ref 120–199)
CHOLEST/HDLC SERPL: 3 {RATIO} (ref 2–5)
CO2 SERPL-SCNC: 26 MMOL/L (ref 23–29)
CREAT SERPL-MCNC: 1.3 MG/DL (ref 0.5–1.4)
ERYTHROCYTE [DISTWIDTH] IN BLOOD BY AUTOMATED COUNT: 13.2 % (ref 11.5–14.5)
EST. GFR  (NO RACE VARIABLE): 57.6 ML/MIN/1.73 M^2
GLUCOSE SERPL-MCNC: 102 MG/DL (ref 70–110)
HCT VFR BLD AUTO: 43.3 % (ref 40–54)
HDLC SERPL-MCNC: 39 MG/DL (ref 40–75)
HDLC SERPL: 33.1 % (ref 20–50)
HGB BLD-MCNC: 13.9 G/DL (ref 14–18)
LDLC SERPL CALC-MCNC: 62 MG/DL (ref 63–159)
MCH RBC QN AUTO: 26.4 PG (ref 27–31)
MCHC RBC AUTO-ENTMCNC: 32.1 G/DL (ref 32–36)
MCV RBC AUTO: 82 FL (ref 82–98)
NONHDLC SERPL-MCNC: 79 MG/DL
PLATELET # BLD AUTO: 225 K/UL (ref 150–450)
PMV BLD AUTO: 11.5 FL (ref 9.2–12.9)
POTASSIUM SERPL-SCNC: 4.2 MMOL/L (ref 3.5–5.1)
RBC # BLD AUTO: 5.27 M/UL (ref 4.6–6.2)
SODIUM SERPL-SCNC: 140 MMOL/L (ref 136–145)
TRIGL SERPL-MCNC: 85 MG/DL (ref 30–150)
WBC # BLD AUTO: 7.33 K/UL (ref 3.9–12.7)

## 2023-08-15 PROCEDURE — 80048 BASIC METABOLIC PNL TOTAL CA: CPT | Performed by: INTERNAL MEDICINE

## 2023-08-15 PROCEDURE — 80061 LIPID PANEL: CPT | Performed by: INTERNAL MEDICINE

## 2023-08-15 PROCEDURE — 36415 COLL VENOUS BLD VENIPUNCTURE: CPT | Performed by: INTERNAL MEDICINE

## 2023-08-15 PROCEDURE — 85027 COMPLETE CBC AUTOMATED: CPT | Performed by: INTERNAL MEDICINE

## 2023-08-21 ENCOUNTER — HOSPITAL ENCOUNTER (OUTPATIENT)
Dept: CARDIOLOGY | Facility: HOSPITAL | Age: 73
Discharge: HOME | End: 2023-08-21
Attending: INTERNAL MEDICINE
Payer: MEDICARE

## 2023-08-21 ENCOUNTER — PATIENT MESSAGE (OUTPATIENT)
Dept: CARDIOLOGY | Facility: CLINIC | Age: 73
End: 2023-08-21
Payer: MEDICARE

## 2023-08-21 DIAGNOSIS — I87.2 VENOUS INSUFFICIENCY: ICD-10-CM

## 2023-08-21 LAB
LEFT GREAT SAPHENOUS DISTAL THIGH DIA: 0.18 CM
LEFT GREAT SAPHENOUS JUNCTION DIA: 0.46 CM
LEFT GREAT SAPHENOUS KNEE DIA: 0.26 CM
LEFT GREAT SAPHENOUS MIDDLE THIGH DIA: 0.17 CM
LEFT GREAT SAPHENOUS PROXIMAL CALF DIA: 0.21 CM
LEFT SMALL SAPHENOUS SPJ DIA: 0.21 CM
RIGHT GREAT SAPHENOUS DISTAL THIGH DIA: 0.23 CM
RIGHT GREAT SAPHENOUS JUNCTION DIA: 0.52 CM
RIGHT GREAT SAPHENOUS KNEE DIA: 0.21 CM
RIGHT GREAT SAPHENOUS MIDDLE THIGH DIA: 0.23 CM
RIGHT GREAT SAPHENOUS PROXIMAL CALF DIA: 0.17 CM
RIGHT SMALL SAPHENOUS SPJ DIA: 0.39 CM

## 2023-08-21 PROCEDURE — 93970 CV US LOWER VENOUS INSUFFICIENCY BILATERAL (CUPID ONLY): ICD-10-PCS | Mod: 26,,, | Performed by: INTERNAL MEDICINE

## 2023-08-21 PROCEDURE — 93970 EXTREMITY STUDY: CPT | Mod: 26,,, | Performed by: INTERNAL MEDICINE

## 2023-08-21 PROCEDURE — 93970 EXTREMITY STUDY: CPT | Mod: TC

## 2023-08-22 RX ORDER — CLOPIDOGREL BISULFATE 75 MG/1
75 TABLET ORAL DAILY
Qty: 90 TABLET | Refills: 3 | Status: SHIPPED | OUTPATIENT
Start: 2023-08-22 | End: 2023-08-22

## 2023-08-23 ENCOUNTER — HOSPITAL ENCOUNTER (OUTPATIENT)
Dept: RADIOLOGY | Facility: HOSPITAL | Age: 73
Discharge: HOME | End: 2023-08-23
Attending: PODIATRIST
Payer: MEDICARE

## 2023-08-23 ENCOUNTER — OFFICE VISIT (OUTPATIENT)
Dept: PODIATRY | Facility: CLINIC | Age: 73
End: 2023-08-23
Payer: MEDICARE

## 2023-08-23 ENCOUNTER — TELEPHONE (OUTPATIENT)
Dept: PODIATRY | Facility: CLINIC | Age: 73
End: 2023-08-23
Payer: MEDICARE

## 2023-08-23 VITALS
DIASTOLIC BLOOD PRESSURE: 78 MMHG | HEART RATE: 74 BPM | BODY MASS INDEX: 32.39 KG/M2 | SYSTOLIC BLOOD PRESSURE: 133 MMHG | WEIGHT: 244.38 LBS | HEIGHT: 73 IN

## 2023-08-23 DIAGNOSIS — M10.00 ACUTE IDIOPATHIC GOUT, UNSPECIFIED SITE: ICD-10-CM

## 2023-08-23 DIAGNOSIS — M79.671 FOOT PAIN, RIGHT: ICD-10-CM

## 2023-08-23 DIAGNOSIS — M10.00 ACUTE IDIOPATHIC GOUT, UNSPECIFIED SITE: Primary | ICD-10-CM

## 2023-08-23 PROCEDURE — 3008F PR BODY MASS INDEX (BMI) DOCUMENTED: ICD-10-PCS | Mod: CPTII,S$GLB,, | Performed by: PODIATRIST

## 2023-08-23 PROCEDURE — 99999 PR PBB SHADOW E&M-EST. PATIENT-LVL IV: ICD-10-PCS | Mod: PBBFAC,,, | Performed by: PODIATRIST

## 2023-08-23 PROCEDURE — 3075F SYST BP GE 130 - 139MM HG: CPT | Mod: CPTII,S$GLB,, | Performed by: PODIATRIST

## 2023-08-23 PROCEDURE — 3008F BODY MASS INDEX DOCD: CPT | Mod: CPTII,S$GLB,, | Performed by: PODIATRIST

## 2023-08-23 PROCEDURE — 99999 PR PBB SHADOW E&M-EST. PATIENT-LVL IV: CPT | Mod: PBBFAC,,, | Performed by: PODIATRIST

## 2023-08-23 PROCEDURE — 73630 XR FOOT COMPLETE 3 VIEW RIGHT: ICD-10-PCS | Mod: 26,RT,, | Performed by: RADIOLOGY

## 2023-08-23 PROCEDURE — 1125F PR PAIN SEVERITY QUANTIFIED, PAIN PRESENT: ICD-10-PCS | Mod: CPTII,S$GLB,, | Performed by: PODIATRIST

## 2023-08-23 PROCEDURE — 73630 X-RAY EXAM OF FOOT: CPT | Mod: TC,PN,RT

## 2023-08-23 PROCEDURE — 99214 PR OFFICE/OUTPT VISIT, EST, LEVL IV, 30-39 MIN: ICD-10-PCS | Mod: S$GLB,,, | Performed by: PODIATRIST

## 2023-08-23 PROCEDURE — 1159F PR MEDICATION LIST DOCUMENTED IN MEDICAL RECORD: ICD-10-PCS | Mod: CPTII,S$GLB,, | Performed by: PODIATRIST

## 2023-08-23 PROCEDURE — 3078F PR MOST RECENT DIASTOLIC BLOOD PRESSURE < 80 MM HG: ICD-10-PCS | Mod: CPTII,S$GLB,, | Performed by: PODIATRIST

## 2023-08-23 PROCEDURE — 4010F ACE/ARB THERAPY RXD/TAKEN: CPT | Mod: CPTII,S$GLB,, | Performed by: PODIATRIST

## 2023-08-23 PROCEDURE — 1160F RVW MEDS BY RX/DR IN RCRD: CPT | Mod: CPTII,S$GLB,, | Performed by: PODIATRIST

## 2023-08-23 PROCEDURE — 4010F PR ACE/ARB THEARPY RXD/TAKEN: ICD-10-PCS | Mod: CPTII,S$GLB,, | Performed by: PODIATRIST

## 2023-08-23 PROCEDURE — 73630 X-RAY EXAM OF FOOT: CPT | Mod: 26,RT,, | Performed by: RADIOLOGY

## 2023-08-23 PROCEDURE — 1160F PR REVIEW ALL MEDS BY PRESCRIBER/CLIN PHARMACIST DOCUMENTED: ICD-10-PCS | Mod: CPTII,S$GLB,, | Performed by: PODIATRIST

## 2023-08-23 PROCEDURE — 1125F AMNT PAIN NOTED PAIN PRSNT: CPT | Mod: CPTII,S$GLB,, | Performed by: PODIATRIST

## 2023-08-23 PROCEDURE — 99214 OFFICE O/P EST MOD 30 MIN: CPT | Mod: S$GLB,,, | Performed by: PODIATRIST

## 2023-08-23 PROCEDURE — 3075F PR MOST RECENT SYSTOLIC BLOOD PRESS GE 130-139MM HG: ICD-10-PCS | Mod: CPTII,S$GLB,, | Performed by: PODIATRIST

## 2023-08-23 PROCEDURE — 3078F DIAST BP <80 MM HG: CPT | Mod: CPTII,S$GLB,, | Performed by: PODIATRIST

## 2023-08-23 PROCEDURE — 1159F MED LIST DOCD IN RCRD: CPT | Mod: CPTII,S$GLB,, | Performed by: PODIATRIST

## 2023-08-23 RX ORDER — METHYLPREDNISOLONE 4 MG/1
TABLET ORAL
Qty: 1 EACH | Refills: 0 | Status: SHIPPED | OUTPATIENT
Start: 2023-08-23 | End: 2023-11-09

## 2023-08-23 NOTE — PROGRESS NOTES
Subjective:      Patient ID: Julio Bernardo is a 73 y.o. male.    Chief Complaint:   Gout (Right foot)    Julio is a 73 y.o. male who presents to the podiatry clinic  with complaint of  right foot pain.  Patient relates he is not sure if it is gout.  He is not currently on allopurinol.  He is had no injury.  He did go to [place] Sunday in watch his granddaughter soccer game.  He did a lot a walking but was wearing good shoes  He noticed some redness and swelling around the foot on Monday    He does walk a city [place] for exercise and Monday had some pain    He does have Colcrys at home because he never took it from the previous prescription when it was dispensed    New to me  Has been followed by Podiatry recently for ingrown toenails  Did see Dr. White last year for suspected gout:  Medrol Dosepak and colchicine  X-ray was negative    Recent knee procedure      Lab Results   Component Value Date    URICACID 6.9 08/23/2023       Followed by cardiology:  ASSESSMENT & PLAN:     1. Atherosclerosis of aorta    2. Coronary artery disease involving native coronary artery of native heart without angina pectoris    3. Hyperlipidemia, unspecified hyperlipidemia type    4. Primary hypertension    5. PVC's (premature ventricular contractions)    6. Venous insufficiency      Past Medical History:   Diagnosis Date    Allergy     Arthritis 2019    Colon polyps     Disorder of kidney and ureter 2019    Hyperlipemia     Hypertension     Posterior capsular opacification      Past Surgical History:   Procedure Laterality Date    ANGIOGRAM, CORONARY, WITH LEFT HEART CATHETERIZATION N/A 7/24/2023    Procedure: Angiogram, Coronary, with Left Heart Cath;  Surgeon: Shola Burns MD;  Location: [facility] CATH LAB;  Service: Cardiology;  Laterality: N/A;    ARTHROSCOPIC CHONDROPLASTY OF KNEE JOINT Left 5/25/2023    Procedure: ARTHROSCOPY, KNEE, WITH CHONDROPLASTY;  Surgeon: Brayan Payne MD;  Location: [facility] OR;  Service:  Orthopedics;  Laterality: Left;    CATARACT EXTRACTION      both eyes    COLONOSCOPY N/A 09/27/2016    Procedure: COLONOSCOPY;  Surgeon: Jan Argueta MD;  Location: [facility] ENDO (4TH FLR);  Service: Endoscopy;  Laterality: N/A;    COLONOSCOPY N/A 05/19/2022    Procedure: COLONOSCOPY;  Surgeon: Bobo Delcid MD;  Location: [facility] ENDO (4TH FLR);  Service: Endoscopy;  Laterality: N/A;  4/4 fully vaccinated; instructions to [place]    EYE SURGERY      HERNIA REPAIR      IVUS, CORONARY  7/24/2023    Procedure: IVUS, Coronary;  Surgeon: Shola Burns MD;  Location: [facility] CATH LAB;  Service: Cardiology;;    KNEE ARTHROSCOPY Right 01/01/2014    KNEE ARTHROSCOPY W/ MENISCECTOMY Left 5/25/2023    Procedure: ARTHROSCOPY, KNEE, WITH MENISCECTOMY;  Surgeon: Brayan Payne MD;  Location: [facility] OR;  Service: Orthopedics;  Laterality: Left;  medial and lateral    STENT, DRUG ELUTING, SINGLE VESSEL, CORONARY  7/24/2023    Procedure: Stent, Drug Eluting, Single Vessel, Coronary;  Surgeon: Shola Burns MD;  Location: [facility] CATH LAB;  Service: Cardiology;;    WISDOM TOOTH EXTRACTION      YAG      Left Eye     Current Outpatient Medications on File Prior to Visit   Medication Sig Dispense Refill    albuterol (PROVENTIL/VENTOLIN HFA) 90 mcg/actuation inhaler Inhale 2 puffs into the lungs every 6 (six) hours as needed (cough). 8.5 g 2    ascorbic acid (VITAMIN C) 1000 MG tablet Take 1,000 mg by mouth once daily.      aspirin 81 mg Tab Take by mouth. 1 Tablet Oral Every day.  Last taken 3/25/11      atorvastatin (LIPITOR) 80 MG tablet Take 1 tablet (80 mg total) by mouth once daily. 90 tablet 3    azelastine (ASTELIN) 137 mcg (0.1 %) nasal spray INSTILL 2 SPRAYS IN EACH NOSTRIL TWICE A DAY 30 mL 2    ciclopirox (PENLAC) 8 % Soln Apply topically nightly. 6.6 mL 11    fexofenadine HCl (ALLEGRA ORAL) Take by mouth.      fish oil-omega-3 fatty acids 300-1,000 mg capsule Take 2 g by mouth once daily.      fluticasone propionate (FLONASE) 50  mcg/actuation nasal spray USE 1 SPRAY BY EACH NARE ROUTE ONCE DAILY. 32 mL 3    GAVILYTE-G 236-22.74-6.74 -5.86 gram suspension Take 4,000 mLs by mouth once.      hydroCHLOROthiazide (MICROZIDE) 12.5 mg capsule Take 1 capsule (12.5 mg total) by mouth once daily. 90 capsule 3    HYDROcodone-acetaminophen (NORCO) 7.5-325 mg per tablet Take 1 tablet by mouth every 6 (six) hours as needed for Pain. 20 tablet 0    ketoconazole (NIZORAL) 2 % cream Apply topically once daily. Apply daily to affected toenails for 6-12 months 60 g 4    losartan (COZAAR) 100 MG tablet Take 1 tablet (100 mg total) by mouth once daily. 90 tablet 3    multivitamin capsule Take 1 capsule by mouth once daily.      omeprazole (PRILOSEC) 20 MG capsule Take 1 capsule (20 mg total) by mouth 2 (two) times daily. 180 capsule 1    ticagrelor (BRILINTA) 90 mg tablet Take 1 tablet (90 mg total) by mouth 2 (two) times daily. 180 tablet 3    verapamiL (VERELAN) 240 MG C24P Take 1 capsule (240 mg total) by mouth once daily. 90 capsule 3     Current Facility-Administered Medications on File Prior to Visit   Medication Dose Route Frequency Provider Last Rate Last Admin    sodium chloride 0.9% flush 10 mL  10 mL Intravenous PRN Shola Burns MD         Review of patient's allergies indicates:  No Known Allergies    Review of Systems   Constitutional: Negative for chills, decreased appetite, fever, malaise/fatigue, night sweats, weight gain and weight loss.   Cardiovascular:  Negative for chest pain, claudication, dyspnea on exertion, leg swelling, palpitations and syncope.   Respiratory:  Negative for cough and shortness of breath.    Endocrine: Negative for cold intolerance and heat intolerance.   Hematologic/Lymphatic: Negative for bleeding problem. Does not bruise/bleed easily.   Skin:  Negative for color change, dry skin, flushing, itching, nail changes, poor wound healing, rash, skin cancer, suspicious lesions and unusual hair distribution.  "  Musculoskeletal:  Negative for arthritis, back pain, falls, gout, joint pain, joint swelling, muscle cramps, muscle weakness, myalgias, neck pain and stiffness.        Foot pain and swelling   Gastrointestinal:  Negative for diarrhea, nausea and vomiting.   Neurological:  Negative for dizziness, focal weakness, light-headedness, numbness, paresthesias, tremors, vertigo and weakness.   Psychiatric/Behavioral:  Negative for altered mental status and depression. The patient does not have insomnia.    Allergic/Immunologic: Negative.            Objective:       Vitals:    23 0924   BP: 133/78   Pulse: 74   Weight: 110.9 kg (244 lb 6.1 oz)   Height: 6' 1" (1.854 m)   PainSc: 10-Worst pain ever   PainLoc: Foot   110.9 kg (244 lb 6.1 oz)     Physical Exam  Vitals reviewed.   Constitutional:       General: He is not in acute distress.     Appearance: He is well-developed. He is not ill-appearing, toxic-appearing or diaphoretic.      Comments: Proper supportive shoegear      Cardiovascular:      Pulses:           Dorsalis pedis pulses are 2+ on the right side and 2+ on the left side.        Posterior tibial pulses are 2+ on the right side and 2+ on the left side.   Musculoskeletal:         General: No swelling or deformity.      Right lower le+ Edema present.      Left lower leg: No edema.      Right ankle: Normal.      Right Achilles Tendon: Normal.      Left ankle: Normal.      Left Achilles Tendon: Normal.      Right foot: Decreased range of motion. Tenderness and bony tenderness present. No deformity.      Left foot: Decreased range of motion. No deformity, tenderness or bony tenderness.      Comments: Positive edema pain on palpation to 1st metatarsal phalangeal joint range of motion medial eminence    Good cap fill time   Feet:      Right foot:      Protective Sensation: 10 sites tested.  10 sites sensed.      Skin integrity: Erythema present. No ulcer, blister, skin breakdown, warmth, callus or dry skin.    "   Toenail Condition: Right toenails are normal.      Left foot:      Protective Sensation: 10 sites tested.  10 sites sensed.      Skin integrity: No ulcer, blister, skin breakdown, erythema, warmth, callus or dry skin.      Toenail Condition: Left toenails are normal.      Comments: Minimal erythema  Skin:     General: Skin is warm.      Capillary Refill: Capillary refill takes 2 to 3 seconds.      Coloration: Skin is not pale.      Findings: No erythema or rash.      Nails: There is no clubbing.   Neurological:      Mental Status: He is alert and oriented to person, place, and time.      Sensory: No sensory deficit.      Gait: Gait abnormal.   Psychiatric:         Attention and Perception: Attention normal.         Mood and Affect: Mood normal.         Speech: Speech normal.         Behavior: Behavior normal.         Thought Content: Thought content normal.         Cognition and Memory: Cognition normal.         Judgment: Judgment normal.               Assessment:       Encounter Diagnoses   Name Primary?    Acute idiopathic gout, unspecified site Yes    Foot pain, right          Plan:       Julio was seen today for gout.    Diagnoses and all orders for this visit:    Acute idiopathic gout, unspecified site  -     X-Ray Foot Complete Right; Future    Foot pain, right  -     Uric acid; Future  -     X-Ray Foot Complete Right; Future    Other orders  -     methylPREDNISolone (MEDROL DOSEPACK) 4 mg tablet; use as directed      I counseled the patient on his conditions, their implications and medical management.    ? Med change caused gout attack?   Denies dehydration    D/w pcp about starting allopurinol    Rx uric acid    Rx xray    Pt has colcrys at home that was never started. Take 1.2mg when get home.   Then daily if need    Medrol dose maico prescribed, advised patient to take meds as directed. Patient should complete medication. If problems occur notify the clinic     Pt has not had any steroid since   Christopher    Consider injection after xray    Recommend hydration    Tubigrip applied     Pt has appt already on 17th       .           No follow-ups on file.

## 2023-09-06 ENCOUNTER — OFFICE VISIT (OUTPATIENT)
Dept: SPORTS MEDICINE | Facility: CLINIC | Age: 73
End: 2023-09-06
Payer: MEDICARE

## 2023-09-06 VITALS
DIASTOLIC BLOOD PRESSURE: 77 MMHG | HEIGHT: 73 IN | BODY MASS INDEX: 31.79 KG/M2 | HEART RATE: 62 BPM | WEIGHT: 239.88 LBS | SYSTOLIC BLOOD PRESSURE: 117 MMHG

## 2023-09-06 DIAGNOSIS — Z98.890 S/P LATERAL MENISCECTOMY OF LEFT KNEE: ICD-10-CM

## 2023-09-06 DIAGNOSIS — Z98.890 S/P MEDIAL MENISCECTOMY OF LEFT KNEE: Primary | ICD-10-CM

## 2023-09-06 PROCEDURE — 4010F PR ACE/ARB THEARPY RXD/TAKEN: ICD-10-PCS | Mod: CPTII,S$GLB,, | Performed by: PHYSICIAN ASSISTANT

## 2023-09-06 PROCEDURE — 99213 OFFICE O/P EST LOW 20 MIN: CPT | Mod: S$GLB,,, | Performed by: PHYSICIAN ASSISTANT

## 2023-09-06 PROCEDURE — 1125F AMNT PAIN NOTED PAIN PRSNT: CPT | Mod: CPTII,S$GLB,, | Performed by: PHYSICIAN ASSISTANT

## 2023-09-06 PROCEDURE — 3288F FALL RISK ASSESSMENT DOCD: CPT | Mod: CPTII,S$GLB,, | Performed by: PHYSICIAN ASSISTANT

## 2023-09-06 PROCEDURE — 1101F PT FALLS ASSESS-DOCD LE1/YR: CPT | Mod: CPTII,S$GLB,, | Performed by: PHYSICIAN ASSISTANT

## 2023-09-06 PROCEDURE — 4010F ACE/ARB THERAPY RXD/TAKEN: CPT | Mod: CPTII,S$GLB,, | Performed by: PHYSICIAN ASSISTANT

## 2023-09-06 PROCEDURE — 3078F DIAST BP <80 MM HG: CPT | Mod: CPTII,S$GLB,, | Performed by: PHYSICIAN ASSISTANT

## 2023-09-06 PROCEDURE — 99999 PR PBB SHADOW E&M-EST. PATIENT-LVL IV: ICD-10-PCS | Mod: PBBFAC,,, | Performed by: PHYSICIAN ASSISTANT

## 2023-09-06 PROCEDURE — 1160F RVW MEDS BY RX/DR IN RCRD: CPT | Mod: CPTII,S$GLB,, | Performed by: PHYSICIAN ASSISTANT

## 2023-09-06 PROCEDURE — 3078F PR MOST RECENT DIASTOLIC BLOOD PRESSURE < 80 MM HG: ICD-10-PCS | Mod: CPTII,S$GLB,, | Performed by: PHYSICIAN ASSISTANT

## 2023-09-06 PROCEDURE — 99999 PR PBB SHADOW E&M-EST. PATIENT-LVL IV: CPT | Mod: PBBFAC,,, | Performed by: PHYSICIAN ASSISTANT

## 2023-09-06 PROCEDURE — 1125F PR PAIN SEVERITY QUANTIFIED, PAIN PRESENT: ICD-10-PCS | Mod: CPTII,S$GLB,, | Performed by: PHYSICIAN ASSISTANT

## 2023-09-06 PROCEDURE — 99213 PR OFFICE/OUTPT VISIT, EST, LEVL III, 20-29 MIN: ICD-10-PCS | Mod: S$GLB,,, | Performed by: PHYSICIAN ASSISTANT

## 2023-09-06 PROCEDURE — 1101F PR PT FALLS ASSESS DOC 0-1 FALLS W/OUT INJ PAST YR: ICD-10-PCS | Mod: CPTII,S$GLB,, | Performed by: PHYSICIAN ASSISTANT

## 2023-09-06 PROCEDURE — 3008F PR BODY MASS INDEX (BMI) DOCUMENTED: ICD-10-PCS | Mod: CPTII,S$GLB,, | Performed by: PHYSICIAN ASSISTANT

## 2023-09-06 PROCEDURE — 3008F BODY MASS INDEX DOCD: CPT | Mod: CPTII,S$GLB,, | Performed by: PHYSICIAN ASSISTANT

## 2023-09-06 PROCEDURE — 1159F PR MEDICATION LIST DOCUMENTED IN MEDICAL RECORD: ICD-10-PCS | Mod: CPTII,S$GLB,, | Performed by: PHYSICIAN ASSISTANT

## 2023-09-06 PROCEDURE — 3288F PR FALLS RISK ASSESSMENT DOCUMENTED: ICD-10-PCS | Mod: CPTII,S$GLB,, | Performed by: PHYSICIAN ASSISTANT

## 2023-09-06 PROCEDURE — 1159F MED LIST DOCD IN RCRD: CPT | Mod: CPTII,S$GLB,, | Performed by: PHYSICIAN ASSISTANT

## 2023-09-06 PROCEDURE — 3074F SYST BP LT 130 MM HG: CPT | Mod: CPTII,S$GLB,, | Performed by: PHYSICIAN ASSISTANT

## 2023-09-06 PROCEDURE — 1160F PR REVIEW ALL MEDS BY PRESCRIBER/CLIN PHARMACIST DOCUMENTED: ICD-10-PCS | Mod: CPTII,S$GLB,, | Performed by: PHYSICIAN ASSISTANT

## 2023-09-06 PROCEDURE — 3074F PR MOST RECENT SYSTOLIC BLOOD PRESSURE < 130 MM HG: ICD-10-PCS | Mod: CPTII,S$GLB,, | Performed by: PHYSICIAN ASSISTANT

## 2023-09-06 NOTE — PROGRESS NOTES
"POST-OPERATIVE EXAMINATION    73 y.o. Male who returns for follow after surgery. He is 3 months s/p    Procedure:  1.  Left Knee Arthroscopy, with meniscectomy (medial And lateral) 52390  2.  Left Knee Arthroscopy, debridement/shaving of articular cartilage (chondroplasty) 52183  3.  Left Knee Arthroscopy, knee, synovectomy, limited 96905    He is doing well without any issues.  He has noticed a significant improvement in his preoperative pain.  He has been able to return to walking in the park without significant discomfort or difficulty.  He is currently in a stay strong program with physical therapy.  He reports having continued improvement in his function as his knee get stronger.      PHYSICAL EXAMINATION:  /77   Pulse 62   Ht 6' 1" (1.854 m)   Wt 108.8 kg (239 lb 13.8 oz)   BMI 31.65 kg/m²   General: Well-developed well-nourished 73 y.o. male in no acute distress   Cardiovascular: Regular rhythm   Lungs: No labored breathing or wheezing appreciated   Neuro: Alert and oriented ×3   Psychiatric: well oriented to person, place and time, demonstrates normal mood and affect   Skin: No rashes, lesions or ulcers, normal temperature, turgor, and texture on involved extremity    ORTHOPEDIC EXAM:  Normal post-operative swelling  Normal post-operative scarring  Strength: WNL  ROM: WNL  Tests: None today    ASSESSMENT:      ICD-10-CM ICD-9-CM   1. S/P medial meniscectomy of left knee  Z98.890 V45.89   2. S/P lateral meniscectomy of left knee  Z98.890 V45.89           PLAN:       RTC as needed; would likely benefit from HA injections if needed in the future                "

## 2023-09-14 ENCOUNTER — OFFICE VISIT (OUTPATIENT)
Dept: PODIATRY | Facility: CLINIC | Age: 73
End: 2023-09-14
Payer: MEDICARE

## 2023-09-14 VITALS
HEIGHT: 73 IN | BODY MASS INDEX: 31.85 KG/M2 | DIASTOLIC BLOOD PRESSURE: 75 MMHG | WEIGHT: 240.31 LBS | HEART RATE: 60 BPM | SYSTOLIC BLOOD PRESSURE: 125 MMHG

## 2023-09-14 DIAGNOSIS — B35.1 ONYCHOMYCOSIS DUE TO DERMATOPHYTE: ICD-10-CM

## 2023-09-14 DIAGNOSIS — M79.671 FOOT PAIN, RIGHT: Primary | ICD-10-CM

## 2023-09-14 PROCEDURE — 3078F DIAST BP <80 MM HG: CPT | Mod: CPTII,S$GLB,, | Performed by: PODIATRIST

## 2023-09-14 PROCEDURE — 99213 PR OFFICE/OUTPT VISIT, EST, LEVL III, 20-29 MIN: ICD-10-PCS | Mod: S$GLB,,, | Performed by: PODIATRIST

## 2023-09-14 PROCEDURE — 3074F PR MOST RECENT SYSTOLIC BLOOD PRESSURE < 130 MM HG: ICD-10-PCS | Mod: CPTII,S$GLB,, | Performed by: PODIATRIST

## 2023-09-14 PROCEDURE — 1159F PR MEDICATION LIST DOCUMENTED IN MEDICAL RECORD: ICD-10-PCS | Mod: CPTII,S$GLB,, | Performed by: PODIATRIST

## 2023-09-14 PROCEDURE — 1126F PR PAIN SEVERITY QUANTIFIED, NO PAIN PRESENT: ICD-10-PCS | Mod: CPTII,S$GLB,, | Performed by: PODIATRIST

## 2023-09-14 PROCEDURE — 4010F PR ACE/ARB THEARPY RXD/TAKEN: ICD-10-PCS | Mod: CPTII,S$GLB,, | Performed by: PODIATRIST

## 2023-09-14 PROCEDURE — 3008F BODY MASS INDEX DOCD: CPT | Mod: CPTII,S$GLB,, | Performed by: PODIATRIST

## 2023-09-14 PROCEDURE — 3074F SYST BP LT 130 MM HG: CPT | Mod: CPTII,S$GLB,, | Performed by: PODIATRIST

## 2023-09-14 PROCEDURE — 1160F PR REVIEW ALL MEDS BY PRESCRIBER/CLIN PHARMACIST DOCUMENTED: ICD-10-PCS | Mod: CPTII,S$GLB,, | Performed by: PODIATRIST

## 2023-09-14 PROCEDURE — 1159F MED LIST DOCD IN RCRD: CPT | Mod: CPTII,S$GLB,, | Performed by: PODIATRIST

## 2023-09-14 PROCEDURE — 1160F RVW MEDS BY RX/DR IN RCRD: CPT | Mod: CPTII,S$GLB,, | Performed by: PODIATRIST

## 2023-09-14 PROCEDURE — 3008F PR BODY MASS INDEX (BMI) DOCUMENTED: ICD-10-PCS | Mod: CPTII,S$GLB,, | Performed by: PODIATRIST

## 2023-09-14 PROCEDURE — 3078F PR MOST RECENT DIASTOLIC BLOOD PRESSURE < 80 MM HG: ICD-10-PCS | Mod: CPTII,S$GLB,, | Performed by: PODIATRIST

## 2023-09-14 PROCEDURE — 99213 OFFICE O/P EST LOW 20 MIN: CPT | Mod: S$GLB,,, | Performed by: PODIATRIST

## 2023-09-14 PROCEDURE — 1126F AMNT PAIN NOTED NONE PRSNT: CPT | Mod: CPTII,S$GLB,, | Performed by: PODIATRIST

## 2023-09-14 PROCEDURE — 4010F ACE/ARB THERAPY RXD/TAKEN: CPT | Mod: CPTII,S$GLB,, | Performed by: PODIATRIST

## 2023-09-14 PROCEDURE — 99999 PR PBB SHADOW E&M-EST. PATIENT-LVL IV: ICD-10-PCS | Mod: PBBFAC,,, | Performed by: PODIATRIST

## 2023-09-14 PROCEDURE — 99999 PR PBB SHADOW E&M-EST. PATIENT-LVL IV: CPT | Mod: PBBFAC,,, | Performed by: PODIATRIST

## 2023-09-14 NOTE — PROGRESS NOTES
Subjective:      Patient ID: Julio Bernardo is a 73 y.o. male.    Chief Complaint:   Chronic Kidney Disease and Nail Care    Julio is a 73 y.o. male who retuns to f/u to the podiatry clinic   right foot pain.   Pain resolved. Here for nail care.     Last visit:  ? Med change caused gout attack?   Denies dehydration  D/w pcp about starting allopurinol  Rx uric acid  Rx xray  Pt has colcrys at home that was never started. Take 1.2mg when get home.   Then daily if need  Medrol dose maico prescribed, advised patient to take meds as directed. Patient should complete medication. If problems occur notify the clinic   Pt has not had any steroid since Dr. White  Consider injection after xray  Recommend hydration  Tubigrip applied     Lab Results   Component Value Date    URICACID 6.9 08/23/2023       Followed by cardiology:  ASSESSMENT & PLAN:     1. Atherosclerosis of aorta    2. Coronary artery disease involving native coronary artery of native heart without angina pectoris    3. Hyperlipidemia, unspecified hyperlipidemia type    4. Primary hypertension    5. PVC's (premature ventricular contractions)    6. Venous insufficiency      Past Medical History:   Diagnosis Date    Allergy     Arthritis 2019    Colon polyps     Disorder of kidney and ureter 2019    Hyperlipemia     Hypertension     Posterior capsular opacification      Past Surgical History:   Procedure Laterality Date    ANGIOGRAM, CORONARY, WITH LEFT HEART CATHETERIZATION N/A 7/24/2023    Procedure: Angiogram, Coronary, with Left Heart Cath;  Surgeon: Shola Burns MD;  Location: [facility] CATH LAB;  Service: Cardiology;  Laterality: N/A;    ARTHROSCOPIC CHONDROPLASTY OF KNEE JOINT Left 5/25/2023    Procedure: ARTHROSCOPY, KNEE, WITH CHONDROPLASTY;  Surgeon: Brayan Payne MD;  Location: [facility] OR;  Service: Orthopedics;  Laterality: Left;    CATARACT EXTRACTION      both eyes    COLONOSCOPY N/A 09/27/2016    Procedure: COLONOSCOPY;  Surgeon: Jan Argueta MD;   Location: [facility] ENDO (4TH FLR);  Service: Endoscopy;  Laterality: N/A;    COLONOSCOPY N/A 05/19/2022    Procedure: COLONOSCOPY;  Surgeon: Bobo Delcid MD;  Location: [facility] ENDO (4TH FLR);  Service: Endoscopy;  Laterality: N/A;  4/4 fully vaccinated; instructions to [facility]    EYE SURGERY      HERNIA REPAIR      IVUS, CORONARY  7/24/2023    Procedure: IVUS, Coronary;  Surgeon: Shola Burns MD;  Location: [facility] CATH LAB;  Service: Cardiology;;    KNEE ARTHROSCOPY Right 01/01/2014    KNEE ARTHROSCOPY W/ MENISCECTOMY Left 5/25/2023    Procedure: ARTHROSCOPY, KNEE, WITH MENISCECTOMY;  Surgeon: Brayan Payne MD;  Location: [facility] OR;  Service: Orthopedics;  Laterality: Left;  medial and lateral    STENT, DRUG ELUTING, SINGLE VESSEL, CORONARY  7/24/2023    Procedure: Stent, Drug Eluting, Single Vessel, Coronary;  Surgeon: Shola Burns MD;  Location: [facility] CATH LAB;  Service: Cardiology;;    WISDOM TOOTH EXTRACTION      YAG      Left Eye     Current Outpatient Medications on File Prior to Visit   Medication Sig Dispense Refill    albuterol (PROVENTIL/VENTOLIN HFA) 90 mcg/actuation inhaler Inhale 2 puffs into the lungs every 6 (six) hours as needed (cough). 8.5 g 2    ascorbic acid (VITAMIN C) 1000 MG tablet Take 1,000 mg by mouth once daily.      aspirin 81 mg Tab Take by mouth. 1 Tablet Oral Every day.  Last taken 3/25/11      atorvastatin (LIPITOR) 80 MG tablet Take 1 tablet (80 mg total) by mouth once daily. 90 tablet 3    azelastine (ASTELIN) 137 mcg (0.1 %) nasal spray INSTILL 2 SPRAYS IN EACH NOSTRIL TWICE A DAY 30 mL 2    ciclopirox (PENLAC) 8 % Soln Apply topically nightly. 6.6 mL 11    fexofenadine HCl (ALLEGRA ORAL) Take by mouth.      fish oil-omega-3 fatty acids 300-1,000 mg capsule Take 2 g by mouth once daily.      fluticasone propionate (FLONASE) 50 mcg/actuation nasal spray USE 1 SPRAY BY EACH NARE ROUTE ONCE DAILY. 32 mL 3    GAVILYTE-G 236-22.74-6.74 -5.86 gram suspension Take 4,000 mLs by mouth  once.      hydroCHLOROthiazide (MICROZIDE) 12.5 mg capsule Take 1 capsule (12.5 mg total) by mouth once daily. 90 capsule 3    HYDROcodone-acetaminophen (NORCO) 7.5-325 mg per tablet Take 1 tablet by mouth every 6 (six) hours as needed for Pain. 20 tablet 0    ketoconazole (NIZORAL) 2 % cream Apply topically once daily. Apply daily to affected toenails for 6-12 months 60 g 4    losartan (COZAAR) 100 MG tablet Take 1 tablet (100 mg total) by mouth once daily. 90 tablet 3    methylPREDNISolone (MEDROL DOSEPACK) 4 mg tablet use as directed 1 each 0    multivitamin capsule Take 1 capsule by mouth once daily.      omeprazole (PRILOSEC) 20 MG capsule Take 1 capsule (20 mg total) by mouth 2 (two) times daily. 180 capsule 1    ticagrelor (BRILINTA) 90 mg tablet Take 1 tablet (90 mg total) by mouth 2 (two) times daily. 180 tablet 3    verapamiL (VERELAN) 240 MG C24P Take 1 capsule (240 mg total) by mouth once daily. 90 capsule 3     Current Facility-Administered Medications on File Prior to Visit   Medication Dose Route Frequency Provider Last Rate Last Admin    sodium chloride 0.9% flush 10 mL  10 mL Intravenous PRN Shola Burns MD         Review of patient's allergies indicates:  No Known Allergies    Review of Systems   Constitutional: Negative for chills, decreased appetite, fever, malaise/fatigue, night sweats, weight gain and weight loss.   Cardiovascular:  Negative for chest pain, claudication, dyspnea on exertion, leg swelling, palpitations and syncope.   Respiratory:  Negative for cough and shortness of breath.    Endocrine: Negative for cold intolerance and heat intolerance.   Hematologic/Lymphatic: Negative for bleeding problem. Does not bruise/bleed easily.   Skin:  Negative for color change, dry skin, flushing, itching, nail changes, poor wound healing, rash, skin cancer, suspicious lesions and unusual hair distribution.   Musculoskeletal:  Negative for arthritis, back pain, falls, gout, joint pain, joint  "swelling, muscle cramps, muscle weakness, myalgias, neck pain and stiffness.        Foot pain and swelling   Gastrointestinal:  Negative for diarrhea, nausea and vomiting.   Neurological:  Negative for dizziness, focal weakness, light-headedness, numbness, paresthesias, tremors, vertigo and weakness.   Psychiatric/Behavioral:  Negative for altered mental status and depression. The patient does not have insomnia.    Allergic/Immunologic: Negative.            Objective:       Vitals:    09/14/23 0915   BP: 125/75   Pulse: 60   Weight: 109 kg (240 lb 4.8 oz)   Height: 6' 1" (1.854 m)   PainSc: 0-No pain   109 kg (240 lb 4.8 oz)     Physical Exam  Vitals reviewed.   Constitutional:       General: He is not in acute distress.     Appearance: He is well-developed. He is not ill-appearing, toxic-appearing or diaphoretic.      Comments: Proper supportive shoegear      Cardiovascular:      Pulses:           Dorsalis pedis pulses are 2+ on the right side and 2+ on the left side.        Posterior tibial pulses are 2+ on the right side and 2+ on the left side.   Musculoskeletal:         General: No swelling or deformity.      Right lower leg: No edema.      Left lower leg: No edema.      Right ankle: Normal.      Right Achilles Tendon: Normal.      Left ankle: Normal.      Left Achilles Tendon: Normal.      Right foot: Decreased range of motion. No deformity, tenderness or bony tenderness.      Left foot: Decreased range of motion. No deformity, tenderness or bony tenderness.   Feet:      Right foot:      Protective Sensation: 10 sites tested.  10 sites sensed.      Skin integrity: Erythema present. No ulcer, blister, skin breakdown, warmth, callus or dry skin.      Toenail Condition: Right toenails are normal.      Left foot:      Protective Sensation: 10 sites tested.  10 sites sensed.      Skin integrity: No ulcer, blister, skin breakdown, erythema, warmth, callus or dry skin.      Toenail Condition: Left toenails are " normal.      Comments: No erythema    Toenails 1-5 bilaterally are elongated by 2-3 mm, thickened by 2-3 mm, discolored/yellowed, dystrophic, brittle with subungual debris.   Skin:     General: Skin is warm.      Capillary Refill: Capillary refill takes 2 to 3 seconds.      Coloration: Skin is not pale.      Findings: No erythema or rash.      Nails: There is no clubbing.   Neurological:      Mental Status: He is alert and oriented to person, place, and time.      Sensory: No sensory deficit.   Psychiatric:         Attention and Perception: Attention normal.         Mood and Affect: Mood normal.         Speech: Speech normal.         Behavior: Behavior normal.         Thought Content: Thought content normal.         Cognition and Memory: Cognition normal.         Judgment: Judgment normal.               Assessment:       Encounter Diagnoses   Name Primary?    Foot pain, right Yes    Onychomycosis due to dermatophyte            Plan:       Julio was seen today for chronic kidney disease and nail care.    Diagnoses and all orders for this visit:    Foot pain, right    Onychomycosis due to dermatophyte        I counseled the patient on his conditions, their implications and medical management.        - foot pain resolved  Re-discussed lab/xray findings    - With patient's permission, the toenails mentioned above were aggressively reduced and debrided using a nail nipper, removing all offending nail and debris.  The patient will continue to monitor the areas daily, inspect the feet, wear protective shoe gear when ambulatory, and moisturizer to maintain skin integrity.     . Consider penlac in future            Follow up if symptoms worsen or fail to improve.      no

## 2023-09-18 ENCOUNTER — HOSPITAL ENCOUNTER (OUTPATIENT)
Dept: CARDIOLOGY | Facility: HOSPITAL | Age: 73
Discharge: HOME | End: 2023-09-18
Attending: INTERNAL MEDICINE
Payer: MEDICARE

## 2023-09-18 VITALS
WEIGHT: 237 LBS | SYSTOLIC BLOOD PRESSURE: 133 MMHG | HEART RATE: 62 BPM | HEIGHT: 73 IN | BODY MASS INDEX: 31.41 KG/M2 | DIASTOLIC BLOOD PRESSURE: 72 MMHG

## 2023-09-18 DIAGNOSIS — Z98.61 POSTSURGICAL PERCUTANEOUS TRANSLUMINAL CORONARY ANGIOPLASTY STATUS: ICD-10-CM

## 2023-09-18 DIAGNOSIS — I25.10 CORONARY ARTERY DISEASE, UNSPECIFIED VESSEL OR LESION TYPE, UNSPECIFIED WHETHER ANGINA PRESENT, UNSPECIFIED WHETHER NATIVE OR TRANSPLANTED HEART: ICD-10-CM

## 2023-09-18 LAB
CV STRESS BASE HR: 62 BPM
DIASTOLIC BLOOD PRESSURE: 72 MMHG
OHS CV CPX 1 MINUTE RECOVERY HEART RATE: 116 BPM
OHS CV CPX 85 PERCENT MAX PREDICTED HEART RATE MALE: 124
OHS CV CPX ABDOMINAL GIRTH: 45.2 CM
OHS CV CPX ANAEROBIC THRESHOLD DIASTOLIC BLOOD PRESSURE: 76 MMHG
OHS CV CPX ANAEROBIC THRESHOLD HEART RATE: 105
OHS CV CPX ANAEROBIC THRESHOLD RATE PRESSURE PRODUCT: NORMAL
OHS CV CPX ANAEROBIC THRESHOLD SYSTOLIC BLOOD PRESSURE: 156
OHS CV CPX DATA GRADE - AT: 8.4
OHS CV CPX DATA GRADE - PEAK: 13.4
OHS CV CPX DATA O2 SAT - PEAK: 98
OHS CV CPX DATA O2 SAT - REST: 98
OHS CV CPX DATA SPEED - AT: 2.7
OHS CV CPX DATA SPEED - PEAK: 3.6
OHS CV CPX DATA TIME - AT: 4.15
OHS CV CPX DATA TIME - PEAK: 6.42
OHS CV CPX DATA VE/VCO2 - AT: 43
OHS CV CPX DATA VE/VCO2 - PEAK: 48
OHS CV CPX DATA VE/VO2 - AT: 34
OHS CV CPX DATA VE/VO2 - PEAK: 45
OHS CV CPX DATA VO2 - AT: 14.4
OHS CV CPX DATA VO2 - PEAK: 18.5
OHS CV CPX DATA VO2 - REST: 3.3
OHS CV CPX ESTIMATED METS: 11
OHS CV CPX FEV1/FVC: 0.82
OHS CV CPX FORCED EXPIRATORY VOLUME: 2.59
OHS CV CPX FORCED VITAL CAPACITY (FVC): 3.16
OHS CV CPX HIGHEST VO: 29.4
OHS CV CPX MAX PREDICTED HEART RATE: 146
OHS CV CPX MAXIMAL VOLUNTARY VENTILATION (MVV) PREDICTED: 103.6
OHS CV CPX MAXIMAL VOLUNTARY VENTILATION (MVV): 91
OHS CV CPX MAXIUMUM EXERCISE VENTILATION (VE MAX): 84.5
OHS CV CPX PATIENT AGE: 74
OHS CV CPX PATIENT HEIGHT IN: 73
OHS CV CPX PATIENT IS FEMALE AGE 11-19: 0
OHS CV CPX PATIENT IS FEMALE AGE GREATER THAN 19: 0
OHS CV CPX PATIENT IS FEMALE AGE LESS THAN 11: 0
OHS CV CPX PATIENT IS FEMALE: 0
OHS CV CPX PATIENT IS MALE AGE 11-25: 0
OHS CV CPX PATIENT IS MALE AGE GREATER THAN 25: 1
OHS CV CPX PATIENT IS MALE AGE LESS THAN 11: 0
OHS CV CPX PATIENT IS MALE GREATER THAN 18: 1
OHS CV CPX PATIENT IS MALE LESS THAN OR EQUAL TO 18: 0
OHS CV CPX PATIENT IS MALE: 1
OHS CV CPX PATIENT WEIGHT RETURNED IN OZ: 3792
OHS CV CPX PEAK DIASTOLIC BLOOD PRESSURE: 60 MMHG
OHS CV CPX PEAK HEAR RATE: 121 BPM
OHS CV CPX PEAK RATE PRESSURE PRODUCT: NORMAL
OHS CV CPX PEAK SYSTOLIC BLOOD PRESSURE: 178 MMHG
OHS CV CPX PERCENT BODY FAT: 28.5
OHS CV CPX PERCENT MAX PREDICTED HEART RATE ACHIEVED: 83
OHS CV CPX PREDICTED VO2: 29.4 ML/KG/MIN
OHS CV CPX RATE PRESSURE PRODUCT PRESENTING: 8246
OHS CV CPX REST PET CO2: 24
OHS CV CPX VE/VCO2 SLOPE: 40.4
STRESS ECHO POST EXERCISE DUR MIN: 6 MINUTES
STRESS ECHO POST EXERCISE DUR SEC: 25 SECONDS
SYSTOLIC BLOOD PRESSURE: 133 MMHG

## 2023-09-18 PROCEDURE — 94621 CARDIOPULM EXERCISE TESTING: CPT | Mod: 26,,, | Performed by: INTERNAL MEDICINE

## 2023-09-18 PROCEDURE — 94621 CARDIOPULMONARY EXERCISE TESTING (CUPID ONLY): ICD-10-PCS | Mod: 26,,, | Performed by: INTERNAL MEDICINE

## 2023-09-18 PROCEDURE — 94621 CARDIOPULM EXERCISE TESTING: CPT

## 2023-09-19 ENCOUNTER — TELEPHONE (OUTPATIENT)
Dept: CARDIAC REHAB | Facility: CLINIC | Age: 73
End: 2023-09-19
Payer: MEDICARE

## 2023-09-19 NOTE — PROGRESS NOTES
"HISTORY: S/P PTCA/STENT (7-24-23), CAD, HLP, HTN, CKD III, EF=63%(7-7-23)    ANTHROPOMETRICS:     PRE   Height (in) 73   Weight (lb) 237   BMI 31.3   Abdominal Girth (in) 45.2   % Body Fat 28.5%       LAB RESULTS:    Lab Results   Component Value Date    HGB 14.8 09/18/2023     Lab Results   Component Value Date    HCT 46.2 09/18/2023     Lab Results   Component Value Date    MPV 11.5 09/18/2023       Lab Results   Component Value Date    CHOL 134 09/18/2023     Lab Results   Component Value Date    HDL 41 09/18/2023     Lab Results   Component Value Date    LDLCALC 74.4 09/18/2023     Lab Results   Component Value Date    TRIG 93 09/18/2023     Lab Results   Component Value Date    CHOLHDL 30.6 09/18/2023       Lab Results   Component Value Date    GLUF 101 09/18/2023     No results found for: "HGBA1C"     Lab Results   Component Value Date    HSCRP 1.93 09/18/2023         SIGIFREDO SCORES:     PRE   Anxiety 1   Depression 1   Somatic 2   Hostility 0     SF-36 SCORES:     PRE   Physical Function 27   Social Function 11   Mental Health 27   Pain 10   Change in Health 5   Physical Role Limitation 4   Mental Role Limitation 3   Energy/Fatigue 20   Health Perceptions 21   Total Score 128     PHQ-9:      9/21/2023     8:51 AM   PHQ-9 Depression Patient Health Questionnaire   Over the last two weeks how often have you been bothered by little interest or pleasure in doing things 0   Over the last two weeks how often have you been bothered by feeling down, depressed or hopeless 0   Over the last two weeks how often have you been bothered by trouble falling or staying asleep, or sleeping too much 1   Over the last two weeks how often have you been bothered by feeling tired or having little energy 1   Over the last two weeks how often have you been bothered by a poor appetite or overeating 0   Over the last two weeks how often have you been bothered by feeling bad about yourself - or that you are a failure or have let yourself " or your family down 0   Over the last two weeks how often have you been bothered by trouble concentrating on things, such as reading the newspaper or watching television 0   Over the last two weeks how often have you been bothered by moving or speaking so slowly that other people could have noticed. 0   Over the last two weeks how often have you been bothered by thoughts that you would be better off dead, or of hurting yourself 0   If you checked off any problems, how difficult have these problems made it for you to do your work, take care of things at home or get along with other people? Not difficult at all   PHQ-9 Score 2             EDUCATION SCORES:     PRE   Education Score 90

## 2023-09-19 NOTE — PROGRESS NOTES
Session: Orientation   Cardiac Rehab Individual Treatment Plan - Initial Assessment      Patient Name: Julio Bernardo MRN: [id number]   : 1950   Age: 73 y.o.   Primary Diagnosis: PTCA/STENT  Date of Event: 23  EF: 63%  Risk Stratification: moderate  Referring Physician: MARK   Exercise Assessment:     CPX/TM Date: 23 Results   RHR 62   Max    Peak VO2 (CPX only) 18.5   Actual METS (CPX only) 5.3   Estimated METS 11.0     Anthropometrics    Height 73 inches   Weight 237 lbs   BMI 31.3   Abdominal Girth 45.2   Body Composition 28.5%     ST Depression noted on Stress Test?:No  Angina with exercise?: No   Fall Risk: No   Assistive Devices:  independent   Currently exercising? Yes: Walking; Frequency: 5 days per week; Duration 30 to 60 minutes  Mr. Singleton stated there were no limitations to exercise.     Exercise Plan:   Goals:  CR Exercise Goals: Attend Cardiac Rehab 3 times/week: In Progress  Home Aerobic Exercise: 2 additional days/week for 30-60 minutes: In Progress  Intensity of 12-15 on the Rate of Perceived Exertion (RPE) scale: In Progress  30% increase in entry estimated METS: 14.3 : In Progress  5 days/week for 30-60 minutes: In Progress    Intervention:   Discussed importance of regular attendance to cardiac rehab class    Exercise Prescription:  THR Range    Mode: Treadmill  Recumbent Bike  Upright Bike  Nustep  Elliptical   Frequency:  3 days/week   Duration:  30 - 60 minutes   Intensity:  12 - 15 RPE   Resistance Training:  Yes: 5 to 7 lb weights with 10-15 reps based on strength and range of motion assessment     Home Prescription:  Mode Aerobic   Frequency: 2- 3 days/week   Duration: 30-60 minutes   Resistance Training: None        Education:  Orientation to Equipment; verbalizes understanding; Date: 23  Exercise Recommendations; verbalizes understanding; Date: 23  Exercise Safety; verbalizes understanding; Date: 23  Class Preparation: verbalizes understanding;  Date: 9-21-23  Signs and symptoms to report: verbalizes understanding; Date: 9-21-23  Caffeine/Hydration: verbalizes understanding; Date: 9-21-23  Exercise Terminology: verbalizes understanding; Date: 9-21-23  Resistance Training: verbalizes understanding; Date: 9-21-23    Comments:  I encouraged Mr. Singleton to continue walking at least 2 non-rehab days per week for at least 30 minutes in addition to attending Phase II cardiac rehab classes 3 days per week.  He stated understanding.     All consent forms were signed, proper attire and shoes were discussed.       Mr. Singleton will begin Cardiac Rehab on Wednesday, September 27 at 10:00 am.    The exercise prescription will be adjusted based on tolerance of exercise intensity by patient.    Ino Fortune., CEP

## 2023-09-21 ENCOUNTER — CLINICAL SUPPORT (OUTPATIENT)
Dept: CARDIAC REHAB | Facility: CLINIC | Age: 73
End: 2023-09-21
Payer: MEDICARE

## 2023-09-21 DIAGNOSIS — I49.3 PVC'S (PREMATURE VENTRICULAR CONTRACTIONS): ICD-10-CM

## 2023-09-21 DIAGNOSIS — I25.119 CORONARY ARTERY DISEASE INVOLVING NATIVE CORONARY ARTERY OF NATIVE HEART WITH ANGINA PECTORIS: ICD-10-CM

## 2023-09-21 PROCEDURE — 99999 PR PBB SHADOW E&M-EST. PATIENT-LVL III: CPT | Mod: PBBFAC,,,

## 2023-09-21 PROCEDURE — 93798 PHYS/QHP OP CAR RHAB W/ECG: CPT | Mod: S$GLB,,, | Performed by: INTERNAL MEDICINE

## 2023-09-21 PROCEDURE — 93798 PR CARDIAC REHAB/MONITOR: ICD-10-PCS | Mod: S$GLB,,, | Performed by: INTERNAL MEDICINE

## 2023-09-21 PROCEDURE — 99999 PR PBB SHADOW E&M-EST. PATIENT-LVL III: ICD-10-PCS | Mod: PBBFAC,,,

## 2023-09-21 NOTE — PROGRESS NOTES
Session: Orientation   Cardiac Rehab Individual Treatment Plan - Initial Assessment      Patient Name: Julio Bernardo MRN: [id number]   : 1950   Age: 73 y.o.   Date of Event: 23   Primary Diagnosis: S/P PTCA/Stent    EF: 63% (23)    Physical Assessment:   There were no vitals taken for this visit.    ASSESSMENT:  Heart Sounds: regular rate and rhythm, S1, S2 normal, no murmur, click, rub or gallop and PVCs noted   Prosthetic Valve: No  Lung Sounds: normal air entry, lungs clear to auscultation  Capillary Refill: normal  Left Radial Pulse: Normal (+2)  Right Radial Pulse: Normal (+2)  Left Pedal Pulse: Normal (+2)  Right Pedal Pulse: Normal (+2)  Right Edema: 1+  Left Edema 3+  Strength: normal  Range of Motion: full range of motion  Existing Limitations:      Site   [] Arthritis, bursitis    [] Amputation, atrophy    [] Other:    []       Diabetic patient's foot examination comments: Normal -  Bilateral  Incisional site: healing well  Special needs: NA    Psychosocial Assessment:   Outcome Survey Tools:    SIGIFREDO SCORES:   PRE   Anxiety 1   Depression 1   Somatic 2   Hostility 0     SF-36 SCORES:   PRE   Physical Function 27   Social Function 11   Mental Health 27   Pain 10   Change in Health 5   Physical Role Limitation 4   Mental Role Limitation 3   Energy/Fatigue 20   Health Perceptions 21   Total Score 128     PHQ-9:      2023     8:51 AM   PHQ-9 Depression Patient Health Questionnaire   Over the last two weeks how often have you been bothered by little interest or pleasure in doing things 0   Over the last two weeks how often have you been bothered by feeling down, depressed or hopeless 0   Over the last two weeks how often have you been bothered by trouble falling or staying asleep, or sleeping too much 1   Over the last two weeks how often have you been bothered by feeling tired or having little energy 1   Over the last two weeks how often have you been bothered by a poor appetite or  overeating 0   Over the last two weeks how often have you been bothered by feeling bad about yourself - or that you are a failure or have let yourself or your family down 0   Over the last two weeks how often have you been bothered by trouble concentrating on things, such as reading the newspaper or watching television 0   Over the last two weeks how often have you been bothered by moving or speaking so slowly that other people could have noticed. 0   Over the last two weeks how often have you been bothered by thoughts that you would be better off dead, or of hurting yourself 0   If you checked off any problems, how difficult have these problems made it for you to do your work, take care of things at home or get along with other people? Not difficult at all   PHQ-9 Score 2              Living Arrangements: Lives with spouse  Family Support: children  Self Reported: Effective Coping Skills  Displays: happiness and calmness  Medication: not applicable    Psychosocial Plan:   Goals:  Improved psychosocial coping strategies  Maintain positive support system  Maintain positive outlook  Improve overall quality of life    Interventions/Recommendations:  Discussed Results of Surveys  Patient to Self Report Emotional Changes at Session Check In  Recommend Physical Activity  Recommend Attending Education Lectures  Notify MD: No  Program Referral: No  Pharmaceutical Intervention/Therapy: No  Other Needs: not applicable  Stage of Readiness to Change: Preparation    Education:  Risk Factors; verbalizes understanding; Date: 9/21/23    Comments:  Screening tool results reviewed with patient, he denies any issues with stress/anxiety/depression. Patient has been instructed to notify staff in the event that circumstances worsen.  Patient verbalizes understanding.    Other Core Components/Risk Factors Assessment:   RISK FACTORS:  hyperlipidemia, hypertension, positive family history    Learning Barriers: None    Education Level:   Post-College Graduate Degree    Pre-test Score: 90    Medication Compliance: has been compliant with taking medications    Other Core Components/Risk Factors Plan:   Goals:  Decrease cholesterol level: In Progress  Increase exercise tolerance: In Progress  Increase knowledge of CAD: In Progress  Decrease blood pressure: In Progress  Weight loss: In Progress  Demonstrate accurate pulse taking: In Progress  Identify and manage personal areas of stress: In Progress  Learn more about healthy eating: In Progress    Interventions/Recommendations:  Recommend regular attendance for Cardiac Rehab: Exercise and Education Lectures  Encourage medication compliance  Individual Education/ Counseling: Yes  Physician Referral: No    Education:    risk factors, verbalizes understanding; Date: 9/21/23         Education method adapted to patients education level and preferred method of learning.  Method: explanation    Comments:  Patient is very motivated to improve his cardiac health by risk factor modification.     Other Core Components/Hypertension Assessment:   Resting BP:  Left 156/82 Right 152/80 HR 75 O2 Sat   BP Readings from Last 1 Encounters:   09/18/23 133/72         BP Diagnosis: Hypertensive  Patient reported symptoms: none    Other Core Components/Hypertension Plan:   Goals:  Blood Pressure <130/80    Interventions/Recommendations:  Med Card Reconciled: Yes  Encourage medication compliance  Encourage sodium reduction  Encourage weight loss  Recommend physical activity  Educate on contributory factors  Reduce stress, anxiety, anger, depression, and/or chronic pain  Encourage home blood pressure monitoring    Education:    Risk Factors; verbalizes understanding; Date: 9/21/23         Comments:  Patient has a blood pressure cuff at home, he takes it occasionally. Discussed importance of daily blood pressure checks and keeping a log for his appointments. Patient will start keeping a log with daily bp checks.       Does the  patient have Heart Failure? No    Other Core Components/Tobacco Cessation Assessment:   Smoking Status: lifetime non-smoker  Smoking Cessation Barriers:  NA  Stage of Readiness to Change: Maintenance    Other Core Components/Tobacco Cessation Plan:   Goals:  Maintain non-smoking status    Interventions:  Maintains non-smoking status    Education:    Risk Factors; verbalizes understanding; Date: 9/21/23         Comments:  Patient is very motivated to start cardiac rehab to improve his cardiac health. He currently is walking 5 days a week for 30-60 minutes. Discussed Cardiac Rehab program in depth with patient.  Medication list updated per patient & marked as reviewed.  Patient has been instructed to notify staff of any problems while attending rehab (ie: chest pain, shortness of breath, lightheadedness, dizziness).  Patient has been instructed to monitor blood pressure readings outside of rehab & to keep a daily log of the readings.  Patient verbalizes understanding.     Tr Flood RN  Cardiac Rehab Nurse

## 2023-09-21 NOTE — PROGRESS NOTES
Orientation   Cardiac Rehab Individual Treatment Plan - Initial Assessment      Patient Name: Julio Bernardo MRN: [id number]   : 1950   Age: 73 y.o.   Primary Diagnosis: s/p PTCA/stent, CAD, HTN, HLD, CKD III    Nutrition Assessment:     Anthropometrics    Height 73 inches   Weight 237 lbs   BMI 31.3   Abdominal Girth 45.2   Body Composition 28.5       Drug Allergies and Intolerances:  Review of patient's allergies indicates:  No Known Allergies    Food Allergies and Intolerances:  NA    Past Medical History:  Past Medical History:   Diagnosis Date    Allergy     Arthritis 2019    Colon polyps     Disorder of kidney and ureter 2019    Hyperlipemia     Hypertension     Posterior capsular opacification        Past Surgical History:  Past Surgical History:   Procedure Laterality Date    ANGIOGRAM, CORONARY, WITH LEFT HEART CATHETERIZATION N/A 2023    Procedure: Angiogram, Coronary, with Left Heart Cath;  Surgeon: Shola Burns MD;  Location: [facility] CATH LAB;  Service: Cardiology;  Laterality: N/A;    ARTHROSCOPIC CHONDROPLASTY OF KNEE JOINT Left 2023    Procedure: ARTHROSCOPY, KNEE, WITH CHONDROPLASTY;  Surgeon: Brayan Payne MD;  Location: [facility] OR;  Service: Orthopedics;  Laterality: Left;    CATARACT EXTRACTION      both eyes    COLONOSCOPY N/A 2016    Procedure: COLONOSCOPY;  Surgeon: Jan Argueta MD;  Location: [address]);  Service: Endoscopy;  Laterality: N/A;    COLONOSCOPY N/A 2022    Procedure: COLONOSCOPY;  Surgeon: Bobo Delcid MD;  Location: [address]);  Service: Endoscopy;  Laterality: N/A;   fully vaccinated; instructions to [place]-    EYE SURGERY      HERNIA REPAIR      IVUS, CORONARY  2023    Procedure: IVUS, Coronary;  Surgeon: Shola Bursn MD;  Location: [facility] CATH LAB;  Service: Cardiology;;    KNEE ARTHROSCOPY Right 2014    KNEE ARTHROSCOPY W/ MENISCECTOMY Left 2023    Procedure: ARTHROSCOPY, KNEE, WITH MENISCECTOMY;  Surgeon:  Brayan Payne MD;  Location: [facility] OR;  Service: Orthopedics;  Laterality: Left;  medial and lateral    STENT, DRUG ELUTING, SINGLE VESSEL, CORONARY  7/24/2023    Procedure: Stent, Drug Eluting, Single Vessel, Coronary;  Surgeon: Shola Burns MD;  Location: [facility] CATH LAB;  Service: Cardiology;;    WISDOM TOOTH EXTRACTION      YAG      Left Eye       Medications:  Current Outpatient Medications   Medication Sig    albuterol (PROVENTIL/VENTOLIN HFA) 90 mcg/actuation inhaler Inhale 2 puffs into the lungs every 6 (six) hours as needed (cough).    ascorbic acid (VITAMIN C) 1000 MG tablet Take 1,000 mg by mouth once daily.    aspirin 81 mg Tab Take by mouth. 1 Tablet Oral Every day.  Last taken 3/25/11    atorvastatin (LIPITOR) 80 MG tablet Take 1 tablet (80 mg total) by mouth once daily.    azelastine (ASTELIN) 137 mcg (0.1 %) nasal spray INSTILL 2 SPRAYS IN EACH NOSTRIL TWICE A DAY    ciclopirox (PENLAC) 8 % Soln Apply topically nightly.    fexofenadine HCl (ALLEGRA ORAL) Take by mouth.    fish oil-omega-3 fatty acids 300-1,000 mg capsule Take 2 g by mouth once daily.    fluticasone propionate (FLONASE) 50 mcg/actuation nasal spray USE 1 SPRAY BY EACH NARE ROUTE ONCE DAILY.    GAVILYTE-G 236-22.74-6.74 -5.86 gram suspension Take 4,000 mLs by mouth once.    hydroCHLOROthiazide (MICROZIDE) 12.5 mg capsule Take 1 capsule (12.5 mg total) by mouth once daily.    HYDROcodone-acetaminophen (NORCO) 7.5-325 mg per tablet Take 1 tablet by mouth every 6 (six) hours as needed for Pain.    ketoconazole (NIZORAL) 2 % cream Apply topically once daily. Apply daily to affected toenails for 6-12 months    losartan (COZAAR) 100 MG tablet Take 1 tablet (100 mg total) by mouth once daily.    methylPREDNISolone (MEDROL DOSEPACK) 4 mg tablet use as directed    multivitamin capsule Take 1 capsule by mouth once daily.    omeprazole (PRILOSEC) 20 MG capsule Take 1 capsule (20 mg total) by mouth 2 (two) times daily.    ticagrelor (BRILINTA) 90  "mg tablet Take 1 tablet (90 mg total) by mouth 2 (two) times daily.    verapamiL (VERELAN) 240 MG C24P Take 1 capsule (240 mg total) by mouth once daily.     Current Facility-Administered Medications   Medication    sodium chloride 0.9% flush 10 mL       Vitamins and Supplements:  MVI, Fish oil    Labs:  Patient confirms he is taking lipitor 80mg for cholesterol control.    Lab Results   Component Value Date    CHOL 134 09/18/2023     Lab Results   Component Value Date    HDL 41 09/18/2023     Lab Results   Component Value Date    LDLCALC 74.4 09/18/2023     Lab Results   Component Value Date    TRIG 93 09/18/2023     Lab Results   Component Value Date    CHOLHDL 30.6 09/18/2023         Lab Results   Component Value Date    GLUF 101 09/18/2023     No results found for: "HGBA1C"    Nutrition/Diet History:  Patient eats 2-3 meals daily.    Seasons food with salt substitute/pepper/ herbs.  Patient denies use of a salt shaker at the table on prepared foods.   Dines out 2 per week at restaurants.    Chooses fried foods rarely  Chooses fish 2 time(s) per week.   Beverages:  water, sweet tea, and lemonade  Alcohol: rarely    24 Hour Recall:  Breakfast: tuna salad on whole wheat with lettuce  Lunch:jambalaya with shrimp and hot sausage  Dinner:leftover jambalaya with corn and chicken breast  Other: 2% chocolate milk    Difficulty Chewing or Swallowing: No  Current Exercise: See Exercise Physiologist Note  Food Safety/Food Preparation: spouse  Living Arrangements/Family Support: Lives with spouse  Cultural/Spiritual/Personal Preferences: not applicable   Barriers to Education: none identified  Stage of Change Related to Diet Habits: Action    Nutrition Diagnosis:  Food and nutrition related knowledge deficit related to the lack of prior nutrition education as evidenced by diet history and 24 hour recall    Nutrition Plan:   Goals:  LDL-C < 70 (for high risk patients)  Hgb A1c < 7%  BMI < 25 and abdominal girth < 40M/<35 F  2 " gram sodium, Mediterranean diet  Rehab weight goal: goal of 200  Fish intake (non-fried varieties) to a goal of 2-3 servings per week.   Increase fruit and vegetable intake    Interventions/Recommendations:  Lab results reviewed and discussed  Nutrition Prescription:  Total Energy Estimated Needs: 8791-7870 Kcal/d for weight loss  Method for Estimating Needs: 20-25kcal/kg ABW  Total Protein Estimated Needs: 54-90 g/d  Method for Estimating Needs: 0.6-1 g/Kg ABW (reduced needs r/t dx CKD III)  Total Fluid Estimated Needs: 1 mL/Kcal  Dietitian Consult: No  Patient to participate in Cardiac Rehab sessions three times a week  Weekly Dietitian Weight Check  Encouraged patient to complete 3 day food diary  Follow Up Plan for Ongoing Self-Management Support    Education:  Mediterranean Diet; verbalizes understanding; Date: 9/21/23  Person taught: patient  Preferred Learning Method: Verbal, Written  Education Needed/Provided: Nutrition counseling and education related to cardiac rehabilitation  Education Method: Weekly nutrition lectures on the Mediterranean diet, cooking, shopping, and dining out  Written Materials Provided: 3 Day Food Record, Introduction to Mediterranean Diet  Strategies Implemented: Motivational interviewing, Goal setting, Self-Monitoring, and Problem Solving    Comments:   Discussed ways to incorporate healthy snacks, eating on a schedule, and monitoring sodium intake for heart health.    Diabetes  Is the patient diabetic? No      RD contact information provided.      Courtney Unger MS, RDN/LDN

## 2023-09-27 ENCOUNTER — CLINICAL SUPPORT (OUTPATIENT)
Dept: CARDIAC REHAB | Facility: CLINIC | Age: 73
End: 2023-09-27
Payer: MEDICARE

## 2023-09-27 DIAGNOSIS — I25.10 CORONARY ATHEROSCLEROSIS OF NATIVE CORONARY ARTERY: ICD-10-CM

## 2023-09-27 DIAGNOSIS — Z98.61 POSTSURGICAL PERCUTANEOUS TRANSLUMINAL CORONARY ANGIOPLASTY STATUS: Primary | ICD-10-CM

## 2023-09-27 PROCEDURE — 93798 PR CARDIAC REHAB/MONITOR: ICD-10-PCS | Mod: S$GLB,,, | Performed by: INTERNAL MEDICINE

## 2023-09-27 PROCEDURE — 93798 PHYS/QHP OP CAR RHAB W/ECG: CPT | Mod: S$GLB,,, | Performed by: INTERNAL MEDICINE

## 2023-09-27 NOTE — PROGRESS NOTES
Julio Bernardo is a 66 y.o. 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 he was last seen and treated in the clinic on 3/23/2017. There has been no significant change in his medical status since his last visit. No Fever, chills, malaise, or unexplained weight change.      Allergies, Medications, past medical and surgical history were reviewed .    Examination of the knee demonstrates  No evidence of edema, erythema , echymosis strength and range of motion are unchanged from previous visit.    The injection site was identified and the skin was prepared with a betadine solution. The joint was injected with 2 ml of Euflexxa solution under sterile technique. Julio Bernardo tolerated the procedure well, he was advised to rest the knee today, ice and elevation. I may take 3 -6 weeks following the last injection to get the full benefit of the medication.  I will see him back in 3-6 months. Sooner if he has any problems or concerns.           .     
warm and dry/color normal/normal/no ulcers

## 2023-09-29 ENCOUNTER — CLINICAL SUPPORT (OUTPATIENT)
Dept: CARDIAC REHAB | Facility: CLINIC | Age: 73
End: 2023-09-29
Payer: MEDICARE

## 2023-09-29 DIAGNOSIS — Z98.61 POSTSURGICAL PERCUTANEOUS TRANSLUMINAL CORONARY ANGIOPLASTY STATUS: Primary | ICD-10-CM

## 2023-09-29 DIAGNOSIS — I25.10 CORONARY ARTERY DISEASE, UNSPECIFIED VESSEL OR LESION TYPE, UNSPECIFIED WHETHER ANGINA PRESENT, UNSPECIFIED WHETHER NATIVE OR TRANSPLANTED HEART: ICD-10-CM

## 2023-09-29 PROCEDURE — 93798 PR CARDIAC REHAB/MONITOR: ICD-10-PCS | Mod: S$GLB,,, | Performed by: INTERNAL MEDICINE

## 2023-09-29 PROCEDURE — 93798 PHYS/QHP OP CAR RHAB W/ECG: CPT | Mod: S$GLB,,, | Performed by: INTERNAL MEDICINE

## 2023-10-02 ENCOUNTER — CLINICAL SUPPORT (OUTPATIENT)
Dept: CARDIAC REHAB | Facility: CLINIC | Age: 73
End: 2023-10-02
Payer: MEDICARE

## 2023-10-02 DIAGNOSIS — Z98.61 POSTSURGICAL PERCUTANEOUS TRANSLUMINAL CORONARY ANGIOPLASTY STATUS: Primary | ICD-10-CM

## 2023-10-02 DIAGNOSIS — I25.10 CORONARY ATHEROSCLEROSIS OF NATIVE CORONARY ARTERY: ICD-10-CM

## 2023-10-02 PROCEDURE — 93798 PR CARDIAC REHAB/MONITOR: ICD-10-PCS | Mod: S$GLB,,, | Performed by: INTERNAL MEDICINE

## 2023-10-02 PROCEDURE — 93798 PHYS/QHP OP CAR RHAB W/ECG: CPT | Mod: S$GLB,,, | Performed by: INTERNAL MEDICINE

## 2023-10-04 ENCOUNTER — CLINICAL SUPPORT (OUTPATIENT)
Dept: OTOLARYNGOLOGY | Facility: CLINIC | Age: 73
End: 2023-10-04
Payer: MEDICARE

## 2023-10-04 ENCOUNTER — CLINICAL SUPPORT (OUTPATIENT)
Dept: CARDIAC REHAB | Facility: CLINIC | Age: 73
End: 2023-10-04
Payer: MEDICARE

## 2023-10-04 ENCOUNTER — OFFICE VISIT (OUTPATIENT)
Dept: OTOLARYNGOLOGY | Facility: CLINIC | Age: 73
End: 2023-10-04
Payer: MEDICARE

## 2023-10-04 VITALS
DIASTOLIC BLOOD PRESSURE: 64 MMHG | OXYGEN SATURATION: 100 % | BODY MASS INDEX: 31.41 KG/M2 | HEART RATE: 65 BPM | HEIGHT: 73 IN | SYSTOLIC BLOOD PRESSURE: 120 MMHG | WEIGHT: 237 LBS | TEMPERATURE: 97 F | RESPIRATION RATE: 12 BRPM

## 2023-10-04 DIAGNOSIS — Z77.122 HISTORY OF EXPOSURE TO NOISE: ICD-10-CM

## 2023-10-04 DIAGNOSIS — H90.3 ASYMMETRICAL SENSORINEURAL HEARING LOSS: Primary | ICD-10-CM

## 2023-10-04 DIAGNOSIS — H93.13 BILATERAL TINNITUS: ICD-10-CM

## 2023-10-04 DIAGNOSIS — R09.81 NASAL CONGESTION: ICD-10-CM

## 2023-10-04 DIAGNOSIS — R94.120 ABNORMALLY COMPLIANT MIDDLE EAR SYSTEM WITH TYPE AD TYMPANOGRAM CURVE OF BOTH EARS: ICD-10-CM

## 2023-10-04 DIAGNOSIS — H61.23 BILATERAL IMPACTED CERUMEN: ICD-10-CM

## 2023-10-04 DIAGNOSIS — Z86.69 HISTORY OF EUSTACHIAN TUBE DYSFUNCTION: ICD-10-CM

## 2023-10-04 DIAGNOSIS — I25.10 CORONARY ATHEROSCLEROSIS OF NATIVE CORONARY ARTERY: ICD-10-CM

## 2023-10-04 DIAGNOSIS — H91.90 HEARING DISORDER, UNSPECIFIED LATERALITY: Primary | ICD-10-CM

## 2023-10-04 DIAGNOSIS — Z98.61 POSTSURGICAL PERCUTANEOUS TRANSLUMINAL CORONARY ANGIOPLASTY STATUS: Primary | ICD-10-CM

## 2023-10-04 DIAGNOSIS — L29.9 EAR ITCH: ICD-10-CM

## 2023-10-04 DIAGNOSIS — H90.3 BILATERAL SENSORINEURAL HEARING LOSS: ICD-10-CM

## 2023-10-04 PROCEDURE — 3008F BODY MASS INDEX DOCD: CPT | Mod: CPTII,S$GLB,, | Performed by: OTOLARYNGOLOGY

## 2023-10-04 PROCEDURE — 92550 PR TYMPANOMETRY AND REFLEX THRESHOLD MEASUREMENTS: ICD-10-PCS | Mod: S$GLB,,, | Performed by: AUDIOLOGIST-HEARING AID FITTER

## 2023-10-04 PROCEDURE — 3074F SYST BP LT 130 MM HG: CPT | Mod: CPTII,S$GLB,, | Performed by: OTOLARYNGOLOGY

## 2023-10-04 PROCEDURE — 93798 PHYS/QHP OP CAR RHAB W/ECG: CPT | Mod: S$GLB,,, | Performed by: INTERNAL MEDICINE

## 2023-10-04 PROCEDURE — 3078F DIAST BP <80 MM HG: CPT | Mod: CPTII,S$GLB,, | Performed by: OTOLARYNGOLOGY

## 2023-10-04 PROCEDURE — 92550 TYMPANOMETRY & REFLEX THRESH: CPT | Mod: S$GLB,,, | Performed by: AUDIOLOGIST-HEARING AID FITTER

## 2023-10-04 PROCEDURE — 1159F MED LIST DOCD IN RCRD: CPT | Mod: CPTII,S$GLB,, | Performed by: OTOLARYNGOLOGY

## 2023-10-04 PROCEDURE — 92557 COMPREHENSIVE HEARING TEST: CPT | Mod: S$GLB,,, | Performed by: AUDIOLOGIST-HEARING AID FITTER

## 2023-10-04 PROCEDURE — 92557 PR COMPREHENSIVE HEARING TEST: ICD-10-PCS | Mod: S$GLB,,, | Performed by: AUDIOLOGIST-HEARING AID FITTER

## 2023-10-04 PROCEDURE — 93798 PR CARDIAC REHAB/MONITOR: ICD-10-PCS | Mod: S$GLB,,, | Performed by: INTERNAL MEDICINE

## 2023-10-04 PROCEDURE — 1160F RVW MEDS BY RX/DR IN RCRD: CPT | Mod: CPTII,S$GLB,, | Performed by: OTOLARYNGOLOGY

## 2023-10-04 PROCEDURE — 99214 OFFICE O/P EST MOD 30 MIN: CPT | Mod: S$GLB,,, | Performed by: OTOLARYNGOLOGY

## 2023-10-04 PROCEDURE — 4010F ACE/ARB THERAPY RXD/TAKEN: CPT | Mod: CPTII,S$GLB,, | Performed by: OTOLARYNGOLOGY

## 2023-10-04 NOTE — Clinical Note
Your patient, Julio Bernardo, was recently seen for an audiogram.  My assessment and recommendations are enclosed.  If you should have any questions or concerns, please contact me at [phone].   Sincerely, Earnest Dc, CCC-A Audiologist [facility]

## 2023-10-04 NOTE — PROGRESS NOTES
Earnest Dc, CCC-A  Audiologist - [facility] [address] [place] [address]  [email or web address]  [phone]    Patient: Julio Bernardo   MRN: [id number]  [address] [facility]   Home Phone [phone]   Work Phone [phone]   Mobile [phone]   : 1950  QUINTEROS: 10/4/2023      AUDIOLOGICAL EVALUATION      RECOMMENDATIONS:   It is recommended that Julio Bernardo:  Follow up medically with Dr. Robison.  Receive binaural hearing aids to improve speech understanding.  Continue to receive audiological monitoring annually.  Use precaution and/or hearing protection in noisy environments.    If you should have any questions or concerns regarding the above information, please do not hesitate to contact me at [phone].      _______________________________  Earnest Dc, JIHAN-A  Audiologist

## 2023-10-09 ENCOUNTER — CLINICAL SUPPORT (OUTPATIENT)
Dept: CARDIAC REHAB | Facility: CLINIC | Age: 73
End: 2023-10-09
Payer: MEDICARE

## 2023-10-09 DIAGNOSIS — I25.10 CORONARY ARTERY DISEASE, UNSPECIFIED VESSEL OR LESION TYPE, UNSPECIFIED WHETHER ANGINA PRESENT, UNSPECIFIED WHETHER NATIVE OR TRANSPLANTED HEART: ICD-10-CM

## 2023-10-09 DIAGNOSIS — Z98.61 POSTSURGICAL PERCUTANEOUS TRANSLUMINAL CORONARY ANGIOPLASTY STATUS: Primary | ICD-10-CM

## 2023-10-09 PROCEDURE — 93798 PHYS/QHP OP CAR RHAB W/ECG: CPT | Mod: S$GLB,,, | Performed by: INTERNAL MEDICINE

## 2023-10-09 PROCEDURE — 93798 PR CARDIAC REHAB/MONITOR: ICD-10-PCS | Mod: S$GLB,,, | Performed by: INTERNAL MEDICINE

## 2023-10-11 ENCOUNTER — CLINICAL SUPPORT (OUTPATIENT)
Dept: CARDIAC REHAB | Facility: CLINIC | Age: 73
End: 2023-10-11
Payer: MEDICARE

## 2023-10-11 DIAGNOSIS — I25.10 ATHEROSCLEROSIS OF NATIVE CORONARY ARTERY OF NATIVE HEART, UNSPECIFIED WHETHER ANGINA PRESENT: ICD-10-CM

## 2023-10-11 DIAGNOSIS — Z98.61 POSTSURGICAL PERCUTANEOUS TRANSLUMINAL CORONARY ANGIOPLASTY STATUS: Primary | ICD-10-CM

## 2023-10-11 PROCEDURE — 93798 PHYS/QHP OP CAR RHAB W/ECG: CPT | Mod: S$GLB,,, | Performed by: INTERNAL MEDICINE

## 2023-10-11 PROCEDURE — 93798 PR CARDIAC REHAB/MONITOR: ICD-10-PCS | Mod: S$GLB,,, | Performed by: INTERNAL MEDICINE

## 2023-10-12 ENCOUNTER — DOCUMENTATION ONLY (OUTPATIENT)
Dept: CARDIAC REHAB | Facility: CLINIC | Age: 73
End: 2023-10-12
Payer: MEDICARE

## 2023-10-12 NOTE — PROGRESS NOTES
Mr. Singleton has completed 7 out of 36 exercise session of Phase II cardiac rehab.  A follow up reassessment will be completed at 12 sessions.     Session: Orientation   Cardiac Rehab Individual Treatment Plan - Initial Assessment      Patient Name: Julio Bernardo MRN: [id number]   : 1950   Age: 73 y.o.   Primary Diagnosis: PTCA/STENT  Date of Event: 23  EF: 63%  Risk Stratification: moderate  Referring Physician: MARK   Exercise Assessment:     CPX/TM Date: 23 Results   RHR 62   Max    Peak VO2 (CPX only) 18.5   Actual METS (CPX only) 5.3   Estimated METS 11.0     Anthropometrics    Height 73 inches   Weight 237 lbs   BMI 31.3   Abdominal Girth 45.2   Body Composition 28.5%     ST Depression noted on Stress Test?:No  Angina with exercise?: No   Fall Risk: No   Assistive Devices:  independent   Currently exercising? Yes: Walking; Frequency: 5 days per week; Duration 30 to 60 minutes  Mr. Singleton stated there were no limitations to exercise.     Exercise Plan:   Goals:  CR Exercise Goals: Attend Cardiac Rehab 3 times/week: In Progress  Home Aerobic Exercise: 2 additional days/week for 30-60 minutes: In Progress  Intensity of 12-15 on the Rate of Perceived Exertion (RPE) scale: In Progress  30% increase in entry estimated METS: 14.3 : In Progress  5 days/week for 30-60 minutes: In Progress    Intervention:   Discussed importance of regular attendance to cardiac rehab class    Exercise Prescription:  THR Range    Mode: Treadmill  Recumbent Bike  Upright Bike  Nustep  Elliptical   Frequency:  3 days/week   Duration:  30 - 60 minutes   Intensity:  12 - 15 RPE   Resistance Training:  Yes: 5 to 7 lb weights with 10-15 reps based on strength and range of motion assessment     Home Prescription:  Mode Aerobic   Frequency: 2- 3 days/week   Duration: 30-60 minutes   Resistance Training: None        Education:  Orientation to Equipment; verbalizes understanding; Date: 23  Exercise Recommendations;  verbalizes understanding; Date: 23  Exercise Safety; verbalizes understanding; Date: 23  Class Preparation: verbalizes understanding; Date: 23  Signs and symptoms to report: verbalizes understanding; Date: 23  Caffeine/Hydration: verbalizes understanding; Date: 23  Exercise Terminology: verbalizes understanding; Date: 23  Resistance Training: verbalizes understanding; Date: 23    Comments:  I encouraged Mr. Singleton to continue walking at least 2 non-rehab days per week for at least 30 minutes in addition to attending Phase II cardiac rehab classes 3 days per week.  He stated understanding.     All consent forms were signed, proper attire and shoes were discussed.       Mr. Singleton will begin Cardiac Rehab on  at 10:00 am.    The exercise prescription will be adjusted based on tolerance of exercise intensity by patient.    Bettie Fortune, CEP     Orientation   Cardiac Rehab Individual Treatment Plan - Initial Assessment      Patient Name: Julio Bernardo MRN: [id number]   : 1950   Age: 73 y.o.   Primary Diagnosis: s/p PTCA/stent, CAD, HTN, HLD, CKD III    Nutrition Assessment:     Anthropometrics    Height 73 inches   Weight 237 lbs   BMI 31.3   Abdominal Girth 45.2   Body Composition 28.5       Drug Allergies and Intolerances:  Review of patient's allergies indicates:  No Known Allergies    Food Allergies and Intolerances:  NA    Past Medical History:  Past Medical History:   Diagnosis Date    Allergy     Arthritis 2019    Colon polyps     Disorder of kidney and ureter 2019    Hyperlipemia     Hypertension     Posterior capsular opacification        Past Surgical History:  Past Surgical History:   Procedure Laterality Date    ANGIOGRAM, CORONARY, WITH LEFT HEART CATHETERIZATION N/A 2023    Procedure: Angiogram, Coronary, with Left Heart Cath;  Surgeon: Shola Burns MD;  Location: [facility] CATH LAB;  Service: Cardiology;  Laterality: N/A;    ARTHROSCOPIC  CHONDROPLASTY OF KNEE JOINT Left 5/25/2023    Procedure: ARTHROSCOPY, KNEE, WITH CHONDROPLASTY;  Surgeon: Brayan Payne MD;  Location: [facility] OR;  Service: Orthopedics;  Laterality: Left;    CATARACT EXTRACTION      both eyes    COLONOSCOPY N/A 09/27/2016    Procedure: COLONOSCOPY;  Surgeon: Jan Argueta MD;  Location: [facility] ENDO (4TH FLR);  Service: Endoscopy;  Laterality: N/A;    COLONOSCOPY N/A 05/19/2022    Procedure: COLONOSCOPY;  Surgeon: Bobo Delcid MD;  Location: [facility] ENDO (4TH FLR);  Service: Endoscopy;  Laterality: N/A;  4/4 fully vaccinated; instructions to [place]-    EYE SURGERY      HERNIA REPAIR      IVUS, CORONARY  7/24/2023    Procedure: IVUS, Coronary;  Surgeon: Shola Burns MD;  Location: [facility] CATH LAB;  Service: Cardiology;;    KNEE ARTHROSCOPY Right 01/01/2014    KNEE ARTHROSCOPY W/ MENISCECTOMY Left 5/25/2023    Procedure: ARTHROSCOPY, KNEE, WITH MENISCECTOMY;  Surgeon: Brayan Payne MD;  Location: [facility] OR;  Service: Orthopedics;  Laterality: Left;  medial and lateral    STENT, DRUG ELUTING, SINGLE VESSEL, CORONARY  7/24/2023    Procedure: Stent, Drug Eluting, Single Vessel, Coronary;  Surgeon: Shola Burns MD;  Location: [facility] CATH LAB;  Service: Cardiology;;    WISDOM TOOTH EXTRACTION      YAG      Left Eye       Medications:  Current Outpatient Medications   Medication Sig    albuterol (PROVENTIL/VENTOLIN HFA) 90 mcg/actuation inhaler Inhale 2 puffs into the lungs every 6 (six) hours as needed (cough).    ascorbic acid (VITAMIN C) 1000 MG tablet Take 1,000 mg by mouth once daily.    aspirin 81 mg Tab Take by mouth. 1 Tablet Oral Every day.  Last taken 3/25/11    atorvastatin (LIPITOR) 80 MG tablet Take 1 tablet (80 mg total) by mouth once daily.    azelastine (ASTELIN) 137 mcg (0.1 %) nasal spray INSTILL 2 SPRAYS IN EACH NOSTRIL TWICE A DAY    ciclopirox (PENLAC) 8 % Soln Apply topically nightly.    fexofenadine HCl (ALLEGRA ORAL) Take by mouth.    fish oil-omega-3 fatty acids  "300-1,000 mg capsule Take 2 g by mouth once daily.    fluticasone propionate (FLONASE) 50 mcg/actuation nasal spray USE 1 SPRAY BY EACH NARE ROUTE ONCE DAILY.    GAVILYTE-G 236-22.74-6.74 -5.86 gram suspension Take 4,000 mLs by mouth once.    hydroCHLOROthiazide (MICROZIDE) 12.5 mg capsule Take 1 capsule (12.5 mg total) by mouth once daily.    HYDROcodone-acetaminophen (NORCO) 7.5-325 mg per tablet Take 1 tablet by mouth every 6 (six) hours as needed for Pain.    ketoconazole (NIZORAL) 2 % cream Apply topically once daily. Apply daily to affected toenails for 6-12 months    losartan (COZAAR) 100 MG tablet Take 1 tablet (100 mg total) by mouth once daily.    methylPREDNISolone (MEDROL DOSEPACK) 4 mg tablet use as directed    multivitamin capsule Take 1 capsule by mouth once daily.    omeprazole (PRILOSEC) 20 MG capsule Take 1 capsule (20 mg total) by mouth 2 (two) times daily.    ticagrelor (BRILINTA) 90 mg tablet Take 1 tablet (90 mg total) by mouth 2 (two) times daily.    verapamiL (VERELAN) 240 MG C24P Take 1 capsule (240 mg total) by mouth once daily.     Current Facility-Administered Medications   Medication    sodium chloride 0.9% flush 10 mL       Vitamins and Supplements:  MVI, Fish oil    Labs:  Patient confirms he is taking lipitor 80mg for cholesterol control.    Lab Results   Component Value Date    CHOL 134 09/18/2023     Lab Results   Component Value Date    HDL 41 09/18/2023     Lab Results   Component Value Date    LDLCALC 74.4 09/18/2023     Lab Results   Component Value Date    TRIG 93 09/18/2023     Lab Results   Component Value Date    CHOLHDL 30.6 09/18/2023         Lab Results   Component Value Date    GLUF 101 09/18/2023     No results found for: "HGBA1C"    Nutrition/Diet History:  Patient eats 2-3 meals daily.    Seasons food with salt substitute/pepper/ herbs.  Patient denies use of a salt shaker at the table on prepared foods.   Dines out 2 per week at restaurants.    Chooses fried foods " rarely  Chooses fish 2 time(s) per week.   Beverages:  water, sweet tea, and lemonade  Alcohol: rarely    24 Hour Recall:  Breakfast: tuna salad on whole wheat with lettuce  Lunch:jambalaya with shrimp and hot sausage  Dinner:leftover jambalaya with corn and chicken breast  Other: 2% chocolate milk    Difficulty Chewing or Swallowing: No  Current Exercise: See Exercise Physiologist Note  Food Safety/Food Preparation: spouse  Living Arrangements/Family Support: Lives with spouse  Cultural/Spiritual/Personal Preferences: not applicable   Barriers to Education: none identified  Stage of Change Related to Diet Habits: Action    Nutrition Diagnosis:  Food and nutrition related knowledge deficit related to the lack of prior nutrition education as evidenced by diet history and 24 hour recall    Nutrition Plan:   Goals:  LDL-C < 70 (for high risk patients)  Hgb A1c < 7%  BMI < 25 and abdominal girth < 40M/<35 F  2 gram sodium, Mediterranean diet  Rehab weight goal: goal of 200  Fish intake (non-fried varieties) to a goal of 2-3 servings per week.   Increase fruit and vegetable intake    Interventions/Recommendations:  Lab results reviewed and discussed  Nutrition Prescription:  Total Energy Estimated Needs: 8145-7571 Kcal/d for weight loss  Method for Estimating Needs: 20-25kcal/kg ABW  Total Protein Estimated Needs: 54-90 g/d  Method for Estimating Needs: 0.6-1 g/Kg ABW (reduced needs r/t dx CKD III)  Total Fluid Estimated Needs: 1 mL/Kcal  Dietitian Consult: No  Patient to participate in Cardiac Rehab sessions three times a week  Weekly Dietitian Weight Check  Encouraged patient to complete 3 day food diary  Follow Up Plan for Ongoing Self-Management Support    Education:  Mediterranean Diet; verbalizes understanding; Date: 9/21/23  Person taught: patient  Preferred Learning Method: Verbal, Written  Education Needed/Provided: Nutrition counseling and education related to cardiac rehabilitation  Education Method: Weekly  nutrition lectures on the Mediterranean diet, cooking, shopping, and dining out  Written Materials Provided: 3 Day Food Record, Introduction to Mediterranean Diet  Strategies Implemented: Motivational interviewing, Goal setting, Self-Monitoring, and Problem Solving    Comments:   Discussed ways to incorporate healthy snacks, eating on a schedule, and monitoring sodium intake for heart health.    Diabetes  Is the patient diabetic? No      RD contact information provided.      Courtney Unger MS, RDN/LDN     Session: Orientation   Cardiac Rehab Individual Treatment Plan - Initial Assessment      Patient Name: Julio Bernardo MRN: [id number]   : 1950   Age: 73 y.o.   Date of Event: 23   Primary Diagnosis: S/P PTCA/Stent    EF: 63% (23)    Physical Assessment:   There were no vitals taken for this visit.    ASSESSMENT:  Heart Sounds: regular rate and rhythm, S1, S2 normal, no murmur, click, rub or gallop and PVCs noted   Prosthetic Valve: No  Lung Sounds: normal air entry, lungs clear to auscultation  Capillary Refill: normal  Left Radial Pulse: Normal (+2)  Right Radial Pulse: Normal (+2)  Left Pedal Pulse: Normal (+2)  Right Pedal Pulse: Normal (+2)  Right Edema: 1+  Left Edema 3+  Strength: normal  Range of Motion: full range of motion  Existing Limitations:      Site   [] Arthritis, bursitis    [] Amputation, atrophy    [] Other:    []       Diabetic patient's foot examination comments: Normal -  Bilateral  Incisional site: healing well  Special needs: NA    Psychosocial Assessment:   Outcome Survey Tools:    SIGIFRDEO SCORES:   PRE   Anxiety 1   Depression 1   Somatic 2   Hostility 0     SF-36 SCORES:   PRE   Physical Function 27   Social Function 11   Mental Health 27   Pain 10   Change in Health 5   Physical Role Limitation 4   Mental Role Limitation 3   Energy/Fatigue 20   Health Perceptions 21   Total Score 128     PHQ-9:      2023     8:51 AM   PHQ-9 Depression Patient Health Questionnaire    Over the last two weeks how often have you been bothered by little interest or pleasure in doing things 0   Over the last two weeks how often have you been bothered by feeling down, depressed or hopeless 0   Over the last two weeks how often have you been bothered by trouble falling or staying asleep, or sleeping too much 1   Over the last two weeks how often have you been bothered by feeling tired or having little energy 1   Over the last two weeks how often have you been bothered by a poor appetite or overeating 0   Over the last two weeks how often have you been bothered by feeling bad about yourself - or that you are a failure or have let yourself or your family down 0   Over the last two weeks how often have you been bothered by trouble concentrating on things, such as reading the newspaper or watching television 0   Over the last two weeks how often have you been bothered by moving or speaking so slowly that other people could have noticed. 0   Over the last two weeks how often have you been bothered by thoughts that you would be better off dead, or of hurting yourself 0   If you checked off any problems, how difficult have these problems made it for you to do your work, take care of things at home or get along with other people? Not difficult at all   PHQ-9 Score 2              Living Arrangements: Lives with spouse  Family Support: children  Self Reported: Effective Coping Skills  Displays: happiness and calmness  Medication: not applicable    Psychosocial Plan:   Goals:  Improved psychosocial coping strategies  Maintain positive support system  Maintain positive outlook  Improve overall quality of life    Interventions/Recommendations:  Discussed Results of Surveys  Patient to Self Report Emotional Changes at Session Check In  Recommend Physical Activity  Recommend Attending Education Lectures  Notify MD: No  Program Referral: No  Pharmaceutical Intervention/Therapy: No  Other Needs: not applicable  Stage  of Readiness to Change: Preparation    Education:  Risk Factors; verbalizes understanding; Date: 9/21/23    Comments:  Screening tool results reviewed with patient, he denies any issues with stress/anxiety/depression. Patient has been instructed to notify staff in the event that circumstances worsen.  Patient verbalizes understanding.    Other Core Components/Risk Factors Assessment:   RISK FACTORS:  hyperlipidemia, hypertension, positive family history    Learning Barriers: None    Education Level:  Post-College Graduate Degree    Pre-test Score: 90    Medication Compliance: has been compliant with taking medications    Other Core Components/Risk Factors Plan:   Goals:  Decrease cholesterol level: In Progress  Increase exercise tolerance: In Progress  Increase knowledge of CAD: In Progress  Decrease blood pressure: In Progress  Weight loss: In Progress  Demonstrate accurate pulse taking: In Progress  Identify and manage personal areas of stress: In Progress  Learn more about healthy eating: In Progress    Interventions/Recommendations:  Recommend regular attendance for Cardiac Rehab: Exercise and Education Lectures  Encourage medication compliance  Individual Education/ Counseling: Yes  Physician Referral: No    Education:    risk factors, verbalizes understanding; Date: 9/21/23         Education method adapted to patients education level and preferred method of learning.  Method: explanation    Comments:  Patient is very motivated to improve his cardiac health by risk factor modification.     Other Core Components/Hypertension Assessment:   Resting BP:  Left 156/82 Right 152/80 HR 75 O2 Sat   BP Readings from Last 1 Encounters:   10/04/23 120/64         BP Diagnosis: Hypertensive  Patient reported symptoms: none    Other Core Components/Hypertension Plan:   Goals:  Blood Pressure <130/80    Interventions/Recommendations:  Med Card Reconciled: Yes  Encourage medication compliance  Encourage sodium  reduction  Encourage weight loss  Recommend physical activity  Educate on contributory factors  Reduce stress, anxiety, anger, depression, and/or chronic pain  Encourage home blood pressure monitoring    Education:    Risk Factors; verbalizes understanding; Date: 9/21/23         Comments:  Patient has a blood pressure cuff at home, he takes it occasionally. Discussed importance of daily blood pressure checks and keeping a log for his appointments. Patient will start keeping a log with daily bp checks.       Does the patient have Heart Failure? No    Other Core Components/Tobacco Cessation Assessment:   Smoking Status: lifetime non-smoker  Smoking Cessation Barriers:  NA  Stage of Readiness to Change: Maintenance    Other Core Components/Tobacco Cessation Plan:   Goals:  Maintain non-smoking status    Interventions:  Maintains non-smoking status    Education:    Risk Factors; verbalizes understanding; Date: 9/21/23         Comments:  Patient is very motivated to start cardiac rehab to improve his cardiac health. He currently is walking 5 days a week for 30-60 minutes. Discussed Cardiac Rehab program in depth with patient.  Medication list updated per patient & marked as reviewed.  Patient has been instructed to notify staff of any problems while attending rehab (ie: chest pain, shortness of breath, lightheadedness, dizziness).  Patient has been instructed to monitor blood pressure readings outside of rehab & to keep a daily log of the readings.  Patient verbalizes understanding.     Tr Flood RN  Cardiac Rehab Nurse

## 2023-10-13 ENCOUNTER — CLINICAL SUPPORT (OUTPATIENT)
Dept: CARDIAC REHAB | Facility: CLINIC | Age: 73
End: 2023-10-13
Payer: MEDICARE

## 2023-10-13 DIAGNOSIS — I25.10 CORONARY ATHEROSCLEROSIS OF NATIVE CORONARY ARTERY: ICD-10-CM

## 2023-10-13 DIAGNOSIS — Z98.61 POSTSURGICAL PERCUTANEOUS TRANSLUMINAL CORONARY ANGIOPLASTY STATUS: Primary | ICD-10-CM

## 2023-10-13 PROCEDURE — 93798 PR CARDIAC REHAB/MONITOR: ICD-10-PCS | Mod: S$GLB,,, | Performed by: INTERNAL MEDICINE

## 2023-10-13 PROCEDURE — 93798 PHYS/QHP OP CAR RHAB W/ECG: CPT | Mod: S$GLB,,, | Performed by: INTERNAL MEDICINE

## 2023-10-16 ENCOUNTER — CLINICAL SUPPORT (OUTPATIENT)
Dept: CARDIAC REHAB | Facility: CLINIC | Age: 73
End: 2023-10-16
Payer: MEDICARE

## 2023-10-16 DIAGNOSIS — Z98.61 POSTSURGICAL PERCUTANEOUS TRANSLUMINAL CORONARY ANGIOPLASTY STATUS: Primary | ICD-10-CM

## 2023-10-16 DIAGNOSIS — I25.10 ATHEROSCLEROSIS OF NATIVE CORONARY ARTERY OF NATIVE HEART, UNSPECIFIED WHETHER ANGINA PRESENT: ICD-10-CM

## 2023-10-16 PROCEDURE — 93798 PHYS/QHP OP CAR RHAB W/ECG: CPT | Mod: S$GLB,,, | Performed by: INTERNAL MEDICINE

## 2023-10-16 PROCEDURE — 93798 PR CARDIAC REHAB/MONITOR: ICD-10-PCS | Mod: S$GLB,,, | Performed by: INTERNAL MEDICINE

## 2023-10-17 ENCOUNTER — OFFICE VISIT (OUTPATIENT)
Dept: CARDIOLOGY | Facility: CLINIC | Age: 73
End: 2023-10-17
Payer: MEDICARE

## 2023-10-17 VITALS
DIASTOLIC BLOOD PRESSURE: 76 MMHG | WEIGHT: 239.44 LBS | SYSTOLIC BLOOD PRESSURE: 126 MMHG | HEIGHT: 73 IN | HEART RATE: 62 BPM | BODY MASS INDEX: 31.73 KG/M2

## 2023-10-17 DIAGNOSIS — I49.3 PVC'S (PREMATURE VENTRICULAR CONTRACTIONS): ICD-10-CM

## 2023-10-17 DIAGNOSIS — I70.0 ATHEROSCLEROSIS OF AORTA: ICD-10-CM

## 2023-10-17 DIAGNOSIS — Z98.61 POSTSURGICAL PERCUTANEOUS TRANSLUMINAL CORONARY ANGIOPLASTY STATUS: ICD-10-CM

## 2023-10-17 DIAGNOSIS — E78.5 HYPERLIPIDEMIA, UNSPECIFIED HYPERLIPIDEMIA TYPE: ICD-10-CM

## 2023-10-17 DIAGNOSIS — I10 PRIMARY HYPERTENSION: ICD-10-CM

## 2023-10-17 DIAGNOSIS — I25.10 CORONARY ARTERY DISEASE INVOLVING NATIVE CORONARY ARTERY OF NATIVE HEART WITHOUT ANGINA PECTORIS: Primary | ICD-10-CM

## 2023-10-17 PROCEDURE — 3288F PR FALLS RISK ASSESSMENT DOCUMENTED: ICD-10-PCS | Mod: CPTII,S$GLB,, | Performed by: INTERNAL MEDICINE

## 2023-10-17 PROCEDURE — 1160F RVW MEDS BY RX/DR IN RCRD: CPT | Mod: CPTII,S$GLB,, | Performed by: INTERNAL MEDICINE

## 2023-10-17 PROCEDURE — 4010F ACE/ARB THERAPY RXD/TAKEN: CPT | Mod: CPTII,S$GLB,, | Performed by: INTERNAL MEDICINE

## 2023-10-17 PROCEDURE — 3008F BODY MASS INDEX DOCD: CPT | Mod: CPTII,S$GLB,, | Performed by: INTERNAL MEDICINE

## 2023-10-17 PROCEDURE — 3288F FALL RISK ASSESSMENT DOCD: CPT | Mod: CPTII,S$GLB,, | Performed by: INTERNAL MEDICINE

## 2023-10-17 PROCEDURE — 1126F AMNT PAIN NOTED NONE PRSNT: CPT | Mod: CPTII,S$GLB,, | Performed by: INTERNAL MEDICINE

## 2023-10-17 PROCEDURE — 1101F PT FALLS ASSESS-DOCD LE1/YR: CPT | Mod: CPTII,S$GLB,, | Performed by: INTERNAL MEDICINE

## 2023-10-17 PROCEDURE — 99214 PR OFFICE/OUTPT VISIT, EST, LEVL IV, 30-39 MIN: ICD-10-PCS | Mod: S$GLB,,, | Performed by: INTERNAL MEDICINE

## 2023-10-17 PROCEDURE — 3008F PR BODY MASS INDEX (BMI) DOCUMENTED: ICD-10-PCS | Mod: CPTII,S$GLB,, | Performed by: INTERNAL MEDICINE

## 2023-10-17 PROCEDURE — 1101F PR PT FALLS ASSESS DOC 0-1 FALLS W/OUT INJ PAST YR: ICD-10-PCS | Mod: CPTII,S$GLB,, | Performed by: INTERNAL MEDICINE

## 2023-10-17 PROCEDURE — 3074F SYST BP LT 130 MM HG: CPT | Mod: CPTII,S$GLB,, | Performed by: INTERNAL MEDICINE

## 2023-10-17 PROCEDURE — 1159F PR MEDICATION LIST DOCUMENTED IN MEDICAL RECORD: ICD-10-PCS | Mod: CPTII,S$GLB,, | Performed by: INTERNAL MEDICINE

## 2023-10-17 PROCEDURE — 3074F PR MOST RECENT SYSTOLIC BLOOD PRESSURE < 130 MM HG: ICD-10-PCS | Mod: CPTII,S$GLB,, | Performed by: INTERNAL MEDICINE

## 2023-10-17 PROCEDURE — 99999 PR PBB SHADOW E&M-EST. PATIENT-LVL IV: ICD-10-PCS | Mod: PBBFAC,,, | Performed by: INTERNAL MEDICINE

## 2023-10-17 PROCEDURE — 1126F PR PAIN SEVERITY QUANTIFIED, NO PAIN PRESENT: ICD-10-PCS | Mod: CPTII,S$GLB,, | Performed by: INTERNAL MEDICINE

## 2023-10-17 PROCEDURE — 99999 PR PBB SHADOW E&M-EST. PATIENT-LVL IV: CPT | Mod: PBBFAC,,, | Performed by: INTERNAL MEDICINE

## 2023-10-17 PROCEDURE — 99214 OFFICE O/P EST MOD 30 MIN: CPT | Mod: S$GLB,,, | Performed by: INTERNAL MEDICINE

## 2023-10-17 PROCEDURE — 3078F PR MOST RECENT DIASTOLIC BLOOD PRESSURE < 80 MM HG: ICD-10-PCS | Mod: CPTII,S$GLB,, | Performed by: INTERNAL MEDICINE

## 2023-10-17 PROCEDURE — 4010F PR ACE/ARB THEARPY RXD/TAKEN: ICD-10-PCS | Mod: CPTII,S$GLB,, | Performed by: INTERNAL MEDICINE

## 2023-10-17 PROCEDURE — 1160F PR REVIEW ALL MEDS BY PRESCRIBER/CLIN PHARMACIST DOCUMENTED: ICD-10-PCS | Mod: CPTII,S$GLB,, | Performed by: INTERNAL MEDICINE

## 2023-10-17 PROCEDURE — 1159F MED LIST DOCD IN RCRD: CPT | Mod: CPTII,S$GLB,, | Performed by: INTERNAL MEDICINE

## 2023-10-17 PROCEDURE — 3078F DIAST BP <80 MM HG: CPT | Mod: CPTII,S$GLB,, | Performed by: INTERNAL MEDICINE

## 2023-10-17 RX ORDER — VERAPAMIL HYDROCHLORIDE 360 MG/1
360 CAPSULE, DELAYED RELEASE PELLETS ORAL DAILY
Qty: 90 CAPSULE | Refills: 3 | Status: SHIPPED | OUTPATIENT
Start: 2023-10-17 | End: 2024-10-16

## 2023-10-17 RX ORDER — SPIRONOLACTONE 25 MG/1
25 TABLET ORAL DAILY
Qty: 90 TABLET | Refills: 3 | Status: SHIPPED | OUTPATIENT
Start: 2023-10-17

## 2023-10-17 NOTE — PROGRESS NOTES
HISTORY:    73-year-old male with a history of CAD s/p LAD PCI July '23, hypertension, hyperlipidemia, PVCs, and OA s/p RLE TKR '23 referred for evaluation by Dr. Zimmerman for abnormal PET stress.      The patient denies any symptoms of chest pain. QUINTEROS improved post PCI.    Exercise tolerance is good and has improved post PCI. The patient exercises 5 days/week. 3 days of cardiac rehab [place]    Long h/o palpitations dating back to a young age. Has not really progressed. Did have a transient episode of increased palpitations with light headedness back in May that sparked castellon resulting in holter and EPS evaluation. 14% PVC burden with a nml LVEF. Started on verapamil by Dr. Canela. No decrease in symptoms.     Patient does have intermittent le edema for years, mild.     Patient currently tolerates aspirin 81 x 1, ticagrelor 90 x 2, hydrochlorothiazide 12.5 x 1, losartan 100 x 1, verapamil 240 x 1, and atorvastatin 80 x 1.    Cardiac rehab has noticed elevated Bps and PVCs.       PHYSICAL EXAM:    Vitals:    10/17/23 1127   BP: 126/76   Pulse: 62         NAD, A+Ox3.  No jvd, no bruit.  RRR nml s1,s2. No murmurs.  CTA B no wheezes or crackles.  No edema.    LABS/STUDIES (imaging reviewed during clinic visit):    September 2023 CBC and CMP normal. LDL 74 and HDL 41.  Triglycerides 93.  ECG July 2023 demonstrates sinus rhythm with no Q-waves or ST changes.  Holter April 2023 sinus rhythm with a 13.9% PVC burden.  Some short runs of NSVT mostly 4-5 beats in duration.  1.5% Pac burden.    TTE April 2023 normal LV size and function with EF 60%.  CVP 3.  Frequent ectopy.    PET stress July 2023 normal LVEF.  LAD and left circ calcification.  14% lateral perfusion abnormality involving 14% of LV myocardium and increasing to 16% with stress.  11% reversible defect in the LAD territory as well.    Cardiac catheterization July 2023 99% mid LAD stenosis status post successful PCI with TEODORA x1 ([id number]  with IVUS guided post-dilation to 2.0 distally and 3.0 proximally). IVUS demonstrated a large, patent left main with mild luminal irregularities.  Patent left circ and large, dominant RCA system. LVEDP 20.    Venous us August 2023 No e/o DVT. Mild L GSV reflux.    ASSESSMENT & PLAN:    1. Coronary artery disease involving native coronary artery of native heart without angina pectoris    2. Atherosclerosis of aorta    3. Hyperlipidemia, unspecified hyperlipidemia type    4. Primary hypertension    5. PVC's (premature ventricular contractions)    6. Postsurgical percutaneous transluminal coronary angioplasty status            Orders Placed This Encounter    Basic Metabolic Panel    verapamiL (VERELAN) 360 MG C24P    spironolactone (ALDACTONE) 25 MG tablet          Patient being worked up for PVCs that have likely been present for some time. PET stress w 2 defects and patient with some QUINTEROS now s/p LAD PCI w nice angiographic result. QUINTEROS subjectively improved.     Cont asa 81x1 and ticagrelor 90x2.      Bps intermittently above goal on verapamil 240x1, hydrochlorothiazide 12.5 x 1, and losartan 100 x 1. Increase verapamil 360x1 and add spironolactone 25x1.     Patient with SUSANNAH, which could be a contributor. Defers CPAP.     LDL 74 on atorvastatin 80x1. Was 62. Monitor. Also on Fish Oil.     Venous insufficiency, mild. Compression stockings as tolerated.     PVC management per Dr. Canela.     Patient has lost 15 pounds, continues to try to lose.     Follow up in about 6 months (around 4/17/2024).      Shola Burns MD

## 2023-10-18 ENCOUNTER — CLINICAL SUPPORT (OUTPATIENT)
Dept: CARDIAC REHAB | Facility: CLINIC | Age: 73
End: 2023-10-18
Payer: MEDICARE

## 2023-10-18 DIAGNOSIS — Z98.61 POSTSURGICAL PERCUTANEOUS TRANSLUMINAL CORONARY ANGIOPLASTY STATUS: Primary | ICD-10-CM

## 2023-10-18 DIAGNOSIS — I25.10 ATHEROSCLEROSIS OF NATIVE CORONARY ARTERY OF NATIVE HEART, UNSPECIFIED WHETHER ANGINA PRESENT: ICD-10-CM

## 2023-10-18 PROCEDURE — 93798 PR CARDIAC REHAB/MONITOR: ICD-10-PCS | Mod: S$GLB,,, | Performed by: INTERNAL MEDICINE

## 2023-10-18 PROCEDURE — 93798 PHYS/QHP OP CAR RHAB W/ECG: CPT | Mod: S$GLB,,, | Performed by: INTERNAL MEDICINE

## 2023-10-20 ENCOUNTER — CLINICAL SUPPORT (OUTPATIENT)
Dept: CARDIAC REHAB | Facility: CLINIC | Age: 73
End: 2023-10-20
Payer: MEDICARE

## 2023-10-20 DIAGNOSIS — I25.10 ATHEROSCLEROSIS OF NATIVE CORONARY ARTERY OF NATIVE HEART, UNSPECIFIED WHETHER ANGINA PRESENT: ICD-10-CM

## 2023-10-20 DIAGNOSIS — Z98.61 POSTSURGICAL PERCUTANEOUS TRANSLUMINAL CORONARY ANGIOPLASTY STATUS: Primary | ICD-10-CM

## 2023-10-20 PROCEDURE — 93798 PR CARDIAC REHAB/MONITOR: ICD-10-PCS | Mod: S$GLB,,, | Performed by: INTERNAL MEDICINE

## 2023-10-20 PROCEDURE — 93798 PHYS/QHP OP CAR RHAB W/ECG: CPT | Mod: S$GLB,,, | Performed by: INTERNAL MEDICINE

## 2023-10-23 ENCOUNTER — CLINICAL SUPPORT (OUTPATIENT)
Dept: CARDIAC REHAB | Facility: CLINIC | Age: 73
End: 2023-10-23
Payer: MEDICARE

## 2023-10-23 ENCOUNTER — DOCUMENTATION ONLY (OUTPATIENT)
Dept: CARDIAC REHAB | Facility: CLINIC | Age: 73
End: 2023-10-23

## 2023-10-23 DIAGNOSIS — I25.10 ATHEROSCLEROSIS OF NATIVE CORONARY ARTERY OF NATIVE HEART WITHOUT ANGINA PECTORIS: ICD-10-CM

## 2023-10-23 DIAGNOSIS — Z98.61 POSTSURGICAL PERCUTANEOUS TRANSLUMINAL CORONARY ANGIOPLASTY STATUS: Primary | ICD-10-CM

## 2023-10-23 PROCEDURE — 93798 PR CARDIAC REHAB/MONITOR: ICD-10-PCS | Mod: S$GLB,,, | Performed by: INTERNAL MEDICINE

## 2023-10-23 PROCEDURE — 93798 PHYS/QHP OP CAR RHAB W/ECG: CPT | Mod: S$GLB,,, | Performed by: INTERNAL MEDICINE

## 2023-10-23 NOTE — PROGRESS NOTES
12 Session Follow Up   Cardiac Rehab Individual Treatment Plan - Reassessment    Patient Name: Julio Bernardo MRN: [id number]   : 1950   Age: 73 y.o.   Primary Diagnosis: s/p PTCA/stent    Nutrition Assessment:     Anthropometrics    Height 73 inches   Weight 238.2 lbs   BMI 31.4     Patient confirms he is taking lipitor 80mg for cholesterol control.  Difficulty Chewing or Swallowing: No  Current Exercise: See Exercise Physiologist Note  Food Safety/Food Preparation: spouse  Living Arrangements/Family Support: Lives with spouse  Cultural/Spiritual/Personal Preferences: not applicable   Barriers to Education: none identified  Stage of Change Related to Diet Habits: Action  Recent Changes to Diet: Yes - less HS snacking  Food Diary: Completed      Nutrition Plan:   Goals:  LDL-C < 70 (for high risk patients)  Hgb A1c < 7%  BMI < 25 and abdominal girth < 40M/<35 F  2 gram sodium, Mediterranean diet: In Progress  Rehab weight loss goal reaching 200 lbs, 1 lb per week: In Progress  Fish intake (non-fried varieties) to a goal of 2-3 servings per week: In Progress  Increase fruit and vegetable intake: In Progress    Comments on Goal Progression:  Pt states he feels well, discussed reducing red meat intake and trying kcal tracker to help with weight loss    Interventions:  Dietitian Consult: No  Patient to participate in Cardiac Rehab sessions three times a week  Weekly Dietitian Weight Check  Nutrition Recommendations Provided: Verbal and Written, Reviewed  Follow Up Plan for Ongoing Self-Management Support    Education:  Cholesterol and Fat; verbalizes understanding; Date: 10/4/23  Protein; verbalizes understanding; Date: 10/18/23  Seafood; verbalizes understanding; Date: 10/11/23  Weight Management; verbalizes understanding; Date: 23    Comments:   Discussed ways to incorporate healthy snacks, eating on a schedule, and monitoring sodium intake for heart health.    Diabetes  Is the patient diabetic?  No      Courtney Unger MS, RDN/LDN

## 2023-10-23 NOTE — PROGRESS NOTES
Mr. Singleton has completed 12 out of 36 exercise session of Phase II cardiac rehab.  A follow up reassessment will be completed at 24 sessions.    12 Session Follow Up   Cardiac Rehab Individual Treatment Plan - Reassessment      Patient Name: Julio Bernardo MRN: [id number]   : 1950   Age: 73 y.o.   Primary Diagnosis: PTCA/STENT Date of Event: 23   EF: 63%  Risk Stratification: moderate  Referring Physician: MARK   Exercise Assessment:     Angina with exercise?: No   ST Depression with Exercise?: No  Fall Risk: No   Assistive Devices:  independent  Mr. Singleton stated at orientation there were no limitations to exercise.  Comments on Progression: Mr. Singleton is progressing well and his workloads will continue to be increased as he tolerates exercise intensity.     Exercise Plan:   Goals:  CR Exercise Goals: Attend Cardiac Rehab 3 times/week: In Progress  Home Aerobic Exercise: 2 additional days/week for 30-60 minutes: In Progress  Intensity of 12-15 on the Rate of Perceived Exertion (RPE) scale: In Progress  30% increase in entry estimated METS: 14.3 : In Progress  5 days/week for 30-60 minutes: In Progress    Comments on Goal Progression:  Patient Consistency: consistent with attendance  Home exercise? Yes: Walking; Frequency: 2 non-rehab days per week; Duration at least 30 minutes  Patient reports intensity rate 11-13 on RPE scale    Intervention/Recommendations:   Discussed importance of regular attendance to cardiac rehab class    Exercise Prescription:  THR Range    Mode: Treadmill  Elliptical   Frequency:  3 days/week   Duration:  30-60 minutes   Intensity:  12-15 RPE   Resistance Training:  Yes: 7 to 12 lb weights with 10-15 reps based on strength and range of motion and adjusted accordingly     Home Prescription:  Mode Aerobic exercise   Frequency: 2- 3 days/week   Duration: 30-60 minutes   Resistance Training: None     Education:  Flexibility/Stretching; verbalizes understanding; Date:  10-16-23  Relaxation Techniques; verbalizes understanding; Date: 10-9-23  Resistance Training II; verbalizes understanding; Date: 10-2-23    Comments:  I reviewed exercise recommendations with Mr. Singleton.  I encouraged him to continue exercising.  He stated understanding.      The exercise prescription will continue to be adjusted based on tolerance of exercise intensity by patient.    Ino Fortune., CEP

## 2023-10-25 ENCOUNTER — CLINICAL SUPPORT (OUTPATIENT)
Dept: CARDIAC REHAB | Facility: CLINIC | Age: 73
End: 2023-10-25
Payer: MEDICARE

## 2023-10-25 DIAGNOSIS — Z98.61 POSTSURGICAL PERCUTANEOUS TRANSLUMINAL CORONARY ANGIOPLASTY STATUS: Primary | ICD-10-CM

## 2023-10-25 DIAGNOSIS — I25.10 ATHEROSCLEROSIS OF NATIVE CORONARY ARTERY OF NATIVE HEART WITHOUT ANGINA PECTORIS: ICD-10-CM

## 2023-10-25 PROCEDURE — 93798 PHYS/QHP OP CAR RHAB W/ECG: CPT | Mod: S$GLB,,, | Performed by: INTERNAL MEDICINE

## 2023-10-25 PROCEDURE — 93798 PR CARDIAC REHAB/MONITOR: ICD-10-PCS | Mod: S$GLB,,, | Performed by: INTERNAL MEDICINE

## 2023-10-25 NOTE — PROGRESS NOTES
Session: 12 Session Follow Up   Cardiac Rehab Individual Treatment Plan - Reassessment      Patient Name: Julio Bernardo MRN: [id number]   : 1950   Age: 73 y.o.   Primary Diagnosis: PTCA      Psychosocial Assessment:   Living Arrangements: Lives with spouse  Family Support: family and spouse  Self Reported: no change Effective Coping Skills  Displays: socializes with others, happiness, smiles often, and calmness  Medication: not applicable    Psychosocial Plan:   Goals:  Improved psychosocial coping strategies: In Progress  Maintain positive support system: Met  Maintain positive outlook: Met  Improve overall quality of life: In Progress    Comments on Goal Progression:  Patient is working on meeting goals    Interventions:  Patient to Self Report Emotional Changes at Session Check In  Recommend Physical Activity  Recommend Attending Education Lectures  Notify MD: No  Program Referral: No  Pharmaceutical Intervention/Therapy: No  Other Needs: not applicable  Stage of Readiness to Change: Action    Education:  Stress Management; verbalizes understanding; Date: 10/25/2023  Stress; verbalizes understanding; Date: 10/25/2023    Comments:  Patient denies having any overwhelming stress/anxiety.  He report to have good family support.  He has been instructed to notify staff in the event that circumstances change.  Patient verbalizes understanding.    Other Core Components/Risk Factors Assessment:   RISK FACTORS:  hyperlipidemia, hypertension, obesity, positive family history    Learning Barriers: None    Medication Compliance: has been compliant with the medicaiton regimen    Other Core Components/Risk Factors Plan:   Goals:  Decrease cholesterol level: In Progress  Increase exercise tolerance: In Progress  Increase knowledge of CAD: In Progress  Decrease blood pressure: In Progress  Weight loss: In Progress  Demonstrate accurate pulse taking: In Progress  Identify and manage personal areas of stress: In  Progress  Learn more about healthy eating: In Progress    Comments on Goal Progression:  Patient will be working on decreasing risk factors for CAD.    Interventions:  Individual Education/ Counseling: Yes  Physician Referral: No    Education:    blood pressure meds, verbalizes understanding; Date: 10/13/2023 & 10/25/2023  cholesterol meds, verbalizes understanding; Date: 10/13/2023  hypertension, verbalizes understanding; Date: 9/29/2023         Education method adapted to patients education level and preferred method of learning.  Method: explanation  handouts    Comments:  Encouraged for patient to continue to work on decreasing risk factors.    Other Core Components/Hypertension Assessment:   BP Range: 170-128/96-78   BP at Goal: BP not at goal.  Dr. Burns was notified of elevations.  Patient reported symptoms: none    Other Core Components/Hypertension Plan:   Goals:  Blood Pressure <130/80    Comments on Goal Progression:  Patient is working to keep BP under good control.  Dr. Burns was notified of elevations.    Interventions:  Med Card Reconciled: Yes  Encourage medication compliance  Encourage sodium reduction  Encourage weight loss  Recommend physical activity  Educate on contributory factors  Encourage home blood pressure monitoring  Recommend daily weights  MD notified/Physician Referral: Yes    Education:    Hypertension; verbalizes understanding; Date: 9/29/2023  Medication I; verbalizes understanding; Date: 10/13/2023  Medication II; verbalizes understanding; Date: 10/20/2023         Comments:  Patient is following recommendations to keep BP under control, however BP remain elevated.    Does the patient have Heart Failure? No      Other Core Components/Tobacco Cessation Assessment:   Smoking Status: lifetime non-smoker  Smoking Cessation Barriers:  N/A  Stage of Readiness to Change: Maintenance    Other Core Components/Tobacco Cessation Plan:   Goals:  Maintain non-smoking status    Comments on Goal  Progression:  Non smoker.    Interventions:  Maintains non-smoking status    Education:    Hypertension; verbalizes understanding; Date: 9/29/2023  Medications I; verbalizes understanding; Date: 10/13/2023  Medications II; verbalizes understanding; Date: 10/20/2023         Comments:  Non smoker.    Tiffanie Shirley RN  Cardiac Rehab Nurse

## 2023-10-27 ENCOUNTER — CLINICAL SUPPORT (OUTPATIENT)
Dept: CARDIAC REHAB | Facility: CLINIC | Age: 73
End: 2023-10-27
Payer: MEDICARE

## 2023-10-27 DIAGNOSIS — Z98.61 POSTSURGICAL PERCUTANEOUS TRANSLUMINAL CORONARY ANGIOPLASTY STATUS: Primary | ICD-10-CM

## 2023-10-27 DIAGNOSIS — I25.10 ATHEROSCLEROSIS OF NATIVE CORONARY ARTERY OF NATIVE HEART WITHOUT ANGINA PECTORIS: ICD-10-CM

## 2023-10-27 PROCEDURE — 93798 PHYS/QHP OP CAR RHAB W/ECG: CPT | Mod: S$GLB,,, | Performed by: INTERNAL MEDICINE

## 2023-10-27 PROCEDURE — 93798 PR CARDIAC REHAB/MONITOR: ICD-10-PCS | Mod: S$GLB,,, | Performed by: INTERNAL MEDICINE

## 2023-10-30 ENCOUNTER — PATIENT MESSAGE (OUTPATIENT)
Dept: CARDIOLOGY | Facility: CLINIC | Age: 73
End: 2023-10-30
Payer: MEDICARE

## 2023-10-30 ENCOUNTER — CLINICAL SUPPORT (OUTPATIENT)
Dept: CARDIAC REHAB | Facility: CLINIC | Age: 73
End: 2023-10-30
Payer: MEDICARE

## 2023-10-30 DIAGNOSIS — Z98.61 POSTSURGICAL PERCUTANEOUS TRANSLUMINAL CORONARY ANGIOPLASTY STATUS: Primary | ICD-10-CM

## 2023-10-30 DIAGNOSIS — I25.10 CORONARY ARTERY DISEASE, UNSPECIFIED VESSEL OR LESION TYPE, UNSPECIFIED WHETHER ANGINA PRESENT, UNSPECIFIED WHETHER NATIVE OR TRANSPLANTED HEART: ICD-10-CM

## 2023-10-30 PROCEDURE — 93798 PHYS/QHP OP CAR RHAB W/ECG: CPT | Mod: S$GLB,,, | Performed by: INTERNAL MEDICINE

## 2023-10-30 PROCEDURE — 93798 PR CARDIAC REHAB/MONITOR: ICD-10-PCS | Mod: S$GLB,,, | Performed by: INTERNAL MEDICINE

## 2023-11-01 ENCOUNTER — CLINICAL SUPPORT (OUTPATIENT)
Dept: CARDIAC REHAB | Facility: CLINIC | Age: 73
End: 2023-11-01
Payer: MEDICARE

## 2023-11-01 DIAGNOSIS — Z98.61 POSTSURGICAL PERCUTANEOUS TRANSLUMINAL CORONARY ANGIOPLASTY STATUS: Primary | ICD-10-CM

## 2023-11-01 DIAGNOSIS — I25.10 ATHEROSCLEROSIS OF NATIVE CORONARY ARTERY OF NATIVE HEART, UNSPECIFIED WHETHER ANGINA PRESENT: ICD-10-CM

## 2023-11-01 PROCEDURE — 93798 PHYS/QHP OP CAR RHAB W/ECG: CPT | Mod: S$GLB,,, | Performed by: INTERNAL MEDICINE

## 2023-11-01 PROCEDURE — 93798 PR CARDIAC REHAB/MONITOR: ICD-10-PCS | Mod: S$GLB,,, | Performed by: INTERNAL MEDICINE

## 2023-11-02 ENCOUNTER — DOCUMENTATION ONLY (OUTPATIENT)
Dept: CARDIAC REHAB | Facility: CLINIC | Age: 73
End: 2023-11-02
Payer: MEDICARE

## 2023-11-02 NOTE — PROGRESS NOTES
Mr. Singleton has completed 16 out of 36 exercise session of Phase II cardiac rehab.  A follow up reassessment will be completed at 24 sessions.    12 Session Follow Up   Cardiac Rehab Individual Treatment Plan - Reassessment      Patient Name: Julio Bernardo MRN: [id number]   : 1950   Age: 73 y.o.   Primary Diagnosis: PTCA/STENT Date of Event: 23   EF: 63%  Risk Stratification: moderate  Referring Physician: MARK   Exercise Assessment:     Angina with exercise?: No   ST Depression with Exercise?: No  Fall Risk: No   Assistive Devices:  independent  Mr. Singleton stated at orientation there were no limitations to exercise.  Comments on Progression: Mr. Singleton is progressing well and his workloads will continue to be increased as he tolerates exercise intensity.     Exercise Plan:   Goals:  CR Exercise Goals: Attend Cardiac Rehab 3 times/week: In Progress  Home Aerobic Exercise: 2 additional days/week for 30-60 minutes: In Progress  Intensity of 12-15 on the Rate of Perceived Exertion (RPE) scale: In Progress  30% increase in entry estimated METS: 14.3 : In Progress  5 days/week for 30-60 minutes: In Progress    Comments on Goal Progression:  Patient Consistency: consistent with attendance  Home exercise? Yes: Walking; Frequency: 2 non-rehab days per week; Duration at least 30 minutes  Patient reports intensity rate 11-13 on RPE scale    Intervention/Recommendations:   Discussed importance of regular attendance to cardiac rehab class    Exercise Prescription:  THR Range    Mode: Treadmill  Elliptical   Frequency:  3 days/week   Duration:  30-60 minutes   Intensity:  12-15 RPE   Resistance Training:  Yes: 7 to 12 lb weights with 10-15 reps based on strength and range of motion and adjusted accordingly     Home Prescription:  Mode Aerobic exercise   Frequency: 2- 3 days/week   Duration: 30-60 minutes   Resistance Training: None     Education:  Flexibility/Stretching; verbalizes understanding; Date:  10-16-23  Relaxation Techniques; verbalizes understanding; Date: 10-9-23  Resistance Training II; verbalizes understanding; Date: 10-2-23    Comments:  I reviewed exercise recommendations with Mr. Singleton.  I encouraged him to continue exercising.  He stated understanding.      The exercise prescription will continue to be adjusted based on tolerance of exercise intensity by patient.    Bettei Fortune, CEP     12 Session Follow Up   Cardiac Rehab Individual Treatment Plan - Reassessment    Patient Name: Julio Bernardo MRN: [id number]   : 1950   Age: 73 y.o.   Primary Diagnosis: s/p PTCA/stent    Nutrition Assessment:     Anthropometrics    Height 73 inches   Weight 238.2 lbs   BMI 31.4     Patient confirms he is taking lipitor 80mg for cholesterol control.  Difficulty Chewing or Swallowing: No  Current Exercise: See Exercise Physiologist Note  Food Safety/Food Preparation: spouse  Living Arrangements/Family Support: Lives with spouse  Cultural/Spiritual/Personal Preferences: not applicable   Barriers to Education: none identified  Stage of Change Related to Diet Habits: Action  Recent Changes to Diet: Yes - less HS snacking  Food Diary: Completed      Nutrition Plan:   Goals:  LDL-C < 70 (for high risk patients)  Hgb A1c < 7%  BMI < 25 and abdominal girth < 40M/<35 F  2 gram sodium, Mediterranean diet: In Progress  Rehab weight loss goal reaching 200 lbs, 1 lb per week: In Progress  Fish intake (non-fried varieties) to a goal of 2-3 servings per week: In Progress  Increase fruit and vegetable intake: In Progress    Comments on Goal Progression:  Pt states he feels well, discussed reducing red meat intake and trying kcal tracker to help with weight loss    Interventions:  Dietitian Consult: No  Patient to participate in Cardiac Rehab sessions three times a week  Weekly Dietitian Weight Check  Nutrition Recommendations Provided: Verbal and Written, Reviewed  Follow Up Plan for Ongoing Self-Management  Support    Education:  Cholesterol and Fat; verbalizes understanding; Date: 10/4/23  Protein; verbalizes understanding; Date: 10/18/23  Seafood; verbalizes understanding; Date: 10/11/23  Weight Management; verbalizes understanding; Date: 23    Comments:   Discussed ways to incorporate healthy snacks, eating on a schedule, and monitoring sodium intake for heart health.    Diabetes  Is the patient diabetic? No      Courtney Unger MS, RDN/LDN     Session: 12 Session Follow Up   Cardiac Rehab Individual Treatment Plan - Reassessment      Patient Name: Julio Bernardo MRN: [id number]   : 1950   Age: 73 y.o.   Primary Diagnosis: PTCA      Psychosocial Assessment:   Living Arrangements: Lives with spouse  Family Support: family and spouse  Self Reported: no change Effective Coping Skills  Displays: socializes with others, happiness, smiles often, and calmness  Medication: not applicable    Psychosocial Plan:   Goals:  Improved psychosocial coping strategies: In Progress  Maintain positive support system: Met  Maintain positive outlook: Met  Improve overall quality of life: In Progress    Comments on Goal Progression:  Patient is working on meeting goals    Interventions:  Patient to Self Report Emotional Changes at Session Check In  Recommend Physical Activity  Recommend Attending Education Lectures  Notify MD: No  Program Referral: No  Pharmaceutical Intervention/Therapy: No  Other Needs: not applicable  Stage of Readiness to Change: Action    Education:  Stress Management; verbalizes understanding; Date: 10/25/2023  Stress; verbalizes understanding; Date: 10/25/2023    Comments:  Patient denies having any overwhelming stress/anxiety.  He report to have good family support.  He has been instructed to notify staff in the event that circumstances change.  Patient verbalizes understanding.    Other Core Components/Risk Factors Assessment:   RISK FACTORS:  hyperlipidemia, hypertension, obesity, positive family  history    Learning Barriers: None    Medication Compliance: has been compliant with the medicaiton regimen    Other Core Components/Risk Factors Plan:   Goals:  Decrease cholesterol level: In Progress  Increase exercise tolerance: In Progress  Increase knowledge of CAD: In Progress  Decrease blood pressure: In Progress  Weight loss: In Progress  Demonstrate accurate pulse taking: In Progress  Identify and manage personal areas of stress: In Progress  Learn more about healthy eating: In Progress    Comments on Goal Progression:  Patient will be working on decreasing risk factors for CAD.    Interventions:  Individual Education/ Counseling: Yes  Physician Referral: No    Education:    blood pressure meds, verbalizes understanding; Date: 10/13/2023 & 10/25/2023  cholesterol meds, verbalizes understanding; Date: 10/13/2023  hypertension, verbalizes understanding; Date: 9/29/2023         Education method adapted to patients education level and preferred method of learning.  Method: explanation  handouts    Comments:  Encouraged for patient to continue to work on decreasing risk factors.    Other Core Components/Hypertension Assessment:   BP Range: 170-128/96-78   BP at Goal: BP not at goal.  Dr. Burns was notified of elevations.  Patient reported symptoms: none    Other Core Components/Hypertension Plan:   Goals:  Blood Pressure <130/80    Comments on Goal Progression:  Patient is working to keep BP under good control.  Dr. Burns was notified of elevations.    Interventions:  Med Card Reconciled: Yes  Encourage medication compliance  Encourage sodium reduction  Encourage weight loss  Recommend physical activity  Educate on contributory factors  Encourage home blood pressure monitoring  Recommend daily weights  MD notified/Physician Referral: Yes    Education:    Hypertension; verbalizes understanding; Date: 9/29/2023  Medication I; verbalizes understanding; Date: 10/13/2023  Medication II; verbalizes understanding;  Date: 10/20/2023         Comments:  Patient is following recommendations to keep BP under control, however BP remain elevated.    Does the patient have Heart Failure? No      Other Core Components/Tobacco Cessation Assessment:   Smoking Status: lifetime non-smoker  Smoking Cessation Barriers:  N/A  Stage of Readiness to Change: Maintenance    Other Core Components/Tobacco Cessation Plan:   Goals:  Maintain non-smoking status    Comments on Goal Progression:  Non smoker.    Interventions:  Maintains non-smoking status    Education:    Hypertension; verbalizes understanding; Date: 9/29/2023  Medications I; verbalizes understanding; Date: 10/13/2023  Medications II; verbalizes understanding; Date: 10/20/2023         Comments:  Non smoker.    Tiffanie Shirley, KAYLIN  Cardiac Rehab Nurse

## 2023-11-03 ENCOUNTER — OFFICE VISIT (OUTPATIENT)
Dept: CARDIOLOGY | Facility: CLINIC | Age: 73
End: 2023-11-03
Payer: MEDICARE

## 2023-11-03 VITALS
BODY MASS INDEX: 30.83 KG/M2 | WEIGHT: 232.63 LBS | SYSTOLIC BLOOD PRESSURE: 128 MMHG | HEART RATE: 86 BPM | HEIGHT: 73 IN | DIASTOLIC BLOOD PRESSURE: 80 MMHG

## 2023-11-03 DIAGNOSIS — N18.31 STAGE 3A CHRONIC KIDNEY DISEASE: ICD-10-CM

## 2023-11-03 DIAGNOSIS — I47.29 NONSUSTAINED VENTRICULAR TACHYCARDIA: ICD-10-CM

## 2023-11-03 DIAGNOSIS — I70.0 ATHEROSCLEROSIS OF AORTA: ICD-10-CM

## 2023-11-03 DIAGNOSIS — G47.33 OSA (OBSTRUCTIVE SLEEP APNEA): ICD-10-CM

## 2023-11-03 DIAGNOSIS — I10 PRIMARY HYPERTENSION: ICD-10-CM

## 2023-11-03 DIAGNOSIS — I48.0 PAROXYSMAL ATRIAL FIBRILLATION: ICD-10-CM

## 2023-11-03 DIAGNOSIS — I49.3 PVC'S (PREMATURE VENTRICULAR CONTRACTIONS): Primary | ICD-10-CM

## 2023-11-03 PROCEDURE — 93005 ELECTROCARDIOGRAM TRACING: CPT | Mod: S$GLB,,, | Performed by: INTERNAL MEDICINE

## 2023-11-03 PROCEDURE — 3074F PR MOST RECENT SYSTOLIC BLOOD PRESSURE < 130 MM HG: ICD-10-PCS | Mod: CPTII,S$GLB,, | Performed by: INTERNAL MEDICINE

## 2023-11-03 PROCEDURE — 3288F PR FALLS RISK ASSESSMENT DOCUMENTED: ICD-10-PCS | Mod: CPTII,S$GLB,, | Performed by: INTERNAL MEDICINE

## 2023-11-03 PROCEDURE — 4010F PR ACE/ARB THEARPY RXD/TAKEN: ICD-10-PCS | Mod: CPTII,S$GLB,, | Performed by: INTERNAL MEDICINE

## 2023-11-03 PROCEDURE — 3079F PR MOST RECENT DIASTOLIC BLOOD PRESSURE 80-89 MM HG: ICD-10-PCS | Mod: CPTII,S$GLB,, | Performed by: INTERNAL MEDICINE

## 2023-11-03 PROCEDURE — 99999 PR PBB SHADOW E&M-EST. PATIENT-LVL III: CPT | Mod: PBBFAC,,, | Performed by: INTERNAL MEDICINE

## 2023-11-03 PROCEDURE — 1101F PT FALLS ASSESS-DOCD LE1/YR: CPT | Mod: CPTII,S$GLB,, | Performed by: INTERNAL MEDICINE

## 2023-11-03 PROCEDURE — 99214 PR OFFICE/OUTPT VISIT, EST, LEVL IV, 30-39 MIN: ICD-10-PCS | Mod: S$GLB,,, | Performed by: INTERNAL MEDICINE

## 2023-11-03 PROCEDURE — 93010 RHYTHM STRIP: ICD-10-PCS | Mod: S$GLB,,, | Performed by: INTERNAL MEDICINE

## 2023-11-03 PROCEDURE — 3008F PR BODY MASS INDEX (BMI) DOCUMENTED: ICD-10-PCS | Mod: CPTII,S$GLB,, | Performed by: INTERNAL MEDICINE

## 2023-11-03 PROCEDURE — 3074F SYST BP LT 130 MM HG: CPT | Mod: CPTII,S$GLB,, | Performed by: INTERNAL MEDICINE

## 2023-11-03 PROCEDURE — 99999 PR PBB SHADOW E&M-EST. PATIENT-LVL III: ICD-10-PCS | Mod: PBBFAC,,, | Performed by: INTERNAL MEDICINE

## 2023-11-03 PROCEDURE — 1126F PR PAIN SEVERITY QUANTIFIED, NO PAIN PRESENT: ICD-10-PCS | Mod: CPTII,S$GLB,, | Performed by: INTERNAL MEDICINE

## 2023-11-03 PROCEDURE — 93010 ELECTROCARDIOGRAM REPORT: CPT | Mod: S$GLB,,, | Performed by: INTERNAL MEDICINE

## 2023-11-03 PROCEDURE — 1126F AMNT PAIN NOTED NONE PRSNT: CPT | Mod: CPTII,S$GLB,, | Performed by: INTERNAL MEDICINE

## 2023-11-03 PROCEDURE — 3288F FALL RISK ASSESSMENT DOCD: CPT | Mod: CPTII,S$GLB,, | Performed by: INTERNAL MEDICINE

## 2023-11-03 PROCEDURE — 99214 OFFICE O/P EST MOD 30 MIN: CPT | Mod: S$GLB,,, | Performed by: INTERNAL MEDICINE

## 2023-11-03 PROCEDURE — 93005 RHYTHM STRIP: ICD-10-PCS | Mod: S$GLB,,, | Performed by: INTERNAL MEDICINE

## 2023-11-03 PROCEDURE — 4010F ACE/ARB THERAPY RXD/TAKEN: CPT | Mod: CPTII,S$GLB,, | Performed by: INTERNAL MEDICINE

## 2023-11-03 PROCEDURE — 3008F BODY MASS INDEX DOCD: CPT | Mod: CPTII,S$GLB,, | Performed by: INTERNAL MEDICINE

## 2023-11-03 PROCEDURE — 1101F PR PT FALLS ASSESS DOC 0-1 FALLS W/OUT INJ PAST YR: ICD-10-PCS | Mod: CPTII,S$GLB,, | Performed by: INTERNAL MEDICINE

## 2023-11-03 PROCEDURE — 3079F DIAST BP 80-89 MM HG: CPT | Mod: CPTII,S$GLB,, | Performed by: INTERNAL MEDICINE

## 2023-11-03 NOTE — PROGRESS NOTES
Subjective:   Patient ID:  Julio Bernardo is a 73 y.o. male who presents for evaluation of PVCs    Referring Physician: Ryan Loera Jr, MD    HPI  Prior Hx:  I had the pleasure of seeing Mr. Bernardo today in our electrophysiology clinic in follow-up for his palpitations, PVCs and nonsustained VT. As you are aware he is a pleasant 72 year-old man with palpitations and hypertension. Reports having intermittent palpitations since he was a teenager. Recently he began to feel palpitations and was light-headed. He reports this lasted ~12 minutes. This occurred after drinking a lot of [employer]. His brother took his pulse once symptoms resolved and measured a pulse rate of 40. He has never passed out. He feels fine since then. He wore a holter monitor which noted a 14% dimorphic PVC burden and a few short runs of nonsustained VT, most ~4-5 beats. He is active and exercises 5 days a week in [employer]. Also just did the [place]. Has no angina. He has an upcoming knee arthroscopy procedure. We started verapamil.    ECHO 4/2023 noted preserved LV function.  Exercise stress echo 2017 noted normal LV function and no ischemia.    I reviewed available ECGs in Epic which show sinus rhythm with PVCs. Stress test ECG from 2017 noted PVCs with LBBB/V4 transition (iso at V3)/inferior rightward axis. Also had a RBBB V2 pattern break PVC on the stress test. PVCs quieted down with exercise. ECG from 2006 notes LBBB concordant superior axis PVC.    We ordered a PET stress test due to dimorphic PVCs and nonsustained VT. This was abnormal. He was referred to Dr. Burns and underwent a coronary angiogram that noted a 99% mid-LAD lesion that was stented.    Interim Hx:  Mr. Bernardo returns for follow-up. He is participating in cardiac rehab. Currently has a cold but feels well. Verapamil has been increased to 360mg daily. He feels his breathing has improved.    My interpretation of today's in clinic ECG is sinus rhythm with  normal intervals. Rhythm strip shows PACs.      Review of Systems   Constitutional: Negative for fever and malaise/fatigue.   HENT:  Negative for congestion and sore throat.    Eyes:  Negative for blurred vision and visual disturbance.   Cardiovascular:  Negative for chest pain, dyspnea on exertion, irregular heartbeat, near-syncope, palpitations and syncope.   Respiratory:  Positive for cough and sputum production. Negative for shortness of breath.    Hematologic/Lymphatic: Negative for bleeding problem. Does not bruise/bleed easily.   Skin: Negative.    Musculoskeletal: Negative.    Gastrointestinal:  Positive for constipation. Negative for bloating, abdominal pain, hematochezia and melena.   Neurological:  Negative for focal weakness and weakness.   Psychiatric/Behavioral: Negative.         Objective:   Physical Exam  Vitals reviewed.   Constitutional:       General: He is not in acute distress.     Appearance: He is well-developed. He is not diaphoretic.   HENT:      Head: Normocephalic and atraumatic.   Eyes:      General:         Right eye: No discharge.         Left eye: No discharge.      Conjunctiva/sclera: Conjunctivae normal.   Cardiovascular:      Rate and Rhythm: Normal rate and regular rhythm. Occasional Extrasystoles are present.     Heart sounds: No murmur heard.     No friction rub. No gallop.   Pulmonary:      Effort: Pulmonary effort is normal. No respiratory distress.      Breath sounds: Normal breath sounds. No wheezing or rales.   Abdominal:      General: Bowel sounds are normal. There is no distension.      Palpations: Abdomen is soft.      Tenderness: There is no abdominal tenderness.   Musculoskeletal:      Cervical back: Neck supple.   Skin:     General: Skin is warm and dry.   Neurological:      Mental Status: He is alert and oriented to person, place, and time.   Psychiatric:         Behavior: Behavior normal.         Thought Content: Thought content normal.         Judgment: Judgment  normal.         Assessment:      1. PVC's (premature ventricular contractions)    2. Atherosclerosis of aorta    3. Primary hypertension    4. Stage 3a chronic kidney disease    5. SUSANNAH (obstructive sleep apnea)    6. Nonsustained ventricular tachycardia        Plan:   In summary, Mr. Bernardo is a pleasant 72 year-old man with palpitations and hypertension presenting for evaluation for multi-focal PVCs and short nonsustained VT, fairly asymptomatic, in setting of normal LV function but now with obstructive CAD s/p LAD PCI. He is on verapamil. Recommend fiber and water supplementation. If constipation becomes too much then can switch to beta-blocker therapy however may not be as effective. Will repeat 48 hour holter.    RTC in 6 months    Thank you for allowing me to participate in the care of this patient. Please do not hesitate to call me with any questions or concerns.    Brant Canela MD, PhD  Cardiac Electrophysiology

## 2023-11-04 ENCOUNTER — OFFICE VISIT (OUTPATIENT)
Dept: URGENT CARE | Facility: CLINIC | Age: 73
End: 2023-11-04
Payer: MEDICARE

## 2023-11-04 VITALS
SYSTOLIC BLOOD PRESSURE: 116 MMHG | HEIGHT: 73 IN | TEMPERATURE: 100 F | DIASTOLIC BLOOD PRESSURE: 65 MMHG | OXYGEN SATURATION: 98 % | RESPIRATION RATE: 16 BRPM | BODY MASS INDEX: 30.82 KG/M2 | WEIGHT: 232.56 LBS | HEART RATE: 76 BPM

## 2023-11-04 DIAGNOSIS — Z79.02 ANTIPLATELET OR ANTITHROMBOTIC LONG-TERM USE: ICD-10-CM

## 2023-11-04 DIAGNOSIS — R05.1 ACUTE COUGH: Primary | ICD-10-CM

## 2023-11-04 DIAGNOSIS — U07.1 COVID-19 VIRUS DETECTED: ICD-10-CM

## 2023-11-04 DIAGNOSIS — U07.1 COVID: ICD-10-CM

## 2023-11-04 DIAGNOSIS — J20.8 VIRAL BRONCHITIS: ICD-10-CM

## 2023-11-04 LAB
CTP QC/QA: YES
SARS-COV-2 AG RESP QL IA.RAPID: POSITIVE

## 2023-11-04 PROCEDURE — 99213 PR OFFICE/OUTPT VISIT, EST, LEVL III, 20-29 MIN: ICD-10-PCS | Mod: S$GLB,,, | Performed by: FAMILY MEDICINE

## 2023-11-04 PROCEDURE — 99213 OFFICE O/P EST LOW 20 MIN: CPT | Mod: S$GLB,,, | Performed by: FAMILY MEDICINE

## 2023-11-04 PROCEDURE — 87811 SARS-COV-2 COVID19 W/OPTIC: CPT | Mod: QW,S$GLB,, | Performed by: FAMILY MEDICINE

## 2023-11-04 PROCEDURE — 87811 SARS CORONAVIRUS 2 ANTIGEN POCT, MANUAL READ: ICD-10-PCS | Mod: QW,S$GLB,, | Performed by: FAMILY MEDICINE

## 2023-11-04 RX ORDER — PROMETHAZINE HYDROCHLORIDE AND DEXTROMETHORPHAN HYDROBROMIDE 6.25; 15 MG/5ML; MG/5ML
5 SYRUP ORAL EVERY 4 HOURS PRN
Qty: 240 ML | Refills: 0 | Status: SHIPPED | OUTPATIENT
Start: 2023-11-04 | End: 2023-11-09

## 2023-11-04 RX ORDER — MOLNUPIRAVIR 200 MG/1
800 CAPSULE ORAL EVERY 12 HOURS
Qty: 40 CAPSULE | Refills: 0 | Status: SHIPPED | OUTPATIENT
Start: 2023-11-04 | End: 2023-11-09

## 2023-11-04 RX ORDER — ALBUTEROL SULFATE 90 UG/1
2 AEROSOL, METERED RESPIRATORY (INHALATION) EVERY 6 HOURS PRN
Qty: 18 G | Refills: 0 | Status: SHIPPED | OUTPATIENT
Start: 2023-11-04

## 2023-11-04 NOTE — PATIENT INSTRUCTIONS
Below are suggestions for symptomatic relief of your upper respiratory symptoms:              -Salt water gargles to soothe throat pain.              -Chloroseptic spray and Cepacol lozenges also help to numb throat pain.              -Warm herbal teas with honey/lemon/ni can help soothe sore throat and hoarseness              -Nasal saline spray reduces inflammation and dryness.              -Warm face compresses to help with facial sinus pain/pressure.              -Humidifiers and steam can help with nasal dryness and congestion              -Vicks vapor rub at night for chest congestion.              -Flonase OTC or Nasacort OTC for nasal congestion and post-nasal drip. Ok to use twice daily for the first week, then reduce to once daily after symptoms have begun to improve.              -Afrin is a nasal spray that can give immediate relief of nasal congestion but you cannot use this medication for more than 3 days              -Simple foods like chicken noodle soup.              - Mucinex for congestion or Mucinex DM for cough during the day time. Delsym helps with coughing at night. Mucinex-D if you have sinus pressure/sinus pain or chest congestion. (caution if history of high blood pressure or palpitations). You must increase your water intake when using expectorants (Mucinex).             -Zyrtec/Claritin/Allegra/Xyzal should help with allergies.  -If you DO NOT have Hypertension or any history of palpitations, it is ok to take over the counter Sudafed or Mucinex D or Allegra-D or Claritin-D or Zyrtec-D.  -If you do take one of the above, it is ok to combine that with plain over the counter Mucinex or Allegra or Claritin or Zyrtec. If, for example, you are taking Zyrtec -D, you can combine that with Mucinex, but not Mucinex-D.  If you are taking Mucinex-D, you can combine that with plain Allegra or Claritin or Zyrtec.   -If you DO have Hypertension or palpitations, it is safe to take Coricidin HBP for  relief of sinus symptoms.     Your test was POSITIVE for COVID-19 (coronavirus).       Please isolate yourself at home.  You may leave home and/or return to work once the following conditions are met:    If you were not hospitalized and are not moderately to severely immunocompromised:   More than 5 days since symptoms first appeared AND  More than 24 hours fever free without medications AND  Symptoms are improving  Continue to wear a mask around others for 5 additional days.    If you were hospitalized OR are moderately to severely immunocompromised:  More than 20 days since symptoms first appeared  More than 24 hours fever free without medications  Symptoms have improved    If you had no symptoms but tested positive:  More than 5 days since the date of the first positive test (20 days if moderately to severely immunocompromised). If you develop symptoms, then use the guidelines above.  Continue to wear a mask around others for 5 additional days.      Contact Tracing    As one of the next steps, you will receive a call or text from the [place] Department of Health ([facility]) COVID-19 Tracing Team. See the contact information below so you know not to ignore the health departments call. It is important that you contact them back immediately so they can help.      Contact Tracer Number:  [phone]  Caller ID for most carriers: [facility]     What is contact tracing?  Contact tracing is a process that helps identify everyone who has been in close contact with an infected person. Contact tracers let those people know they may have been exposed and guide them on next steps. Confidentiality is important for everyone; no one will be told who may have exposed them to the virus.  Your involvement is important. The more we know about where and how this virus is spreading, the better chance we have at stopping it from spreading further.  What does exposure mean?  Exposure means you have been within 6 feet for more than  15 minutes with a person who has or had COVID-19.  What kind of questions do the contact tracers ask?  A contact tracer will confirm your basic contact information including name, address, phone number, and next of kin, as well as asking about any symptoms you may have had. Theyll also ask you how you think you may have gotten sick, such as places where you may have been exposed to the virus, and people you were with. Those names will never be shared with anyone outside of that call, and will only be used to help trace and stop the spread of the virus.   I have privacy concerns. How will the state use my information?  Your privacy about your health is important. All calls are completed using call centers that use the appropriate health privacy protection measures (HIPAA compliance), meaning that your patient information is safe. No one will ever ask you any questions related to immigration status. Your health comes first.   Do I have to participate?  You do not have to participate, but we strongly encourage you to. Contact tracing can help us catch and control new outbreaks as theyre developing to keep your friends and family safe.   What if I dont hear from anyone?  If you dont receive a call within 24 hours, you can call the number above right away to inquire about your status. That line is open from 8:00 am - 8:00 p.m., 7 days a week.  Contact tracing saves lives! Together, we have the power to beat this virus and keep our loved ones and neighbors safe.    For more information see CDC link below.      https://www.cdc.gov/coronavirus/2019-ncov/hcp/guidance-prevent-spread.html#precautions        Sources:  CDC, [place] [facility]           Seek immediate care in the emergency room in the event of severe abdominal pain, chest pain, respiratory distress, fever unresponsive to antipyretic, dehydration, loss of consciousness, seizure.

## 2023-11-04 NOTE — PROGRESS NOTES
"Subjective:      Patient ID: Julio Bernardo is a 73 y.o. male.    Vitals:  height is 6' 1" (1.854 m) and weight is 105.5 kg (232 lb 9.4 oz). His oral temperature is 100.3 °F (37.9 °C). His blood pressure is 116/65 and his pulse is 76. His respiration is 16 and oxygen saturation is 98%.     Chief Complaint: Cough (With greenish colored mucus associated.)    Patient is a 73 y.o. male whom recently back on July 24 th had a stent placed reports to the clinic with the compliant of a cough and sore throat that presented on Thursday, he reports with taking Mucinex for his current symptoms and reports with no relief. Patient also reports with taking an at home COVID test that reported back negative however, he reports with his wife's test being positive.     Cough  This is a new problem. The current episode started in the past 7 days. The problem has been gradually worsening. The problem occurs every few minutes. The cough is Productive of sputum. Associated symptoms include chills, a fever, nasal congestion, a sore throat and shortness of breath. Pertinent negatives include no chest pain, ear congestion, ear pain, headaches, heartburn, hemoptysis, myalgias, postnasal drip, rash, rhinorrhea, sweats, weight loss or wheezing. The symptoms are aggravated by lying down. Treatments tried: Mucinex. The treatment provided mild relief. His past medical history is significant for pneumonia. There is no history of asthma, bronchiectasis, bronchitis, COPD, emphysema or environmental allergies.       Constitution: Positive for chills and fever.   HENT:  Positive for sore throat. Negative for ear pain and postnasal drip.    Cardiovascular:  Negative for chest pain.   Respiratory:  Positive for cough and shortness of breath. Negative for bloody sputum and wheezing.    Gastrointestinal:  Negative for heartburn.   Musculoskeletal:  Negative for muscle ache.   Skin:  Negative for rash.   Allergic/Immunologic: Negative for environmental " "allergies.   Neurological:  Negative for headaches.      Objective:     Vitals:    11/04/23 1004   BP: 116/65   BP Location: Right arm   Patient Position: Sitting   BP Method: Medium (Automatic)   Pulse: 76   Resp: 16   Temp: 100.3 °F (37.9 °C)   TempSrc: Oral   SpO2: 98%   Weight: 105.5 kg (232 lb 9.4 oz)   Height: 6' 1" (1.854 m)      Physical Exam   Constitutional: He is oriented to person, place, and time.  Non-toxic appearance. He does not appear ill. No distress.   HENT:   Head: Atraumatic.   Eyes: Conjunctivae are normal.   Cardiovascular: Normal rate, regular rhythm, normal heart sounds and normal pulses.   Pulmonary/Chest: Effort normal and breath sounds normal.   Neurological: He is alert and oriented to person, place, and time.   Skin: Skin is not diaphoretic.   Psychiatric: Judgment and thought content normal.     Results for orders placed or performed in visit on 11/04/23   SARS Coronavirus 2 Antigen, POCT Manual Read   Result Value Ref Range    SARS Coronavirus 2 Antigen Positive (A) Negative     Acceptable Yes       Assessment:     1. Acute cough    2. Viral bronchitis    3. COVID    4. Antiplatelet or antithrombotic long-term use        Plan:       Acute cough  -     SARS Coronavirus 2 Antigen, POCT Manual Read  -     promethazine-dextromethorphan (PROMETHAZINE-DM) 6.25-15 mg/5 mL Syrp; Take 5 mLs by mouth every 4 (four) hours as needed (cough).  Dispense: 240 mL; Refill: 0    2. Viral bronchitis  -     promethazine-dextromethorphan (PROMETHAZINE-DM) 6.25-15 mg/5 mL Syrp; Take 5 mLs by mouth every 4 (four) hours as needed (cough).  Dispense: 240 mL; Refill: 0  -     albuterol (VENTOLIN HFA) 90 mcg/actuation inhaler; Inhale 2 puffs into the lungs every 6 (six) hours as needed for Shortness of Breath. Rescue. Ok to substitute based on affordability: Proair, Proventil, ventolin  Dispense: 18 g; Refill: 0    3. COVID  -     molnupiravir (LAGEVRIO, EUA,) 200 mg capsule (EUA); Take 4 " capsules (800 mg total) by mouth every 12 (twelve) hours. for 5 days  Dispense: 40 capsule; Refill: 0    4. Antiplatelet or antithrombotic long-term use  Ticagrelor- no drug drug interaction with Lagevrio    Patient Instructions   Below are suggestions for symptomatic relief of your upper respiratory symptoms:              -Salt water gargles to soothe throat pain.              -Chloroseptic spray and Cepacol lozenges also help to numb throat pain.              -Warm herbal teas with honey/lemon/ni can help soothe sore throat and hoarseness              -Nasal saline spray reduces inflammation and dryness.              -Warm face compresses to help with facial sinus pain/pressure.              -Humidifiers and steam can help with nasal dryness and congestion              -Vicks vapor rub at night for chest congestion.              -Flonase OTC or Nasacort OTC for nasal congestion and post-nasal drip. Ok to use twice daily for the first week, then reduce to once daily after symptoms have begun to improve.              -Afrin is a nasal spray that can give immediate relief of nasal congestion but you cannot use this medication for more than 3 days              -Simple foods like chicken noodle soup.              - Mucinex for congestion or Mucinex DM for cough during the day time. Delsym helps with coughing at night. Mucinex-D if you have sinus pressure/sinus pain or chest congestion. (caution if history of high blood pressure or palpitations). You must increase your water intake when using expectorants (Mucinex).             -Zyrtec/Claritin/Allegra/Xyzal should help with allergies.  -If you DO NOT have Hypertension or any history of palpitations, it is ok to take over the counter Sudafed or Mucinex D or Allegra-D or Claritin-D or Zyrtec-D.  -If you do take one of the above, it is ok to combine that with plain over the counter Mucinex or Allegra or Claritin or Zyrtec. If, for example, you are taking Zyrtec -D,  you can combine that with Mucinex, but not Mucinex-D.  If you are taking Mucinex-D, you can combine that with plain Allegra or Claritin or Zyrtec.   -If you DO have Hypertension or palpitations, it is safe to take Coricidin HBP for relief of sinus symptoms.     Your test was POSITIVE for COVID-19 (coronavirus).       Please isolate yourself at home.  You may leave home and/or return to work once the following conditions are met:    If you were not hospitalized and are not moderately to severely immunocompromised:   More than 5 days since symptoms first appeared AND  More than 24 hours fever free without medications AND  Symptoms are improving  Continue to wear a mask around others for 5 additional days.    If you were hospitalized OR are moderately to severely immunocompromised:  More than 20 days since symptoms first appeared  More than 24 hours fever free without medications  Symptoms have improved    If you had no symptoms but tested positive:  More than 5 days since the date of the first positive test (20 days if moderately to severely immunocompromised). If you develop symptoms, then use the guidelines above.  Continue to wear a mask around others for 5 additional days.      Contact Tracing    As one of the next steps, you will receive a call or text from the [place] [facility] ([facility]) COVID-19 Tracing Team. See the contact information below so you know not to ignore the health departments call. It is important that you contact them back immediately so they can help.      Contact Tracer Number:  [phone]  Caller ID for most carriers: [place] [facility]     What is contact tracing?  Contact tracing is a process that helps identify everyone who has been in close contact with an infected person. Contact tracers let those people know they may have been exposed and guide them on next steps. Confidentiality is important for everyone; no one will be told who may have exposed them to the virus.  Your  involvement is important. The more we know about where and how this virus is spreading, the better chance we have at stopping it from spreading further.  What does exposure mean?  Exposure means you have been within 6 feet for more than 15 minutes with a person who has or had COVID-19.  What kind of questions do the contact tracers ask?  A contact tracer will confirm your basic contact information including name, address, phone number, and next of kin, as well as asking about any symptoms you may have had. Theyll also ask you how you think you may have gotten sick, such as places where you may have been exposed to the virus, and people you were with. Those names will never be shared with anyone outside of that call, and will only be used to help trace and stop the spread of the virus.   I have privacy concerns. How will the state use my information?  Your privacy about your health is important. All calls are completed using call centers that use the appropriate health privacy protection measures (HIPAA compliance), meaning that your patient information is safe. No one will ever ask you any questions related to immigration status. Your health comes first.   Do I have to participate?  You do not have to participate, but we strongly encourage you to. Contact tracing can help us catch and control new outbreaks as theyre developing to keep your friends and family safe.   What if I dont hear from anyone?  If you dont receive a call within 24 hours, you can call the number above right away to inquire about your status. That line is open from 8:00 am - 8:00 p.m., 7 days a week.  Contact tracing saves lives! Together, we have the power to beat this virus and keep our loved ones and neighbors safe.    For more information see CDC link below.      https://www.cdc.gov/coronavirus/2019-ncov/hcp/guidance-prevent-spread.html#precautions        Sources:  [facility], [place] [facility] and [facility]           Seek immediate  care in the emergency room in the event of severe abdominal pain, chest pain, respiratory distress, fever unresponsive to antipyretic, dehydration, loss of consciousness, seizure.

## 2023-11-07 ENCOUNTER — PATIENT MESSAGE (OUTPATIENT)
Dept: CARDIOLOGY | Facility: CLINIC | Age: 73
End: 2023-11-07
Payer: MEDICARE

## 2023-11-09 ENCOUNTER — CLINICAL SUPPORT (OUTPATIENT)
Dept: URGENT CARE | Facility: CLINIC | Age: 73
End: 2023-11-09
Payer: MEDICARE

## 2023-11-09 VITALS
SYSTOLIC BLOOD PRESSURE: 107 MMHG | DIASTOLIC BLOOD PRESSURE: 69 MMHG | TEMPERATURE: 98 F | HEIGHT: 73 IN | OXYGEN SATURATION: 96 % | RESPIRATION RATE: 16 BRPM | HEART RATE: 60 BPM | WEIGHT: 232 LBS | BODY MASS INDEX: 30.75 KG/M2

## 2023-11-09 DIAGNOSIS — U07.1 COVID: Primary | ICD-10-CM

## 2023-11-09 DIAGNOSIS — R05.1 ACUTE COUGH: ICD-10-CM

## 2023-11-09 PROCEDURE — 99213 PR OFFICE/OUTPT VISIT, EST, LEVL III, 20-29 MIN: ICD-10-PCS | Mod: S$GLB,,, | Performed by: NURSE PRACTITIONER

## 2023-11-09 PROCEDURE — 99213 OFFICE O/P EST LOW 20 MIN: CPT | Mod: S$GLB,,, | Performed by: NURSE PRACTITIONER

## 2023-11-09 NOTE — PATIENT INSTRUCTIONS
Advised patient he may be in the window of Covid positive test.  Advised patient to wait for retesting; he verbalized his understanding.    POSITIVE COVID TEST:  11/04/2023       ISOLATION    If you tested positive and do not have symptoms, you must isolate for 5 days starting on the day of the positive test. I         If you tested positive and have symptoms, you must isolate for 5 days starting on the day of the first symptoms,  not the day of the positive test.         This is the most important part, both the CDC and the [facility] emphasize that you do not test out of isolation.         After 5 days, if your symptoms have improved and you have not had fever on day 5, you can return to the community on day 6- NO TESTING REQUIRED!          In fact, we do not retest if you were positive in the last 90 days.         After your 5 days of isolation are completed, the CDC recommends strict mask use for the first 5 days that you come out of isolation.      When to Seek Emergency Medical Attention  Look for emergency warning signs* for COVID-19. If someone is showing any of these signs, seek emergency medical care immediately:    Trouble breathing  Persistent pain or pressure in the chest  New confusion  Inability to wake or stay awake  Pale, gray, or blue-colored skin, lips, or nail beds, depending on skin tone  *This list is not all possible symptoms. Please call your medical provider for any other symptoms that are severe or concerning to you.    Call 911 or call ahead to your local emergency facility: Notify the  that you are seeking care for someone who has or may have COVID-19.       For up to date guidelines please visit www.cdc.gov  If you have any questions you may call [facility] line [phone]    What advice should be given to patients with known or presumed COVID-19 managed at home?    For most patients with COVID-19 who are managed at home, we advise the following:    ?Supportive care  with antipyretics/analgesics (eg, acetaminophen) and hydration    ?Close contact with their health care provider    ?Monitoring for clinical worsening, particularly the development of new or worsening dyspnea, which should prompt clinical evaluation and possible hospitalization    ?Separation from other household members, including pets (eg, staying in a separate room when possible and wearing a mask when in the same room)    ?Frequent hand washing for all family members    ?Frequent disinfection of commonly touched surfaces    Some patients with cough or dyspnea may experience symptomatic improvement with self-proning (resting in the prone rather than the supine position)    All other care is generally supportive, similar to that advised for other acute viral illnesses:    ?We advise that patients stay well hydrated, particularly those patients with sustained or higher fevers, in whom insensible fluid losses may be significant.    ?Cough that is persistent, interferes with sleep, or causes discomfort can be managed with an over-the-counter cough medication (eg, dextromethorphan) or prescription benzonatate, 100 to 200 mg orally three times daily as needed.    ?We advise rest as needed during the acute illness; for patients without hypoxia, frequent repositioning and ambulation is encouraged. In addition, we encourage all patients to advance activity as soon as tolerated during recovery.      Additional instructions:  Separate yourself from other people and animals in your home.  Call ahead before visiting your doctor.  Wear a facemask when around others.  Cover your coughs and sneezes.  Wash your hands often with soap and water; hand  can be used, too.  Avoid sharing personal household items.  Wipe down surfaces used daily.  Monitor your symptoms. Seek prompt medical attention if your illness is worsening (e.g., difficulty breathing).   Before seeking care, call your healthcare provider.  If you have a  medical emergency and need to call 911, notify the dispatch personnel that you have, or are being evaluated for COVID-19. If possible, put on a facemask before emergency medical services arrive.      Contact Tracing for patients who have been vaccinated and agree to sequencing.    As one of the next steps, you will receive a call or text from the [place] Department [facility] Health ([facility]) COVID-19 Tracing Team. See the contact information below so you know not to ignore the health departments call. It is important that you contact them back immediately so they can help.      Contact Tracer Number:  [phone]  Caller ID for most carriers: [place] Dept Health     What is contact tracing?  Contact tracing is a process that helps identify everyone who has been in close contact with an infected person. Contact tracers let those people know they may have been exposed and guide them on next steps. Confidentiality is important for everyone; no one will be told who may have exposed them to the virus.  Your involvement is important. The more we know about where and how this virus is spreading, the better chance we have at stopping it from spreading further.  What does exposure mean?  Exposure means you have been within 6 feet for more than 15 minutes with a person who has or had COVID-19.  What kind of questions do the contact tracers ask?  A contact tracer will confirm your basic contact information including name, address, phone number, and next of kin, as well as asking about any symptoms you may have had. Theyll also ask you how you think you may have gotten sick, such as places where you may have been exposed to the virus, and people you were with. Those names will never be shared with anyone outside of that call, and will only be used to help trace and stop the spread of the virus.   I have privacy concerns. How will the state use my information?  Your privacy about your health is important. All calls are completed using  call centers that use the appropriate health privacy protection measures (HIPAA compliance), meaning that your patient information is safe. No one will ever ask you any questions related to immigration status. Your health comes first.   Do I have to participate?  You do not have to participate, but we strongly encourage you to. Contact tracing can help us catch and control new outbreaks as theyre developing to keep your friends and family safe.   What if I dont hear from anyone?  If you dont receive a call within 24 hours, you can call the number above right away to inquire about your status. That line is open from 8:00 am - 8:00 p.m., 7 days a week.  Contact tracing saves lives! Together, we have the power to beat this virus and keep our loved ones and neighbors safe.    For more information see CDC link below.      https://www.cdc.gov/coronavirus/2019-ncov/hcp/guidance-prevent-spread.html#precautions        Sources:  [facility], [place] [facility] and [facility]     You must understand that you've received an Urgent Care treatment only and that you may be released before all your medical problems are known or treated. You, the patient, will arrange for follow up care as instructed.  Follow up with your PCP or specialty clinic as directed in the next 1-2 weeks if not improved or as needed.  You can call [phone] to schedule an appointment with the appropriate provider.    If your condition worsens we recommend that you receive another evaluation at the emergency room immediately or contact your primary medical clinics after hours call service to discuss your concerns.  Please return here or go to the Emergency Department for any concerns or worsening of condition.    If you were prescribed a narcotic or controlled medication, do not drive or operate heavy equipment or machinery while taking these medications.    Thank you for choosing [facility]!    Our goal in the Urgent Care is to always  provide outstanding medical care. You may receive a survey by mail or e-mail in the next week regarding your experience today. We would greatly appreciate you completing and returning the survey. Your feedback provides us with a way to recognize our staff who provide very good care, and it helps us learn how to improve when your experience was below our aspiration of excellence.      We appreciate you trusting us with your medical care. We hope you feel better soon. We will be happy to take care of you for all of your future medical needs.    Sincerely,    Reinaldo Villeda DNP, FNP-C

## 2023-11-09 NOTE — PROGRESS NOTES
"Subjective:      Patient ID: Julio Bernardo is a 73 y.o. male.    Vitals:  height is 6' 1" (1.854 m) and weight is 105.2 kg (232 lb). His oral temperature is 98.1 °F (36.7 °C). His blood pressure is 107/69 and his pulse is 60. His respiration is 16 and oxygen saturation is 96%.     Chief Complaint: Cough    74 y/o male presents today with a request for Covid retesting.  Positive Covid test, 11/4 2023.  Completed full course of anti-viral, denies adverse effects.  States continued mild cough.  Denies worsening of cough, SOB, chest pain, fever, or chills.  States cough is improving.    Cough  This is a recurrent problem. The problem has been gradually improving. Pertinent negatives include no chest pain, chills, ear pain, fever, hemoptysis, nasal congestion, postnasal drip, rash, sore throat, shortness of breath or wheezing. Nothing aggravates the symptoms. He has tried OTC cough suppressant (Antiviral;inhaler,Mucinex.) for the symptoms. The treatment provided mild relief. There is no history of asthma, bronchiectasis, bronchitis, emphysema, environmental allergies or pneumonia. Covid 11/4/2023       Constitution: Negative for chills, fatigue, fever and international travel in last 60 days.   HENT:  Negative for ear pain, postnasal drip, sinus pain and sore throat.    Cardiovascular:  Negative for chest pain.   Respiratory:  Positive for cough. Negative for chest tightness, sputum production, bloody sputum, COPD, shortness of breath, stridor, wheezing and asthma.    Skin:  Negative for rash.   Allergic/Immunologic: Negative for environmental allergies and asthma.   Neurological:  Negative for dizziness.      Objective:     Physical Exam   Constitutional: He is oriented to person, place, and time. He appears well-developed. He is cooperative.  Non-toxic appearance. He does not appear ill. No distress.   HENT:   Head: Normocephalic and atraumatic.   Ears:   Right Ear: Hearing, tympanic membrane, external ear and ear " canal normal. impacted cerumen  Left Ear: Hearing, tympanic membrane, external ear and ear canal normal. impacted cerumen  Nose: Nose normal. No mucosal edema, rhinorrhea, nasal deformity or congestion. No epistaxis. Right sinus exhibits no maxillary sinus tenderness and no frontal sinus tenderness. Left sinus exhibits no maxillary sinus tenderness and no frontal sinus tenderness.   Mouth/Throat: Uvula is midline, oropharynx is clear and moist and mucous membranes are normal. No trismus in the jaw. Normal dentition. No uvula swelling. No oropharyngeal exudate, posterior oropharyngeal edema or posterior oropharyngeal erythema.   Eyes: Conjunctivae and lids are normal. No scleral icterus.   Neck: Trachea normal and phonation normal. Neck supple. No edema present. No erythema present. No neck rigidity present.   Cardiovascular: Normal rate, regular rhythm, normal heart sounds and normal pulses.   Pulmonary/Chest: Effort normal and breath sounds normal. No stridor. No respiratory distress. He has no decreased breath sounds. He has no wheezes. He has no rhonchi. He has no rales. He exhibits no tenderness.   Abdominal: Normal appearance.   Musculoskeletal: Normal range of motion.         General: No deformity. Normal range of motion.   Neurological: He is alert and oriented to person, place, and time. He exhibits normal muscle tone. Coordination normal.   Skin: Skin is warm, dry, intact, not diaphoretic and not pale.   Psychiatric: His speech is normal and behavior is normal. Judgment and thought content normal.   Nursing note and vitals reviewed.      Assessment:     1. COVID    2. Acute cough        Plan:     COVID    Acute cough  -     Cancel: SARS Coronavirus 2 Antigen, POCT Manual Read      Advised patient he may be in the window of Covid positive test.  Advised patient to wait for retesting; he verbalized his understanding.    POSITIVE COVID TEST:  11/04/2023       ISOLATION    If you tested positive and do not have  symptoms, you must isolate for 5 days starting on the day of the positive test. I         If you tested positive and have symptoms, you must isolate for 5 days starting on the day of the first symptoms,  not the day of the positive test.         This is the most important part, both the CDC and the [facility] emphasize that you do not test out of isolation.         After 5 days, if your symptoms have improved and you have not had fever on day 5, you can return to the community on day 6- NO TESTING REQUIRED!          In fact, we do not retest if you were positive in the last 90 days.         After your 5 days of isolation are completed, the CDC recommends strict mask use for the first 5 days that you come out of isolation.      When to Seek Emergency Medical Attention  Look for emergency warning signs* for COVID-19. If someone is showing any of these signs, seek emergency medical care immediately:    Trouble breathing  Persistent pain or pressure in the chest  New confusion  Inability to wake or stay awake  Pale, gray, or blue-colored skin, lips, or nail beds, depending on skin tone  *This list is not all possible symptoms. Please call your medical provider for any other symptoms that are severe or concerning to you.    Call 911 or call ahead to your local emergency facility: Notify the  that you are seeking care for someone who has or may have COVID-19.       For up to date guidelines please visit www.cdc.gov  If you have any questions you may call [facility] line [phone]    What advice should be given to patients with known or presumed COVID-19 managed at home?    For most patients with COVID-19 who are managed at home, we advise the following:    ?Supportive care with antipyretics/analgesics (eg, acetaminophen) and hydration    ?Close contact with their health care provider    ?Monitoring for clinical worsening, particularly the development of new or worsening dyspnea, which should prompt  clinical evaluation and possible hospitalization    ?Separation from other household members, including pets (eg, staying in a separate room when possible and wearing a mask when in the same room)    ?Frequent hand washing for all family members    ?Frequent disinfection of commonly touched surfaces    Some patients with cough or dyspnea may experience symptomatic improvement with self-proning (resting in the prone rather than the supine position)    All other care is generally supportive, similar to that advised for other acute viral illnesses:    ?We advise that patients stay well hydrated, particularly those patients with sustained or higher fevers, in whom insensible fluid losses may be significant.    ?Cough that is persistent, interferes with sleep, or causes discomfort can be managed with an over-the-counter cough medication (eg, dextromethorphan) or prescription benzonatate, 100 to 200 mg orally three times daily as needed.    ?We advise rest as needed during the acute illness; for patients without hypoxia, frequent repositioning and ambulation is encouraged. In addition, we encourage all patients to advance activity as soon as tolerated during recovery.      Additional instructions:  Separate yourself from other people and animals in your home.  Call ahead before visiting your doctor.  Wear a facemask when around others.  Cover your coughs and sneezes.  Wash your hands often with soap and water; hand  can be used, too.  Avoid sharing personal household items.  Wipe down surfaces used daily.  Monitor your symptoms. Seek prompt medical attention if your illness is worsening (e.g., difficulty breathing).   Before seeking care, call your healthcare provider.  If you have a medical emergency and need to call 911, notify the dispatch personnel that you have, or are being evaluated for COVID-19. If possible, put on a facemask before emergency medical services arrive.      Contact Tracing for patients who  have been vaccinated and agree to sequencing.    As one of the next steps, you will receive a call or text from the [place] [facility] ([facility]) COVID-19 Tracing Team. See the contact information below so you know not to ignore the health departments call. It is important that you contact them back immediately so they can help.      Contact Tracer Number:  [phone]  Caller ID for most carriers: [place] Dept [facility]     What is contact tracing?  Contact tracing is a process that helps identify everyone who has been in close contact with an infected person. Contact tracers let those people know they may have been exposed and guide them on next steps. Confidentiality is important for everyone; no one will be told who may have exposed them to the virus.  Your involvement is important. The more we know about where and how this virus is spreading, the better chance we have at stopping it from spreading further.  What does exposure mean?  Exposure means you have been within 6 feet for more than 15 minutes with a person who has or had COVID-19.  What kind of questions do the contact tracers ask?  A contact tracer will confirm your basic contact information including name, address, phone number, and next of kin, as well as asking about any symptoms you may have had. Theyll also ask you how you think you may have gotten sick, such as places where you may have been exposed to the virus, and people you were with. Those names will never be shared with anyone outside of that call, and will only be used to help trace and stop the spread of the virus.   I have privacy concerns. How will the state use my information?  Your privacy about your health is important. All calls are completed using call centers that use the appropriate health privacy protection measures (HIPAA compliance), meaning that your patient information is safe. No one will ever ask you any questions related to immigration status. Your health comes first.    Do I have to participate?  You do not have to participate, but we strongly encourage you to. Contact tracing can help us catch and control new outbreaks as theyre developing to keep your friends and family safe.   What if I dont hear from anyone?  If you dont receive a call within 24 hours, you can call the number above right away to inquire about your status. That line is open from 8:00 am - 8:00 p.m., 7 days a week.  Contact tracing saves lives! Together, we have the power to beat this virus and keep our loved ones and neighbors safe.       Additional MDM:     Heart Failure Score:   COPD = No

## 2023-11-10 DIAGNOSIS — I48.0 PAROXYSMAL ATRIAL FIBRILLATION: Primary | ICD-10-CM

## 2023-11-16 ENCOUNTER — LAB VISIT (OUTPATIENT)
Dept: LAB | Facility: HOSPITAL | Age: 73
End: 2023-11-16
Attending: INTERNAL MEDICINE
Payer: MEDICARE

## 2023-11-16 DIAGNOSIS — I10 PRIMARY HYPERTENSION: ICD-10-CM

## 2023-11-16 LAB
ANION GAP SERPL CALC-SCNC: 7 MMOL/L (ref 8–16)
BUN SERPL-MCNC: 13 MG/DL (ref 8–23)
CALCIUM SERPL-MCNC: 9.5 MG/DL (ref 8.7–10.5)
CHLORIDE SERPL-SCNC: 105 MMOL/L (ref 95–110)
CO2 SERPL-SCNC: 28 MMOL/L (ref 23–29)
CREAT SERPL-MCNC: 1.4 MG/DL (ref 0.5–1.4)
EST. GFR  (NO RACE VARIABLE): 52.7 ML/MIN/1.73 M^2
GLUCOSE SERPL-MCNC: 74 MG/DL (ref 70–110)
POTASSIUM SERPL-SCNC: 4.4 MMOL/L (ref 3.5–5.1)
SODIUM SERPL-SCNC: 140 MMOL/L (ref 136–145)

## 2023-11-16 PROCEDURE — 36415 COLL VENOUS BLD VENIPUNCTURE: CPT | Performed by: INTERNAL MEDICINE

## 2023-11-16 PROCEDURE — 80048 BASIC METABOLIC PNL TOTAL CA: CPT | Performed by: INTERNAL MEDICINE

## 2023-11-17 ENCOUNTER — CLINICAL SUPPORT (OUTPATIENT)
Dept: CARDIAC REHAB | Facility: CLINIC | Age: 73
End: 2023-11-17
Payer: MEDICARE

## 2023-11-17 DIAGNOSIS — I25.10 ATHEROSCLEROSIS OF NATIVE CORONARY ARTERY OF NATIVE HEART WITHOUT ANGINA PECTORIS: ICD-10-CM

## 2023-11-17 DIAGNOSIS — Z98.61 POSTSURGICAL PERCUTANEOUS TRANSLUMINAL CORONARY ANGIOPLASTY STATUS: Primary | ICD-10-CM

## 2023-11-17 PROCEDURE — 93798 PHYS/QHP OP CAR RHAB W/ECG: CPT | Mod: S$GLB,,, | Performed by: INTERNAL MEDICINE

## 2023-11-17 PROCEDURE — 93798 PR CARDIAC REHAB/MONITOR: ICD-10-PCS | Mod: S$GLB,,, | Performed by: INTERNAL MEDICINE

## 2023-11-20 ENCOUNTER — CLINICAL SUPPORT (OUTPATIENT)
Dept: CARDIAC REHAB | Facility: CLINIC | Age: 73
End: 2023-11-20
Payer: MEDICARE

## 2023-11-20 DIAGNOSIS — I25.10 CORONARY ARTERY DISEASE, UNSPECIFIED VESSEL OR LESION TYPE, UNSPECIFIED WHETHER ANGINA PRESENT, UNSPECIFIED WHETHER NATIVE OR TRANSPLANTED HEART: ICD-10-CM

## 2023-11-20 DIAGNOSIS — Z98.61 POSTSURGICAL PERCUTANEOUS TRANSLUMINAL CORONARY ANGIOPLASTY STATUS: Primary | ICD-10-CM

## 2023-11-20 PROCEDURE — 93798 PR CARDIAC REHAB/MONITOR: ICD-10-PCS | Mod: S$GLB,,, | Performed by: INTERNAL MEDICINE

## 2023-11-20 PROCEDURE — 93798 PHYS/QHP OP CAR RHAB W/ECG: CPT | Mod: S$GLB,,, | Performed by: INTERNAL MEDICINE

## 2023-11-22 ENCOUNTER — CLINICAL SUPPORT (OUTPATIENT)
Dept: CARDIAC REHAB | Facility: CLINIC | Age: 73
End: 2023-11-22
Payer: MEDICARE

## 2023-11-22 DIAGNOSIS — I25.10 ATHEROSCLEROSIS OF NATIVE CORONARY ARTERY OF NATIVE HEART, UNSPECIFIED WHETHER ANGINA PRESENT: ICD-10-CM

## 2023-11-22 DIAGNOSIS — I25.10 CORONARY ARTERY DISEASE, UNSPECIFIED VESSEL OR LESION TYPE, UNSPECIFIED WHETHER ANGINA PRESENT, UNSPECIFIED WHETHER NATIVE OR TRANSPLANTED HEART: ICD-10-CM

## 2023-11-22 DIAGNOSIS — Z98.61 POSTSURGICAL PERCUTANEOUS TRANSLUMINAL CORONARY ANGIOPLASTY STATUS: Primary | ICD-10-CM

## 2023-11-22 PROCEDURE — 93798 PR CARDIAC REHAB/MONITOR: ICD-10-PCS | Mod: S$GLB,,, | Performed by: INTERNAL MEDICINE

## 2023-11-22 PROCEDURE — 93798 PHYS/QHP OP CAR RHAB W/ECG: CPT | Mod: S$GLB,,, | Performed by: INTERNAL MEDICINE

## 2023-11-24 ENCOUNTER — DOCUMENTATION ONLY (OUTPATIENT)
Dept: CARDIAC REHAB | Facility: CLINIC | Age: 73
End: 2023-11-24
Payer: MEDICARE

## 2023-11-24 ENCOUNTER — CLINICAL SUPPORT (OUTPATIENT)
Dept: CARDIOLOGY | Facility: HOSPITAL | Age: 73
End: 2023-11-24
Attending: INTERNAL MEDICINE
Payer: MEDICARE

## 2023-11-24 DIAGNOSIS — I49.3 PVC'S (PREMATURE VENTRICULAR CONTRACTIONS): ICD-10-CM

## 2023-11-24 PROCEDURE — 93225 XTRNL ECG REC<48 HRS REC: CPT

## 2023-11-24 PROCEDURE — 93227 XTRNL ECG REC<48 HR R&I: CPT | Mod: ,,, | Performed by: INTERNAL MEDICINE

## 2023-11-24 PROCEDURE — 93227 HOLTER MONITOR - 48 HOUR (CUPID ONLY): ICD-10-PCS | Mod: ,,, | Performed by: INTERNAL MEDICINE

## 2023-11-24 NOTE — PROGRESS NOTES
Mr. Singleton has completed 19 out of 36 exercise session of Phase II cardiac rehab.  A follow up reassessment will be completed at 24 sessions.    12 Session Follow Up   Cardiac Rehab Individual Treatment Plan - Reassessment      Patient Name: Julio Bernardo MRN: [id number]   : 1950   Age: 73 y.o.   Primary Diagnosis: PTCA/STENT Date of Event: 23   EF: 63%  Risk Stratification: moderate  Referring Physician: MARK   Exercise Assessment:     Angina with exercise?: No   ST Depression with Exercise?: No  Fall Risk: No   Assistive Devices:  independent  Mr. Singleton stated at orientation there were no limitations to exercise.  Comments on Progression: Mr. Singleton is progressing well and his workloads will continue to be increased as he tolerates exercise intensity.     Exercise Plan:   Goals:  CR Exercise Goals: Attend Cardiac Rehab 3 times/week: In Progress  Home Aerobic Exercise: 2 additional days/week for 30-60 minutes: In Progress  Intensity of 12-15 on the Rate of Perceived Exertion (RPE) scale: In Progress  30% increase in entry estimated METS: 14.3 : In Progress  5 days/week for 30-60 minutes: In Progress    Comments on Goal Progression:  Patient Consistency: consistent with attendance  Home exercise? Yes: Walking; Frequency: 2 non-rehab days per week; Duration at least 30 minutes  Patient reports intensity rate 11-13 on RPE scale    Intervention/Recommendations:   Discussed importance of regular attendance to cardiac rehab class    Exercise Prescription:  THR Range    Mode: Treadmill  Elliptical   Frequency:  3 days/week   Duration:  30-60 minutes   Intensity:  12-15 RPE   Resistance Training:  Yes: 7 to 12 lb weights with 10-15 reps based on strength and range of motion and adjusted accordingly     Home Prescription:  Mode Aerobic exercise   Frequency: 2- 3 days/week   Duration: 30-60 minutes   Resistance Training: None     Education:  Flexibility/Stretching; verbalizes understanding; Date:  10-16-23  Relaxation Techniques; verbalizes understanding; Date: 10-9-23  Resistance Training II; verbalizes understanding; Date: 10-2-23    Comments:  I reviewed exercise recommendations with Mr. Singleton.  I encouraged him to continue exercising.  He stated understanding.      The exercise prescription will continue to be adjusted based on tolerance of exercise intensity by patient.    Bettie Fortune, CEP     12 Session Follow Up   Cardiac Rehab Individual Treatment Plan - Reassessment    Patient Name: Julio Bernardo MRN: [id number]   : 1950   Age: 73 y.o.   Primary Diagnosis: s/p PTCA/stent    Nutrition Assessment:     Anthropometrics    Height 73 inches   Weight 238.2 lbs   BMI 31.4     Patient confirms he is taking lipitor 80mg for cholesterol control.  Difficulty Chewing or Swallowing: No  Current Exercise: See Exercise Physiologist Note  Food Safety/Food Preparation: spouse  Living Arrangements/Family Support: Lives with spouse  Cultural/Spiritual/Personal Preferences: not applicable   Barriers to Education: none identified  Stage of Change Related to Diet Habits: Action  Recent Changes to Diet: Yes - less HS snacking  Food Diary: Completed      Nutrition Plan:   Goals:  LDL-C < 70 (for high risk patients)  Hgb A1c < 7%  BMI < 25 and abdominal girth < 40M/<35 F  2 gram sodium, Mediterranean diet: In Progress  Rehab weight loss goal reaching 200 lbs, 1 lb per week: In Progress  Fish intake (non-fried varieties) to a goal of 2-3 servings per week: In Progress  Increase fruit and vegetable intake: In Progress    Comments on Goal Progression:  Pt states he feels well, discussed reducing red meat intake and trying kcal tracker to help with weight loss    Interventions:  Dietitian Consult: No  Patient to participate in Cardiac Rehab sessions three times a week  Weekly Dietitian Weight Check  Nutrition Recommendations Provided: Verbal and Written, Reviewed  Follow Up Plan for Ongoing Self-Management  Support    Education:  Cholesterol and Fat; verbalizes understanding; Date: 10/4/23  Protein; verbalizes understanding; Date: 10/18/23  Seafood; verbalizes understanding; Date: 10/11/23  Weight Management; verbalizes understanding; Date: 23    Comments:   Discussed ways to incorporate healthy snacks, eating on a schedule, and monitoring sodium intake for heart health.    Diabetes  Is the patient diabetic? No      Courtney Unger MS, RDN/LDN     Session: 12 Session Follow Up   Cardiac Rehab Individual Treatment Plan - Reassessment      Patient Name: Julio Bernardo MRN: [id number]   : 1950   Age: 73 y.o.   Primary Diagnosis: PTCA      Psychosocial Assessment:   Living Arrangements: Lives with spouse  Family Support: family and spouse  Self Reported: no change Effective Coping Skills  Displays: socializes with others, happiness, smiles often, and calmness  Medication: not applicable    Psychosocial Plan:   Goals:  Improved psychosocial coping strategies: In Progress  Maintain positive support system: Met  Maintain positive outlook: Met  Improve overall quality of life: In Progress    Comments on Goal Progression:  Patient is working on meeting goals    Interventions:  Patient to Self Report Emotional Changes at Session Check In  Recommend Physical Activity  Recommend Attending Education Lectures  Notify MD: No  Program Referral: No  Pharmaceutical Intervention/Therapy: No  Other Needs: not applicable  Stage of Readiness to Change: Action    Education:  Stress Management; verbalizes understanding; Date: 10/25/2023  Stress; verbalizes understanding; Date: 10/25/2023    Comments:  Patient denies having any overwhelming stress/anxiety.  He report to have good family support.  He has been instructed to notify staff in the event that circumstances change.  Patient verbalizes understanding.    Other Core Components/Risk Factors Assessment:   RISK FACTORS:  hyperlipidemia, hypertension, obesity, positive family  history    Learning Barriers: None    Medication Compliance: has been compliant with the medicaiton regimen    Other Core Components/Risk Factors Plan:   Goals:  Decrease cholesterol level: In Progress  Increase exercise tolerance: In Progress  Increase knowledge of CAD: In Progress  Decrease blood pressure: In Progress  Weight loss: In Progress  Demonstrate accurate pulse taking: In Progress  Identify and manage personal areas of stress: In Progress  Learn more about healthy eating: In Progress    Comments on Goal Progression:  Patient will be working on decreasing risk factors for CAD.    Interventions:  Individual Education/ Counseling: Yes  Physician Referral: No    Education:    blood pressure meds, verbalizes understanding; Date: 10/13/2023 & 10/25/2023  cholesterol meds, verbalizes understanding; Date: 10/13/2023  hypertension, verbalizes understanding; Date: 9/29/2023         Education method adapted to patients education level and preferred method of learning.  Method: explanation  handouts    Comments:  Encouraged for patient to continue to work on decreasing risk factors.    Other Core Components/Hypertension Assessment:   BP Range: 170-128/96-78   BP at Goal: BP not at goal.  Dr. Burns was notified of elevations.  Patient reported symptoms: none    Other Core Components/Hypertension Plan:   Goals:  Blood Pressure <130/80    Comments on Goal Progression:  Patient is working to keep BP under good control.  Dr. Burns was notified of elevations.    Interventions:  Med Card Reconciled: Yes  Encourage medication compliance  Encourage sodium reduction  Encourage weight loss  Recommend physical activity  Educate on contributory factors  Encourage home blood pressure monitoring  Recommend daily weights  MD notified/Physician Referral: Yes    Education:    Hypertension; verbalizes understanding; Date: 9/29/2023  Medication I; verbalizes understanding; Date: 10/13/2023  Medication II; verbalizes understanding;  Date: 10/20/2023         Comments:  Patient is following recommendations to keep BP under control, however BP remain elevated.    Does the patient have Heart Failure? No      Other Core Components/Tobacco Cessation Assessment:   Smoking Status: lifetime non-smoker  Smoking Cessation Barriers:  N/A  Stage of Readiness to Change: Maintenance    Other Core Components/Tobacco Cessation Plan:   Goals:  Maintain non-smoking status    Comments on Goal Progression:  Non smoker.    Interventions:  Maintains non-smoking status    Education:    Hypertension; verbalizes understanding; Date: 9/29/2023  Medications I; verbalizes understanding; Date: 10/13/2023  Medications II; verbalizes understanding; Date: 10/20/2023         Comments:  Non smoker.    Tiffanie Shirley, KAYLIN  Cardiac Rehab Nurse

## 2023-11-27 ENCOUNTER — CLINICAL SUPPORT (OUTPATIENT)
Dept: CARDIAC REHAB | Facility: CLINIC | Age: 73
End: 2023-11-27
Payer: MEDICARE

## 2023-11-27 DIAGNOSIS — I25.10 ATHEROSCLEROSIS OF NATIVE CORONARY ARTERY OF NATIVE HEART WITHOUT ANGINA PECTORIS: ICD-10-CM

## 2023-11-27 DIAGNOSIS — Z98.61 POSTSURGICAL PERCUTANEOUS TRANSLUMINAL CORONARY ANGIOPLASTY STATUS: Primary | ICD-10-CM

## 2023-11-27 PROCEDURE — 93798 PHYS/QHP OP CAR RHAB W/ECG: CPT | Mod: S$GLB,,, | Performed by: INTERNAL MEDICINE

## 2023-11-27 PROCEDURE — 93798 PR CARDIAC REHAB/MONITOR: ICD-10-PCS | Mod: S$GLB,,, | Performed by: INTERNAL MEDICINE

## 2023-11-29 ENCOUNTER — CLINICAL SUPPORT (OUTPATIENT)
Dept: CARDIAC REHAB | Facility: CLINIC | Age: 73
End: 2023-11-29
Payer: MEDICARE

## 2023-11-29 DIAGNOSIS — I25.10 ATHEROSCLEROSIS OF NATIVE CORONARY ARTERY OF NATIVE HEART, UNSPECIFIED WHETHER ANGINA PRESENT: ICD-10-CM

## 2023-11-29 DIAGNOSIS — Z98.61 POSTSURGICAL PERCUTANEOUS TRANSLUMINAL CORONARY ANGIOPLASTY STATUS: Primary | ICD-10-CM

## 2023-11-29 LAB
OHS CV EVENT MONITOR DAY: 0
OHS CV HOLTER LENGTH DECIMAL HOURS: 48
OHS CV HOLTER LENGTH HOURS: 48
OHS CV HOLTER LENGTH MINUTES: 0
OHS CV HOLTER SINUS AVERAGE HR: 64
OHS CV HOLTER SINUS MAX HR: 98
OHS CV HOLTER SINUS MIN HR: 50

## 2023-11-29 PROCEDURE — 93798 PHYS/QHP OP CAR RHAB W/ECG: CPT | Mod: S$GLB,,, | Performed by: INTERNAL MEDICINE

## 2023-11-29 PROCEDURE — 93798 PR CARDIAC REHAB/MONITOR: ICD-10-PCS | Mod: S$GLB,,, | Performed by: INTERNAL MEDICINE

## 2023-12-01 ENCOUNTER — CLINICAL SUPPORT (OUTPATIENT)
Dept: CARDIAC REHAB | Facility: CLINIC | Age: 73
End: 2023-12-01
Payer: MEDICARE

## 2023-12-01 DIAGNOSIS — Z98.61 POSTSURGICAL PERCUTANEOUS TRANSLUMINAL CORONARY ANGIOPLASTY STATUS: Primary | ICD-10-CM

## 2023-12-01 DIAGNOSIS — I25.10 ATHEROSCLEROSIS OF NATIVE CORONARY ARTERY OF NATIVE HEART WITHOUT ANGINA PECTORIS: ICD-10-CM

## 2023-12-01 PROCEDURE — 93798 PR CARDIAC REHAB/MONITOR: ICD-10-PCS | Mod: S$GLB,,, | Performed by: INTERNAL MEDICINE

## 2023-12-01 PROCEDURE — 93798 PHYS/QHP OP CAR RHAB W/ECG: CPT | Mod: S$GLB,,, | Performed by: INTERNAL MEDICINE

## 2023-12-04 ENCOUNTER — CLINICAL SUPPORT (OUTPATIENT)
Dept: CARDIAC REHAB | Facility: CLINIC | Age: 73
End: 2023-12-04
Payer: MEDICARE

## 2023-12-04 DIAGNOSIS — Z98.61 POSTSURGICAL PERCUTANEOUS TRANSLUMINAL CORONARY ANGIOPLASTY STATUS: Primary | ICD-10-CM

## 2023-12-04 DIAGNOSIS — I25.10 CORONARY ATHEROSCLEROSIS OF NATIVE CORONARY ARTERY: ICD-10-CM

## 2023-12-04 PROCEDURE — 93798 PHYS/QHP OP CAR RHAB W/ECG: CPT | Mod: S$GLB,,, | Performed by: INTERNAL MEDICINE

## 2023-12-04 PROCEDURE — 93798 PR CARDIAC REHAB/MONITOR: ICD-10-PCS | Mod: S$GLB,,, | Performed by: INTERNAL MEDICINE

## 2023-12-05 DIAGNOSIS — I25.10 ATHEROSCLEROSIS OF NATIVE CORONARY ARTERY OF NATIVE HEART, UNSPECIFIED WHETHER ANGINA PRESENT: ICD-10-CM

## 2023-12-05 DIAGNOSIS — Z98.61 POSTSURGICAL PERCUTANEOUS TRANSLUMINAL CORONARY ANGIOPLASTY STATUS: Primary | ICD-10-CM

## 2023-12-06 ENCOUNTER — DOCUMENTATION ONLY (OUTPATIENT)
Dept: CARDIAC REHAB | Facility: CLINIC | Age: 73
End: 2023-12-06

## 2023-12-06 ENCOUNTER — CLINICAL SUPPORT (OUTPATIENT)
Dept: CARDIAC REHAB | Facility: CLINIC | Age: 73
End: 2023-12-06
Payer: MEDICARE

## 2023-12-06 DIAGNOSIS — I25.10 CORONARY ARTERY DISEASE, UNSPECIFIED VESSEL OR LESION TYPE, UNSPECIFIED WHETHER ANGINA PRESENT, UNSPECIFIED WHETHER NATIVE OR TRANSPLANTED HEART: ICD-10-CM

## 2023-12-06 DIAGNOSIS — Z98.61 POSTSURGICAL PERCUTANEOUS TRANSLUMINAL CORONARY ANGIOPLASTY STATUS: Primary | ICD-10-CM

## 2023-12-06 PROCEDURE — 93798 PHYS/QHP OP CAR RHAB W/ECG: CPT | Mod: S$GLB,,, | Performed by: INTERNAL MEDICINE

## 2023-12-06 PROCEDURE — 93798 PR CARDIAC REHAB/MONITOR: ICD-10-PCS | Mod: S$GLB,,, | Performed by: INTERNAL MEDICINE

## 2023-12-06 NOTE — PROGRESS NOTES
Mr. Singleton has completed 24 out of 36 exercise session of Phase II cardiac rehab.  A follow up reassessment will be completed at exit interview.    24 Session Follow Up   Cardiac Rehab Individual Treatment Plan - Reassessment      Patient Name: Julio Bernardo MRN: [id number]   : 1950   Age: 73 y.o.   Primary Diagnosis: PTCA/STENT Date of Event: 23   EF: 63%  Risk Stratification: moderate  Referring Physician: MARK   Exercise Assessment:     Angina with exercise?: No   ST Depression with Exercise?: No  Fall Risk: No   Assistive Devices:  independent  Mr. Singleton stated at orientation there were no limitations to exercise.  Comments on Progression: Mr. Singleton is progressing well and his workloads will continue to be increased as he tolerates exercise intensity.    Exercise Plan:   Goals:  CR Exercise Goals: Attend Cardiac Rehab 3 times/week: In Progress  Home Aerobic Exercise: 2 additional days/week for 30-60 minutes: In Progress  Intensity of 12-15 on the Rate of Perceived Exertion (RPE) scale: In Progress  30% increase in entry estimated METS: 14.3 : In Progress  5 days/week for 30-60 minutes: In Progress  Demonstrate proper pulse taking technique: In Progress    Comments on Goal Progression:  Patient Consistency: consistent with attendance  Home exercise? Yes: Walking; Frequency: 2 non-rehab days per week; Duration 40 to 45 minutes  Patient reports intensity rate 11-14 on RPE scale  Patient is progressing well  Patient is able to demonstrate proper pulse taking technique by using a fitness tracker.      Intervention/Recommendations:   Discussed importance of regular attendance to cardiac rehab class    Exercise Prescription:  THR Range    Mode: Treadmill  Elliptical   Frequency:  3 days/week   Duration:  30-60 minutes   Intensity:  12-15 RPE   Resistance Training:  Yes: 10 to 15 lb weights with 10-15 reps based on strength and range of motion and adjusted accordingly     Home Prescription:  Mode Aerobic  exercise   Frequency: 2- 3 days/week   Duration: 30-60 minutes   Resistance Training: None     Education:  Arthritis/Osteporosis; verbalizes understanding; Date: 11-20-23  Body Composition; verbalizes understanding; Date: 10-30-23  Exercise Terminology; verbalizes understanding; Date: 11-27-23  Muscular Anatomy; verbalizes understanding; Date: 12-4-23    Comments:  I reviewed exercise recommendations with Mr. Singleton.  I encouraged him to continue exercising.  He stated understanding.     The exercise prescription will continue to be adjusted based on tolerance of exercise intensity by patient.    Ino Fortune., CEP

## 2023-12-06 NOTE — PROGRESS NOTES
Session: 24 Session Follow Up   Cardiac Rehab Individual Treatment Plan - Reassessment      Patient Name: Julio Bernardo MRN: [id number]   : 1950   Age: 73 y.o.   Primary Diagnosis: PTCA      Psychosocial Assessment:   Living Arrangements: Lives with spouse  Family Support: family and friends  Self Reported: no change Effective Coping Skills  Displays: socializes with others, happiness, smiles often, and calmness  Medication: not applicable    Psychosocial Plan:   Goals:  Improved psychosocial coping strategies: In Progress  Maintain positive support system: In Progress  Maintain positive outlook: In Progress  Improve overall quality of life: In Progress    Comments on Goal Progression:  Patient denies having any overwhelming stress or anxiety.  He reports improvements emotionally & physically since beginning cardiac rehab.  He is very motivated & is exercising an additional 2 days outside of rehab.      Interventions:  Patient to Self Report Emotional Changes at Session Check In  Recommend Physical Activity  Recommend Attending Education Lectures  Notify MD: No  Program Referral: No  Pharmaceutical Intervention/Therapy: No  Other Needs: not applicable  Stage of Readiness to Change: Action    Education:  Stress Management; verbalizes understanding; Date: 2023  Stress; verbalizes understanding; Date: 2023    Comments:  Patient reports to have good support from his wife, family & friends.  He states that he is exercising at [employer] with friends on Tuesday & Thursday.  He has been instructed to notify staff in the event that circumstances change.  Patient verbalizes understanding.    Other Core Components/Risk Factors Assessment:   RISK FACTORS:  hyperlipidemia, hypertension, positive family history    Learning Barriers: None    Medication Compliance: has been compliant with taking medications    Other Core Components/Risk Factors Plan:   Goals:  Decrease cholesterol level: In Progress  Increase  exercise tolerance: In Progress  Increase knowledge of CAD: In Progress  Decrease blood pressure: In Progress  Weight loss: In Progress  Demonstrate accurate pulse taking: In Progress  Identify and manage personal areas of stress: In Progress  Learn more about healthy eating: In Progress    Comments on Goal Progression:  Patient continues to work on achieving goals to decrease risk factors for CAD.  He is making changes in his diet & is exercising 2 additional days outside of rehab.  He is attending lectures & exercise classes consistently.  Patient is monitoring BP at home & is obtaining readings lower than those obtained in rehab.  Encouraged for him to keep a log to compare readings in rehab & at home.  He is able to monitor his pulse accurately.    Interventions:  Individual Education/ Counseling: Yes  Physician Referral: No    Education:    arrhythmias, verbalizes understanding; Date: 12/6/2023  blood pressure meds, verbalizes understanding; Date: 10/13/2023  cholesterol meds, verbalizes understanding; Date: 10/20/2023  fluid overload/CHF, verbalizes understanding; Date: 12/6/2023  hypertension, verbalizes understanding; Date: 9/29/2023  risk factors, verbalizes understanding; Date: 12/6/2023  sodium, verbalizes understanding; Date: 11/1/2023 & 12/6/2023  stress, verbalizes understanding; Date: 12/6/2023         Education method adapted to patients education level and preferred method of learning.  Method: explanation  handouts    Comments:  Normal sinus rhythm noted with frequent PVC's on monitor.  Patient is aware that he has these arrhythmias & does not experience any lightheadedness or dizziness.  He is compliant with medications.  He has been monitoring his sodium intake.    Other Core Components/Hypertension Assessment:   BP Range: 154-126/84-62  BP at Goal: BP is not at goal in rehab.  Patient states that he gets lower readings at home.    Patient reported symptoms: none    Other Core  Components/Hypertension Plan:   Goals:  Blood Pressure <130/80    Comments on Goal Progression:  Patient reports that he is monitoring BP at home & states that his readings are lower than those readings obtained in cardiac rehab.  Encouraged to keep a daily log to make comparisons.    Interventions:  Med Card Reconciled: Yes  Encourage medication compliance  Encourage sodium reduction  Encourage weight loss  Recommend physical activity  Educate on contributory factors  Reduce stress, anxiety, anger, depression, and/or chronic pain  Encourage home blood pressure monitoring  Recommend daily weights    Education:    Hypertension; verbalizes understanding; Date: 9/29/2023  Risk Factors; verbalizes understanding; Date: 12/6/2023  Medication I; verbalizes understanding; Date: 10/13/2023  Medication II; verbalizes understanding; Date: 10/20/2023         Comments:  Patient reports compliance with medications.  He monitors his sodium intake carefully & monitors his weight on a daily basis.  He is exercising 2 additional days outside of rehab with his friends at [employer].    Does the patient have Heart Failure? No      Other Core Components/Tobacco Cessation Assessment:   Smoking Status: lifetime non-smoker  Smoking Cessation Barriers:  N/A  Stage of Readiness to Change: Maintenance    Other Core Components/Tobacco Cessation Plan:   Goals:  Maintain non-smoking status    Comments on Goal Progression:  Non smoker.    Interventions:  Maintains non-smoking status    Education:    Risk Factors; verbalizes understanding; Date: 12/6/2023         Comments:  Non smoker.    Tiffanie Shirley RN  Cardiac Rehab Nurse

## 2023-12-07 NOTE — PROGRESS NOTES
24 Session Follow Up   Cardiac Rehab Individual Treatment Plan - Reassessment    Patient Name: Julio Bernardo MRN: [id number]   : 1950   Age: 73 y.o.   Primary Diagnosis: s/p PTCA/stent    Nutrition Assessment:     Anthropometrics    Height 73 inches   Weight 235.3 lbs   BMI 33.4     Patient confirms he is taking lipitor 80mg for cholesterol control.  Difficulty Chewing or Swallowing: No  Current Exercise: See Exercise Physiologist Note  Food Safety/Food Preparation: spouse  Living Arrangements/Family Support: Lives with wife  Cultural/Spiritual/Personal Preferences: not applicable   Barriers to Education: none identified  Stage of Change Related to Diet Habits: Action  Recent Changes to Diet: Yes   Food Diary: Completed      Nutrition Plan:   Goals:  LDL-C < 70 (for high risk patients)  Hgb A1c < 7%  BMI < 25 and abdominal girth < 40M/<35 F  2 gram sodium, Mediterranean diet: In Progress  Rehab weight loss goal reaching 200 lbs, 1 lb per week: In Progress  Fish intake (non-fried varieties) to a goal of 2-3 servings per week: In Progress  Increase fruit and vegetable intake: In Progress    Comments on Goal Progression:  Pt states he is feeling well, working on improving produce intake, uses papi to track water intake to improve hydration    Interventions:  Dietitian Consult: No  Patient to participate in Cardiac Rehab sessions three times a week  Weekly Dietitian Weight Check  Nutrition Recommendations Provided: Verbal and Written, Reviewed  Follow Up Plan for Ongoing Self-Management Support    Education:  Dining Out; verbalizes understanding; Date: 23  Mediterranean Diet; verbalizes understanding; Date: 23  Sodium; verbalizes understanding; Date: 23  Vegetarianism; verbalizes understanding; Date: 23  Fiber; verbalizes understanding; Date: 10/25/23    Comments:   Discussed ways to incorporate healthy snacks, eating on a schedule, and monitoring sodium intake for heart  health.    Diabetes  Is the patient diabetic? No    Courtney Unger MS, RDN/LDN

## 2023-12-08 ENCOUNTER — CLINICAL SUPPORT (OUTPATIENT)
Dept: CARDIAC REHAB | Facility: CLINIC | Age: 73
End: 2023-12-08
Payer: MEDICARE

## 2023-12-08 DIAGNOSIS — Z98.61 POSTSURGICAL PERCUTANEOUS TRANSLUMINAL CORONARY ANGIOPLASTY STATUS: Primary | ICD-10-CM

## 2023-12-08 DIAGNOSIS — I25.10 ATHEROSCLEROSIS OF NATIVE CORONARY ARTERY OF NATIVE HEART, UNSPECIFIED WHETHER ANGINA PRESENT: ICD-10-CM

## 2023-12-08 PROCEDURE — 93798 PR CARDIAC REHAB/MONITOR: ICD-10-PCS | Mod: S$GLB,,, | Performed by: INTERNAL MEDICINE

## 2023-12-08 PROCEDURE — 93798 PHYS/QHP OP CAR RHAB W/ECG: CPT | Mod: S$GLB,,, | Performed by: INTERNAL MEDICINE

## 2023-12-11 ENCOUNTER — CLINICAL SUPPORT (OUTPATIENT)
Dept: CARDIAC REHAB | Facility: CLINIC | Age: 73
End: 2023-12-11
Payer: MEDICARE

## 2023-12-11 DIAGNOSIS — I25.10 CORONARY ARTERY DISEASE, UNSPECIFIED VESSEL OR LESION TYPE, UNSPECIFIED WHETHER ANGINA PRESENT, UNSPECIFIED WHETHER NATIVE OR TRANSPLANTED HEART: ICD-10-CM

## 2023-12-11 DIAGNOSIS — Z98.61 POSTSURGICAL PERCUTANEOUS TRANSLUMINAL CORONARY ANGIOPLASTY STATUS: Primary | ICD-10-CM

## 2023-12-11 PROCEDURE — 93798 PHYS/QHP OP CAR RHAB W/ECG: CPT | Mod: S$GLB,,, | Performed by: INTERNAL MEDICINE

## 2023-12-11 PROCEDURE — 93798 PR CARDIAC REHAB/MONITOR: ICD-10-PCS | Mod: S$GLB,,, | Performed by: INTERNAL MEDICINE

## 2023-12-13 ENCOUNTER — CLINICAL SUPPORT (OUTPATIENT)
Dept: CARDIAC REHAB | Facility: CLINIC | Age: 73
End: 2023-12-13
Payer: MEDICARE

## 2023-12-13 DIAGNOSIS — Z98.61 POSTSURGICAL PERCUTANEOUS TRANSLUMINAL CORONARY ANGIOPLASTY STATUS: Primary | ICD-10-CM

## 2023-12-13 DIAGNOSIS — I25.10 ATHEROSCLEROSIS OF NATIVE CORONARY ARTERY OF NATIVE HEART, UNSPECIFIED WHETHER ANGINA PRESENT: ICD-10-CM

## 2023-12-13 PROCEDURE — 93798 PR CARDIAC REHAB/MONITOR: ICD-10-PCS | Mod: S$GLB,,, | Performed by: INTERNAL MEDICINE

## 2023-12-13 PROCEDURE — 93798 PHYS/QHP OP CAR RHAB W/ECG: CPT | Mod: S$GLB,,, | Performed by: INTERNAL MEDICINE

## 2023-12-14 ENCOUNTER — DOCUMENTATION ONLY (OUTPATIENT)
Dept: CARDIAC REHAB | Facility: CLINIC | Age: 73
End: 2023-12-14
Payer: MEDICARE

## 2023-12-14 NOTE — PROGRESS NOTES
Mr. Singleton has completed 27 out of 36 exercise session of Phase II cardiac rehab.  A follow up reassessment will be completed at exit interview.    24 Session Follow Up   Cardiac Rehab Individual Treatment Plan - Reassessment      Patient Name: Julio Bernardo MRN: [id number]   : 1950   Age: 73 y.o.   Primary Diagnosis: PTCA/STENT Date of Event: 23   EF: 63%  Risk Stratification: moderate  Referring Physician: MARK   Exercise Assessment:     Angina with exercise?: No   ST Depression with Exercise?: No  Fall Risk: No   Assistive Devices:  independent  Mr. Singleton stated at orientation there were no limitations to exercise.  Comments on Progression: Mr. Singleton is progressing well and his workloads will continue to be increased as he tolerates exercise intensity.    Exercise Plan:   Goals:  CR Exercise Goals: Attend Cardiac Rehab 3 times/week: In Progress  Home Aerobic Exercise: 2 additional days/week for 30-60 minutes: In Progress  Intensity of 12-15 on the Rate of Perceived Exertion (RPE) scale: In Progress  30% increase in entry estimated METS: 14.3 : In Progress  5 days/week for 30-60 minutes: In Progress  Demonstrate proper pulse taking technique: In Progress    Comments on Goal Progression:  Patient Consistency: consistent with attendance  Home exercise? Yes: Walking; Frequency: 2 non-rehab days per week; Duration 40 to 45 minutes  Patient reports intensity rate 11-14 on RPE scale  Patient is progressing well  Patient is able to demonstrate proper pulse taking technique by using a fitness tracker.      Intervention/Recommendations:   Discussed importance of regular attendance to cardiac rehab class    Exercise Prescription:  THR Range    Mode: Treadmill  Elliptical   Frequency:  3 days/week   Duration:  30-60 minutes   Intensity:  12-15 RPE   Resistance Training:  Yes: 10 to 15 lb weights with 10-15 reps based on strength and range of motion and adjusted accordingly     Home Prescription:  Mode Aerobic  exercise   Frequency: 2- 3 days/week   Duration: 30-60 minutes   Resistance Training: None     Education:  Arthritis/Osteporosis; verbalizes understanding; Date: 23  Body Composition; verbalizes understanding; Date: 10-30-23  Exercise Terminology; verbalizes understanding; Date: 23  Muscular Anatomy; verbalizes understanding; Date: 23    Comments:  I reviewed exercise recommendations with Mr. Singleton.  I encouraged him to continue exercising.  He stated understanding.     The exercise prescription will continue to be adjusted based on tolerance of exercise intensity by patient.    Bettie Fortune, CEP    24 Session Follow Up   Cardiac Rehab Individual Treatment Plan - Reassessment    Patient Name: Julio Bernardo MRN: [id number]   : 1950   Age: 73 y.o.   Primary Diagnosis: s/p PTCA/stent    Nutrition Assessment:     Anthropometrics    Height 73 inches   Weight 235.3 lbs   BMI 33.4     Patient confirms he is taking lipitor 80mg for cholesterol control.  Difficulty Chewing or Swallowing: No  Current Exercise: See Exercise Physiologist Note  Food Safety/Food Preparation: spouse  Living Arrangements/Family Support: Lives with wife  Cultural/Spiritual/Personal Preferences: not applicable   Barriers to Education: none identified  Stage of Change Related to Diet Habits: Action  Recent Changes to Diet: Yes   Food Diary: Completed      Nutrition Plan:   Goals:  LDL-C < 70 (for high risk patients)  Hgb A1c < 7%  BMI < 25 and abdominal girth < 40M/<35 F  2 gram sodium, Mediterranean diet: In Progress  Rehab weight loss goal reaching 200 lbs, 1 lb per week: In Progress  Fish intake (non-fried varieties) to a goal of 2-3 servings per week: In Progress  Increase fruit and vegetable intake: In Progress    Comments on Goal Progression:  Pt states he is feeling well, working on improving produce intake, uses papi to track water intake to improve hydration    Interventions:  Dietitian Consult: No  Patient to  participate in Cardiac Rehab sessions three times a week  Weekly Dietitian Weight Check  Nutrition Recommendations Provided: Verbal and Written, Reviewed  Follow Up Plan for Ongoing Self-Management Support    Education:  Dining Out; verbalizes understanding; Date: 23  Mediterranean Diet; verbalizes understanding; Date: 23  Sodium; verbalizes understanding; Date: 23  Vegetarianism; verbalizes understanding; Date: 23  Fiber; verbalizes understanding; Date: 10/25/23    Comments:   Discussed ways to incorporate healthy snacks, eating on a schedule, and monitoring sodium intake for heart health.    Diabetes  Is the patient diabetic? No    Courtney Unger MS, RDN/LDN    Session: 24 Session Follow Up   Cardiac Rehab Individual Treatment Plan - Reassessment      Patient Name: Julio Bernardo MRN: [id number]   : 1950   Age: 73 y.o.   Primary Diagnosis: PTCA      Psychosocial Assessment:   Living Arrangements: Lives with spouse  Family Support: family and friends  Self Reported: no change Effective Coping Skills  Displays: socializes with others, happiness, smiles often, and calmness  Medication: not applicable    Psychosocial Plan:   Goals:  Improved psychosocial coping strategies: In Progress  Maintain positive support system: In Progress  Maintain positive outlook: In Progress  Improve overall quality of life: In Progress    Comments on Goal Progression:  Patient denies having any overwhelming stress or anxiety.  He reports improvements emotionally & physically since beginning cardiac rehab.  He is very motivated & is exercising an additional 2 days outside of rehab.      Interventions:  Patient to Self Report Emotional Changes at Session Check In  Recommend Physical Activity  Recommend Attending Education Lectures  Notify MD: No  Program Referral: No  Pharmaceutical Intervention/Therapy: No  Other Needs: not applicable  Stage of Readiness to Change: Action    Education:  Stress Management;  verbalizes understanding; Date: 12/6/2023  Stress; verbalizes understanding; Date: 12/6/2023    Comments:  Patient reports to have good support from his wife, family & friends.  He states that he is exercising at [employer] with friends on Tuesday & Thursday.  He has been instructed to notify staff in the event that circumstances change.  Patient verbalizes understanding.    Other Core Components/Risk Factors Assessment:   RISK FACTORS:  hyperlipidemia, hypertension, positive family history    Learning Barriers: None    Medication Compliance: has been compliant with taking medications    Other Core Components/Risk Factors Plan:   Goals:  Decrease cholesterol level: In Progress  Increase exercise tolerance: In Progress  Increase knowledge of CAD: In Progress  Decrease blood pressure: In Progress  Weight loss: In Progress  Demonstrate accurate pulse taking: In Progress  Identify and manage personal areas of stress: In Progress  Learn more about healthy eating: In Progress    Comments on Goal Progression:  Patient continues to work on achieving goals to decrease risk factors for CAD.  He is making changes in his diet & is exercising 2 additional days outside of rehab.  He is attending lectures & exercise classes consistently.  Patient is monitoring BP at home & is obtaining readings lower than those obtained in rehab.  Encouraged for him to keep a log to compare readings in rehab & at home.  He is able to monitor his pulse accurately.    Interventions:  Individual Education/ Counseling: Yes  Physician Referral: No    Education:    arrhythmias, verbalizes understanding; Date: 12/6/2023  blood pressure meds, verbalizes understanding; Date: 10/13/2023  cholesterol meds, verbalizes understanding; Date: 10/20/2023  fluid overload/CHF, verbalizes understanding; Date: 12/6/2023  hypertension, verbalizes understanding; Date: 9/29/2023  risk factors, verbalizes understanding; Date: 12/6/2023  sodium, verbalizes understanding;  Date: 11/1/2023 & 12/6/2023  stress, verbalizes understanding; Date: 12/6/2023         Education method adapted to patients education level and preferred method of learning.  Method: explanation  handouts    Comments:  Normal sinus rhythm noted with frequent PVC's on monitor.  Patient is aware that he has these arrhythmias & does not experience any lightheadedness or dizziness.  He is compliant with medications.  He has been monitoring his sodium intake.    Other Core Components/Hypertension Assessment:   BP Range: 154-126/84-62  BP at Goal: BP is not at goal in rehab.  Patient states that he gets lower readings at home.    Patient reported symptoms: none    Other Core Components/Hypertension Plan:   Goals:  Blood Pressure <130/80    Comments on Goal Progression:  Patient reports that he is monitoring BP at home & states that his readings are lower than those readings obtained in cardiac rehab.  Encouraged to keep a daily log to make comparisons.    Interventions:  Med Card Reconciled: Yes  Encourage medication compliance  Encourage sodium reduction  Encourage weight loss  Recommend physical activity  Educate on contributory factors  Reduce stress, anxiety, anger, depression, and/or chronic pain  Encourage home blood pressure monitoring  Recommend daily weights    Education:    Hypertension; verbalizes understanding; Date: 9/29/2023  Risk Factors; verbalizes understanding; Date: 12/6/2023  Medication I; verbalizes understanding; Date: 10/13/2023  Medication II; verbalizes understanding; Date: 10/20/2023         Comments:  Patient reports compliance with medications.  He monitors his sodium intake carefully & monitors his weight on a daily basis.  He is exercising 2 additional days outside of rehab with his friends at [employer].    Does the patient have Heart Failure? No      Other Core Components/Tobacco Cessation Assessment:   Smoking Status: lifetime non-smoker  Smoking Cessation Barriers:  N/A  Stage of  Readiness to Change: Maintenance    Other Core Components/Tobacco Cessation Plan:   Goals:  Maintain non-smoking status    Comments on Goal Progression:  Non smoker.    Interventions:  Maintains non-smoking status    Education:    Risk Factors; verbalizes understanding; Date: 12/6/2023         Comments:  Non smoker.    Tiffanie Shirley, KAYLIN  Cardiac Rehab Nurse

## 2023-12-15 ENCOUNTER — CLINICAL SUPPORT (OUTPATIENT)
Dept: CARDIAC REHAB | Facility: CLINIC | Age: 73
End: 2023-12-15
Payer: MEDICARE

## 2023-12-15 DIAGNOSIS — Z98.61 POSTSURGICAL PERCUTANEOUS TRANSLUMINAL CORONARY ANGIOPLASTY STATUS: Primary | ICD-10-CM

## 2023-12-15 DIAGNOSIS — I25.10 CORONARY ARTERY DISEASE, UNSPECIFIED VESSEL OR LESION TYPE, UNSPECIFIED WHETHER ANGINA PRESENT, UNSPECIFIED WHETHER NATIVE OR TRANSPLANTED HEART: ICD-10-CM

## 2023-12-15 PROCEDURE — 93798 PR CARDIAC REHAB/MONITOR: ICD-10-PCS | Mod: S$GLB,,, | Performed by: INTERNAL MEDICINE

## 2023-12-15 PROCEDURE — 93798 PHYS/QHP OP CAR RHAB W/ECG: CPT | Mod: S$GLB,,, | Performed by: INTERNAL MEDICINE

## 2023-12-18 ENCOUNTER — CLINICAL SUPPORT (OUTPATIENT)
Dept: CARDIAC REHAB | Facility: CLINIC | Age: 73
End: 2023-12-18
Payer: MEDICARE

## 2023-12-18 DIAGNOSIS — I25.10 CORONARY ARTERY DISEASE, UNSPECIFIED VESSEL OR LESION TYPE, UNSPECIFIED WHETHER ANGINA PRESENT, UNSPECIFIED WHETHER NATIVE OR TRANSPLANTED HEART: ICD-10-CM

## 2023-12-18 DIAGNOSIS — Z98.61 POSTSURGICAL PERCUTANEOUS TRANSLUMINAL CORONARY ANGIOPLASTY STATUS: Primary | ICD-10-CM

## 2023-12-18 PROCEDURE — 93798 PR CARDIAC REHAB/MONITOR: ICD-10-PCS | Mod: S$GLB,,, | Performed by: INTERNAL MEDICINE

## 2023-12-18 PROCEDURE — 93798 PHYS/QHP OP CAR RHAB W/ECG: CPT | Mod: S$GLB,,, | Performed by: INTERNAL MEDICINE

## 2023-12-20 ENCOUNTER — CLINICAL SUPPORT (OUTPATIENT)
Dept: CARDIAC REHAB | Facility: CLINIC | Age: 73
End: 2023-12-20
Payer: MEDICARE

## 2023-12-20 DIAGNOSIS — I25.10 ATHEROSCLEROSIS OF NATIVE CORONARY ARTERY OF NATIVE HEART WITHOUT ANGINA PECTORIS: ICD-10-CM

## 2023-12-20 DIAGNOSIS — Z98.61 POSTSURGICAL PERCUTANEOUS TRANSLUMINAL CORONARY ANGIOPLASTY STATUS: Primary | ICD-10-CM

## 2023-12-20 PROCEDURE — 93798 PHYS/QHP OP CAR RHAB W/ECG: CPT | Mod: S$GLB,,, | Performed by: INTERNAL MEDICINE

## 2023-12-20 PROCEDURE — 93798 PR CARDIAC REHAB/MONITOR: ICD-10-PCS | Mod: S$GLB,,, | Performed by: INTERNAL MEDICINE

## 2023-12-22 ENCOUNTER — CLINICAL SUPPORT (OUTPATIENT)
Dept: CARDIAC REHAB | Facility: CLINIC | Age: 73
End: 2023-12-22
Payer: MEDICARE

## 2023-12-22 DIAGNOSIS — I25.10 ATHEROSCLEROSIS OF NATIVE CORONARY ARTERY OF NATIVE HEART, UNSPECIFIED WHETHER ANGINA PRESENT: ICD-10-CM

## 2023-12-22 DIAGNOSIS — Z98.61 POSTSURGICAL PERCUTANEOUS TRANSLUMINAL CORONARY ANGIOPLASTY STATUS: Primary | ICD-10-CM

## 2023-12-22 PROCEDURE — 93798 PR CARDIAC REHAB/MONITOR: ICD-10-PCS | Mod: S$GLB,,, | Performed by: INTERNAL MEDICINE

## 2023-12-22 PROCEDURE — 93798 PHYS/QHP OP CAR RHAB W/ECG: CPT | Mod: S$GLB,,, | Performed by: INTERNAL MEDICINE

## 2023-12-29 ENCOUNTER — CLINICAL SUPPORT (OUTPATIENT)
Dept: CARDIAC REHAB | Facility: CLINIC | Age: 73
End: 2023-12-29
Payer: MEDICARE

## 2023-12-29 DIAGNOSIS — Z98.61 POSTSURGICAL PERCUTANEOUS TRANSLUMINAL CORONARY ANGIOPLASTY STATUS: Primary | ICD-10-CM

## 2023-12-29 DIAGNOSIS — I25.10 CORONARY ARTERY DISEASE, UNSPECIFIED VESSEL OR LESION TYPE, UNSPECIFIED WHETHER ANGINA PRESENT, UNSPECIFIED WHETHER NATIVE OR TRANSPLANTED HEART: ICD-10-CM

## 2023-12-29 PROCEDURE — 93798 PR CARDIAC REHAB/MONITOR: ICD-10-PCS | Mod: S$GLB,,, | Performed by: INTERNAL MEDICINE

## 2023-12-29 PROCEDURE — 93798 PHYS/QHP OP CAR RHAB W/ECG: CPT | Mod: S$GLB,,, | Performed by: INTERNAL MEDICINE

## 2024-01-03 ENCOUNTER — CLINICAL SUPPORT (OUTPATIENT)
Dept: CARDIAC REHAB | Facility: CLINIC | Age: 74
End: 2024-01-03
Payer: MEDICARE

## 2024-01-03 DIAGNOSIS — I25.10 CORONARY ARTERY DISEASE, UNSPECIFIED VESSEL OR LESION TYPE, UNSPECIFIED WHETHER ANGINA PRESENT, UNSPECIFIED WHETHER NATIVE OR TRANSPLANTED HEART: ICD-10-CM

## 2024-01-03 DIAGNOSIS — Z98.61 POSTSURGICAL PERCUTANEOUS TRANSLUMINAL CORONARY ANGIOPLASTY STATUS: Primary | ICD-10-CM

## 2024-01-03 PROCEDURE — 93798 PHYS/QHP OP CAR RHAB W/ECG: CPT | Mod: S$GLB,,, | Performed by: INTERNAL MEDICINE

## 2024-01-04 ENCOUNTER — HOSPITAL ENCOUNTER (OUTPATIENT)
Dept: CARDIOLOGY | Facility: HOSPITAL | Age: 74
Discharge: HOME | End: 2024-01-04
Attending: INTERNAL MEDICINE
Payer: MEDICARE

## 2024-01-04 ENCOUNTER — DOCUMENTATION ONLY (OUTPATIENT)
Dept: CARDIAC REHAB | Facility: CLINIC | Age: 74
End: 2024-01-04
Payer: MEDICARE

## 2024-01-04 VITALS
HEART RATE: 71 BPM | HEIGHT: 73 IN | DIASTOLIC BLOOD PRESSURE: 67 MMHG | WEIGHT: 235 LBS | SYSTOLIC BLOOD PRESSURE: 127 MMHG | BODY MASS INDEX: 31.14 KG/M2

## 2024-01-04 DIAGNOSIS — I25.10 ATHEROSCLEROSIS OF NATIVE CORONARY ARTERY OF NATIVE HEART, UNSPECIFIED WHETHER ANGINA PRESENT: ICD-10-CM

## 2024-01-04 DIAGNOSIS — Z98.61 POSTSURGICAL PERCUTANEOUS TRANSLUMINAL CORONARY ANGIOPLASTY STATUS: ICD-10-CM

## 2024-01-04 LAB
CV STRESS BASE HR: 71 BPM
DIASTOLIC BLOOD PRESSURE: 67 MMHG
OHS CV CPX 1 MINUTE RECOVERY HEART RATE: 121 BPM
OHS CV CPX 85 PERCENT MAX PREDICTED HEART RATE MALE: 124
OHS CV CPX ABDOMINAL GIRTH: 44 CM
OHS CV CPX ANAEROBIC THRESHOLD DIASTOLIC BLOOD PRESSURE: 67 MMHG
OHS CV CPX ANAEROBIC THRESHOLD HEART RATE: 108
OHS CV CPX ANAEROBIC THRESHOLD RATE PRESSURE PRODUCT: NORMAL
OHS CV CPX ANAEROBIC THRESHOLD SYSTOLIC BLOOD PRESSURE: 145
OHS CV CPX DATA GRADE - AT: 8.9
OHS CV CPX DATA GRADE - PEAK: 15.9
OHS CV CPX DATA O2 SAT - PEAK: 93
OHS CV CPX DATA O2 SAT - REST: 97
OHS CV CPX DATA SPEED - AT: 2.7
OHS CV CPX DATA SPEED - PEAK: 4.2
OHS CV CPX DATA TIME - AT: 4.3
OHS CV CPX DATA TIME - PEAK: 7.5
OHS CV CPX DATA VE/VCO2 - AT: 42
OHS CV CPX DATA VE/VCO2 - PEAK: 53
OHS CV CPX DATA VE/VO2 - AT: 31
OHS CV CPX DATA VE/VO2 - PEAK: 51
OHS CV CPX DATA VO2 - AT: 14.7
OHS CV CPX DATA VO2 - PEAK: 21
OHS CV CPX DATA VO2 - REST: 3.5
OHS CV CPX ESTIMATED METS: 12
OHS CV CPX FEV1/FVC: 0.82
OHS CV CPX FORCED EXPIRATORY VOLUME: 2.75
OHS CV CPX FORCED VITAL CAPACITY (FVC): 3.36
OHS CV CPX HIGHEST VO: 29.4
OHS CV CPX MAX PREDICTED HEART RATE: 146
OHS CV CPX MAXIMAL VOLUNTARY VENTILATION (MVV) PREDICTED: 110
OHS CV CPX MAXIMAL VOLUNTARY VENTILATION (MVV): 102
OHS CV CPX MAXIUMUM EXERCISE VENTILATION (VE MAX): 111.2
OHS CV CPX PATIENT AGE: 74
OHS CV CPX PATIENT HEIGHT IN: 73
OHS CV CPX PATIENT IS FEMALE AGE 11-19: 0
OHS CV CPX PATIENT IS FEMALE AGE GREATER THAN 19: 0
OHS CV CPX PATIENT IS FEMALE AGE LESS THAN 11: 0
OHS CV CPX PATIENT IS FEMALE: 0
OHS CV CPX PATIENT IS MALE AGE 11-25: 0
OHS CV CPX PATIENT IS MALE AGE GREATER THAN 25: 1
OHS CV CPX PATIENT IS MALE AGE LESS THAN 11: 0
OHS CV CPX PATIENT IS MALE GREATER THAN 18: 1
OHS CV CPX PATIENT IS MALE LESS THAN OR EQUAL TO 18: 0
OHS CV CPX PATIENT IS MALE: 1
OHS CV CPX PATIENT WEIGHT RETURNED IN OZ: 3760.17
OHS CV CPX PEAK DIASTOLIC BLOOD PRESSURE: 61 MMHG
OHS CV CPX PEAK HEAR RATE: 133 BPM
OHS CV CPX PEAK RATE PRESSURE PRODUCT: NORMAL
OHS CV CPX PEAK SYSTOLIC BLOOD PRESSURE: 182 MMHG
OHS CV CPX PERCENT BODY FAT: 25.3
OHS CV CPX PERCENT MAX PREDICTED HEART RATE ACHIEVED: 91
OHS CV CPX PREDICTED VO2: 29.4 ML/KG/MIN
OHS CV CPX RATE PRESSURE PRODUCT PRESENTING: 9017
OHS CV CPX REST PET CO2: 22
OHS CV CPX VE/VCO2 SLOPE: 39
STRESS ECHO POST EXERCISE DUR MIN: 7 MINUTES
STRESS ECHO POST EXERCISE DUR SEC: 30 SECONDS
SYSTOLIC BLOOD PRESSURE: 127 MMHG

## 2024-01-04 PROCEDURE — 94621 CARDIOPULM EXERCISE TESTING: CPT | Mod: 26,,, | Performed by: INTERNAL MEDICINE

## 2024-01-04 PROCEDURE — 94621 CARDIOPULM EXERCISE TESTING: CPT

## 2024-01-04 NOTE — PROGRESS NOTES
Mr. Singleton has completed 33 out of 36 exercise session of Phase II cardiac rehab.  A follow up reassessment will be completed at exit interview.    24 Session Follow Up   Cardiac Rehab Individual Treatment Plan - Reassessment      Patient Name: Julio Bernardo MRN: [id number]   : 1950   Age: 73 y.o.   Primary Diagnosis: PTCA/STENT Date of Event: 23   EF: 63%  Risk Stratification: moderate  Referring Physician: MARK   Exercise Assessment:     Angina with exercise?: No   ST Depression with Exercise?: No  Fall Risk: No   Assistive Devices:  independent  Mr. Singleton stated at orientation there were no limitations to exercise.  Comments on Progression: Mr. Singleton is progressing well and his workloads will continue to be increased as he tolerates exercise intensity.    Exercise Plan:   Goals:  CR Exercise Goals: Attend Cardiac Rehab 3 times/week: In Progress  Home Aerobic Exercise: 2 additional days/week for 30-60 minutes: In Progress  Intensity of 12-15 on the Rate of Perceived Exertion (RPE) scale: In Progress  30% increase in entry estimated METS: 14.3 : In Progress  5 days/week for 30-60 minutes: In Progress  Demonstrate proper pulse taking technique: In Progress    Comments on Goal Progression:  Patient Consistency: consistent with attendance  Home exercise? Yes: Walking; Frequency: 2 non-rehab days per week; Duration 40 to 45 minutes  Patient reports intensity rate 11-14 on RPE scale  Patient is progressing well  Patient is able to demonstrate proper pulse taking technique by using a fitness tracker.      Intervention/Recommendations:   Discussed importance of regular attendance to cardiac rehab class    Exercise Prescription:  THR Range    Mode: Treadmill  Elliptical   Frequency:  3 days/week   Duration:  30-60 minutes   Intensity:  12-15 RPE   Resistance Training:  Yes: 10 to 15 lb weights with 10-15 reps based on strength and range of motion and adjusted accordingly     Home Prescription:  Mode Aerobic  exercise   Frequency: 2- 3 days/week   Duration: 30-60 minutes   Resistance Training: None     Education:  Arthritis/Osteporosis; verbalizes understanding; Date: 23  Body Composition; verbalizes understanding; Date: 10-30-23  Exercise Terminology; verbalizes understanding; Date: 23  Muscular Anatomy; verbalizes understanding; Date: 23    Comments:  I reviewed exercise recommendations with Mr. Singleton.  I encouraged him to continue exercising.  He stated understanding.     The exercise prescription will continue to be adjusted based on tolerance of exercise intensity by patient.    Bettie Fortune, CEP    24 Session Follow Up   Cardiac Rehab Individual Treatment Plan - Reassessment    Patient Name: Julio Bernardo MRN: [id number]   : 1950   Age: 73 y.o.   Primary Diagnosis: s/p PTCA/stent    Nutrition Assessment:     Anthropometrics    Height 73 inches   Weight 235.3 lbs   BMI 33.4     Patient confirms he is taking lipitor 80mg for cholesterol control.  Difficulty Chewing or Swallowing: No  Current Exercise: See Exercise Physiologist Note  Food Safety/Food Preparation: spouse  Living Arrangements/Family Support: Lives with wife  Cultural/Spiritual/Personal Preferences: not applicable   Barriers to Education: none identified  Stage of Change Related to Diet Habits: Action  Recent Changes to Diet: Yes   Food Diary: Completed      Nutrition Plan:   Goals:  LDL-C < 70 (for high risk patients)  Hgb A1c < 7%  BMI < 25 and abdominal girth < 40M/<35 F  2 gram sodium, Mediterranean diet: In Progress  Rehab weight loss goal reaching 200 lbs, 1 lb per week: In Progress  Fish intake (non-fried varieties) to a goal of 2-3 servings per week: In Progress  Increase fruit and vegetable intake: In Progress    Comments on Goal Progression:  Pt states he is feeling well, working on improving produce intake, uses papi to track water intake to improve hydration    Interventions:  Dietitian Consult: No  Patient to  participate in Cardiac Rehab sessions three times a week  Weekly Dietitian Weight Check  Nutrition Recommendations Provided: Verbal and Written, Reviewed  Follow Up Plan for Ongoing Self-Management Support    Education:  Dining Out; verbalizes understanding; Date: 23  Mediterranean Diet; verbalizes understanding; Date: 23  Sodium; verbalizes understanding; Date: 23  Vegetarianism; verbalizes understanding; Date: 23  Fiber; verbalizes understanding; Date: 10/25/23    Comments:   Discussed ways to incorporate healthy snacks, eating on a schedule, and monitoring sodium intake for heart health.    Diabetes  Is the patient diabetic? No    Courtney Unger MS, RDN/LDN    Session: 24 Session Follow Up   Cardiac Rehab Individual Treatment Plan - Reassessment      Patient Name: Julio Bernardo MRN: [id number]   : 1950   Age: 73 y.o.   Primary Diagnosis: PTCA      Psychosocial Assessment:   Living Arrangements: Lives with spouse  Family Support: family and friends  Self Reported: no change Effective Coping Skills  Displays: socializes with others, happiness, smiles often, and calmness  Medication: not applicable    Psychosocial Plan:   Goals:  Improved psychosocial coping strategies: In Progress  Maintain positive support system: In Progress  Maintain positive outlook: In Progress  Improve overall quality of life: In Progress    Comments on Goal Progression:  Patient denies having any overwhelming stress or anxiety.  He reports improvements emotionally & physically since beginning cardiac rehab.  He is very motivated & is exercising an additional 2 days outside of rehab.      Interventions:  Patient to Self Report Emotional Changes at Session Check In  Recommend Physical Activity  Recommend Attending Education Lectures  Notify MD: No  Program Referral: No  Pharmaceutical Intervention/Therapy: No  Other Needs: not applicable  Stage of Readiness to Change: Action    Education:  Stress Management;  verbalizes understanding; Date: 12/6/2023  Stress; verbalizes understanding; Date: 12/6/2023    Comments:  Patient reports to have good support from his wife, family & friends.  He states that he is exercising at [employer] with friends on Tuesday & Thursday.  He has been instructed to notify staff in the event that circumstances change.  Patient verbalizes understanding.    Other Core Components/Risk Factors Assessment:   RISK FACTORS:  hyperlipidemia, hypertension, positive family history    Learning Barriers: None    Medication Compliance: has been compliant with taking medications    Other Core Components/Risk Factors Plan:   Goals:  Decrease cholesterol level: In Progress  Increase exercise tolerance: In Progress  Increase knowledge of CAD: In Progress  Decrease blood pressure: In Progress  Weight loss: In Progress  Demonstrate accurate pulse taking: In Progress  Identify and manage personal areas of stress: In Progress  Learn more about healthy eating: In Progress    Comments on Goal Progression:  Patient continues to work on achieving goals to decrease risk factors for CAD.  He is making changes in his diet & is exercising 2 additional days outside of rehab.  He is attending lectures & exercise classes consistently.  Patient is monitoring BP at home & is obtaining readings lower than those obtained in rehab.  Encouraged for him to keep a log to compare readings in rehab & at home.  He is able to monitor his pulse accurately.    Interventions:  Individual Education/ Counseling: Yes  Physician Referral: No    Education:    arrhythmias, verbalizes understanding; Date: 12/6/2023  blood pressure meds, verbalizes understanding; Date: 10/13/2023  cholesterol meds, verbalizes understanding; Date: 10/20/2023  fluid overload/CHF, verbalizes understanding; Date: 12/6/2023  hypertension, verbalizes understanding; Date: 9/29/2023  risk factors, verbalizes understanding; Date: 12/6/2023  sodium, verbalizes understanding;  Date: 11/1/2023 & 12/6/2023  stress, verbalizes understanding; Date: 12/6/2023         Education method adapted to patients education level and preferred method of learning.  Method: explanation  handouts    Comments:  Normal sinus rhythm noted with frequent PVC's on monitor.  Patient is aware that he has these arrhythmias & does not experience any lightheadedness or dizziness.  He is compliant with medications.  He has been monitoring his sodium intake.    Other Core Components/Hypertension Assessment:   BP Range: 154-126/84-62  BP at Goal: BP is not at goal in rehab.  Patient states that he gets lower readings at home.    Patient reported symptoms: none    Other Core Components/Hypertension Plan:   Goals:  Blood Pressure <130/80    Comments on Goal Progression:  Patient reports that he is monitoring BP at home & states that his readings are lower than those readings obtained in cardiac rehab.  Encouraged to keep a daily log to make comparisons.    Interventions:  Med Card Reconciled: Yes  Encourage medication compliance  Encourage sodium reduction  Encourage weight loss  Recommend physical activity  Educate on contributory factors  Reduce stress, anxiety, anger, depression, and/or chronic pain  Encourage home blood pressure monitoring  Recommend daily weights    Education:    Hypertension; verbalizes understanding; Date: 9/29/2023  Risk Factors; verbalizes understanding; Date: 12/6/2023  Medication I; verbalizes understanding; Date: 10/13/2023  Medication II; verbalizes understanding; Date: 10/20/2023         Comments:  Patient reports compliance with medications.  He monitors his sodium intake carefully & monitors his weight on a daily basis.  He is exercising 2 additional days outside of rehab with his friends at [email or web address].    Does the patient have Heart Failure? No      Other Core Components/Tobacco Cessation Assessment:   Smoking Status: lifetime non-smoker  Smoking Cessation Barriers:  N/A  Stage of  Readiness to Change: Maintenance    Other Core Components/Tobacco Cessation Plan:   Goals:  Maintain non-smoking status    Comments on Goal Progression:  Non smoker.    Interventions:  Maintains non-smoking status    Education:    Risk Factors; verbalizes understanding; Date: 12/6/2023         Comments:  Non smoker.    Tiffanie Shirley, KAYLIN  Cardiac Rehab Nurse

## 2024-01-05 ENCOUNTER — CLINICAL SUPPORT (OUTPATIENT)
Dept: CARDIAC REHAB | Facility: CLINIC | Age: 74
End: 2024-01-05
Payer: MEDICARE

## 2024-01-05 DIAGNOSIS — Z98.61 POSTSURGICAL PERCUTANEOUS TRANSLUMINAL CORONARY ANGIOPLASTY STATUS: Primary | ICD-10-CM

## 2024-01-05 DIAGNOSIS — I25.10 CORONARY ATHEROSCLEROSIS OF NATIVE CORONARY ARTERY: ICD-10-CM

## 2024-01-05 PROCEDURE — 93798 PHYS/QHP OP CAR RHAB W/ECG: CPT | Mod: S$GLB,,, | Performed by: INTERNAL MEDICINE

## 2024-01-10 ENCOUNTER — CLINICAL SUPPORT (OUTPATIENT)
Dept: CARDIAC REHAB | Facility: CLINIC | Age: 74
End: 2024-01-10
Payer: MEDICARE

## 2024-01-10 DIAGNOSIS — Z98.61 POSTSURGICAL PERCUTANEOUS TRANSLUMINAL CORONARY ANGIOPLASTY STATUS: Primary | ICD-10-CM

## 2024-01-10 DIAGNOSIS — I25.10 ATHEROSCLEROSIS OF NATIVE CORONARY ARTERY OF NATIVE HEART, UNSPECIFIED WHETHER ANGINA PRESENT: ICD-10-CM

## 2024-01-10 PROCEDURE — 93798 PHYS/QHP OP CAR RHAB W/ECG: CPT | Mod: S$GLB,,, | Performed by: INTERNAL MEDICINE

## 2024-01-10 NOTE — PROGRESS NOTES
Session: Exit   Cardiac Rehab Individual Treatment Plan - Discharge Assessment      Patient Name: Julio Bernardo MRN: [id number]   : 1950   Age: 73 y.o.   Primary Diagnosis: PTCA/STENT  Date of Event: 23  EF: 63%  Risk Stratification: moderate  Referring Physician: MARK   Exercise Assessment:     CPX/TM: Entry Results Exit Results    Date: 23 Date: 24   RHR 62 71   Max  133   Peak VO2 (CPX only) 18.5 21.0   Actual METS (CPX only) 5.3 6.0   Estimated METS 11.0 12.0     Anthropometrics Entry Results Exit Results   Height 73 inches 73 inches   Weight 237 lbs 234 lbs   BMI 31.3  31.0   Abdominal Girth 45.2 44.0   Body Composition 28.5% 25.3%     Angina with exercise?: No   ST Depression with Exercise?: No  Currently exercising at home? yes Walking; Frequency: 5 days per week; Duration at least 35 minutes    Education:  Resistance Training; verbalizes understanding; Date: 23  The Exercise Program; verbalizes understanding; Date: 23    Rehab Exercise Goals:  Attend Cardiac Rehab 3 times/week: Met  Home Aerobic Exercise: 2 additional days/week for 30-60 minutes: Met  Intensity of 12-15 on the Rate of Perceived Exertion (RPE) scale: Met  30% increase in entry estimated METS: 14.3 : Not Met: 12.0  Demonstrate proper pulse taking technique: Met    Comments: Mr. Singleton attended rehab classes regularly and had significant improvement in aerobic capacity.      Exercise Discharge Plan:     Intervention/Recommendations:       Recommended Home Prescription:  THR Range: 103-124   Mode: Aerobic   Frequency: 5-6 times per week   Duration: 30-60 minutes each day   Other Recs: 3-5 minute warm up and cool down/stretches   Resistance Trainin-3 days per week on non-consecutive days       Comments:    I met with Mr. Singleton for an exit interview from the Phase II cardiac rehab program.  The entry and exit stress testing results were discussed as well as current and future exercise programs.     Patient's  plan: Mr. Singleton is planning to continue with his current exercise program of walking 5 days per week for at least 30 minutes.     I reviewed exercise recommendations with Mr. Singleton and encouraged him to continue exercising.  We discussed incorporating alternative exercise modalities into his program like elliptical and treadmill.  I explained walking is great exercise but he would benefit more if he alternated walking with more intense exercise modalities.  I asked Mr. Singleton to call if he has any questions in the future.  He stated understanding.    Ino Fortune., CEP

## 2024-01-10 NOTE — PROGRESS NOTES
"HISTORY: S/P PTCA/STENT (7-24-23), CAD,HLP, HTN, CKD III, EF=63%(7-7-23)    ANTHROPOMETRICS:     PRE POST   Abdominal girth (in) 45.2 44.0   Height (in) 73 73   Weight (lbs) 237 234   BMI 31.3 31.0   % Body Fat 28.5% 25.3%     EXERCISE RESULTS:     PRE POST   Peak VO2 (CPX only) 18.5 21.0   Actual METS (CPX only) 5.3 6.0   Estimated METS 11.0 12.0       HOME PRESCRIPTION: Julio Rangel.  You completed Cardiac Rehab.  Aerobic exercise frequency is recommended 5 to 6 times per week with a duration of 30 to 60 minutes.  Intensity should keep you in your target heart range of 103 to 124 beats per minute.  Include a 3 to 5 minute warm up as well as cool down with stretches.  Resistance training is recommended 2 to 3 days per week on non-consecutive days.  Good luck to you.  Keep up the hard work, and it has been a pleasure working with you.      LAB RESULTS:    Lab Results   Component Value Date    HGB 13.3 (L) 01/04/2024    HGB 14.8 09/18/2023    HGB 13.9 (L) 08/15/2023     Lab Results   Component Value Date    HCT 41.0 01/04/2024    HCT 46.2 09/18/2023    HCT 43.3 08/15/2023     Lab Results   Component Value Date    MPV 11.0 01/04/2024    MPV 11.5 09/18/2023    MPV 11.5 08/15/2023       Lab Results   Component Value Date    CHOL 131 01/04/2024    CHOL 134 09/18/2023    CHOL 118 (L) 08/15/2023     Lab Results   Component Value Date    HDL 49 01/04/2024    HDL 41 09/18/2023    HDL 39 (L) 08/15/2023     Lab Results   Component Value Date    LDLCALC 66.0 01/04/2024    LDLCALC 74.4 09/18/2023    LDLCALC 62.0 (L) 08/15/2023     Lab Results   Component Value Date    TRIG 80 01/04/2024    TRIG 93 09/18/2023    TRIG 85 08/15/2023     Lab Results   Component Value Date    CHOLHDL 37.4 01/04/2024    CHOLHDL 30.6 09/18/2023    CHOLHDL 33.1 08/15/2023       Lab Results   Component Value Date    GLUF 107 01/04/2024    GLUF 101 09/18/2023     No results found for: "HGBA1C"     Lab Results   Component Value Date    HSCRP " 1.47 01/04/2024    HSCRP 1.93 09/18/2023    HSCRP 1.48 09/15/2016         SIGIFREDO SCORES:     PRE POST   Anxiety 1 1   Depression 1 0   Somatic 2 2   Hostility 0 0     SF-36 SCORES:     PRE POST   Physical Function 27 27   Social Function 11 11   Mental Health 27 29   Pain 10 10   Change in Health 5 5   Physical Role Limitation 4 4   Mental Role Limitation 3 3   Energy/Fatigue 20 21   Health Perceptions 21 24   Total Score 128 134     PHQ-9:        8/7/2023     2:03 PM 9/21/2023     8:51 AM 1/11/2024     9:28 AM   PHQ-9 Depression Patient Health Questionnaire   Over the last two weeks how often have you been bothered by little interest or pleasure in doing things 0 0 0   Over the last two weeks how often have you been bothered by feeling down, depressed or hopeless 0 0 0   Over the last two weeks how often have you been bothered by trouble falling or staying asleep, or sleeping too much  1 0   Over the last two weeks how often have you been bothered by feeling tired or having little energy  1 0   Over the last two weeks how often have you been bothered by a poor appetite or overeating  0 0   Over the last two weeks how often have you been bothered by feeling bad about yourself - or that you are a failure or have let yourself or your family down  0 0   Over the last two weeks how often have you been bothered by trouble concentrating on things, such as reading the newspaper or watching television  0 0   Over the last two weeks how often have you been bothered by moving or speaking so slowly that other people could have noticed.  0 0   Over the last two weeks how often have you been bothered by thoughts that you would be better off dead, or of hurting yourself  0 0   If you checked off any problems, how difficult have these problems made it for you to do your work, take care of things at home or get along with other people?  Not difficult at all Not difficult at all   PHQ-9 Score  2 0                  EDUCATION  SCORES:     PRE POST   Education Score 90 100

## 2024-01-10 NOTE — PROGRESS NOTES
[facility]  Physical Therapy Initial Evaluation    Name: Julio Bernardo  Clinic Number: [id number]    Therapy Diagnosis:   Encounter Diagnoses   Name Primary?    Decreased range of motion of neck     Segmental dysfunction of cervical region     Abnormal posture     Neck stiffness      Physician: Ryan Loera Jr., MD    Physician Orders: PT Eval and Treat neck  Medical Diagnosis from Referral:   M54.2 (ICD-10-CM) - Neck pain  Evaluation Date: 12/12/2019  Authorization Period Expiration: 12/26/2019  Plan of Care Expiration: 3/12/2020  Visit # / Visits authorized: 1/ 6    Time In: 910  Time Out: 1010  Total Billable Time: 60 minutes    Precautions: Standard    Subjective      Medical History:   Past Medical History:   Diagnosis Date    Allergy     Colon polyps     Hyperlipemia     Hypertension     Posterior capsular opacification        Surgical History:   Julio Bernardo  has a past surgical history that includes Hernia repair; Rush tooth extraction; Cataract extraction; YAG; Eye surgery; and Colonoscopy (N/A, 9/27/2016).    Medications:   Julio has a current medication list which includes the following prescription(s): acetic acid-hydrocortisone, ascorbic acid (vitamin c), aspirin, atorvastatin, diclofenac, fish oil-omega-3 fatty acids, fluticasone propionate, hydrochlorothiazide, loratadine, losartan, multivitamin, prevident 5000 sensitive, and sildenafil.    Allergies:   Review of patient's allergies indicates:   Allergen Reactions    Penicillins     V-cillin k            History of current condition - Mani reports: pain in the neck and left shoulder area that is constant.   Date of onset: 11/7/2019, awoke on 11/6/2019 with neck stiffness. Saw Chiropractor on 11/7/2019 with bilateral neck pain that appeared that AM. There was improvement on the right side.  Three more sessions followed with only temporary relief. Presently, improved. Patient also reports a low back    Problem: Adult Inpatient Plan of Care  Goal: Plan of Care Review  Outcome: Ongoing, Progressing      problem that he has encountered for many years.       Pain:  Current 4/10 (tolerabnle), worst 10/10, best 4/10  (tolerable)  Location: left  neck and suprascapular region   Description: Aching  Aggravating Factors: Sitting looking either down or up   Easing Factors: lying down  Sleep disturbed  - not disturbed.   Headaches - denies   Blurred vision  - denies   Tinnitus - denies   Nausea - negative  Difficulty swallowing - neg     Current Level of Function:   Personal - no limitation   Domestic - no limitation    Community / social  - not limited   Occupation - not limited   Recreation / fitness / sports - not limited   Prior Therapy: Chiropractic care   Social History:   lives with their spouse in a single family home   Occupation: [employer]   Prior Level of Function: unchanged   Pts goals: to get rid of the pain and restore freedom of motion   Imaging, MRI studies, 12/5/2019  Cervical spondylosis, most prominent at C3-4, resulting in severe spinal canal stenosis. There is increased cord signal at this level suggesting myelomalacia and/or edema.  Additional cervical spondylosis noted, resulting in moderate/ severe neural foraminal narrowing from C3-4 through C5-6, as above.    X-RAY 12/4/2019  Two views: Odontoid prevertebral soft tissues and posterior elements are intact.  There is mild DJD throughout the C-spine.  No fracture dislocation bone destruction seen.  No instability seen.  No trauma seen.    1      Objective     Posture Alignment: forward head with left lateral shift and lateral head tilt to the right.   Palpation: pain elicited over C5,C6 spinous processes and left transverse processes and articular pillars of C4-5, C5-6 left side; pain over Darling point left side. Negative for spasm or guarding.   Sensation: Light Touch: Intact    Range of Motion/Strength:     Cervical/Thoracic AROM: Pain/Dysfunction with Movement:   Flexion                                   70 DN - curve not uniform     Extension                               30 DP - left lower cervical    Right side bending                  30 P - slight left sided   Left side bending                     25 P left sided.   Right rotation DN   Left rotation DP - left lower cervical      SEGMENTAL MOBILITY:     UPPER EXTREMITY STRENGTH:   Left  5/5 Right  5/5         DTR's:   Left Right   Biceps Tendon 2+ 2+   Brachioradialis 2+ 2+   Triceps Tendon 2+ 2+       Special Tests:   Left Right   Compression + -   Dystraction + pain relief  -   Spurling + -       Selective Functional Movement Assessment:  FN: functional, non-painful  FP: functional, painful  DP: dysfunctional, painful  DN: dysfunctional, non-painful    Active Cervical Flexion: see above   Active Cervical Extension: see above   Cervical Rotation:  Right:see above   Left: sede above   Upper extremity pattern 1:  Right: DN  Left:FN  Upper extremity pattern 2:  Right:FN  Left:FN    Cervical Break out patterns   Supine   Flex   -Active - DN - chin not to chest   -Passive - DN - curve not uniform   Rotation   -Active -   R - DN  L - DP  -Passive -   R - DN  L - DN  OA   -active  R - DN  L - DP  -passive  R - DN  L - DP    C1C2  -Active-     R - FN  L - FN  -Passive    R - NP  L - NP  Extension   Passive-  DP    CMS Impairment/Limitation/Restriction for FOTO neck Survey not performed              Assessment       Julio is a 69 y.o. male referred to outpatient Physical Therapy with a medical diagnosis of neck pain . Pt presents with clinical findings of a cervical spine OA and flexion mobility dysfunction and lower cervical bilateral rotation mobility dysfunction. There is a shoulder mobility problem of the RUE that may be associated with the postural findings that are likely related to the lumbar spine problem he reports. ROM is diminished with associated soft tissue pain, fascial and muscle tightness.    Evaluation has determined a decrease in functional status and subjective and objective  deficits that can be addressed by physical therapy intervention. Pt demonstrates pain limiting functional activities. Decreased flexibility limiting normal movement patterns. Decreased segmental motion. Decreased postural strength and awareness. Decreased participation in functional and recreational activities. Subjective and objective measures are addressed by goals in the plan of care. Patient/family are involved in the development of these goals.    Pt prognosis is Good.   Pt will benefit from skilled outpatient Physical Therapy to address the deficits stated above and in the chart below, provide pt/family education, and to maximize pt's level of independence.     Plan of care discussed with patient: Yes  Pt's spiritual, cultural and educational needs considered and patient is agreeable to the plan of care and goals as stated below:     Anticipated Barriers for therapy: none     Medical Necessity is demonstrated by the following  History  Co-morbidities and personal factors that may impact the plan of care Co-morbidities:   history of low back pain    Personal Factors:   no deficits     low   Examination  Body Structures and Functions, activity limitations and participation restrictions that may impact the plan of care Body Regions:   neck    Body Systems:    musculoskeletal    Participation Restrictions:   None      Activity limitations:   Learning and applying knowledge  no deficits    General Tasks and Commands  no deficits    Communication  no deficits    Mobility  no deficits    Self care  no deficits    Domestic Life  no deficits    Interactions/Relationships  no deficits    Life Areas  no deficits    Community and Social Life  no deficits         low   Clinical Presentation stable and uncomplicated low   Decision Making/ Complexity Score: low       Short Term GOALS: 5 weeks. Pt agrees with goals set.  1. Pt to report decreased intensity and frequency of pain 20 to 40% to improve quality of daily activity.    2. Pt to improve flexibility in the cervical and upper extremity musculature to restore functional mobility.   3. Pt to report decreased pain and tenderness over the low cervical segments and surrounding musculature to restore functional mobility.   4. Patient demonstrates independence with HEP for the purpose od self maintenance    Long Term GOALS: 10 weeks. Pt agrees with goals set.  1. Patient demonstrates increased right shoulder motions actively to restore functional movement patterns   2. Patient demonstrates increased scapulothoracic strength for the purpose of improving postures and reducing pain in the cervical area when sitting.   3. Patient demonstrates functional cervical flexion and rotation without pain and increase extension mobility without pain .     Plan         Plan of care Certification: 12/12/2019 to 3/12/2019.    Outpatient Physical Therapy 2 times weekly for 10 weeks to include the following interventions: Manual Therapy, Patient Education and Therapeutic Exercise.     Mian France, PT

## 2024-01-11 ENCOUNTER — CLINICAL SUPPORT (OUTPATIENT)
Dept: CARDIAC REHAB | Facility: CLINIC | Age: 74
End: 2024-01-11
Payer: MEDICARE

## 2024-01-11 DIAGNOSIS — Z98.61 POSTSURGICAL PERCUTANEOUS TRANSLUMINAL CORONARY ANGIOPLASTY STATUS: Primary | ICD-10-CM

## 2024-01-11 DIAGNOSIS — I25.10 CORONARY ARTERY DISEASE, UNSPECIFIED VESSEL OR LESION TYPE, UNSPECIFIED WHETHER ANGINA PRESENT, UNSPECIFIED WHETHER NATIVE OR TRANSPLANTED HEART: ICD-10-CM

## 2024-01-11 PROCEDURE — 99999 PR PBB SHADOW E&M-EST. PATIENT-LVL III: CPT | Mod: PBBFAC,,,

## 2024-01-11 PROCEDURE — 93798 PHYS/QHP OP CAR RHAB W/ECG: CPT | Mod: S$GLB,,, | Performed by: INTERNAL MEDICINE

## 2024-01-11 NOTE — PROGRESS NOTES
"Exit   Cardiac Rehab Individual Treatment Plan - Discharge Assessment      Patient Name: Julio Bernardo MRN: [id number]   : 1950   Age: 73 y.o.   Primary Diagnosis: s/p PTCA/stent    Nutrition Assessment:     Anthropometrics Pre Post   Height 73 inches 73 inches   Weight 237 lbs 234 lbs   BMI 31.3 31   Abdominal Girth 35.2 44   Body Composition 28.5 25.3       Labs:  Patient confirms he is taking lipitor 80mg for cholesterol control.    Lab Results   Component Value Date    CHOL 131 2024    CHOL 134 2023    CHOL 118 (L) 08/15/2023     Lab Results   Component Value Date    HDL 49 2024    HDL 41 2023    HDL 39 (L) 08/15/2023     Lab Results   Component Value Date    LDLCALC 66.0 2024    LDLCALC 74.4 2023    LDLCALC 62.0 (L) 08/15/2023     Lab Results   Component Value Date    TRIG 80 2024    TRIG 93 2023    TRIG 85 08/15/2023     Lab Results   Component Value Date    CHOLHDL 37.4 2024    CHOLHDL 30.6 2023    CHOLHDL 33.1 08/15/2023       Lab Results   Component Value Date    GLUF 107 2024     No results found for: "HGBA1C"    Nutrition/Diet History:  Patient eats 2-3 meals daily.    Seasons food with lemon pepper/basil.  Patient denies use of a salt shaker at the table on prepared foods.   Dines out 1-2 per week.  Chooses fried foods rarely  Chooses fish 2 time(s) per week.   Beverages:  water, sweet tea, and lemonade  Alcohol: social drinker  Current Exercise: See Exercise Physiologist Note  Food Safety/Food Preparation: spouse  Living Arrangements/Family Support: Lives with spouse  Stage of Change Related to Diet Habits: Maintenance  Recent Changes to Diet: No  Food Diary: Completed      Nutrition Plan:   Goals:  BMI < 25 and abdominal girth < 40M/<35 F: In Progress  2 gram sodium, Mediterranean diet: Met  Rehab weight loss goal of reach 200 lbs, 1 lb per week: In Progress  Increase fish intake (non-fried varieties) to a goal of 2-3 servings per " "week: Met  Increase fruit and vegetable intake: Met    Comments of Goal Progression:  Pt states he "feels great", pleased with ongoing progress and lost 3.2% body fat and 1inch off abdominal circumference. He is mindful of diet and takes care to included a variety of color in produce choices and cannot recall the last time he had fried foods, states they have no salt in the house.     Interventions/Recommendations:  Reviewed Anthropometric Progress  Reviewed Pre and Post Labs  Dietitian Consult: No  Nutrition Recommendations Provided: Verbal and Written, Reviewed  Discussed follow up plan for ongoing self-management support    Education:  Food Labels; verbalizes understanding; Date: 1/3/24  Recipe Modication; verbalizes understanding; Date: 12/13/23  Weight Management; verbalizes understanding; Date: 12/20/23  Food Additives; verbalizes understanding; Date: 1/10/24    Comments:   Discussed ways to continue to incorporate healthy snacks, eating on a schedule, and monitoring sodium intake for heart health.    Diabetes  Is the patient diabetic? No      Courtney Unger MS, RDN/LDN    "

## 2024-01-11 NOTE — PROGRESS NOTES
Session: Exit   Cardiac Rehab Individual Treatment Plan - Discharge Assessment      Patient Name: Julio Bernardo MRN: [id number]   : 1950   Age: 73 y.o.   Diagnosis: PTCA    Psychosocial Assessment:   Outcome Survey Tools:    SIGIFREDO SCORES:   PRE POST   Anxiety 1 0   Depression 1 0   Somatic 2 2   Hostility 0 0     SF-36 SCORES:   PRE POST   Physical Function 27 27   Social Function 11 11   Mental Health 27 29   Pain 10 10   Change in Health 5 5   Physical Role Limitation 4 4   Mental Role Limitation 3 3   Energy/Fatigue 20 21   Health Perceptions 21 24   Total Score 128 134     PHQ 9:      2023     2:03 PM 2023     8:51 AM 2024     9:28 AM   PHQ-9 Depression Patient Health Questionnaire   Over the last two weeks how often have you been bothered by little interest or pleasure in doing things 0 0 0   Over the last two weeks how often have you been bothered by feeling down, depressed or hopeless 0 0 0   Over the last two weeks how often have you been bothered by trouble falling or staying asleep, or sleeping too much  1 0   Over the last two weeks how often have you been bothered by feeling tired or having little energy  1 0   Over the last two weeks how often have you been bothered by a poor appetite or overeating  0 0   Over the last two weeks how often have you been bothered by feeling bad about yourself - or that you are a failure or have let yourself or your family down  0 0   Over the last two weeks how often have you been bothered by trouble concentrating on things, such as reading the newspaper or watching television  0 0   Over the last two weeks how often have you been bothered by moving or speaking so slowly that other people could have noticed.  0 0   Over the last two weeks how often have you been bothered by thoughts that you would be better off dead, or of hurting yourself  0 0   If you checked off any problems, how difficult have these problems made it for you to do your work, take  care of things at home or get along with other people?  Not difficult at all Not difficult at all   PHQ-9 Score  2 0              Family Support: children, family, and spouse  Self Reported: Effective Coping Skills  Displays: happiness, smiles often, and calmness    Psychosocial Plan:   Goals:  Improved psychosocial coping strategies: Met  Maintain positive support system: Met  Maintain positive outlook: Met  Improve overall quality of life: Met    Comments on Goal Progression:  Patient has met all goals that were set.  He is very motivated & plans to continue exercising 5-6 days per week.  He reports to have excellent support from his family & wife.      Interventions/Recommendations:  Discussed Results of Surveys: Yes  Notify MD: No  Program Referral: No  Pharmaceutical Intervention/Therapy: No  Physical Activity: Yes  Continue Patient Education: Yes  Other Needs: not applicable  Stage of Readiness to Change: Maintenance    Education:  Stress Management; verbalizes understanding; Date: 1/11/2024  Stress; verbalizes understanding; Date: 1/11/2024    Comments:  Discussed SF36/Leigh Ann/PHQ9 scores with patient.  Noted improvements in all scores.  Patient denies having any overwhelming stress or anxiety.  He is very excited about his plans to retire in the near future.  He has been instructed to seek attention via PCP/Psychiatry in the event that circumstances change. Patient verbalizes understanding.     Other Core Components/Risk Factors Assessment:   RISK FACTORS:  hyperlipidemia, hypertension, positive family history    Learning Barriers: None    Education Level:  Post-College Graduate Degree    Pre-Test Score Post-Test Score   90 100     Medication Compliance: has been compliant with taking medications    Other Core Components/Risk Factors Plan:   Goals:  Decrease cholesterol level: Met  Increase exercise tolerance: Met  Increase knowledge of CAD: Met  Decrease blood pressure: Met  Weight loss: Met  Demonstrate  accurate pulse taking: Met  Identify and manage personal areas of stress: Met  Learn more about healthy eating: Met    Interventions/Recommendations:  Individual Education/Counseling: Yes  Physician Referral: No    Education:    arrhythmias, verbalizes understanding; Date: 1/11/2024  blood pressure meds, verbalizes understanding; Date: 10/13/2023 & 1/11/2024  cholesterol meds, verbalizes understanding; Date: 10/20/2023  fluid overload/CHF, verbalizes understanding; Date: 12/15/2023  hypertension, verbalizes understanding; Date: 12/29/2023  physical activity, verbalizes understanding; Date: 1/11/2024  risk factors, verbalizes understanding; Date: 12/22/2023 & 1/11/2024  stress, verbalizes understanding; Date: 1/5/2024 & 1/11/2024         Comments:  Patient has met all goals that were set to decrease risk factors for CAD.  Noted cholesterol changes with HDL increase from 41 to 49 & LDL decrease from 74 to 66.  He is currently exercising 5-6 days per week for approximately 45 minutes.  Educational scores improved from 90 to 100%.  BP is under much better control than when he began cardiac rehab.  He did have a weight loss of 3 lbs. While in rehab.  He is monitoring his heart rate accurately.  Discussed arrhythmias & encouraged patient to notify Dr. Burns of any lightheadedness/dizziness/confusion.    Other Core Components/Hypertension Assessment:   BP Range: 160-130/100-58  Patient reported symptoms: none    Other Core Components/Hypertension Plan:   Goals:  Blood Pressure <130/80: In Progress & has been instructed to continue monitoring BP at home & keep log in order to show Dr. Burns at appointments.  Patient states that his BP is at or below goal of 130/80.  For his last CPX, resting /67.    Interventions/Recommendations:  Med Card Reconciled: Yes  Encourage medication compliance  Encourage sodium reduction  Encourage weight loss  Recommend physical activity  Educate on contributory factors  Encourage home  blood pressure monitoring  Recommend daily weights    Education:    Hypertension; verbalizes understanding; Date: 12/29/2023  Coronary Artery Disease; verbalizes understanding; Date: 12/8/2023  Congestive Heart Failure; verbalizes understanding; Date: 12/15/2023  Risk Factors; verbalizes understanding; Date: 12/22/2023  Medication I; verbalizes understanding; Date: 10/13/2023  Medication II; verbalizes understanding; Date: 10/20/2023  Stress; verbalizes understanding; Date: 1/5/2024         Comments:  See monthly report in Epic for blood pressure trends  Information given to patient for [facility]: Declined  Patient is currently exercising on his own.    Does the patient have Heart Failure? No    Other Core Components/Tobacco Cessation Assessment:   Smoking Status: lifetime non-smoker  Smoking Cessation Barriers:  N/A  Stage of Readiness to Change: Maintenance    Other Core Components/Tobacco Cessation Plan:   Goals:  Maintain non-smoking status: Met    Interventions/Recommendations:  Maintains non-smoking status    Education:    Risk Factors; verbalizes understanding; Date: 12/22/2023         Comments:  Non smoker.    Patient has been instructed to follow up with Cardiologist for any further cardiac issues.  He verbalizes understanding.    Tiffanie Shirley RN  Cardiac Rehab Nurse

## 2024-02-26 NOTE — PROGRESS NOTES
Subjective:       Patient ID: Julio Bernardo is a 74 y.o. male.    Chief Complaint: Follow-up (Check/clean ears, update audiogram, nasal congestion)    Here for scheduled follow-up to check/clean ears and update audiogram, last seen 09/19/2022.  Prior history of excessive cerumen with no recent stuffiness or fullness, but some itching recently AS.  States tinnitus is the same like cicadas AU with some loud music exposure in the past, no ongoing acoustical trauma.  Not aware of any change in hearing, fluctuations, spinning, other otologic complaints.  No recent air travel with prior history of ear pain on descent.  Has been using azelastine nasal spray 3 to 4 days a week since last visit and works fine for nasal congestion.  Reports wife, daughter-in-law, grandson age 12 sick last week with respiratory symptoms and everyone COVID negative.  Throat has been scratchy with slight cough.  Was on acid reducer until 2 months ago.  Feels fine otherwise.  No other otolaryngologic complaints.          Review of Systems     Constitutional: Negative for appetite change, chills, fatigue, fever and unexpected weight loss.      HENT: Positive for hearing loss and ringing in the ears.      Eyes:  Negative for change in eyesight, eye drainage, eye itching and photophobia.     Respiratory:  Positive for shortness of breath.      Cardiovascular:  Positive for foot swelling and irregular heartbeat.     Gastrointestinal:  Negative for abdominal pain, acid reflux, constipation, diarrhea, heartburn and vomiting.     Genitourinary: Negative for difficulty urinating, sexual problems and frequent urination.     Musc: Negative for aching joints, aching muscles, back pain and neck pain.     Skin: Negative for rash.     Allergy: Negative for food allergies and seasonal allergies.     Endocrine: Negative for cold intolerance and heat intolerance.      Neurological: Negative for dizziness, headaches, light-headedness, seizures and tremors.       Hematologic: Positive for bruises/bleeds easily.     Psychiatric: Negative for decreased concentration, depression, nervous/anxious and sleep disturbance.          Patient's self populated ROS above noted supplemented by history.        Objective:        Vitals:    10/04/23 1408   BP: 120/64   Pulse: 65   Resp: 12   Temp: 97 °F (36.1 °C)     Body mass index is 31.27 kg/m².  Physical Exam  Constitutional:       General: He is not in acute distress.     Appearance: Normal appearance.   HENT:      Head: Normocephalic and atraumatic.      Right Ear: Tympanic membrane and external ear normal. There is impacted cerumen.      Left Ear: Tympanic membrane and external ear normal. There is impacted cerumen.      Ears:      Comments:   Ear canals occluded by cerumen, right greater than left.     Nose: No nasal deformity, mucosal edema or rhinorrhea.      Mouth/Throat:      Mouth: Mucous membranes are moist.      Pharynx: No pharyngeal swelling, oropharyngeal exudate or posterior oropharyngeal erythema.   Neck:      Trachea: Phonation normal.   Pulmonary:      Effort: Pulmonary effort is normal. No respiratory distress.   Musculoskeletal:      Cervical back: Neck supple.   Lymphadenopathy:      Cervical: No cervical adenopathy.   Skin:     General: Skin is warm and dry.   Neurological:      Mental Status: He is alert and oriented to person, place, and time.   Psychiatric:         Speech: Speech normal.         Behavior: Behavior normal.         Tests / Results:            Assessment:       1. Bilateral sensorineural hearing loss    2. 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 History of eustachian tube dysfunction    4. Nasal congestion    5. Bilateral impacted cerumen    6. Ear itch        Plan:       Ears cleaned as per procedure note.    See procedure note.      Above audiogram was subsequently performed and discussed/reviewed and compared to prior.  This again demonstrates  bilateral sensorineural hearing loss which is essentially symmetrical except for borderline asymmetry at 8000 hertz with excellent speech discrimination bilaterally and hyper mobile tymp bilaterally.    Again discussed and medically cleared for hearing aid trial.      Proper ear care again discussed and no Q-tips and okay for baby oil drops and/or Debrox regularly.    Hearing protection.    Okay to increase azelastine nasal spray to daily use and as much as 2 sprays in each nostril twice daily regularly or as needed.    Change to over-the-counter Zyrtec/cetirizine using regularly every evening or as needed.      Follow-up one year unless change or problems prior.

## 2024-03-08 NOTE — PROGRESS NOTES
"  Julio Bernardo presented for a  Medicare AWV and comprehensive Health Risk Assessment today. He is a patient of Dr. Loera and is new to me.  The following components were reviewed and updated:    Medical history  Family History  Social history  Allergies and Current Medications  Health Risk Assessment  Health Maintenance  Care Team     ** See Completed Assessments for Annual Wellness Visit within the encounter summary.**    The following assessments were completed:  Living Situation  CAGE  Depression Screening  Timed Get Up and Go  Whisper Test  Cognitive Function Screening  Nutrition Screening  ADL Screening  PAQ Screening  Review for opioid screen: pt does not have rx for opioid  Review for substance use disorder:  pt does not use substance        Vitals:    03/11/24 1103   BP: 114/64   BP Location: Right arm   Patient Position: Sitting   Pulse: 65   Resp: 16   SpO2: 97%   Weight: 106.3 kg (234 lb 5.6 oz)   Height: 6' 1" (1.854 m)     Body mass index is 30.92 kg/m².  Physical Exam  Vitals reviewed.   Constitutional:       Appearance: Normal appearance.   HENT:      Head: Normocephalic and atraumatic.   Cardiovascular:      Rate and Rhythm: Rhythm irregular.      Pulses: Normal pulses.           Radial pulses are 2+ on the right side and 2+ on the left side.        Dorsalis pedis pulses are 2+ on the right side and 2+ on the left side.        Posterior tibial pulses are 2+ on the right side and 2+ on the left side.      Heart sounds: Normal heart sounds.   Pulmonary:      Effort: Pulmonary effort is normal.      Breath sounds: Normal breath sounds.   Musculoskeletal:      Right lower leg: No edema.      Left lower leg: No edema.   Skin:     General: Skin is warm and dry.      Capillary Refill: Capillary refill takes less than 2 seconds.   Neurological:      General: No focal deficit present.      Mental Status: He is alert and oriented to person, place, and time.   Psychiatric:         Mood and Affect: Mood " normal.         Behavior: Behavior normal.        Diagnoses and health risks identified today and associated recommendations/orders:    1. Encounter for preventive health examination  Comments:  assessment and evaluation performed as stated above    2. Stage 3a chronic kidney disease  Assessment & Plan:  Stable and protected on losartan.  Pt drinking water.  Encouraged pt to avoid nephrotoxic drugs.      3. Coronary artery disease involving native coronary artery of native heart without angina pectoris  4. Atherosclerosis of native coronary artery of native heart without angina pectoris  Assessment & Plan:   Stable on current regimen.  Followed by Cardiology      5. Atherosclerosis of aorta  Overview:  Noted on NM PET from 4/18/22.     Assessment & Plan:  Stable on statin.  Pt adheres to low Na diet, exercises regularly and increasing water intake.  Approx 3-4, 16oz bottles daily. Followed by Cardiology      6. Hyperlipidemia, unspecified hyperlipidemia type   Stable on statin.  Followed by Cardiology    7. Cavitary lesion of lung  Overview:  -maria teresa triagular opacity noted since 12/21.  S/p empiric antibiotics infectious pna hx walking pna  -quantiferon and afb negative negative  -Stable non-hypermetabolic lesion since 12/2021, minimal symptoms overall improving -normal pulmonary function  -stable over 15 months.  Most likely residual scarring issue.    Assessment & Plan:   Stable.  Followed by PCP      8. Primary hypertension   Stable on losartan, spironolactone, verapamil.  Followed by Cardiology    9. PVC's (premature ventricular contractions)  Assessment & Plan:   Stable, followed by electrophysiologist          Bonifacio Kim with a 5-10 year written screening schedule and personal prevention plan. Recommendations were developed using the USPSTF age appropriate recommendations. Education, counseling, and referrals were provided as needed. After Visit Summary printed and given to patient which includes a list of  additional screenings\tests needed.    Follow up in about 1 year (around 3/11/2025), or if symptoms worsen or fail to improve.      Carole Taylor NP      Portions of this note may have been generated using voice recognition software.  Please excuse any spelling/grammatical errors. Occasional wrong-word or sound-a-like substitutions may have also occurred due to the inherent limitations of voice recognition software. Please read the chart carefully and recognize, using context, where substitutions have occurred.    I offered to discuss advanced care planning, including how to pick a person who would make decisions for you if you were unable to make them for yourself, called a health care power of , and what kind of decisions you might make such as use of life sustaining treatments such as ventilators and tube feeding when faced with a life limiting illness recorded on a living will that they will need to know. (How you want to be cared for as you near the end of your natural life)     X Patient is interested in learning more about how to make advanced directives.  I provided them paperwork and offered to discuss this with them.

## 2024-03-08 NOTE — PATIENT INSTRUCTIONS
Counseling and Referral of Other Preventative  (Italic type indicates deductible and co-insurance are waived)    Patient Name: Julio Bernardo  Today's Date: 3/8/2024    Health Maintenance       Date Due Completion Date    RSV Vaccine (Age 60+ and Pregnant patients) (1 - 1-dose 60+ series) Never done ---    Shingles Vaccine (2 of 3) 12/12/2017 10/17/2017    COVID-19 Vaccine (5 - 2023-24 season) 09/01/2023 7/15/2022    Lipid Panel 01/04/2025 1/4/2024    High Dose Statin 02/27/2025 2/27/2024    Colorectal Cancer Screening 05/19/2027 5/19/2022    TETANUS VACCINE 07/31/2028 7/31/2018        No orders of the defined types were placed in this encounter.      The following information is provided to all patients.  This information is to help you find resources for any of the problems found today that may be affecting your health:                  Living healthy guide: www.[facility].[place].[facility]      Understanding Diabetes: www.diabetes.org      Eating healthy: www.cdc.gov/healthyweight      [facility] home safety checklist: www.cdc.gov/steadi/patient.html      Agency on Aging: www.goea.[place].gov      Alcoholics anonymous (AA): www.aa.org      Physical Activity: www.alina.nih.gov/oq8aeqm      Tobacco use: www.quitwithusla.org

## 2024-03-11 ENCOUNTER — OFFICE VISIT (OUTPATIENT)
Dept: PRIMARY CARE CLINIC | Facility: CLINIC | Age: 74
End: 2024-03-11
Payer: MEDICARE

## 2024-03-11 VITALS
HEIGHT: 73 IN | WEIGHT: 234.38 LBS | OXYGEN SATURATION: 97 % | BODY MASS INDEX: 31.06 KG/M2 | SYSTOLIC BLOOD PRESSURE: 114 MMHG | HEART RATE: 65 BPM | RESPIRATION RATE: 16 BRPM | DIASTOLIC BLOOD PRESSURE: 64 MMHG

## 2024-03-11 DIAGNOSIS — I49.3 PVC'S (PREMATURE VENTRICULAR CONTRACTIONS): ICD-10-CM

## 2024-03-11 DIAGNOSIS — Z00.00 ENCOUNTER FOR PREVENTIVE HEALTH EXAMINATION: Primary | ICD-10-CM

## 2024-03-11 DIAGNOSIS — I25.10 CORONARY ARTERY DISEASE INVOLVING NATIVE CORONARY ARTERY OF NATIVE HEART WITHOUT ANGINA PECTORIS: ICD-10-CM

## 2024-03-11 DIAGNOSIS — N18.31 STAGE 3A CHRONIC KIDNEY DISEASE: ICD-10-CM

## 2024-03-11 DIAGNOSIS — I10 PRIMARY HYPERTENSION: ICD-10-CM

## 2024-03-11 DIAGNOSIS — I70.0 ATHEROSCLEROSIS OF AORTA: ICD-10-CM

## 2024-03-11 DIAGNOSIS — E78.5 HYPERLIPIDEMIA, UNSPECIFIED HYPERLIPIDEMIA TYPE: ICD-10-CM

## 2024-03-11 DIAGNOSIS — J98.4 CAVITARY LESION OF LUNG: ICD-10-CM

## 2024-03-11 DIAGNOSIS — I25.10 ATHEROSCLEROSIS OF NATIVE CORONARY ARTERY OF NATIVE HEART WITHOUT ANGINA PECTORIS: ICD-10-CM

## 2024-03-11 PROCEDURE — G0439 PPPS, SUBSEQ VISIT: HCPCS | Mod: S$GLB,,,

## 2024-03-11 PROCEDURE — 99999 PR PBB SHADOW E&M-EST. PATIENT-LVL IV: CPT | Mod: PBBFAC,,,

## 2024-03-11 RX ORDER — SENNOSIDES 8.6 MG/1
2 TABLET ORAL DAILY
COMMUNITY

## 2024-03-11 NOTE — ASSESSMENT & PLAN NOTE
Stable on statin.  Pt adheres to low Na diet, exercises regularly and increasing water intake.  Approx 3-4, 16oz bottles daily.

## 2024-04-15 DIAGNOSIS — I49.3 PVC'S (PREMATURE VENTRICULAR CONTRACTIONS): ICD-10-CM

## 2024-04-16 RX ORDER — VERAPAMIL HYDROCHLORIDE 360 MG/1
360 CAPSULE, DELAYED RELEASE PELLETS ORAL
Qty: 90 CAPSULE | Refills: 3 | Status: SHIPPED | OUTPATIENT
Start: 2024-04-16

## 2024-04-17 ENCOUNTER — PATIENT MESSAGE (OUTPATIENT)
Dept: CARDIOLOGY | Facility: CLINIC | Age: 74
End: 2024-04-17

## 2024-04-17 ENCOUNTER — OFFICE VISIT (OUTPATIENT)
Dept: CARDIOLOGY | Facility: CLINIC | Age: 74
End: 2024-04-17
Payer: MEDICARE

## 2024-04-17 VITALS
RESPIRATION RATE: 20 BRPM | HEIGHT: 73 IN | HEART RATE: 74 BPM | SYSTOLIC BLOOD PRESSURE: 127 MMHG | BODY MASS INDEX: 30.88 KG/M2 | DIASTOLIC BLOOD PRESSURE: 71 MMHG | WEIGHT: 233 LBS

## 2024-04-17 DIAGNOSIS — I48.0 PAROXYSMAL ATRIAL FIBRILLATION: ICD-10-CM

## 2024-04-17 DIAGNOSIS — I25.10 CORONARY ARTERY DISEASE INVOLVING NATIVE CORONARY ARTERY OF NATIVE HEART WITHOUT ANGINA PECTORIS: ICD-10-CM

## 2024-04-17 DIAGNOSIS — I10 PRIMARY HYPERTENSION: ICD-10-CM

## 2024-04-17 DIAGNOSIS — I49.3 PVC'S (PREMATURE VENTRICULAR CONTRACTIONS): ICD-10-CM

## 2024-04-17 DIAGNOSIS — I70.0 ATHEROSCLEROSIS OF AORTA: Primary | ICD-10-CM

## 2024-04-17 DIAGNOSIS — E78.5 HYPERLIPIDEMIA, UNSPECIFIED HYPERLIPIDEMIA TYPE: ICD-10-CM

## 2024-04-17 PROCEDURE — 1101F PT FALLS ASSESS-DOCD LE1/YR: CPT | Mod: CPTII,S$GLB,, | Performed by: INTERNAL MEDICINE

## 2024-04-17 PROCEDURE — 99214 OFFICE O/P EST MOD 30 MIN: CPT | Mod: S$GLB,,, | Performed by: INTERNAL MEDICINE

## 2024-04-17 PROCEDURE — 1126F AMNT PAIN NOTED NONE PRSNT: CPT | Mod: CPTII,S$GLB,, | Performed by: INTERNAL MEDICINE

## 2024-04-17 PROCEDURE — 1159F MED LIST DOCD IN RCRD: CPT | Mod: CPTII,S$GLB,, | Performed by: INTERNAL MEDICINE

## 2024-04-17 PROCEDURE — 1160F RVW MEDS BY RX/DR IN RCRD: CPT | Mod: CPTII,S$GLB,, | Performed by: INTERNAL MEDICINE

## 2024-04-17 PROCEDURE — 3078F DIAST BP <80 MM HG: CPT | Mod: CPTII,S$GLB,, | Performed by: INTERNAL MEDICINE

## 2024-04-17 PROCEDURE — 3288F FALL RISK ASSESSMENT DOCD: CPT | Mod: CPTII,S$GLB,, | Performed by: INTERNAL MEDICINE

## 2024-04-17 PROCEDURE — 99999 PR PBB SHADOW E&M-EST. PATIENT-LVL IV: CPT | Mod: PBBFAC,,, | Performed by: INTERNAL MEDICINE

## 2024-04-17 PROCEDURE — 3074F SYST BP LT 130 MM HG: CPT | Mod: CPTII,S$GLB,, | Performed by: INTERNAL MEDICINE

## 2024-04-17 RX ORDER — EZETIMIBE 10 MG/1
10 TABLET ORAL DAILY
Qty: 90 TABLET | Refills: 3 | Status: SHIPPED | OUTPATIENT
Start: 2024-04-17 | End: 2024-04-19 | Stop reason: SDUPTHER

## 2024-04-17 NOTE — PROGRESS NOTES
HISTORY:    73-year-old male with a history of CAD s/p LAD PCI July '23, hypertension, hyperlipidemia, PVCs, and OA s/p RLE TKR '23 presenting for follow-up.    The patient denies any symptoms of chest pain. QUINTEROS improved post PCI.    Exercise tolerance excellent. Completed cardiac rehab. The patient exercises 5 days/week and does well.     Long h/o palpitations dating back to a young age with known PVCs.    Patient does have intermittent le edema for years, mild.     Patient currently tolerates aspirin 81 x 1, ticagrelor 90 x 2, hydrochlorothiazide 12.5 x 1, losartan 100 x 1, verapamil 360 x 1, spironolactone 25 x 1, and atorvastatin 80 x 1.    Bps controlled.     PHYSICAL EXAM:    Vitals:    04/17/24 1136   BP: 127/71   Pulse: 74   Resp: 20       NAD, A+Ox3.  No jvd, no bruit.  RRR nml s1,s2. No murmurs.  CTA B no wheezes or crackles.  No edema.    LABS/STUDIES (imaging reviewed during clinic visit):      January 2024 CBC normal.   November 2023 BMP normal.September 2023 CBC and CMP normal.   2024 / HDL 49/ LDL 66/TG 80.  ECG July 2023 demonstrates sinus rhythm with no Q-waves or ST changes.  Holter   November 2023 sinus rhythm with a PVC burden of 3.3%.   Four beat run of AIVR, April 2023 sinus rhythm with a 13.9% PVC burden.  Some short runs of NSVT mostly 4-5 beats in duration.  1.5% Pac burden.      CPX January 2024 7 minutes and 30 seconds on a high ramp protocol.  No ischemia.  PVCs.  TTE April 2023 normal LV size and function with EF 60%.  CVP 3.  Frequent ectopy.    PET stress July 2023 normal LVEF.  LAD and left circ calcification.  14% lateral perfusion abnormality involving 14% of LV myocardium and increasing to 16% with stress.  11% reversible defect in the LAD territory as well.    Cardiac catheterization July 2023 99% mid LAD stenosis status post successful PCI with TEODORA x1 (2.30q31wx with IVUS guided post-dilation to 2.0 distally and 3.0 proximally). IVUS demonstrated a large, patent left main  with mild luminal irregularities.  Patent left circ and large, dominant RCA system. LVEDP 20.    Venous us August 2023 No e/o DVT. Mild L GSV reflux.    ASSESSMENT & PLAN:    1. Atherosclerosis of aorta    2. Paroxysmal atrial fibrillation    3. Coronary artery disease involving native coronary artery of native heart without angina pectoris    4. Hyperlipidemia, unspecified hyperlipidemia type    5. Primary hypertension    6. PVC's (premature ventricular contractions)              Orders Placed This Encounter    ezetimibe (ZETIA) 10 mg tablet         PVC burden improved on verapamil. 3.6%.     CAD s/p LAD PCI. Okay to cont asa 81x1 monotherapy and drop ticagrelor. Ticagrelor maybe causing some transient dyspnea that the patient exercises through.     Bps controlled on verapamil 360x1, hydrochlorothiazide 12.5 x 1, spironolactone 25 x 1, and losartan 100 x 1.     Patient with SUSANNAH. Defers CPAP.     LDL 66 on atorvastatin 80x1. Add on ezetimibe 10 x 1.     Venous insufficiency, mild. Compression stockings as tolerated.     PVC management per Dr. Canela.     Patient has done well with weight loss.     Follow up in about 6 months (around 10/17/2024).      Shola Burns MD

## 2024-04-19 ENCOUNTER — PATIENT MESSAGE (OUTPATIENT)
Dept: CARDIOLOGY | Facility: CLINIC | Age: 74
End: 2024-04-19
Payer: MEDICARE

## 2024-04-19 RX ORDER — EZETIMIBE 10 MG/1
10 TABLET ORAL DAILY
Qty: 90 TABLET | Refills: 3 | Status: SHIPPED | OUTPATIENT
Start: 2024-04-19

## 2024-05-20 RX ORDER — FLUTICASONE PROPIONATE 50 MCG
1 SPRAY, SUSPENSION (ML) NASAL DAILY
Qty: 32 ML | Refills: 0 | Status: SHIPPED | OUTPATIENT
Start: 2024-05-20

## 2024-05-20 NOTE — TELEPHONE ENCOUNTER
Provider Staff:  Action required for this patient    Requires appointment      Please see care gap opportunities below in Care Due Message.    Thanks!  [facility]     Appointments      Date Provider   Last Visit   5/16/2023 Ryan Loera Jr., MD   Next Visit   Visit date not found Ryan Loera Jr., MD     Refill Decision Note   Julio Bernardo  is requesting a refill authorization.    Brief Assessment and Rationale for Refill:  Approve       Medication Therapy Plan:         Comments:     Note composed:2:28 PM 05/20/2024

## 2024-05-20 NOTE — TELEPHONE ENCOUNTER
Care Due:                  Date            Visit Type   Department     Provider  --------------------------------------------------------------------------------                                             [facility]  Last Visit: 05-      PRE-OP       MEDICINE       Ryan Loera  Next Visit: None Scheduled  None         None Found                                                            Last  Test          Frequency    Reason                     Performed    Due Date  --------------------------------------------------------------------------------    Office Visit  15 months..  albuterol................  05- 08-    Health [facility] Embedded Care Due Messages. Reference number: [id number].   5/20/2024 12:05:56 AM CDT

## 2024-06-02 DIAGNOSIS — R05.3 CHRONIC COUGH: ICD-10-CM

## 2024-06-04 RX ORDER — OMEPRAZOLE 20 MG/1
20 CAPSULE, DELAYED RELEASE ORAL 2 TIMES DAILY
Qty: 180 CAPSULE | Refills: 1 | Status: SHIPPED | OUTPATIENT
Start: 2024-06-04

## 2024-06-10 ENCOUNTER — PATIENT MESSAGE (OUTPATIENT)
Dept: INTERNAL MEDICINE | Facility: CLINIC | Age: 74
End: 2024-06-10
Payer: MEDICARE

## 2024-06-10 RX ORDER — AZELASTINE 1 MG/ML
SPRAY, METERED NASAL
Qty: 30 ML | Refills: 2 | Status: SHIPPED | OUTPATIENT
Start: 2024-06-10

## 2024-07-03 DIAGNOSIS — E78.5 HYPERLIPIDEMIA, UNSPECIFIED HYPERLIPIDEMIA TYPE: ICD-10-CM

## 2024-07-03 RX ORDER — ATORVASTATIN CALCIUM 80 MG/1
80 TABLET, FILM COATED ORAL DAILY
Qty: 90 TABLET | Refills: 3 | Status: SHIPPED | OUTPATIENT
Start: 2024-07-03

## 2024-07-15 RX ORDER — SPIRONOLACTONE 25 MG/1
25 TABLET ORAL
Qty: 90 TABLET | Refills: 3 | Status: SHIPPED | OUTPATIENT
Start: 2024-07-15

## 2024-08-26 RX ORDER — LOSARTAN POTASSIUM 100 MG/1
100 TABLET ORAL
Qty: 90 TABLET | Refills: 3 | Status: SHIPPED | OUTPATIENT
Start: 2024-08-26

## 2024-10-09 ENCOUNTER — OFFICE VISIT (OUTPATIENT)
Dept: OTOLARYNGOLOGY | Facility: CLINIC | Age: 74
End: 2024-10-09
Payer: MEDICARE

## 2024-10-09 ENCOUNTER — CLINICAL SUPPORT (OUTPATIENT)
Dept: OTOLARYNGOLOGY | Facility: CLINIC | Age: 74
End: 2024-10-09
Payer: MEDICARE

## 2024-10-09 VITALS
TEMPERATURE: 98 F | WEIGHT: 239 LBS | HEART RATE: 52 BPM | DIASTOLIC BLOOD PRESSURE: 67 MMHG | BODY MASS INDEX: 31.68 KG/M2 | HEIGHT: 73 IN | SYSTOLIC BLOOD PRESSURE: 139 MMHG

## 2024-10-09 DIAGNOSIS — R94.120 ABNORMALLY COMPLIANT MIDDLE EAR SYSTEM WITH TYPE AD TYMPANOGRAM CURVE OF BOTH EARS: ICD-10-CM

## 2024-10-09 DIAGNOSIS — H61.22 IMPACTED CERUMEN, LEFT EAR: ICD-10-CM

## 2024-10-09 DIAGNOSIS — H93.13 BILATERAL TINNITUS: ICD-10-CM

## 2024-10-09 DIAGNOSIS — H90.3 BILATERAL SENSORINEURAL HEARING LOSS: ICD-10-CM

## 2024-10-09 DIAGNOSIS — H61.21 EXCESSIVE CERUMEN IN EAR CANAL, RIGHT: ICD-10-CM

## 2024-10-09 DIAGNOSIS — H91.90 HEARING LOSS, UNSPECIFIED HEARING LOSS TYPE, UNSPECIFIED LATERALITY: Primary | ICD-10-CM

## 2024-10-09 DIAGNOSIS — H90.3 BILATERAL SENSORINEURAL HEARING LOSS: Primary | ICD-10-CM

## 2024-10-09 DIAGNOSIS — Z77.122 HISTORY OF EXPOSURE TO NOISE: ICD-10-CM

## 2024-10-09 PROCEDURE — 99214 OFFICE O/P EST MOD 30 MIN: CPT | Mod: S$GLB,,, | Performed by: OTOLARYNGOLOGY

## 2024-10-09 PROCEDURE — 92557 COMPREHENSIVE HEARING TEST: CPT | Mod: S$GLB,,, | Performed by: AUDIOLOGIST-HEARING AID FITTER

## 2024-10-09 PROCEDURE — 3078F DIAST BP <80 MM HG: CPT | Mod: CPTII,S$GLB,, | Performed by: OTOLARYNGOLOGY

## 2024-10-09 PROCEDURE — 4010F ACE/ARB THERAPY RXD/TAKEN: CPT | Mod: CPTII,S$GLB,, | Performed by: OTOLARYNGOLOGY

## 2024-10-09 PROCEDURE — 3075F SYST BP GE 130 - 139MM HG: CPT | Mod: CPTII,S$GLB,, | Performed by: OTOLARYNGOLOGY

## 2024-10-09 PROCEDURE — 1159F MED LIST DOCD IN RCRD: CPT | Mod: CPTII,S$GLB,, | Performed by: OTOLARYNGOLOGY

## 2024-10-09 PROCEDURE — 92550 TYMPANOMETRY & REFLEX THRESH: CPT | Mod: S$GLB,,, | Performed by: AUDIOLOGIST-HEARING AID FITTER

## 2024-10-09 PROCEDURE — 1160F RVW MEDS BY RX/DR IN RCRD: CPT | Mod: CPTII,S$GLB,, | Performed by: OTOLARYNGOLOGY

## 2024-10-09 NOTE — Clinical Note
Your patient, Julio Bernardo, was recently seen for an audiogram.  My assessment and recommendations are enclosed.  If you should have any questions or concerns, please contact me at [phone].   Sincerely, Earnest Dc, CCC-A Audiologist [facility]

## 2024-10-09 NOTE — PROGRESS NOTES
Earnest Dc CCC-A  Audiologist - [facility] [address] [place] [address]  [email or web address]  [phone]    Patient: Julio Bernardo   MRN: [id number]  [address] [facility]   Home Phone [phone]   Work Phone [phone]   Mobile [phone]   : 1950  QUINTEROS: 10/9/2024      AUDIOLOGICAL EVALUATION    Mr. Julio Bernardo was seen in the clinic today for an audiological evaluation.  Mr. Bernardo reported tinnitus and history of noise exposure.  Mr. Bernardo denied dizziness and family history of hearing loss.    Audiological testing revealed a mild sloping to moderate sensorineural hearing loss for the Right ear and a mild sloping to moderately-severe sensorineural hearing loss for the Left ear.  A speech reception threshold was obtained at 30 dB HL for the Right ear and at 35 dB HL for the Left ear.  Speech discrimination was 96% for the Right ear and 100% for the Left ear.      Tympanometry testing revealed a Type Ad (2.47 mmho) tympanogram for the Right ear and a Type Ad (2.57 mmho) tympanogram for the Left ear.  Acoustic reflex thresholds were elevated/absent ipsilaterally in the Right ear and elevated ipsilaterally in the Left ear.      RECOMMENDATIONS:   It is recommended that Julio Bernardo:  Follow up medically with Dr. Robison.   Receive binaural hearing aids to improve speech understanding.  Continue to receive audiological monitoring annually.  Use precaution and/or hearing protection in noisy environments.    If you should have any questions or concerns regarding the above information, please do not hesitate to contact me at [phone].      _______________________________  Earnest Dc, JIHAN-A  Audiologist

## 2024-10-14 NOTE — PROGRESS NOTES
Subjective:       Patient ID: Julio Bernardo is a 74 y.o. male.    Chief Complaint: Follow-up (Check/clean ears, update audiogram.)    Here for scheduled follow-up to check/clean ears and update audiogram, last seen 10/04/2023.  Prior history of excessive cerumen with no recent stuffiness or fullness, but some itching recently.  Tinnitus unchanged with some loud music exposure in the past but no ongoing acoustical trauma.  Not aware of any change in hearing, fluctuations, spinning, otalgia, otorrhea, other otologic complaints.  Tendency for AR symptoms on and off associated with nasal congestion and drip.  Astelin works but dislikes the taste.  Not using Zyrtec, unclear if tried this.  No other nasal, sinonasal, or otolaryngologic complaints.          Review of Systems     Constitutional: Negative for appetite change, chills, fatigue, fever and unexpected weight loss.      HENT: Positive for ringing in the ears.  Negative for ear discharge and ear pain.      Eyes:  Negative for change in eyesight, eye drainage, eye itching and photophobia.     Respiratory:  Negative for shortness of breath.      Cardiovascular:  Positive for foot swelling.     Gastrointestinal:  Positive for constipation.     Genitourinary: Negative for difficulty urinating, sexual problems and frequent urination.     Musc: Positive for back pain and neck pain.     Skin: Negative for rash.     Allergy: Negative for food allergies and seasonal allergies.     Endocrine: Negative for cold intolerance and heat intolerance.      Neurological: Negative for dizziness, headaches, light-headedness, seizures and tremors.      Hematologic: Negative for bruises/bleeds easily and swollen glands.      Psychiatric: Negative for decreased concentration, depression, nervous/anxious and sleep disturbance.                Objective:        Vitals:    10/09/24 1259   BP: 139/67   Pulse: (!) 52   Temp: 97.5 °F (36.4 °C)     Body mass index is 31.53 kg/m².  Physical  Exam  Constitutional:       General: He is not in acute distress.     Appearance: Normal appearance.   HENT:      Head: Normocephalic and atraumatic.      Right Ear: Tympanic membrane and external ear normal.      Left Ear: Tympanic membrane and external ear normal.      Ears:      Comments:   Left ear canal essentially occluded by medial cerumen and right ear canal with lesser partially obstructing cerumen.     Nose: No nasal deformity, mucosal edema or rhinorrhea.      Mouth/Throat:      Mouth: Mucous membranes are moist.      Pharynx: No pharyngeal swelling, oropharyngeal exudate or posterior oropharyngeal erythema.   Neck:      Trachea: Phonation normal.   Pulmonary:      Effort: Pulmonary effort is normal. No respiratory distress.   Skin:     General: Skin is warm and dry.   Neurological:      Mental Status: He is alert and oriented to person, place, and time.   Psychiatric:         Speech: Speech normal.         Behavior: Behavior normal.         Tests / Results:                    Assessment:       1. Impacted cerumen, left ear    2. Excessive cerumen in ear canal, right    3. Bilateral sensorineural hearing loss    4. 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    Audiologic testing was subsequently performed and reviewed/discussed.  This again demonstrates bilateral essentially symmetrical sensorineural hearing loss with no significant changes, continued excellent speech discrimination and hyper mobile tymp AU.    Again defers amplification trial, states hearing okay.    Continue hearing protection and proper ear care and do not insert Q-tips, etc.      Options for AR symptoms reviewed and will try saline nasal spray and/or Zyrtec as needed.      Follow-up if  symptoms, problems and one year.

## 2024-10-17 ENCOUNTER — OFFICE VISIT (OUTPATIENT)
Dept: CARDIOLOGY | Facility: CLINIC | Age: 74
End: 2024-10-17
Payer: MEDICARE

## 2024-10-17 VITALS
WEIGHT: 239.19 LBS | SYSTOLIC BLOOD PRESSURE: 124 MMHG | BODY MASS INDEX: 31.56 KG/M2 | DIASTOLIC BLOOD PRESSURE: 75 MMHG | HEART RATE: 58 BPM

## 2024-10-17 DIAGNOSIS — E78.2 MIXED HYPERLIPIDEMIA: ICD-10-CM

## 2024-10-17 DIAGNOSIS — N18.30 STAGE 3 CHRONIC KIDNEY DISEASE, UNSPECIFIED WHETHER STAGE 3A OR 3B CKD: ICD-10-CM

## 2024-10-17 DIAGNOSIS — I49.3 PVC'S (PREMATURE VENTRICULAR CONTRACTIONS): ICD-10-CM

## 2024-10-17 DIAGNOSIS — I10 PRIMARY HYPERTENSION: ICD-10-CM

## 2024-10-17 DIAGNOSIS — I25.10 CORONARY ARTERY DISEASE INVOLVING NATIVE CORONARY ARTERY OF NATIVE HEART WITHOUT ANGINA PECTORIS: Primary | ICD-10-CM

## 2024-10-17 DIAGNOSIS — E78.5 HYPERLIPIDEMIA, UNSPECIFIED HYPERLIPIDEMIA TYPE: ICD-10-CM

## 2024-10-17 DIAGNOSIS — I87.2 VENOUS INSUFFICIENCY: ICD-10-CM

## 2024-10-17 PROCEDURE — 3074F SYST BP LT 130 MM HG: CPT | Mod: CPTII,S$GLB,, | Performed by: INTERNAL MEDICINE

## 2024-10-17 PROCEDURE — 1160F RVW MEDS BY RX/DR IN RCRD: CPT | Mod: CPTII,S$GLB,, | Performed by: INTERNAL MEDICINE

## 2024-10-17 PROCEDURE — 4010F ACE/ARB THERAPY RXD/TAKEN: CPT | Mod: CPTII,S$GLB,, | Performed by: INTERNAL MEDICINE

## 2024-10-17 PROCEDURE — 3078F DIAST BP <80 MM HG: CPT | Mod: CPTII,S$GLB,, | Performed by: INTERNAL MEDICINE

## 2024-10-17 PROCEDURE — 1159F MED LIST DOCD IN RCRD: CPT | Mod: CPTII,S$GLB,, | Performed by: INTERNAL MEDICINE

## 2024-10-17 PROCEDURE — 3288F FALL RISK ASSESSMENT DOCD: CPT | Mod: CPTII,S$GLB,, | Performed by: INTERNAL MEDICINE

## 2024-10-17 PROCEDURE — 99214 OFFICE O/P EST MOD 30 MIN: CPT | Mod: S$GLB,,, | Performed by: INTERNAL MEDICINE

## 2024-10-17 PROCEDURE — 1125F AMNT PAIN NOTED PAIN PRSNT: CPT | Mod: CPTII,S$GLB,, | Performed by: INTERNAL MEDICINE

## 2024-10-17 PROCEDURE — 99999 PR PBB SHADOW E&M-EST. PATIENT-LVL III: CPT | Mod: PBBFAC,,, | Performed by: INTERNAL MEDICINE

## 2024-10-17 PROCEDURE — 1101F PT FALLS ASSESS-DOCD LE1/YR: CPT | Mod: CPTII,S$GLB,, | Performed by: INTERNAL MEDICINE

## 2024-10-17 RX ORDER — ATORVASTATIN CALCIUM 80 MG/1
80 TABLET, FILM COATED ORAL DAILY
Qty: 90 TABLET | Refills: 3 | Status: SHIPPED | OUTPATIENT
Start: 2024-10-17

## 2024-10-17 RX ORDER — VERAPAMIL HYDROCHLORIDE 360 MG/1
360 CAPSULE, DELAYED RELEASE PELLETS ORAL DAILY
Qty: 90 CAPSULE | Refills: 3 | Status: SHIPPED | OUTPATIENT
Start: 2024-10-17

## 2024-10-17 RX ORDER — LOSARTAN POTASSIUM 100 MG/1
100 TABLET ORAL DAILY
Qty: 90 TABLET | Refills: 3 | Status: SHIPPED | OUTPATIENT
Start: 2024-10-17

## 2024-10-17 RX ORDER — EZETIMIBE 10 MG/1
10 TABLET ORAL DAILY
Qty: 90 TABLET | Refills: 3 | Status: SHIPPED | OUTPATIENT
Start: 2024-10-17

## 2024-10-17 NOTE — PROGRESS NOTES
HISTORY:    74-year-old male with a history of CAD s/p LAD PCI July '23, hypertension, hyperlipidemia, PVCs, and OA s/p RLE TKR '23 presenting for follow-up.    The patient denies any symptoms of chest pain. QUINTEROS improved post PCI.    Exercise tolerance excellent. Completed cardiac rehab. The patient exercises 5 days/week and does well.     Long h/o palpitations dating back to a young age with known PVCs.    Patient does have intermittent le edema for years, mild.     Patient currently tolerates aspirin 81 x 1, losartan 100 x 1, verapamil 360 x 1, atorvastatin 80 x 1, ezetimibe 10 x 1.    Bps controlled.     PHYSICAL EXAM:    Vitals:    10/17/24 1014   BP: 124/75   Pulse: (!) 58       NAD, A+Ox3.  No jvd, no bruit.  RRR nml s1,s2. No murmurs.  CTA B no wheezes or crackles.  No edema.    LABS/STUDIES (imaging reviewed during clinic visit):    January 2024 CBC normal.   November 2023 BMP normal.September 2023 CBC and CMP normal.   2024 / HDL 49/ LDL 66/TG 80.  ECG July 2023 demonstrates sinus rhythm with no Q-waves or ST changes.  Holter   November 2023 sinus rhythm with a PVC burden of 3.3%.   Four beat run of AIVR, April 2023 sinus rhythm with a 13.9% PVC burden.  Some short runs of NSVT mostly 4-5 beats in duration.  1.5% Pac burden.      CPX January 2024 7 minutes and 30 seconds on a high ramp protocol.  No ischemia.  PVCs.  TTE April 2023 normal LV size and function with EF 60%.  CVP 3.  Frequent ectopy.    PET stress July 2023 normal LVEF.  LAD and left circ calcification.  14% lateral perfusion abnormality involving 14% of LV myocardium and increasing to 16% with stress.  11% reversible defect in the LAD territory as well.    Cardiac catheterization July 2023 99% mid LAD stenosis status post successful PCI with TEODORA x1 ([id number] with IVUS guided post-dilation to 2.0 distally and 3.0 proximally). IVUS demonstrated a large, patent left main with mild luminal irregularities.  Patent left circ and large,  dominant RCA system. LVEDP 20.    Venous us August 2023 No e/o DVT. Mild L GSV reflux.    ASSESSMENT & PLAN:    1. Coronary artery disease involving native coronary artery of native heart without angina pectoris    2. Mixed hyperlipidemia    3. Primary hypertension    4. PVC's (premature ventricular contractions)    5. Stage 3 chronic kidney disease, unspecified whether stage 3a or 3b CKD    6. Hyperlipidemia, unspecified hyperlipidemia type    7. Venous insufficiency                Orders Placed This Encounter    CV US Lower Extremity Veins Bilateral Insufficiency    verapamiL (VERELAN) 360 MG C24P    losartan (COZAAR) 100 MG tablet    atorvastatin (LIPITOR) 80 MG tablet    ezetimibe (ZETIA) 10 mg tablet       PVC burden improved on verapamil. 3.6%.     CAD s/p LAD PCI. Okay to cont asa 81x1 monotherapy.    Bps controlled on verapamil 360x1 and losartan 100 x 1.     Patient with SUSANNAH. Defers CPAP.     LDL 66 on atorvastatin 80x1. Add on ezetimibe 10 x 1.     Venous insufficiency, mild but cumbersome. Classic symptoms of edema and heaviness late in the day. Compression stockings as tolerated. Did not tolerate HCTZ (dehydration/constipation) and spironolactone (breast tenderness). Will repeat a venous us off of diurteic tx.    PVC management per Dr. Canela.     Patient has done well with weight loss. Keep it up.     Follow up in about 1 year (around 10/17/2025).      Shola Burns MD

## 2024-10-24 DIAGNOSIS — I49.3 PVC'S (PREMATURE VENTRICULAR CONTRACTIONS): ICD-10-CM

## 2024-10-24 RX ORDER — VERAPAMIL HYDROCHLORIDE 360 MG/1
360 CAPSULE, DELAYED RELEASE PELLETS ORAL
Qty: 90 CAPSULE | Refills: 3 | Status: SHIPPED | OUTPATIENT
Start: 2024-10-24

## 2024-11-11 ENCOUNTER — HOSPITAL ENCOUNTER (OUTPATIENT)
Dept: CARDIOLOGY | Facility: HOSPITAL | Age: 74
Discharge: HOME | End: 2024-11-11
Attending: INTERNAL MEDICINE
Payer: MEDICARE

## 2024-11-11 DIAGNOSIS — I87.2 VENOUS INSUFFICIENCY: ICD-10-CM

## 2024-11-11 LAB
LEFT GREAT SAPHENOUS DISTAL THIGH DIA: 0.26 CM
LEFT GREAT SAPHENOUS JUNCTION DIA: 0.5 CM
LEFT GREAT SAPHENOUS KNEE DIA: 0.23 CM
LEFT GREAT SAPHENOUS KNEE REFLUX: 2072 MS
LEFT GREAT SAPHENOUS MIDDLE THIGH DIA: 0.41 CM
LEFT GREAT SAPHENOUS PROXIMAL CALF DIA: 0.18 CM
LEFT SMALL SAPHENOUS KNEE DIA: 0.22 CM
LEFT SMALL SAPHENOUS SPJ DIA: 0.17 CM
RIGHT GREAT SAPHENOUS DISTAL THIGH DIA: 0.15 CM
RIGHT GREAT SAPHENOUS JUNCTION DIA: 0.95 CM
RIGHT GREAT SAPHENOUS KNEE DIA: 0.21 CM
RIGHT GREAT SAPHENOUS MIDDLE THIGH DIA: 0.31 CM
RIGHT GREAT SAPHENOUS PROXIMAL CALF DIA: 0.19 CM
RIGHT SMALL SAPHENOUS KNEE DIA: 0.3 CM
RIGHT SMALL SAPHENOUS SPJ DIA: 0.3 CM

## 2024-11-11 PROCEDURE — 93970 EXTREMITY STUDY: CPT | Mod: TC

## 2024-11-11 PROCEDURE — 93970 EXTREMITY STUDY: CPT | Mod: 26,,, | Performed by: INTERNAL MEDICINE

## 2024-11-28 RX ORDER — AZELASTINE 1 MG/ML
SPRAY, METERED NASAL
Qty: 30 ML | Refills: 3 | Status: SHIPPED | OUTPATIENT
Start: 2024-11-28

## 2024-12-18 ENCOUNTER — PATIENT MESSAGE (OUTPATIENT)
Dept: CARDIOLOGY | Facility: CLINIC | Age: 74
End: 2024-12-18
Payer: MEDICARE

## 2024-12-26 ENCOUNTER — LAB VISIT (OUTPATIENT)
Dept: LAB | Facility: HOSPITAL | Age: 74
End: 2024-12-26
Payer: MEDICARE

## 2024-12-26 ENCOUNTER — OFFICE VISIT (OUTPATIENT)
Dept: INTERNAL MEDICINE | Facility: CLINIC | Age: 74
End: 2024-12-26
Payer: MEDICARE

## 2024-12-26 ENCOUNTER — TELEPHONE (OUTPATIENT)
Dept: INTERNAL MEDICINE | Facility: CLINIC | Age: 74
End: 2024-12-26
Payer: MEDICARE

## 2024-12-26 VITALS
DIASTOLIC BLOOD PRESSURE: 73 MMHG | HEART RATE: 63 BPM | WEIGHT: 240.94 LBS | BODY MASS INDEX: 31.93 KG/M2 | HEIGHT: 73 IN | SYSTOLIC BLOOD PRESSURE: 121 MMHG | OXYGEN SATURATION: 99 %

## 2024-12-26 DIAGNOSIS — E78.5 HYPERLIPIDEMIA, UNSPECIFIED HYPERLIPIDEMIA TYPE: Primary | ICD-10-CM

## 2024-12-26 DIAGNOSIS — E66.9 OBESITY, UNSPECIFIED CLASS, UNSPECIFIED OBESITY TYPE, UNSPECIFIED WHETHER SERIOUS COMORBIDITY PRESENT: ICD-10-CM

## 2024-12-26 DIAGNOSIS — E78.5 HYPERLIPIDEMIA, UNSPECIFIED HYPERLIPIDEMIA TYPE: ICD-10-CM

## 2024-12-26 DIAGNOSIS — Z13.1 SCREENING FOR DIABETES MELLITUS: ICD-10-CM

## 2024-12-26 DIAGNOSIS — I10 HYPERTENSION, UNSPECIFIED TYPE: ICD-10-CM

## 2024-12-26 DIAGNOSIS — J98.4 CAVITARY LESION OF LUNG: ICD-10-CM

## 2024-12-26 DIAGNOSIS — I10 PRIMARY HYPERTENSION: ICD-10-CM

## 2024-12-26 DIAGNOSIS — Z12.5 PROSTATE CANCER SCREENING: ICD-10-CM

## 2024-12-26 DIAGNOSIS — M1A.3710 CHRONIC GOUT OF RIGHT FOOT DUE TO RENAL IMPAIRMENT WITHOUT TOPHUS: ICD-10-CM

## 2024-12-26 DIAGNOSIS — N18.31 STAGE 3A CHRONIC KIDNEY DISEASE: ICD-10-CM

## 2024-12-26 LAB
ALBUMIN SERPL BCP-MCNC: 3.7 G/DL (ref 3.5–5.2)
ALBUMIN/CREAT UR: NORMAL UG/MG (ref 0–30)
ALP SERPL-CCNC: 83 U/L (ref 40–150)
ALT SERPL W/O P-5'-P-CCNC: 15 U/L (ref 10–44)
ANION GAP SERPL CALC-SCNC: 7 MMOL/L (ref 8–16)
AST SERPL-CCNC: 24 U/L (ref 10–40)
BILIRUB SERPL-MCNC: 0.9 MG/DL (ref 0.1–1)
BUN SERPL-MCNC: 15 MG/DL (ref 8–23)
CALCIUM SERPL-MCNC: 9 MG/DL (ref 8.7–10.5)
CHLORIDE SERPL-SCNC: 106 MMOL/L (ref 95–110)
CHOLEST SERPL-MCNC: 109 MG/DL (ref 120–199)
CHOLEST/HDLC SERPL: 2.3 {RATIO} (ref 2–5)
CO2 SERPL-SCNC: 25 MMOL/L (ref 23–29)
COMPLEXED PSA SERPL-MCNC: 1.2 NG/ML (ref 0–4)
CREAT SERPL-MCNC: 1.4 MG/DL (ref 0.5–1.4)
CREAT UR-MCNC: 141 MG/DL (ref 23–375)
EST. GFR  (NO RACE VARIABLE): 52.4 ML/MIN/1.73 M^2
ESTIMATED AVG GLUCOSE: 123 MG/DL (ref 68–131)
GLUCOSE SERPL-MCNC: 93 MG/DL (ref 70–110)
HBA1C MFR BLD: 5.9 % (ref 4–5.6)
HDLC SERPL-MCNC: 47 MG/DL (ref 40–75)
HDLC SERPL: 43.1 % (ref 20–50)
LDLC SERPL CALC-MCNC: 49 MG/DL (ref 63–159)
MICROALBUMIN UR DL<=1MG/L-MCNC: <5 UG/ML
NONHDLC SERPL-MCNC: 62 MG/DL
POTASSIUM SERPL-SCNC: 4 MMOL/L (ref 3.5–5.1)
PROT SERPL-MCNC: 6.8 G/DL (ref 6–8.4)
SODIUM SERPL-SCNC: 138 MMOL/L (ref 136–145)
TRIGL SERPL-MCNC: 65 MG/DL (ref 30–150)

## 2024-12-26 PROCEDURE — 84153 ASSAY OF PSA TOTAL: CPT | Performed by: NURSE PRACTITIONER

## 2024-12-26 PROCEDURE — 4010F ACE/ARB THERAPY RXD/TAKEN: CPT | Mod: CPTII,S$GLB,, | Performed by: NURSE PRACTITIONER

## 2024-12-26 PROCEDURE — 99214 OFFICE O/P EST MOD 30 MIN: CPT | Mod: S$GLB,,, | Performed by: NURSE PRACTITIONER

## 2024-12-26 PROCEDURE — 1101F PT FALLS ASSESS-DOCD LE1/YR: CPT | Mod: CPTII,S$GLB,, | Performed by: NURSE PRACTITIONER

## 2024-12-26 PROCEDURE — 82043 UR ALBUMIN QUANTITATIVE: CPT | Performed by: NURSE PRACTITIONER

## 2024-12-26 PROCEDURE — 3078F DIAST BP <80 MM HG: CPT | Mod: CPTII,S$GLB,, | Performed by: NURSE PRACTITIONER

## 2024-12-26 PROCEDURE — 1126F AMNT PAIN NOTED NONE PRSNT: CPT | Mod: CPTII,S$GLB,, | Performed by: NURSE PRACTITIONER

## 2024-12-26 PROCEDURE — 99999 PR PBB SHADOW E&M-EST. PATIENT-LVL V: CPT | Mod: PBBFAC,,, | Performed by: NURSE PRACTITIONER

## 2024-12-26 PROCEDURE — 80053 COMPREHEN METABOLIC PANEL: CPT | Performed by: NURSE PRACTITIONER

## 2024-12-26 PROCEDURE — 3288F FALL RISK ASSESSMENT DOCD: CPT | Mod: CPTII,S$GLB,, | Performed by: NURSE PRACTITIONER

## 2024-12-26 PROCEDURE — 1159F MED LIST DOCD IN RCRD: CPT | Mod: CPTII,S$GLB,, | Performed by: NURSE PRACTITIONER

## 2024-12-26 PROCEDURE — 80061 LIPID PANEL: CPT | Performed by: NURSE PRACTITIONER

## 2024-12-26 PROCEDURE — 3074F SYST BP LT 130 MM HG: CPT | Mod: CPTII,S$GLB,, | Performed by: NURSE PRACTITIONER

## 2024-12-26 PROCEDURE — 83036 HEMOGLOBIN GLYCOSYLATED A1C: CPT | Performed by: NURSE PRACTITIONER

## 2024-12-26 NOTE — PROGRESS NOTES
"Subjective     Patient ID: Julio Bernardo is a 74 y.o. male.  /73 (BP Location: Right arm, Patient Position: Sitting)   Pulse 63   Ht 6' 1" (1.854 m)   Wt 109.3 kg (240 lb 15.4 oz)   SpO2 99%   BMI 31.79 kg/m²      Chief Complaint: Follow-up    Mr. Bernardo is a 72  -year-old gentleman coming in today for routine follow up on his chronic medical conditions. Patient of Dr Loera.     S/P PCI July 2023. Cavitary lesions noted on prior CT, has been followed by pulm. No change to cavitarly lesion on CT from Feb 2023. Missed his follow up CT this year, as well as follow up with pulm. Has "pinched nerve" in L upper buttock that he describes as burning. He has been seeing a chiropractor for this as well as getting messages, both help a lot. Also has some R neck pain that travels to his R upper arm/shoulder. Neck pain is helped by massage and stretching.       1. He has hypertension. /73 today. Currently on losartan 100, and verapamil 360      2. He has hyperlipidemia. He is on atorvastatin. Due for labs today       3. Colon polyps: He had 2 colon polyps in 2007, and 1 polyps in 9/2013--last colonoscopy was back in May of 2022.  Three polyps were removed.  Next colonoscopy in 2027.      4. Gout-- last attack in last year.                Patient Active Problem List   Diagnosis    After-cataract, obscuring vision - Left Eye    Vitreous detachment - Both Eyes    Astigmatism - Both Eyes    Post-operative state - Left Eye    Hyperlipemia    Hypertension    ED (erectile dysfunction)    Colon polyp    Decreased taste and smell    Right posterior capsular opacification    Pseudophakia    CKD (chronic kidney disease) stage 3, GFR 30-59 ml/min    Chronic gout    Primary osteoarthritis of both knees    Decreased range of motion of neck    Segmental dysfunction of cervical region    Abnormal posture    Neck stiffness    Chronic cough    Cavitary lesion of lung    Elevated hemidiaphragm    SUSANNAH (obstructive sleep apnea) "    Mucous retention cyst of maxillary sinus    Chronic nasal congestion    Atherosclerosis of aorta    Acute medial meniscus tear of left knee    Peripheral tear of lateral meniscus of left knee as current injury    Chondromalacia of left knee    PVC's (premature ventricular contractions)    Coronary artery disease involving native coronary artery of native heart without angina pectoris    Postsurgical percutaneous transluminal coronary angioplasty status    Nonsustained ventricular tachycardia        Current Outpatient Medications   Medication Sig Dispense Refill    aspirin 81 mg Tab Take by mouth. 1 Tablet Oral Every day.  Last taken 3/25/11      atorvastatin (LIPITOR) 80 MG tablet Take 1 tablet (80 mg total) by mouth once daily. 90 tablet 3    azelastine (ASTELIN) 137 mcg (0.1 %) nasal spray INSTILL 2 SPRAYS IN EACH NOSTRIL TWICE A DAY 30 mL 3    ciclopirox (PENLAC) 8 % Soln Apply topically nightly. 6.6 mL 11    ezetimibe (ZETIA) 10 mg tablet Take 1 tablet (10 mg total) by mouth once daily. 90 tablet 3    fexofenadine HCl (ALLEGRA ORAL) Take by mouth. As needed      fish oil-omega-3 fatty acids 300-1,000 mg capsule Take 2 g by mouth once daily.      fluticasone propionate (FLONASE) 50 mcg/actuation nasal spray 1 spray (50 mcg total) by Each Nostril route once daily. 32 mL 0    ketoconazole (NIZORAL) 2 % cream Apply topically once daily. Apply daily to affected toenails for 6-12 months 60 g 4    losartan (COZAAR) 100 MG tablet Take 1 tablet (100 mg total) by mouth once daily. 90 tablet 3    multivitamin capsule Take 1 capsule by mouth once daily.      verapamiL (VERELAN) 360 MG C24P TAKE 1 CAPSULE BY MOUTH ONCE DAILY. 90 capsule 3     Current Facility-Administered Medications   Medication Dose Route Frequency Provider Last Rate Last Admin    sodium chloride 0.9% flush 10 mL  10 mL Intravenous PRN Shola Burns MD            Review of Systems   Constitutional:  Negative for activity change and unexpected weight  change.   HENT:  Negative for hearing loss, rhinorrhea and trouble swallowing.    Eyes:  Negative for discharge and visual disturbance.   Respiratory:  Negative for chest tightness and wheezing.    Cardiovascular:  Negative for palpitations.   Gastrointestinal:  Positive for constipation. Negative for blood in stool and diarrhea.   Endocrine: Positive for polyuria. Negative for polydipsia.   Genitourinary:  Positive for urgency. Negative for difficulty urinating and hematuria.   Psychiatric/Behavioral:  Negative for confusion and dysphoric mood.           Objective     Physical Exam  Constitutional:       General: He is not in acute distress.     Appearance: Normal appearance. He is not ill-appearing, toxic-appearing or diaphoretic.   HENT:      Head: Normocephalic and atraumatic.      Mouth/Throat:      Mouth: Mucous membranes are moist.      Pharynx: Oropharynx is clear.   Eyes:      Pupils: Pupils are equal, round, and reactive to light.   Cardiovascular:      Rate and Rhythm: Normal rate. Rhythm irregular.   Pulmonary:      Effort: Pulmonary effort is normal.      Breath sounds: Normal breath sounds.   Abdominal:      General: Abdomen is flat.      Palpations: Abdomen is soft.   Musculoskeletal:         General: Normal range of motion.      Cervical back: Normal range of motion and neck supple.   Skin:     General: Skin is warm and dry.   Neurological:      Mental Status: He is alert and oriented to person, place, and time.   Psychiatric:         Mood and Affect: Mood normal.         Behavior: Behavior normal.            Assessment and Plan     1. Hyperlipidemia, unspecified hyperlipidemia type; controlled on lipitor. Continue current meds and check labs today     2. Primary hypertension; at goal on losartan and verapamil, continue     3. Obesity, unspecified class, unspecified obesity type, unspecified whether serious comorbidity present; continue dietary and lifestyle modifications to assist in weight  reduction     4. 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 Continue to monitor    5. Stage 3a chronic kidney disease; due for routine screening, ordered   -     Microalbumin/creatinine urine ratio    6. Cavitary lesion of lung; recommended by pulm to continue surveillance CT's. Missed CT and follow up with pulm this year. Will order CT ahead of pulm appointment   Overview:  -maria teresa triagular opacity noted since 12/21.  S/p empiric antibiotics infectious pna hx walking pna  -quantiferon and afb negative negative  -Stable non-hypermetabolic lesion since 12/2021, minimal symptoms overall improving -normal pulmonary function  -stable over 15 months.  Most likely residual scarring issue.    Orders:  -     Ambulatory referral/consult to Pulmonology; Future; Expected date: 01/02/2025  -     CT Chest Without Contrast; Future; Expected date: 12/26/2024          Caesar Meraz NP   Internal Medicine           Follow up with PCP or myself in 6 months for annual/routine follow up

## 2024-12-26 NOTE — TELEPHONE ENCOUNTER
----- Message from Patrizia sent at 12/26/2024  8:49 AM CST -----  Contact: [phone]  type: Lab    Caller is requesting to schedule their Lab appointment prior to an appointment.    Order is not listed in EPIC.  Please enter order and contact patient to schedule.    Preferred Date and Time of Labs:12/26/24 in am    Date of Appointment:12/26/24    Where would they like the lab performed?[facility]    Would the patient rather a call back or a response via [facility]? callback    Pt also wants a PSA

## 2024-12-27 ENCOUNTER — PATIENT MESSAGE (OUTPATIENT)
Dept: INTERNAL MEDICINE | Facility: CLINIC | Age: 74
End: 2024-12-27
Payer: MEDICARE

## 2024-12-27 DIAGNOSIS — R73.03 PREDIABETES: Primary | ICD-10-CM

## 2024-12-31 ENCOUNTER — HOSPITAL ENCOUNTER (OUTPATIENT)
Dept: RADIOLOGY | Facility: HOSPITAL | Age: 74
Discharge: HOME | End: 2024-12-31
Attending: NURSE PRACTITIONER
Payer: MEDICARE

## 2024-12-31 DIAGNOSIS — J98.4 CAVITARY LESION OF LUNG: ICD-10-CM

## 2024-12-31 PROCEDURE — 71250 CT THORAX DX C-: CPT | Mod: 26,,, | Performed by: RADIOLOGY

## 2024-12-31 PROCEDURE — 71250 CT THORAX DX C-: CPT | Mod: TC

## 2025-01-02 ENCOUNTER — PATIENT MESSAGE (OUTPATIENT)
Dept: INTERNAL MEDICINE | Facility: CLINIC | Age: 75
End: 2025-01-02
Payer: MEDICARE

## 2025-01-09 ENCOUNTER — NURSE TRIAGE (OUTPATIENT)
Dept: ADMINISTRATIVE | Facility: CLINIC | Age: 75
End: 2025-01-09
Payer: MEDICARE

## 2025-01-09 ENCOUNTER — OFFICE VISIT (OUTPATIENT)
Dept: INTERNAL MEDICINE | Facility: CLINIC | Age: 75
End: 2025-01-09
Payer: MEDICARE

## 2025-01-09 ENCOUNTER — HOSPITAL ENCOUNTER (OUTPATIENT)
Dept: RADIOLOGY | Facility: HOSPITAL | Age: 75
Discharge: HOME | End: 2025-01-09
Payer: MEDICARE

## 2025-01-09 VITALS
WEIGHT: 241.38 LBS | SYSTOLIC BLOOD PRESSURE: 144 MMHG | DIASTOLIC BLOOD PRESSURE: 78 MMHG | OXYGEN SATURATION: 96 % | HEART RATE: 57 BPM | BODY MASS INDEX: 31.99 KG/M2 | HEIGHT: 73 IN

## 2025-01-09 DIAGNOSIS — J40 BRONCHITIS: Primary | ICD-10-CM

## 2025-01-09 DIAGNOSIS — J40 BRONCHITIS: ICD-10-CM

## 2025-01-09 PROCEDURE — 71046 X-RAY EXAM CHEST 2 VIEWS: CPT | Mod: TC

## 2025-01-09 PROCEDURE — 3077F SYST BP >= 140 MM HG: CPT | Mod: CPTII,S$GLB,,

## 2025-01-09 PROCEDURE — 3078F DIAST BP <80 MM HG: CPT | Mod: CPTII,S$GLB,,

## 2025-01-09 PROCEDURE — 1101F PT FALLS ASSESS-DOCD LE1/YR: CPT | Mod: CPTII,S$GLB,,

## 2025-01-09 PROCEDURE — 99999 PR PBB SHADOW E&M-EST. PATIENT-LVL IV: CPT | Mod: PBBFAC,,,

## 2025-01-09 PROCEDURE — 1160F RVW MEDS BY RX/DR IN RCRD: CPT | Mod: CPTII,S$GLB,,

## 2025-01-09 PROCEDURE — 1126F AMNT PAIN NOTED NONE PRSNT: CPT | Mod: CPTII,S$GLB,,

## 2025-01-09 PROCEDURE — 99213 OFFICE O/P EST LOW 20 MIN: CPT | Mod: S$GLB,,,

## 2025-01-09 PROCEDURE — 3288F FALL RISK ASSESSMENT DOCD: CPT | Mod: CPTII,S$GLB,,

## 2025-01-09 PROCEDURE — 1159F MED LIST DOCD IN RCRD: CPT | Mod: CPTII,S$GLB,,

## 2025-01-09 PROCEDURE — 71046 X-RAY EXAM CHEST 2 VIEWS: CPT | Mod: 26,,, | Performed by: RADIOLOGY

## 2025-01-09 RX ORDER — CODEINE PHOSPHATE AND GUAIFENESIN 10; 100 MG/5ML; MG/5ML
5 SOLUTION ORAL 3 TIMES DAILY PRN
Qty: 118 ML | Refills: 0 | Status: SHIPPED | OUTPATIENT
Start: 2025-01-09 | End: 2025-01-19

## 2025-01-09 RX ORDER — AMOXICILLIN AND CLAVULANATE POTASSIUM 875; 125 MG/1; MG/1
1 TABLET, FILM COATED ORAL 2 TIMES DAILY
Qty: 14 TABLET | Refills: 0 | Status: SHIPPED | OUTPATIENT
Start: 2025-01-09 | End: 2025-01-16

## 2025-01-09 NOTE — TELEPHONE ENCOUNTER
Pt c/o productive Coughing x 8 days. Caller states that he has had cough spells that have led to gagging. Denies SOB or CP at this time. appointment made per protocol.  Pt advised to see provider within 24 hours per protocol and verbalized understanding.   Reason for Disposition   SEVERE coughing spells (e.g., whooping sound after coughing, vomiting after coughing)    Additional Information   Negative: SEVERE difficulty breathing (e.g., struggling for each breath, speaks in single words)   Negative: Bluish (or gray) lips or face now   Negative: [1] Difficulty breathing AND [2] exposure to flames, smoke, or fumes   Negative: [1] Stridor AND [2] difficulty breathing   Negative: Sounds like a life-threatening emergency to the triager   Negative: [1] MODERATE difficulty breathing (e.g., speaks in phrases, SOB even at rest, pulse 100-120) AND [2] still present when not coughing   Negative: Chest pain  (Exception: MILD central chest pain, present only when coughing.)   Negative: Patient sounds very sick or weak to the triager   Negative: [1] MILD difficulty breathing (e.g., minimal/no SOB at rest, SOB with walking, pulse <100) AND [2] still present when not coughing   Negative: [1] Coughed up blood AND [2] > 1 tablespoon (15 ml)   (Exception: Blood-tinged sputum.)   Negative: Fever > 103 F (39.4 C)   Negative: [1] Fever > 101 F (38.3 C) AND [2] age > 60 years   Negative: [1] Fever > 100 F (37.8 C) AND [2] bedridden (e.g., CVA, chronic illness, recovering from surgery)   Negative: [1] Fever > 100 F (37.8 C) AND [2] diabetes mellitus or weak immune system (e.g., HIV positive, cancer chemo, splenectomy, organ transplant, chronic steroids)   Negative: Wheezing is present    Protocols used: Cough - Acute Onpjuhwzal-W-XA

## 2025-01-09 NOTE — PROGRESS NOTES
INTERNAL MEDICINE PROGRESS/URGENT CARE NOTE    Patient: Julio Bernardo  : 1950  MRN: [id number]  PCP: Ryan Loera Jr., MD    CHIEF COMPLAINT     Chief Complaint   Patient presents with    Cough    Nasal Congestion    Sore Throat     Symptoms        HPI     Julio is a 74 y.o. male with HTN, HLD, CAD, CKD3, IFG, SUSANNAH who presents for an urgent visit today with c/o a persistent cough that has been going on for about 1 week.    HISTORY OF PRESENT ILLNESS:  He reports a cough that began on New Year's Day after a walk. The cough is worse at night and became productive with yellowish-green mucus. He experiences slight shortness of breath when walking in the morning, but this has been going on for some time. Seems to improve after his first lap. Following with Cardiology.    CURRENT MEDICATIONS:  He is currently using Mucinex DM. He has cherry cough syrup at home but has not been using it recently.    ROS:  Respiratory: +cough, +shortness of breath, +sputum production                   Past Medical History:  Past Medical History:   Diagnosis Date    Allergy     Arthritis 2019    Colon polyps     Disorder of kidney and ureter 2019    Hyperlipemia     Hypertension     Posterior capsular opacification         Past Surgical History:  Past Surgical History:   Procedure Laterality Date    ANGIOGRAM, CORONARY, WITH LEFT HEART CATHETERIZATION N/A 2023    Procedure: Angiogram, Coronary, with Left Heart Cath;  Surgeon: Shola Burns MD;  Location: [facility] CATH LAB;  Service: Cardiology;  Laterality: N/A;    ARTHROSCOPIC CHONDROPLASTY OF KNEE JOINT Left 2023    Procedure: ARTHROSCOPY, KNEE, WITH CHONDROPLASTY;  Surgeon: Brayan Payne MD;  Location: [facility] OR;  Service: Orthopedics;  Laterality: Left;    CATARACT EXTRACTION      both eyes    COLONOSCOPY N/A 2016    Procedure: COLONOSCOPY;  Surgeon: Jan Argueta MD;  Location: [address]);  Service: Endoscopy;  Laterality: N/A;    COLONOSCOPY N/A  05/19/2022    Procedure: COLONOSCOPY;  Surgeon: Bobo Delcid MD;  [facility]O [facility]);  Service: Endoscopy;  Laterality: N/A;  4/4 fully vaccinated; instructions to [facility]    EYE SURGERY      HERNIA REPAIR      IVUS, CORONARY  7/24/2023    Procedure: IVUS, Coronary;  Surgeon: Shola Burns MD;  Location: [facility] CATH LAB;  Service: Cardiology;;    KNEE ARTHROSCOPY Right 01/01/2014    KNEE ARTHROSCOPY W/ MENISCECTOMY Left 5/25/2023    Procedure: ARTHROSCOPY, KNEE, WITH MENISCECTOMY;  Surgeon: Brayan Payne MD;  Location: [facility] OR;  Service: Orthopedics;  Laterality: Left;  medial and lateral    STENT, DRUG ELUTING, SINGLE VESSEL, CORONARY  7/24/2023    Procedure: Stent, Drug Eluting, Single Vessel, Coronary;  Surgeon: Shola Burns MD;  Location: [facility] CATH LAB;  Service: Cardiology;;    WISDOM TOOTH EXTRACTION      YAG      Left Eye        Allergies:  Review of patient's allergies indicates:  No Known Allergies    Home Medications:    Current Outpatient Medications:     aspirin 81 mg Tab, Take by mouth. 1 Tablet Oral Every day.  Last taken 3/25/11, Disp: , Rfl:     atorvastatin (LIPITOR) 80 MG tablet, Take 1 tablet (80 mg total) by mouth once daily., Disp: 90 tablet, Rfl: 3    azelastine (ASTELIN) 137 mcg (0.1 %) nasal spray, INSTILL 2 SPRAYS IN EACH NOSTRIL TWICE A DAY, Disp: 30 mL, Rfl: 3    ezetimibe (ZETIA) 10 mg tablet, Take 1 tablet (10 mg total) by mouth once daily., Disp: 90 tablet, Rfl: 3    fish oil-omega-3 fatty acids 300-1,000 mg capsule, Take 2 g by mouth once daily., Disp: , Rfl:     fluticasone propionate (FLONASE) 50 mcg/actuation nasal spray, 1 spray (50 mcg total) by Each Nostril route once daily., Disp: 32 mL, Rfl: 0    losartan (COZAAR) 100 MG tablet, Take 1 tablet (100 mg total) by mouth once daily., Disp: 90 tablet, Rfl: 3    multivitamin capsule, Take 1 capsule by mouth once daily., Disp: , Rfl:     amoxicillin-clavulanate 875-125mg (AUGMENTIN) 875-125 mg per tablet, Take 1  "tablet by mouth 2 (two) times daily for 7 days, Disp: 14 tablet, Rfl: 0    ciclopirox (PENLAC) 8 % Soln, Apply topically nightly. (Patient not taking: Reported on 1/9/2025), Disp: 6.6 mL, Rfl: 11    fexofenadine HCl (ALLEGRA ORAL), Take by mouth. As needed (Patient not taking: Reported on 1/9/2025), Disp: , Rfl:     guaiFENesin-codeine 100-10 mg/5 ml (TUSSI-ORGANIDIN NR)  mg/5 mL syrup, Take 5 mLs by mouth 3 (three) times daily as needed for Cough., Disp: 118 mL, Rfl: 0    ketoconazole (NIZORAL) 2 % cream, Apply topically once daily. Apply daily to affected toenails for 6-12 months (Patient not taking: Reported on 1/9/2025), Disp: 60 g, Rfl: 4    verapamiL (VERELAN) 360 MG C24P, TAKE 1 CAPSULE BY MOUTH ONCE DAILY. (Patient not taking: Reported on 1/9/2025), Disp: 90 capsule, Rfl: 3    Current Facility-Administered Medications:     sodium chloride 0.9% flush 10 mL, 10 mL, Intravenous, PRN, Shola Burns MD     Review of Systems:  Review of Systems   Constitutional:  Negative for fever.   HENT:  Positive for rhinorrhea and sneezing.    Respiratory:  Positive for cough. Negative for chest tightness and shortness of breath.    Cardiovascular:  Negative for chest pain.   Gastrointestinal:  Negative for abdominal pain, blood in stool, diarrhea, nausea and vomiting.   Musculoskeletal:  Negative for myalgias.   Neurological:  Negative for dizziness, weakness and headaches.   Psychiatric/Behavioral:  Negative for suicidal ideas.          PHYSICAL EXAM     Vitals:    01/09/25 0837   BP: (!) 144/78   BP Location: Right arm   Patient Position: Sitting   Pulse: (!) 57   SpO2: 96%   Weight: 109.5 kg (241 lb 6.5 oz)   Height: 6' 1" (1.854 m)      Body mass index is 31.85 kg/m².     Physical Exam  Constitutional:       General: He is not in acute distress.     Appearance: Normal appearance. He is not ill-appearing or toxic-appearing.   HENT:      Head: Normocephalic.      Comments: Frontal and maxillary sinuses nttp     Right " Ear: Tympanic membrane and ear canal normal.      Left Ear: Tympanic membrane and ear canal normal.      Nose: Nose normal. No congestion or rhinorrhea.      Mouth/Throat:      Mouth: Mucous membranes are moist.      Pharynx: Oropharynx is clear. No oropharyngeal exudate or posterior oropharyngeal erythema.   Eyes:      Extraocular Movements: Extraocular movements intact.      Pupils: Pupils are equal, round, and reactive to light.   Cardiovascular:      Rate and Rhythm: Normal rate and regular rhythm.   Pulmonary:      Effort: Pulmonary effort is normal. No respiratory distress.      Breath sounds: No wheezing.   Abdominal:      General: Bowel sounds are normal. There is no distension.      Palpations: Abdomen is soft.      Tenderness: There is no abdominal tenderness. There is no guarding.   Musculoskeletal:         General: Normal range of motion.      Cervical back: Normal range of motion and neck supple. No rigidity or tenderness.      Right lower leg: No edema.      Left lower leg: No edema.   Lymphadenopathy:      Cervical: No cervical adenopathy.   Skin:     General: Skin is warm and dry.   Neurological:      General: No focal deficit present.      Mental Status: He is alert and oriented to person, place, and time.             LABS     Lab Results   Component Value Date    HGBA1C 5.9 (H) 12/26/2024     CMP  Sodium   Date Value Ref Range Status   12/26/2024 138 136 - 145 mmol/L Final     Potassium   Date Value Ref Range Status   12/26/2024 4.0 3.5 - 5.1 mmol/L Final     Chloride   Date Value Ref Range Status   12/26/2024 106 95 - 110 mmol/L Final     CO2   Date Value Ref Range Status   12/26/2024 25 23 - 29 mmol/L Final     Glucose   Date Value Ref Range Status   12/26/2024 93 70 - 110 mg/dL Final     BUN   Date Value Ref Range Status   12/26/2024 15 8 - 23 mg/dL Final     Creatinine   Date Value Ref Range Status   12/26/2024 1.4 0.5 - 1.4 mg/dL Final     Calcium   Date Value Ref Range Status   12/26/2024 9.0  8.7 - 10.5 mg/dL Final     Total Protein   Date Value Ref Range Status   12/26/2024 6.8 6.0 - 8.4 g/dL Final     Albumin   Date Value Ref Range Status   12/26/2024 3.7 3.5 - 5.2 g/dL Final     Total Bilirubin   Date Value Ref Range Status   12/26/2024 0.9 0.1 - 1.0 mg/dL Final     Comment:     For infants and newborns, interpretation of results should be based  on gestational age, weight and in agreement with clinical  observations.    Premature Infant recommended reference ranges:  Up to 24 hours.............<8.0 mg/dL  Up to 48 hours............<12.0 mg/dL  3-5 days..................<15.0 mg/dL  6-29 days.................<15.0 mg/dL       Alkaline Phosphatase   Date Value Ref Range Status   12/26/2024 83 40 - 150 U/L Final     AST   Date Value Ref Range Status   12/26/2024 24 10 - 40 U/L Final     ALT   Date Value Ref Range Status   12/26/2024 15 10 - 44 U/L Final     Anion Gap   Date Value Ref Range Status   12/26/2024 7 (L) 8 - 16 mmol/L Final     eGFR if    Date Value Ref Range Status   06/21/2022 >60.0 >60 mL/min/1.73 m^2 Final     eGFR if non    Date Value Ref Range Status   06/21/2022 54.1 (A) >60 mL/min/1.73 m^2 Final     Comment:     Calculation used to obtain the estimated glomerular filtration  rate (eGFR) is the CKD-EPI equation.        Lab Results   Component Value Date    WBC 7.05 01/04/2024    HGB 13.3 (L) 01/04/2024    HCT 41.0 01/04/2024    MCV 85 01/04/2024     01/04/2024     Lab Results   Component Value Date    CHOL 109 (L) 12/26/2024    CHOL 131 01/04/2024    CHOL 134 09/18/2023     Lab Results   Component Value Date    HDL 47 12/26/2024    HDL 49 01/04/2024    HDL 41 09/18/2023     Lab Results   Component Value Date    LDLCALC 49.0 (L) 12/26/2024    LDLCALC 66.0 01/04/2024    LDLCALC 74.4 09/18/2023     Lab Results   Component Value Date    TRIG 65 12/26/2024    TRIG 80 01/04/2024    TRIG 93 09/18/2023     Lab Results   Component Value Date    CHOLHDL  43.1 12/26/2024    CHOLHDL 37.4 01/04/2024    CHOLHDL 30.6 09/18/2023     Lab Results   Component Value Date    TSH 1.770 05/15/2018       ASSESSMENT & PLAN       1. Bronchitis  -     guaiFENesin-codeine 100-10 mg/5 ml (TUSSI-ORGANIDIN NR)  mg/5 mL syrup; Take 5 mLs by mouth 3 (three) times daily as needed for Cough.  Dispense: 118 mL; Refill: 0  -     X-Ray Chest PA And Lateral; Future; Expected date: 01/09/2025  -     amoxicillin-clavulanate 875-125mg (AUGMENTIN) 875-125 mg per tablet; Take 1 tablet by mouth 2 (two) times daily for 7 days  Dispense: 14 tablet; Refill: 0             Assessment & Plan    IMPRESSION:  - Evaluated patient's lungs, noting results were not reassuring  - Will order chest XR to rule out pneumonia   - Reviewed patient's pulmonary history, including CT results showing spots in upper left and lower right lobes    ACUTE BRONCHITIS:  - Mr. Bernardo reports coughing for several days, initially non-productive but now productive with yellowish-green sputum.  - Cough is worse at night, causing diaphragm pain.  - Prescribed Guaifenesin with Codeine cough syrup and sent the prescription to the pharmacy.  - Will consider prescribing antibiotics pending chest XR results.  - Prescribed Zyrtec to be taken nightly for at least a week to help dry up mucus.  - Also prescribed Flonase nasal spray to be used daily as needed.  - Continued Azelastine nasal spray as needed, can be used twice daily.      Please proceed with the following supportive management measures:  Get plenty of rest and drink plenty of water.  Warm saltwater gargles twice daily and warm teas with honey and lemon can help sore throat and cough.  Take an OTC allergy medication (Xyzal, Zyrtec, Allegra, Claritin) once per day, in the evening, for at least the next two weeks. Avoid allergy medications with decongestants (denoted with a -D) if Hx hypertension).  Recommend use of Robitussin-DM (Or Tussin-DM) as needed for cough.  You may  rotate between tylenol (acetaminophen) and advil (ibuprofen) every four hours as needed for pain or fever.  Use nasal steroid as directed. You may also use a humidifer or run a hot shower for congestion. Vicks rub also a good option.  May purchase over the counter nasal saline spray. Use up to four times per day. Wait at least 20 minutes after using nasal steroids before using saline rinses.                Follow up if symptoms worsen or fail to improve.    Patient was counseled on when to seek emergent care. Patient's plan/treatment was discussed including medications and possible side effects. Verbalized understanding of all instructions.            MARIA ANTONIA Shin, FNP-C  Department of Internal Medicine  [facility]    This note was generated with the assistance of ambient listening technology. Verbal consent was obtained by the patient and accompanying visitor(s) for the recording of patient appointment to facilitate this note. I attest to having reviewed and edited the generated note for accuracy, though some syntax or spelling errors may persist. Please contact the author of this note for any clarification.

## 2025-01-27 ENCOUNTER — OFFICE VISIT (OUTPATIENT)
Dept: PULMONOLOGY | Facility: CLINIC | Age: 75
End: 2025-01-27
Payer: MEDICARE

## 2025-01-27 VITALS
DIASTOLIC BLOOD PRESSURE: 77 MMHG | OXYGEN SATURATION: 96 % | SYSTOLIC BLOOD PRESSURE: 135 MMHG | HEIGHT: 73 IN | BODY MASS INDEX: 32.26 KG/M2 | WEIGHT: 243.38 LBS | HEART RATE: 67 BPM

## 2025-01-27 DIAGNOSIS — R91.1 SOLITARY PULMONARY NODULE: Primary | ICD-10-CM

## 2025-01-27 DIAGNOSIS — J98.4 CAVITARY LESION OF LUNG: ICD-10-CM

## 2025-01-27 PROCEDURE — 1126F AMNT PAIN NOTED NONE PRSNT: CPT | Mod: CPTII,S$GLB,, | Performed by: INTERNAL MEDICINE

## 2025-01-27 PROCEDURE — 99999 PR PBB SHADOW E&M-EST. PATIENT-LVL IV: CPT | Mod: PBBFAC,,, | Performed by: INTERNAL MEDICINE

## 2025-01-27 PROCEDURE — 3075F SYST BP GE 130 - 139MM HG: CPT | Mod: CPTII,S$GLB,, | Performed by: INTERNAL MEDICINE

## 2025-01-27 PROCEDURE — 1159F MED LIST DOCD IN RCRD: CPT | Mod: CPTII,S$GLB,, | Performed by: INTERNAL MEDICINE

## 2025-01-27 PROCEDURE — 3288F FALL RISK ASSESSMENT DOCD: CPT | Mod: CPTII,S$GLB,, | Performed by: INTERNAL MEDICINE

## 2025-01-27 PROCEDURE — 99214 OFFICE O/P EST MOD 30 MIN: CPT | Mod: S$GLB,,, | Performed by: INTERNAL MEDICINE

## 2025-01-27 PROCEDURE — 1101F PT FALLS ASSESS-DOCD LE1/YR: CPT | Mod: CPTII,S$GLB,, | Performed by: INTERNAL MEDICINE

## 2025-01-27 PROCEDURE — 3078F DIAST BP <80 MM HG: CPT | Mod: CPTII,S$GLB,, | Performed by: INTERNAL MEDICINE

## 2025-01-27 NOTE — ASSESSMENT & PLAN NOTE
First noted in 2021. Stable. TB workup negative. Overall consistent with omar scar. No signs of active infection. Reassess in 1 year

## 2025-01-27 NOTE — PROGRESS NOTES
Subjective:       Patient ID: Julio eBrnardo is a 74 y.o. male.  The patient's last visit with me was on Visit date not found.     Last seen in [facility] March 2023.    Patient had a viral URI earlier in the month with some increased coughing but this is nearly resolved. No chronic f/c/ns. Remains active and exercises 5x/week. No new complaints.    Cough  Review of Systems   Respiratory:  Positive for cough.      Objective:      Vitals:    01/27/25 1318   BP: 135/77   Pulse: 67     Wt Readings from Last 3 Encounters:   01/27/25 110.4 kg (243 lb 6.2 oz)   01/09/25 109.5 kg (241 lb 6.5 oz)   12/26/24 109.3 kg (240 lb 15.4 oz)     Temp Readings from Last 3 Encounters:   10/09/24 97.5 °F (36.4 °C) (Temporal)   11/09/23 98.1 °F (36.7 °C) (Oral)   11/04/23 100.3 °F (37.9 °C) (Oral)     BP Readings from Last 3 Encounters:   01/27/25 135/77   01/09/25 (!) 144/78   12/26/24 121/73     Pulse Readings from Last 3 Encounters:   01/27/25 67   01/09/25 (!) 57   12/26/24 63       Physical Exam    CBC  Lab Results   Component Value Date    WBC 7.05 01/04/2024    HGB 13.3 (L) 01/04/2024    HCT 41.0 01/04/2024    MCV 85 01/04/2024     01/04/2024         CMP  Sodium   Date Value Ref Range Status   12/26/2024 138 136 - 145 mmol/L Final     Potassium   Date Value Ref Range Status   12/26/2024 4.0 3.5 - 5.1 mmol/L Final     Chloride   Date Value Ref Range Status   12/26/2024 106 95 - 110 mmol/L Final     CO2   Date Value Ref Range Status   12/26/2024 25 23 - 29 mmol/L Final     Glucose   Date Value Ref Range Status   12/26/2024 93 70 - 110 mg/dL Final     BUN   Date Value Ref Range Status   12/26/2024 15 8 - 23 mg/dL Final     Creatinine   Date Value Ref Range Status   12/26/2024 1.4 0.5 - 1.4 mg/dL Final     Calcium   Date Value Ref Range Status   12/26/2024 9.0 8.7 - 10.5 mg/dL Final     Total Protein   Date Value Ref Range Status   12/26/2024 6.8 6.0 - 8.4 g/dL Final     Albumin   Date Value Ref Range Status   12/26/2024  "3.7 3.5 - 5.2 g/dL Final     Total Bilirubin   Date Value Ref Range Status   12/26/2024 0.9 0.1 - 1.0 mg/dL Final     Comment:     For infants and newborns, interpretation of results should be based  on gestational age, weight and in agreement with clinical  observations.    Premature Infant recommended reference ranges:  Up to 24 hours.............<8.0 mg/dL  Up to 48 hours............<12.0 mg/dL  3-5 days..................<15.0 mg/dL  6-29 days.................<15.0 mg/dL       Alkaline Phosphatase   Date Value Ref Range Status   12/26/2024 83 40 - 150 U/L Final     AST   Date Value Ref Range Status   12/26/2024 24 10 - 40 U/L Final     ALT   Date Value Ref Range Status   12/26/2024 15 10 - 44 U/L Final     Anion Gap   Date Value Ref Range Status   12/26/2024 7 (L) 8 - 16 mmol/L Final     eGFR   Date Value Ref Range Status   12/26/2024 52.4 (A) >60 mL/min/1.73 m^2 Final       ABG  No results found for: "PH", "PO2", "PCO2"        Personal Diagnostic Review  I have personally reviewed the following data and added my own interpretation as below:  CT Chest 12/31/24 images personally reviewed and shows AUDI apical lesion with some cavitations overall omar in size without satellite lesions. RLL sup seg 5mm nodule noted and clearly braches more superiorly and likely a pulmonary vein      1/27/2025     1:18 PM 1/9/2025     8:37 AM 12/26/2024     1:28 PM 10/17/2024    10:14 AM 10/9/2024    12:59 PM 4/17/2024    11:36 AM 3/11/2024    11:03 AM   Pulmonary Function Tests   SpO2 96 % 96 % 99 %    97 %   Height 6' 1" (1.854 m) 6' 1" (1.854 m) 6' 1" (1.854 m)  6' 1" (1.854 m) 6' 1" (1.854 m) 6' 1" (1.854 m)   Weight 110.4 kg (243 lb 6.2 oz) 109.5 kg (241 lb 6.5 oz) 109.3 kg (240 lb 15.4 oz) 108.5 kg (239 lb 3.2 oz) 108.4 kg (239 lb) 105.7 kg (233 lb) 106.3 kg (234 lb 5.6 oz)   BMI (Calculated) 32.1 31.9 31.8  31.5 30.7 30.9         Assessment:       1. Solitary pulmonary nodule    2. Cavitary lesion of lung      "   Outpatient Encounter Medications as of 2025   Medication Sig Dispense Refill    aspirin 81 mg Tab Take by mouth. 1 Tablet Oral Every day.  Last taken 3/25/11      atorvastatin (LIPITOR) 80 MG tablet Take 1 tablet (80 mg total) by mouth once daily. 90 tablet 3    azelastine (ASTELIN) 137 mcg (0.1 %) nasal spray INSTILL 2 SPRAYS IN EACH NOSTRIL TWICE A DAY 30 mL 3    ezetimibe (ZETIA) 10 mg tablet Take 1 tablet (10 mg total) by mouth once daily. 90 tablet 3    fish oil-omega-3 fatty acids 300-1,000 mg capsule Take 2 g by mouth once daily.      fluticasone propionate (FLONASE) 50 mcg/actuation nasal spray 1 spray (50 mcg total) by Each Nostril route once daily. 32 mL 0    losartan (COZAAR) 100 MG tablet Take 1 tablet (100 mg total) by mouth once daily. 90 tablet 3    multivitamin capsule Take 1 capsule by mouth once daily.      [] amoxicillin-clavulanate 875-125mg (AUGMENTIN) 875-125 mg per tablet Take 1 tablet by mouth 2 (two) times daily for 7 days 14 tablet 0    ciclopirox (PENLAC) 8 % Soln Apply topically nightly. (Patient not taking: Reported on 2025) 6.6 mL 11    fexofenadine HCl (ALLEGRA ORAL) Take by mouth. As needed (Patient not taking: Reported on 2025)      [] guaiFENesin-codeine 100-10 mg/5 ml (TUSSI-ORGANIDIN NR)  mg/5 mL syrup Take 5 mLs by mouth 3 (three) times daily as needed for Cough. 118 mL 0    ketoconazole (NIZORAL) 2 % cream Apply topically once daily. Apply daily to affected toenails for 6-12 months (Patient not taking: Reported on 2025) 60 g 4    verapamiL (VERELAN) 360 MG C24P TAKE 1 CAPSULE BY MOUTH ONCE DAILY. (Patient not taking: Reported on 2025) 90 capsule 3     Facility-Administered Encounter Medications as of 2025   Medication Dose Route Frequency Provider Last Rate Last Admin    sodium chloride 0.9% flush 10 mL  10 mL Intravenous PRN Shola Burns MD         1. Cavitary lesion of lung  - Ambulatory referral/consult to  Pulmonology  - CT Chest Without Contrast; Future    2. Solitary pulmonary nodule    Plan:     Problem List Items Addressed This Visit          Pulmonary    Cavitary lesion of lung    Overview     -maria teresa triagular opacity noted since 12/21.  S/p empiric antibiotics infectious pna hx walking pna  -quantiferon and afb negative negative  -Stable non-hypermetabolic lesion since 12/2021, minimal symptoms overall improving -normal pulmonary function  -stable over 15 months.  Most likely residual scarring issue.         Current Assessment & Plan     First noted in 2021. Stable. TB workup negative. Overall consistent with omar scar. No signs of active infection. Reassess in 1 year         Relevant Orders    CT Chest Without Contrast    Solitary pulmonary nodule - Primary    Current Assessment & Plan     This is branching and I think is a focal dilation of a pulmonary vein. Reassess on CT in 1 year            Please note Overview Notes are historic documentation. Please review A/P for current updates.  No follow-ups on file.    Future Appointments   Date Time Provider Department Center   4/9/2025  1:00 PM Jayleen Robison MD [facility]   5/1/2025 10:00 AM Manish Godoy, K[facility]LO[facility] [place]   6/20/2025 10:30 AM [facility]   6/27/2025 11:00 AM Ryan Loera Jr., MD [facility] IM [facility]   10/15/2025  1:00 PM Jayleen Robison MD [facility]   10/15/2025  1:30 PM Charlotte García AU.D [facility]         Bowen Bright MD

## 2025-03-20 ENCOUNTER — TELEPHONE (OUTPATIENT)
Dept: OTOLARYNGOLOGY | Facility: CLINIC | Age: 75
End: 2025-03-20
Payer: MEDICARE

## 2025-03-20 NOTE — TELEPHONE ENCOUNTER
Spoke with patient, telling him having to move him from 4/9 that doctor is out of town and putting him on  4/16 at 1:00pm.

## 2025-03-24 DIAGNOSIS — Z00.00 ENCOUNTER FOR MEDICARE ANNUAL WELLNESS EXAM: ICD-10-CM

## 2025-05-01 ENCOUNTER — OFFICE VISIT (OUTPATIENT)
Dept: OPTOMETRY | Facility: CLINIC | Age: 75
End: 2025-05-01
Payer: COMMERCIAL

## 2025-05-01 DIAGNOSIS — H52.4 PRESBYOPIA: ICD-10-CM

## 2025-05-01 DIAGNOSIS — Z13.5 GLAUCOMA SCREENING: ICD-10-CM

## 2025-05-01 DIAGNOSIS — Z01.00 ROUTINE EYE EXAM: Primary | ICD-10-CM

## 2025-05-01 PROCEDURE — 92014 COMPRE OPH EXAM EST PT 1/>: CPT | Mod: S$GLB,,, | Performed by: OPTOMETRIST

## 2025-05-01 PROCEDURE — 92015 DETERMINE REFRACTIVE STATE: CPT | Mod: S$GLB,,, | Performed by: OPTOMETRIST

## 2025-05-01 PROCEDURE — 99999 PR PBB SHADOW E&M-EST. PATIENT-LVL III: CPT | Mod: PBBFAC,,, | Performed by: OPTOMETRIST

## 2025-05-01 NOTE — PROGRESS NOTES
"HPI     Concerns About Ocular Health            Comments: Patient Julio "Rocky Bernardo is a 74 year old male.          Comments    Pt here for annual eye exam. Pt states blurry VA OU at near in his phone.   Pt states that he has FBS with sticky discharge, uses Systane to help   treat. Pt states occasional flaoters in VA OU, especially when moving his   head. Pt denies any eye pain.    DLS: 09/16/2022 with Dr. Godoy  PD: 65.0 mm    Meds: Systane PRN OU    POHx:  1. Pseudophakia of both eyes      S/p YAG laser OU  2. Glaucoma screening  3. Astigmatism of both eyes with presbyopia            Last edited by Lorenza Olsen on 5/1/2025  9:56 AM.            Assessment /Plan     For exam results, see Encounter Report.    Routine eye exam    Glaucoma screening    Presbyopia      Sp pciol OU--wrote new spex Rx (large change axis OS--confirmed by trial frame)  TEODORA--pt to cont w ATs    PLAN:    Rtc 1 yr                   "

## 2025-05-25 ENCOUNTER — PATIENT MESSAGE (OUTPATIENT)
Dept: CARDIOLOGY | Facility: CLINIC | Age: 75
End: 2025-05-25
Payer: MEDICARE

## 2025-05-25 DIAGNOSIS — R06.09 DOE (DYSPNEA ON EXERTION): Primary | ICD-10-CM

## 2025-05-26 ENCOUNTER — TELEPHONE (OUTPATIENT)
Dept: CARDIOLOGY | Facility: CLINIC | Age: 75
End: 2025-05-26
Payer: MEDICARE

## 2025-05-26 NOTE — TELEPHONE ENCOUNTER
Patient with complaints of dyspnea on exertion.  We will schedule a TST and follow up with me the day of.

## 2025-05-28 DIAGNOSIS — M54.9 DORSALGIA, UNSPECIFIED: Primary | ICD-10-CM

## 2025-05-28 DIAGNOSIS — M99.05 SOMATIC DYSFUNCTION OF PELVIS REGION: ICD-10-CM

## 2025-05-28 DIAGNOSIS — M99.03 SOMATIC DYSFUNCTION OF LUMBAR REGION: ICD-10-CM

## 2025-05-30 ENCOUNTER — OFFICE VISIT (OUTPATIENT)
Dept: CARDIOLOGY | Facility: CLINIC | Age: 75
End: 2025-05-30
Payer: MEDICARE

## 2025-05-30 ENCOUNTER — HOSPITAL ENCOUNTER (OUTPATIENT)
Dept: CARDIOLOGY | Facility: HOSPITAL | Age: 75
Discharge: HOME | End: 2025-05-30
Attending: INTERNAL MEDICINE
Payer: MEDICARE

## 2025-05-30 DIAGNOSIS — I25.10 CORONARY ARTERY DISEASE INVOLVING NATIVE CORONARY ARTERY OF NATIVE HEART WITHOUT ANGINA PECTORIS: ICD-10-CM

## 2025-05-30 DIAGNOSIS — I49.3 PVC'S (PREMATURE VENTRICULAR CONTRACTIONS): Primary | ICD-10-CM

## 2025-05-30 DIAGNOSIS — I87.2 VENOUS INSUFFICIENCY: ICD-10-CM

## 2025-05-30 DIAGNOSIS — R06.09 DOE (DYSPNEA ON EXERTION): ICD-10-CM

## 2025-05-30 DIAGNOSIS — E78.2 MIXED HYPERLIPIDEMIA: ICD-10-CM

## 2025-05-30 DIAGNOSIS — I10 PRIMARY HYPERTENSION: ICD-10-CM

## 2025-05-30 PROBLEM — I47.29 NONSUSTAINED VENTRICULAR TACHYCARDIA: Status: RESOLVED | Noted: 2023-11-03 | Resolved: 2025-05-30

## 2025-05-30 LAB
CV STRESS BASE HR: 80 BPM
DIASTOLIC BLOOD PRESSURE: 77 MMHG
OHS CV CPX 1 MINUTE RECOVERY HEART RATE: 118 BPM
OHS CV CPX 85 PERCENT MAX PREDICTED HEART RATE MALE: 123
OHS CV CPX ESTIMATED METS: 8
OHS CV CPX MAX PREDICTED HEART RATE: 145
OHS CV CPX PATIENT IS FEMALE: 0
OHS CV CPX PATIENT IS MALE: 1
OHS CV CPX PEAK DIASTOLIC BLOOD PRESSURE: 68 MMHG
OHS CV CPX PEAK HEAR RATE: 126 BPM
OHS CV CPX PEAK RATE PRESSURE PRODUCT: NORMAL
OHS CV CPX PEAK SYSTOLIC BLOOD PRESSURE: 202 MMHG
OHS CV CPX PERCENT MAX PREDICTED HEART RATE ACHIEVED: 87
OHS CV CPX RATE PRESSURE PRODUCT PRESENTING: NORMAL
STRESS ECHO POST EXERCISE DUR MIN: 6 MINUTES
STRESS ECHO POST EXERCISE DUR SEC: 21 SECONDS
SYSTOLIC BLOOD PRESSURE: 151 MMHG

## 2025-05-30 PROCEDURE — 93016 CV STRESS TEST SUPVJ ONLY: CPT | Mod: ,,, | Performed by: INTERNAL MEDICINE

## 2025-05-30 PROCEDURE — 93017 CV STRESS TEST TRACING ONLY: CPT

## 2025-05-30 PROCEDURE — 99999 PR PBB SHADOW E&M-EST. PATIENT-LVL III: CPT | Mod: PBBFAC,,, | Performed by: INTERNAL MEDICINE

## 2025-05-30 PROCEDURE — 93018 CV STRESS TEST I&R ONLY: CPT | Mod: ,,, | Performed by: INTERNAL MEDICINE

## 2025-05-30 NOTE — PROGRESS NOTES
HISTORY:    74-year-old male with a history of CAD s/p LAD PCI July '23, hypertension, hyperlipidemia, PVCs, and OA s/p RLE TKR '23 presenting for follow-up.    The patient denies any symptoms of chest pain.     QUINTEROS stable. Exercise tolerance excellent. The patient exercises 5 days/week and does well. Can exercise through SOB.    Had one episode of SOB that caught his attention a week ago and we ordered a TST today that was unremarkable.     Long h/o palpitations dating back to a young age with known PVCs.    Patient does have intermittent le edema for years, mild.     Patient currently tolerates aspirin 81 x 1, losartan 100 x 1, verapamil 360 x 1, atorvastatin 80 x 1, ezetimibe 10 x 1.    Bps controlled.     PHYSICAL EXAM:    Vitals:    05/30/25 1525   BP: (P) 137/78   Pulse: (!) (P) 53     NAD, A+Ox3.  No jvd, no bruit.  RRR nml s1,s2. No murmurs.  CTA B no wheezes or crackles.  No edema.    LABS/STUDIES (imaging reviewed during clinic visit):    January 2024 CBC normal.  December 2024 CMP normal.  /HDL 47/LDL 49/TG 65.  A1c 5.9.  ECG May 2025 demonstrates sinus rhythm with no Q-waves or ST changes.  PVCs.  Holter   November 2023 sinus rhythm with a PVC burden of 3.3%.   Four beat run of AIVR, April 2023 sinus rhythm with a 13.9% PVC burden.  Some short runs of NSVT mostly 4-5 beats in duration.  1.5% Pac burden.    TST May 2025 6 minutes and 21 seconds on a high ramp protocol.  No evidence of ischemia.  PVCs/PACs.  CPX January 2024 7 minutes and 30 seconds on a high ramp protocol.  No ischemia.  PVCs.  TTE April 2023 normal LV size and function with EF 60%.  CVP 3.  Frequent ectopy.    PET stress July 2023 normal LVEF.  LAD and left circ calcification.  14% lateral perfusion abnormality involving 14% of LV myocardium and increasing to 16% with stress.  11% reversible defect in the LAD territory as well.    Cardiac catheterization July 2023 99% mid LAD stenosis status post successful PCI with TEODORA x1  (2.23d40st with IVUS guided post-dilation to 2.0 distally and 3.0 proximally). IVUS demonstrated a large, patent left main with mild luminal irregularities.  Patent left circ and large, dominant RCA system. LVEDP 20.    Venous us August 2023 No e/o DVT. Mild L GSV reflux.    ASSESSMENT & PLAN:    1. PVC's (premature ventricular contractions)    2. Primary hypertension    3. Mixed hyperlipidemia    4. Coronary artery disease involving native coronary artery of native heart without angina pectoris    5. Venous insufficiency                  Orders Placed This Encounter    CV US Lower Extremity Veins Bilateral Insufficiency    Echo         PVC burden improved on verapamil. 3% in '23.     CAD s/p LAD PCI. On asa 81x1 monotherapy. Nml TST today.     Bps controlled on verapamil 360x1 and losartan 100 x 1.     Patient with SUSANNAH. Defers CPAP.     LDL 49 on atorvastatin 80x1/ezetimibe 10 x 1.     Venous insufficiency, mild but cumbersome. Classic symptoms of edema and heaviness late in the day. Compression stockings as tolerated. Did not tolerate HCTZ (dehydration/constipation) and spironolactone (breast tenderness). Will repeat a venous us off of diurteic tx.    PVC management per Dr. Canela.     Patient has done well with weight loss. Keep it up.     Follow up in about 6 months (around 11/30/2025).      Shola Burns MD

## 2025-06-20 ENCOUNTER — LAB VISIT (OUTPATIENT)
Dept: LAB | Facility: HOSPITAL | Age: 75
End: 2025-06-20
Payer: MEDICARE

## 2025-06-20 DIAGNOSIS — R73.03 PREDIABETES: ICD-10-CM

## 2025-06-20 LAB
EAG (OHS): 128 MG/DL (ref 68–131)
HBA1C MFR BLD: 6.1 % (ref 4–5.6)

## 2025-06-20 PROCEDURE — 36415 COLL VENOUS BLD VENIPUNCTURE: CPT

## 2025-06-20 PROCEDURE — 83036 HEMOGLOBIN GLYCOSYLATED A1C: CPT

## 2025-06-27 ENCOUNTER — OFFICE VISIT (OUTPATIENT)
Dept: INTERNAL MEDICINE | Facility: CLINIC | Age: 75
End: 2025-06-27
Payer: MEDICARE

## 2025-06-27 VITALS
HEART RATE: 52 BPM | DIASTOLIC BLOOD PRESSURE: 68 MMHG | WEIGHT: 238.13 LBS | SYSTOLIC BLOOD PRESSURE: 126 MMHG | BODY MASS INDEX: 31.56 KG/M2 | OXYGEN SATURATION: 97 % | HEIGHT: 73 IN

## 2025-06-27 DIAGNOSIS — I49.3 PVC'S (PREMATURE VENTRICULAR CONTRACTIONS): ICD-10-CM

## 2025-06-27 DIAGNOSIS — N18.31 STAGE 3A CHRONIC KIDNEY DISEASE: Primary | ICD-10-CM

## 2025-06-27 DIAGNOSIS — I25.10 CORONARY ARTERY DISEASE INVOLVING NATIVE CORONARY ARTERY OF NATIVE HEART WITHOUT ANGINA PECTORIS: ICD-10-CM

## 2025-06-27 DIAGNOSIS — Z12.5 SCREENING PSA (PROSTATE SPECIFIC ANTIGEN): ICD-10-CM

## 2025-06-27 DIAGNOSIS — E78.5 HYPERLIPIDEMIA, UNSPECIFIED HYPERLIPIDEMIA TYPE: ICD-10-CM

## 2025-06-27 DIAGNOSIS — R73.03 PREDIABETES: ICD-10-CM

## 2025-06-27 PROBLEM — R29.3 ABNORMAL POSTURE: Status: RESOLVED | Noted: 2019-12-12 | Resolved: 2025-06-27

## 2025-06-27 PROBLEM — R05.3 CHRONIC COUGH: Status: RESOLVED | Noted: 2021-12-03 | Resolved: 2025-06-27

## 2025-06-27 PROCEDURE — 99999 PR PBB SHADOW E&M-EST. PATIENT-LVL III: CPT | Mod: PBBFAC,,, | Performed by: INTERNAL MEDICINE

## 2025-06-27 RX ORDER — EZETIMIBE 10 MG/1
10 TABLET ORAL DAILY
Qty: 90 TABLET | Refills: 3 | Status: SHIPPED | OUTPATIENT
Start: 2025-06-27

## 2025-06-27 RX ORDER — LOSARTAN POTASSIUM 100 MG/1
100 TABLET ORAL DAILY
Qty: 90 TABLET | Refills: 3 | Status: SHIPPED | OUTPATIENT
Start: 2025-06-27

## 2025-06-27 RX ORDER — ATORVASTATIN CALCIUM 80 MG/1
80 TABLET, FILM COATED ORAL DAILY
Qty: 90 TABLET | Refills: 3 | Status: SHIPPED | OUTPATIENT
Start: 2025-06-27

## 2025-06-27 RX ORDER — VERAPAMIL HYDROCHLORIDE 360 MG/1
360 CAPSULE, DELAYED RELEASE ORAL DAILY
Qty: 90 CAPSULE | Refills: 3 | Status: SHIPPED | OUTPATIENT
Start: 2025-06-27

## 2025-06-27 NOTE — PROGRESS NOTES
"  Mr. Bernardo is a 75  -year-old gentleman  coming in  for follow. I last saw him  in May 2023.       CAD-- had a Stent to LAD in July 2023-  Has a abnormal PET stress test-  Saw cards last ion 5/30/2025.              1. He has hypertension. He has been on losartan 100 mg a day-- and  verapamil .       2. He has hyperlipidemia. He is on atorvastatin. Cholesterol from this visit was reviewed---tc was 109, hdl 47  , LDL 49.0          3. . Colon polyps: He had 2 colon polyps in 2007, and 1 polyps in 9/2013--last colonoscopy was back in May of 2022.  Three polyps were removed.  If this colonoscopy should be and 2027.  This was reviewed today.  Bowels are normal if he eats a lot of fiber.          4. . Gout-- no recent gout attacks.       5. Chronic neck pain-- pretty stable he plans to get massage soon-- worse on the right vs the left.  He says it is doing terrible and he is going to get massage after he leaves today .      6. Ckd stage 3 A--  recent creatinine was 1.4-- GFR was 52.4    7. Prediabetes-- 6.1-  trying to eat better.      8. Colon polyps-  had c-scope in 2022-- due again in 2027.    9.  Has a solitary pulmonary lesion-- following with  Pulmonary ion 1/27/2025.  Had  a cavitary lung lesion-  noticed when he had covid-- He needs another follow up ct in  or around Dec [address]-- already ordered.     Last PSA was 1.2 in Dec 2024.          ROS : Gen - no fatigue --   has lost  17 # since last visit.    Eyes - no eye pain or visual changes  ENT - no hoarseness or sore throat  CV - as above  Pulm - no cough or wheezing  GI - no N/V/D   no dysuria or incontinence  MS - no joint pain or muscle pain  Skin - no rash, or c/o of skin lesions  Neuro - no HA, dizziness--- memory is doing well.   Heme - no abnormal bleeding or bruising  Endo - no polydipsia, or temperature changes  Psych - no anxiety or depression             /68 (BP Location: Left arm, Patient Position: Sitting)   Pulse (!) 52   Ht 6' 1" (1.854 m) "   Wt 108 kg (238 lb 1.6 oz)   SpO2 97%   BMI 31.41 kg/m²        PHYSICAL EXAM: A well-appearing gentleman.   NECK: Supple with no JVD. Thyroid is not enlarged.   Chest : CTA bilaterally   CARDIOVASCULAR: S1, S2, regular rate and rhythm without murmur, gallop, or  rub.   Lungs: Clear bilaterally   ABDOMEN: Soft, nontender.   LOWER EXTREMITIES: No edema  He has no cervical, axillary, or inguinal adenopathy.   Bicep reflexs: nomal and equal bilaterally  Lower extremities: Normal muscle strength. No edema or cyanosis. TP/PT pulses+2 bilaterally.bilateral LE edema        ASSESSMENT:   Palpitations With some V-tach and Atrial tachycardia-- He needs to see EP for this before he has his surgery       1. Hyperlipidemia. continue the atorvastatin.   2. Hypertension.  continue current meds-   3. Obesity. Spoke to him about diet and exercise. He actually seems to be exercising pretty well. L   4. Colon polyp- c-scope due  again in 5/2027-- has 3 polyps on 5/2022 c-scope    5. Gout-  No  recent episode. - We reviewed the gout diet.  6  ckd stage 3 A-- stable-- will monitor-- avoid nsaids    7. Has a cavitary lung lesion following with pulmonary for this.  PET reviewed from 2/2022.    reviewed Pulmonary note -- follow up 12/2025.         Follow up in 6 months with labs    Our office providers are the focal point for all needed health care services that are part of ongoing care related to a patient's single serious condition or the patient's complex conditions.

## 2025-07-07 ENCOUNTER — HOSPITAL ENCOUNTER (OUTPATIENT)
Dept: CARDIOLOGY | Facility: HOSPITAL | Age: 75
Discharge: HOME | End: 2025-07-07
Attending: INTERNAL MEDICINE
Payer: MEDICARE

## 2025-07-07 VITALS
HEART RATE: 50 BPM | BODY MASS INDEX: 31.54 KG/M2 | HEIGHT: 73 IN | SYSTOLIC BLOOD PRESSURE: 137 MMHG | DIASTOLIC BLOOD PRESSURE: 75 MMHG | WEIGHT: 238 LBS

## 2025-07-07 DIAGNOSIS — I87.2 VENOUS INSUFFICIENCY: ICD-10-CM

## 2025-07-07 DIAGNOSIS — I49.3 PVC'S (PREMATURE VENTRICULAR CONTRACTIONS): ICD-10-CM

## 2025-07-07 LAB
AORTIC SIZE INDEX (SOV): 1.3 CM/M2
AORTIC SIZE INDEX: 1.2 CM/M2
APICAL FOUR CHAMBER EJECTION FRACTION: 67 %
APICAL TWO CHAMBER EJECTION FRACTION: 56 %
ASCENDING AORTA: 2.8 CM
AV AREA BY CONTINUOUS VTI: 2.2 CM2
AV INDEX (PROSTH): 0.7
AV LVOT MEAN GRADIENT: 2 MMHG
AV LVOT PEAK GRADIENT: 5 MMHG
AV MEAN GRADIENT: 5 MMHG
AV PEAK GRADIENT: 10 MMHG
AV VALVE AREA BY VELOCITY RATIO: 2.2 CM²
AV VALVE AREA: 2.2 CM²
AV VELOCITY RATIO: 0.69
BSA FOR ECHO PROCEDURE: 2.36 M2
CV ECHO LV RWT: 0.24 CM
DOP CALC AO PEAK VEL: 1.6 M/S
DOP CALC AO VTI: 35 CM
DOP CALC LVOT AREA: 3.1 CM2
DOP CALC LVOT DIAMETER: 2 CM
DOP CALC LVOT PEAK VEL: 1.1 M/S
DOP CALC LVOT STROKE VOLUME: 76.9 CM3
DOP CALC MV VTI: 40.8 CM
DOP CALC RVOT AREA: 2.63 CM2
DOP CALC RVOT DIAMETER: 1.83 CM
DOP CALCLVOT PEAK VEL VTI: 24.5 CM
E WAVE DECELERATION TIME: 210 MSEC
E/A RATIO: 1.19
E/E' RATIO: 11 M/S
ECHO EF ESTIMATED: 65 %
ECHO LV POSTERIOR WALL: 0.6 CM (ref 0.6–1.1)
FRACTIONAL SHORTENING: 37.3 % (ref 28–44)
HR MV ECHO: 50 BPM
INTERVENTRICULAR SEPTUM: 0.6 CM (ref 0.6–1.1)
IVC DIAMETER: 2 CM
IVRT: 63 MSEC
LA MAJOR: 6.7 CM
LA MINOR: 6.6 CM
LA WIDTH: 4.5 CM
LEFT ATRIUM AREA SYSTOLIC (APICAL 2 CHAMBER): 25.69 CM2
LEFT ATRIUM AREA SYSTOLIC (APICAL 4 CHAMBER): 26.92 CM2
LEFT ATRIUM SIZE: 3.7 CM
LEFT ATRIUM VOLUME INDEX MOD: 36 ML/M2
LEFT ATRIUM VOLUME INDEX: 41 ML/M2
LEFT ATRIUM VOLUME MOD: 84 ML
LEFT ATRIUM VOLUME: 94 CM3
LEFT GREAT SAPHENOUS DISTAL THIGH DIA: 0.23 CM
LEFT GREAT SAPHENOUS JUNCTION DIA: 0.6 CM
LEFT GREAT SAPHENOUS KNEE DIA: 0.21 CM
LEFT GREAT SAPHENOUS MIDDLE THIGH DIA: 0.36 CM
LEFT GREAT SAPHENOUS PROXIMAL CALF DIA: 0.16 CM
LEFT GREAT SAPHENOUS PROXIMAL CALF REFLUX: 891 MS
LEFT INTERNAL DIMENSION IN SYSTOLE: 3.2 CM (ref 2.1–4)
LEFT SMALL SAPHENOUS KNEE DIA: 0.24 CM
LEFT SMALL SAPHENOUS SPJ DIA: 0.2 CM
LEFT VENTRICLE DIASTOLIC VOLUME INDEX: 52.16 ML/M2
LEFT VENTRICLE DIASTOLIC VOLUME: 121 ML
LEFT VENTRICLE END DIASTOLIC VOLUME APICAL 2 CHAMBER: 113.82 ML
LEFT VENTRICLE END DIASTOLIC VOLUME APICAL 4 CHAMBER: 87.62 ML
LEFT VENTRICLE END SYSTOLIC VOLUME APICAL 2 CHAMBER: 82.04 ML
LEFT VENTRICLE END SYSTOLIC VOLUME APICAL 4 CHAMBER: 85.73 ML
LEFT VENTRICLE MASS INDEX: 42.4 G/M2
LEFT VENTRICLE SYSTOLIC VOLUME INDEX: 18.1 ML/M2
LEFT VENTRICLE SYSTOLIC VOLUME: 42 ML
LEFT VENTRICULAR INTERNAL DIMENSION IN DIASTOLE: 5.1 CM (ref 3.5–6)
LEFT VENTRICULAR MASS: 98.3 G
LV LATERAL E/E' RATIO: 10.6 M/S
LV SEPTAL E/E' RATIO: 11.8 M/S
LVED V (TEICH): 121.47 ML
LVES V (TEICH): 42.1 ML
LVOT MG: 2.42 MMHG
LVOT MV: 0.72 CM/S
MV A" WAVE DURATION": 108.47 MS
MV MEAN GRADIENT: 2 MMHG
MV PEAK A VEL: 0.89 M/S
MV PEAK E VEL: 1.06 M/S
MV PEAK GRADIENT: 4 MMHG
MV STENOSIS PRESSURE HALF TIME: 60.98 MS
MV VALVE AREA BY CONTINUITY EQUATION: 1.89 CM2
MV VALVE AREA P 1/2 METHOD: 3.61 CM2
OHS CV RV/LV RATIO: 0.76 CM
OHS LV EJECTION FRACTION SIMPSONS BIPLANE MOD: 61 %
PISA TR MAX VEL: 3.1 M/S
PULM VEIN A" WAVE DURATION": 108.47 MS
PULM VEIN S/D RATIO: 1.01
PULMONIC VEIN PEAK A VELOCITY: 0.2 M/S
PV PEAK D VEL: 0.9 M/S
PV PEAK S VEL: 0.91 M/S
RA MAJOR: 5.91 CM
RA PRESSURE ESTIMATED: 3 MMHG
RA VOL SYS: 68.62 ML
RA WIDTH: 4.51 CM
RIGHT ATRIAL AREA: 21.6 CM2
RIGHT ATRIUM END SYSTOLIC VOLUME APICAL 4 CHAMBER INDEX BSA: 28.77 ML/M2
RIGHT ATRIUM VOLUME AREA LENGTH APICAL 4 CHAMBER: 66.75 ML
RIGHT GREAT SAPHENOUS DISTAL THIGH DIA: 0.18 CM
RIGHT GREAT SAPHENOUS JUNCTION DIA: 0.78 CM
RIGHT GREAT SAPHENOUS KNEE DIA: 0.22 CM
RIGHT GREAT SAPHENOUS MIDDLE THIGH DIA: 0.34 CM
RIGHT GREAT SAPHENOUS PROXIMAL CALF DIA: 0.16 CM
RIGHT SMALL SAPHENOUS KNEE DIA: 0.3 CM
RIGHT SMALL SAPHENOUS SPJ DIA: 0.26 CM
RIGHT VENTRICLE DIASTOLIC BASEL DIMENSION: 3.9 CM
RIGHT VENTRICULAR END-DIASTOLIC DIMENSION: 3.86 CM
RV TB RVSP: 6 MMHG
RV TISSUE DOPPLER FREE WALL SYSTOLIC VELOCITY 1 (APICAL 4 CHAMBER VIEW): 14.67 CM/S
SINUS: 3 CM
STJ: 2.3 CM
TDI LATERAL: 0.1 M/S
TDI SEPTAL: 0.09 M/S
TDI: 0.1 M/S
TR MAX PG: 38 MMHG
TRICUSPID ANNULAR PLANE SYSTOLIC EXCURSION: 2.1 CM
TV PEAK GRADIENT: 38 MMHG
TV REST PULMONARY ARTERY PRESSURE: 41 MMHG
Z-SCORE OF LEFT VENTRICULAR DIMENSION IN END DIASTOLE: -5.96
Z-SCORE OF LEFT VENTRICULAR DIMENSION IN END SYSTOLE: -4.39

## 2025-07-07 PROCEDURE — 93306 TTE W/DOPPLER COMPLETE: CPT

## 2025-07-07 PROCEDURE — 93970 EXTREMITY STUDY: CPT | Mod: 26,,, | Performed by: INTERNAL MEDICINE

## 2025-07-07 PROCEDURE — 93306 TTE W/DOPPLER COMPLETE: CPT | Mod: 26,,, | Performed by: INTERNAL MEDICINE

## 2025-07-07 PROCEDURE — 93970 EXTREMITY STUDY: CPT | Mod: TC

## (undated) DEVICE — GUIDEWIRE RUNTHROUGH EF 180CM

## (undated) DEVICE — GLOVE BIOGEL SKINSENSE PI 6.5

## (undated) DEVICE — KIT MANIFOLD LOW PRESS TUBING

## (undated) DEVICE — PAD DEFIB CADENCE ADULT R2

## (undated) DEVICE — GAUZE SPONGE 4X4 12PLY

## (undated) DEVICE — GUIDEWIRE EMERALD 150CM PTFE

## (undated) DEVICE — TOURNIQUET SB QC DP 34X4IN

## (undated) DEVICE — DRESSING N ADH OIL EMUL 3X3

## (undated) DEVICE — INFLATOR ENCORE 26 BLLN INFL

## (undated) DEVICE — GLOVE PROTEXIS LIGHT BROWN 8.5

## (undated) DEVICE — SET INFLOW TUBE ARTHSCP

## (undated) DEVICE — GUIDE LAUNCHER 6FR EBU 3.5

## (undated) DEVICE — CATH EMERGE MR 12 X 2.00

## (undated) DEVICE — SOL NACL IRR 3000ML

## (undated) DEVICE — SUT ETHILON 3-0 PS2 18 BLK

## (undated) DEVICE — DRAPE ARTHSCP FLD CTRL POUCH

## (undated) DEVICE — KIT COPILOT VALVE HEMO TOOL

## (undated) DEVICE — CATH EAGLE EYE PLATINUM

## (undated) DEVICE — HEMOSTAT VASC BAND LONG 27CM

## (undated) DEVICE — CATH NC EMERGE MR 3X12MM

## (undated) DEVICE — COVER BAND BAG 28 X 12

## (undated) DEVICE — COVER PROBE US 5.5X58L NON LTX

## (undated) DEVICE — DRAPE TOP 53X102IN

## (undated) DEVICE — GUIDE LAUNCHER 6FR EBU 4.0

## (undated) DEVICE — PAD CAST SPECIALIST STRL 6

## (undated) DEVICE — KIT GLIDESHEATH SLEND 6FR 10CM

## (undated) DEVICE — COVER PROXIMA MAYO STAND

## (undated) DEVICE — DRAPE ANGIO BRACH 38X44IN

## (undated) DEVICE — SOL IRR SOD CHL .9% POUR

## (undated) DEVICE — GLOVE BIOGEL SKINSENSE PI 8.0

## (undated) DEVICE — CATH RADIAL TIG 6FR 4.0 110CM

## (undated) DEVICE — CATH NC EMERGE MR 2X12X143

## (undated) DEVICE — OMNIPAQUE 350MG 150ML VIAL

## (undated) DEVICE — WIRE GUIDE SAFE-T-J .035 260CM

## (undated) DEVICE — Device

## (undated) DEVICE — GOWN ECLIPSE REINF L4 XLNG XXL

## (undated) DEVICE — KIT SYR REUSABLE

## (undated) DEVICE — UNDERGLOVES BIOGEL PI SZ 7 LF

## (undated) DEVICE — ANGIOTOUCH KIT

## (undated) DEVICE — TRAY CATH LAB OMC

## (undated) DEVICE — GLOVE PROTEXIS LTX MICRO 8

## (undated) DEVICE — BANDAGE MATRIX HK LOOP 6IN 5YD

## (undated) DEVICE — COVER CAMERA OPERATING ROOM

## (undated) DEVICE — UNDERGLOVES BIOGEL PI SIZE 8

## (undated) DEVICE — COVER LIGHT HANDLE 80/CA

## (undated) DEVICE — DRAPE STERI U-SHAPED 47X51IN

## (undated) DEVICE — NDL SAFETY 22G X 1.5 ECLIPSE

## (undated) DEVICE — APPLICATOR CHLORAPREP ORN 26ML

## (undated) DEVICE — BLADE SHAVER 15D 13CMX4.2MM

## (undated) DEVICE — GOWN ECLIPSE REINF LVL4 TWL LG